# Patient Record
Sex: FEMALE | Race: WHITE | NOT HISPANIC OR LATINO | Employment: OTHER | URBAN - METROPOLITAN AREA
[De-identification: names, ages, dates, MRNs, and addresses within clinical notes are randomized per-mention and may not be internally consistent; named-entity substitution may affect disease eponyms.]

---

## 2017-01-31 RX ORDER — FLUTICASONE PROPIONATE 50 MCG
SPRAY, SUSPENSION (ML) NASAL
Qty: 1 BOTTLE | Refills: 2 | Status: SHIPPED | OUTPATIENT
Start: 2017-01-31 | End: 2017-08-28 | Stop reason: SDUPTHER

## 2017-02-23 RX ORDER — RANITIDINE 150 MG/1
TABLET ORAL
Qty: 180 TABLET | Refills: 1 | Status: SHIPPED | OUTPATIENT
Start: 2017-02-23 | End: 2017-07-10 | Stop reason: SDUPTHER

## 2017-02-23 RX ORDER — LOSARTAN POTASSIUM 50 MG/1
TABLET ORAL
Qty: 90 TABLET | Refills: 1 | Status: SHIPPED | OUTPATIENT
Start: 2017-02-23 | End: 2017-03-20 | Stop reason: HOSPADM

## 2017-02-23 RX ORDER — PRIMIDONE 50 MG/1
TABLET ORAL
Qty: 180 TABLET | Refills: 1 | Status: SHIPPED | OUTPATIENT
Start: 2017-02-23 | End: 2017-07-10 | Stop reason: SDUPTHER

## 2017-02-23 RX ORDER — SERTRALINE HYDROCHLORIDE 100 MG/1
TABLET, FILM COATED ORAL
Qty: 90 TABLET | Refills: 1 | Status: SHIPPED | OUTPATIENT
Start: 2017-02-23 | End: 2017-07-10 | Stop reason: SDUPTHER

## 2017-02-23 RX ORDER — TEMAZEPAM 15 MG/1
CAPSULE ORAL
Qty: 90 CAPSULE | Refills: 0 | Status: ON HOLD | OUTPATIENT
Start: 2017-02-23 | End: 2017-03-20

## 2017-03-14 ENCOUNTER — TELEPHONE (OUTPATIENT)
Dept: FAMILY MEDICINE CLINIC | Facility: CLINIC | Age: 82
End: 2017-03-14

## 2017-03-14 ENCOUNTER — HOSPITAL ENCOUNTER (INPATIENT)
Facility: HOSPITAL | Age: 82
LOS: 5 days | Discharge: NURSING, REHAB OR LONG-TERM CARE | End: 2017-03-20
Attending: EMERGENCY MEDICINE | Admitting: INTERNAL MEDICINE

## 2017-03-14 DIAGNOSIS — L03.116 LEFT LEG CELLULITIS: Primary | ICD-10-CM

## 2017-03-14 LAB
ALBUMIN SERPL-MCNC: 3.8 G/DL (ref 3.5–5.2)
ALBUMIN/GLOB SERPL: 1.1 G/DL
ALP SERPL-CCNC: 88 U/L (ref 39–117)
ALT SERPL W P-5'-P-CCNC: 11 U/L (ref 1–33)
ANION GAP SERPL CALCULATED.3IONS-SCNC: 8.7 MMOL/L
AST SERPL-CCNC: 19 U/L (ref 1–32)
BASOPHILS # BLD AUTO: 0.02 10*3/MM3 (ref 0–0.2)
BASOPHILS NFR BLD AUTO: 0.4 % (ref 0–1.5)
BILIRUB SERPL-MCNC: 0.4 MG/DL (ref 0.1–1.2)
BUN BLD-MCNC: 32 MG/DL (ref 8–23)
BUN/CREAT SERPL: 22.5 (ref 7–25)
CALCIUM SPEC-SCNC: 9.1 MG/DL (ref 8.2–9.6)
CHLORIDE SERPL-SCNC: 101 MMOL/L (ref 98–107)
CO2 SERPL-SCNC: 28.3 MMOL/L (ref 22–29)
CREAT BLD-MCNC: 1.42 MG/DL (ref 0.57–1)
DEPRECATED RDW RBC AUTO: 46.4 FL (ref 37–54)
EOSINOPHIL # BLD AUTO: 0.15 10*3/MM3 (ref 0–0.7)
EOSINOPHIL NFR BLD AUTO: 2.7 % (ref 0.3–6.2)
ERYTHROCYTE [DISTWIDTH] IN BLOOD BY AUTOMATED COUNT: 13.1 % (ref 11.7–13)
GFR SERPL CREATININE-BSD FRML MDRD: 35 ML/MIN/1.73
GLOBULIN UR ELPH-MCNC: 3.4 GM/DL
GLUCOSE BLD-MCNC: 135 MG/DL (ref 65–99)
HCT VFR BLD AUTO: 35.2 % (ref 35.6–45.5)
HGB BLD-MCNC: 11.1 G/DL (ref 11.9–15.5)
IMM GRANULOCYTES # BLD: 0 10*3/MM3 (ref 0–0.03)
IMM GRANULOCYTES NFR BLD: 0 % (ref 0–0.5)
LYMPHOCYTES # BLD AUTO: 1.42 10*3/MM3 (ref 0.9–4.8)
LYMPHOCYTES NFR BLD AUTO: 25.8 % (ref 19.6–45.3)
MCH RBC QN AUTO: 30.7 PG (ref 26.9–32)
MCHC RBC AUTO-ENTMCNC: 31.5 G/DL (ref 32.4–36.3)
MCV RBC AUTO: 97.2 FL (ref 80.5–98.2)
MONOCYTES # BLD AUTO: 0.43 10*3/MM3 (ref 0.2–1.2)
MONOCYTES NFR BLD AUTO: 7.8 % (ref 5–12)
NEUTROPHILS # BLD AUTO: 3.48 10*3/MM3 (ref 1.9–8.1)
NEUTROPHILS NFR BLD AUTO: 63.3 % (ref 42.7–76)
PLATELET # BLD AUTO: 201 10*3/MM3 (ref 140–500)
PMV BLD AUTO: 10 FL (ref 6–12)
POTASSIUM BLD-SCNC: 5 MMOL/L (ref 3.5–5.2)
PROT SERPL-MCNC: 7.2 G/DL (ref 6–8.5)
RBC # BLD AUTO: 3.62 10*6/MM3 (ref 3.9–5.2)
SODIUM BLD-SCNC: 138 MMOL/L (ref 136–145)
WBC NRBC COR # BLD: 5.5 10*3/MM3 (ref 4.5–10.7)

## 2017-03-14 PROCEDURE — 85025 COMPLETE CBC W/AUTO DIFF WBC: CPT | Performed by: EMERGENCY MEDICINE

## 2017-03-14 PROCEDURE — 80053 COMPREHEN METABOLIC PANEL: CPT | Performed by: EMERGENCY MEDICINE

## 2017-03-14 PROCEDURE — 36415 COLL VENOUS BLD VENIPUNCTURE: CPT | Performed by: EMERGENCY MEDICINE

## 2017-03-14 PROCEDURE — 99284 EMERGENCY DEPT VISIT MOD MDM: CPT

## 2017-03-14 RX ORDER — MULTIVITAMIN
1 TABLET ORAL
COMMUNITY
End: 2017-03-20 | Stop reason: HOSPADM

## 2017-03-14 NOTE — ED NOTES
"Pt reports she \"scratched\" her leg and has had swelling and redness in it since. LLE visibly swollen, red with dry drainage present. Pt reports she is diabetic and was seen by a podiatrist last Tues but leg has gotten worse. Resp even and unlabored. Pt in [place].     Alivia Pan RN  03/14/17 5292    "

## 2017-03-14 NOTE — TELEPHONE ENCOUNTER
Pt says that her leg is bleeding off and on red and swollen and I told her that she should go to er for evaluation. jm

## 2017-03-15 ENCOUNTER — APPOINTMENT (OUTPATIENT)
Dept: GENERAL RADIOLOGY | Facility: HOSPITAL | Age: 82
End: 2017-03-15
Attending: INTERNAL MEDICINE

## 2017-03-15 PROBLEM — L03.116 LEFT LEG CELLULITIS: Status: ACTIVE | Noted: 2017-03-15

## 2017-03-15 PROBLEM — N18.30 CKD (CHRONIC KIDNEY DISEASE) STAGE 3, GFR 30-59 ML/MIN (HCC): Status: ACTIVE | Noted: 2017-03-15

## 2017-03-15 LAB
GLUCOSE BLDC GLUCOMTR-MCNC: 119 MG/DL (ref 70–130)
GLUCOSE BLDC GLUCOMTR-MCNC: 132 MG/DL (ref 70–130)
GLUCOSE BLDC GLUCOMTR-MCNC: 175 MG/DL (ref 70–130)
GLUCOSE BLDC GLUCOMTR-MCNC: 98 MG/DL (ref 70–130)

## 2017-03-15 PROCEDURE — 25010000002 MEROPENEM: Performed by: EMERGENCY MEDICINE

## 2017-03-15 PROCEDURE — 25010000002 ENOXAPARIN PER 10 MG: Performed by: INTERNAL MEDICINE

## 2017-03-15 PROCEDURE — 87205 SMEAR GRAM STAIN: CPT | Performed by: EMERGENCY MEDICINE

## 2017-03-15 PROCEDURE — 73590 X-RAY EXAM OF LOWER LEG: CPT

## 2017-03-15 PROCEDURE — 87147 CULTURE TYPE IMMUNOLOGIC: CPT | Performed by: EMERGENCY MEDICINE

## 2017-03-15 PROCEDURE — 87070 CULTURE OTHR SPECIMN AEROBIC: CPT | Performed by: EMERGENCY MEDICINE

## 2017-03-15 PROCEDURE — 82962 GLUCOSE BLOOD TEST: CPT

## 2017-03-15 PROCEDURE — 25010000002 HYDRALAZINE PER 20 MG: Performed by: HOSPITALIST

## 2017-03-15 PROCEDURE — 25010000002 HYDRALAZINE PER 20 MG: Performed by: INTERNAL MEDICINE

## 2017-03-15 PROCEDURE — 25010000002 VANCOMYCIN: Performed by: EMERGENCY MEDICINE

## 2017-03-15 PROCEDURE — 87186 SC STD MICRODIL/AGAR DIL: CPT | Performed by: EMERGENCY MEDICINE

## 2017-03-15 PROCEDURE — 63710000001 INSULIN ASPART PER 5 UNITS: Performed by: INTERNAL MEDICINE

## 2017-03-15 PROCEDURE — 25010000002 MEROPENEM: Performed by: INTERNAL MEDICINE

## 2017-03-15 PROCEDURE — 87040 BLOOD CULTURE FOR BACTERIA: CPT | Performed by: EMERGENCY MEDICINE

## 2017-03-15 RX ORDER — DEXTROSE MONOHYDRATE 25 G/50ML
25 INJECTION, SOLUTION INTRAVENOUS
Status: DISCONTINUED | OUTPATIENT
Start: 2017-03-15 | End: 2017-03-20 | Stop reason: HOSPADM

## 2017-03-15 RX ORDER — HYDRALAZINE HYDROCHLORIDE 20 MG/ML
20 INJECTION INTRAMUSCULAR; INTRAVENOUS EVERY 6 HOURS PRN
Status: DISCONTINUED | OUTPATIENT
Start: 2017-03-15 | End: 2017-03-20 | Stop reason: HOSPADM

## 2017-03-15 RX ORDER — HYDROCODONE BITARTRATE AND ACETAMINOPHEN 5; 325 MG/1; MG/1
1 TABLET ORAL EVERY 4 HOURS PRN
Status: DISCONTINUED | OUTPATIENT
Start: 2017-03-15 | End: 2017-03-20 | Stop reason: HOSPADM

## 2017-03-15 RX ORDER — ACETAMINOPHEN 325 MG/1
650 TABLET ORAL EVERY 4 HOURS PRN
Status: DISCONTINUED | OUTPATIENT
Start: 2017-03-15 | End: 2017-03-20 | Stop reason: HOSPADM

## 2017-03-15 RX ORDER — SODIUM CHLORIDE 0.9 % (FLUSH) 0.9 %
1-10 SYRINGE (ML) INJECTION AS NEEDED
Status: DISCONTINUED | OUTPATIENT
Start: 2017-03-15 | End: 2017-03-20 | Stop reason: HOSPADM

## 2017-03-15 RX ORDER — SERTRALINE HYDROCHLORIDE 100 MG/1
100 TABLET, FILM COATED ORAL NIGHTLY
Status: DISCONTINUED | OUTPATIENT
Start: 2017-03-15 | End: 2017-03-20 | Stop reason: HOSPADM

## 2017-03-15 RX ORDER — FAMOTIDINE 20 MG/1
20 TABLET, FILM COATED ORAL 2 TIMES DAILY
Status: DISCONTINUED | OUTPATIENT
Start: 2017-03-15 | End: 2017-03-15 | Stop reason: DRUGHIGH

## 2017-03-15 RX ORDER — HYDRALAZINE HYDROCHLORIDE 20 MG/ML
10 INJECTION INTRAMUSCULAR; INTRAVENOUS ONCE
Status: COMPLETED | OUTPATIENT
Start: 2017-03-15 | End: 2017-03-15

## 2017-03-15 RX ORDER — AMLODIPINE BESYLATE 5 MG/1
5 TABLET ORAL
Status: DISCONTINUED | OUTPATIENT
Start: 2017-03-15 | End: 2017-03-20 | Stop reason: HOSPADM

## 2017-03-15 RX ORDER — PRIMIDONE 50 MG/1
50 TABLET ORAL EVERY 12 HOURS SCHEDULED
Status: DISCONTINUED | OUTPATIENT
Start: 2017-03-15 | End: 2017-03-20 | Stop reason: HOSPADM

## 2017-03-15 RX ORDER — FAMOTIDINE 20 MG/1
20 TABLET, FILM COATED ORAL DAILY
Status: DISCONTINUED | OUTPATIENT
Start: 2017-03-16 | End: 2017-03-20 | Stop reason: HOSPADM

## 2017-03-15 RX ORDER — TEMAZEPAM 15 MG/1
15 CAPSULE ORAL NIGHTLY
Status: DISCONTINUED | OUTPATIENT
Start: 2017-03-15 | End: 2017-03-20 | Stop reason: HOSPADM

## 2017-03-15 RX ORDER — NICOTINE POLACRILEX 4 MG
15 LOZENGE BUCCAL
Status: DISCONTINUED | OUTPATIENT
Start: 2017-03-15 | End: 2017-03-20 | Stop reason: HOSPADM

## 2017-03-15 RX ORDER — FLUTICASONE PROPIONATE 50 MCG
2 SPRAY, SUSPENSION (ML) NASAL DAILY
Status: DISCONTINUED | OUTPATIENT
Start: 2017-03-15 | End: 2017-03-20 | Stop reason: HOSPADM

## 2017-03-15 RX ADMIN — FAMOTIDINE 20 MG: 20 TABLET, FILM COATED ORAL at 11:06

## 2017-03-15 RX ADMIN — VANCOMYCIN HYDROCHLORIDE 1500 MG: 1 INJECTION, POWDER, LYOPHILIZED, FOR SOLUTION INTRAVENOUS at 01:49

## 2017-03-15 RX ADMIN — MEROPENEM 500 MG: 500 INJECTION, POWDER, FOR SOLUTION INTRAVENOUS at 21:01

## 2017-03-15 RX ADMIN — MEROPENEM 1 G: 1 INJECTION, POWDER, FOR SOLUTION INTRAVENOUS at 07:26

## 2017-03-15 RX ADMIN — PRIMIDONE 50 MG: 50 TABLET ORAL at 11:06

## 2017-03-15 RX ADMIN — SERTRALINE 100 MG: 100 TABLET, FILM COATED ORAL at 21:03

## 2017-03-15 RX ADMIN — AMLODIPINE BESYLATE 5 MG: 5 TABLET ORAL at 11:06

## 2017-03-15 RX ADMIN — PRIMIDONE 50 MG: 50 TABLET ORAL at 21:03

## 2017-03-15 RX ADMIN — ENOXAPARIN SODIUM 30 MG: 30 INJECTION SUBCUTANEOUS at 11:11

## 2017-03-15 RX ADMIN — INSULIN ASPART 2 UNITS: 100 INJECTION, SOLUTION INTRAVENOUS; SUBCUTANEOUS at 11:07

## 2017-03-15 RX ADMIN — HYDRALAZINE HYDROCHLORIDE 20 MG: 20 INJECTION INTRAMUSCULAR; INTRAVENOUS at 23:05

## 2017-03-15 RX ADMIN — HYDRALAZINE HYDROCHLORIDE 10 MG: 20 INJECTION INTRAMUSCULAR; INTRAVENOUS at 07:42

## 2017-03-15 RX ADMIN — TEMAZEPAM 15 MG: 15 CAPSULE ORAL at 21:04

## 2017-03-15 NOTE — ED PROVIDER NOTES
EMERGENCY DEPARTMENT ENCOUNTER    CHIEF COMPLAINT  Chief Complaint: lower extremity issue   History given by: pt  History limited by: none  Room Number: 17/17  PMD: Fly Sanchez DO      HPI:  Pt is a 89 y.o. female with hx of diabetes who presents complaining of redness and swelling to left lower extremity which has worsened over the past two days. She has been seeing a pediatrsit for for an area on her ankle, and reports it began itching and the redness worsened after scratching. She denies fever, CP, SOA and other complaints at this time. Hx of cellulitis on RLE.       Duration:  2 days   Onset: gradual   Timing: constant   Location: LLE  Radiation: none  Quality: redness and swelling   Intensity/Severity: moderate   Progression: worsening   Associated Symptoms: none  Aggravating Factors: none  Alleviating Factors: none  Previous Episodes: Hx of cellulitis on RLE.   Treatment before arrival: none    PAST MEDICAL HISTORY  Active Ambulatory Problems     Diagnosis Date Noted   • Adjustment disorder with mixed anxiety and depressed mood 03/25/2016   • Anemia 03/25/2016   • Benign essential hypertension 03/25/2016   • Hereditary essential tremor 03/25/2016   • Type 2 diabetes mellitus with diabetic polyneuropathy, without long-term current use of insulin 03/25/2016   • Dry skin dermatitis 03/25/2016   • Dyslipidemia 03/25/2016   • Gastroesophageal reflux disease with esophagitis 03/25/2016   • Pain of hand 03/25/2016   • Hearing loss 03/25/2016   • Arthralgia of hip 03/25/2016   • Impacted cerumen 03/25/2016   • Pruritus 03/25/2016   • Knee pain 03/25/2016   • Pain in extremity 03/25/2016   • Chronic low back pain 03/25/2016   • Weakness of lower extremity 03/25/2016   • Spinal stenosis of lumbar region 03/25/2016   • Senile osteoporosis 03/25/2016   • Piriformis syndrome 03/25/2016   • Primary insomnia 03/25/2016   • Tinea pedis 03/25/2016   • Vitamin D deficiency 03/25/2016     Resolved Ambulatory Problems      Diagnosis Date Noted   • Acute otitis externa 03/25/2016   • Acute sinusitis 03/25/2016   • Chronic sinusitis 03/25/2016     Past Medical History   Diagnosis Date   • Anxiety    • Atrophy of hand muscles    • Benign familial tremor    • Carpal tunnel syndrome    • Cataract    • Chronic rhinosinusitis    • Depression    • Diabetes mellitus    • Esophagitis, reflux    • Fracture of hip    • Frequent falls    • GERD (gastroesophageal reflux disease)    • Hand pain    • Hip pain    • Hyperlipidemia    • Hypertension    • Insomnia    • Limb pain    • Loss of hearing    • Low back pain    • Lower extremity weakness    • Lumbar canal stenosis    • Osteoporosis, senile    • Renal insufficiency 2006   • Sensorineural hearing loss    • Stroke 2004       PAST SURGICAL HISTORY  Past Surgical History   Procedure Laterality Date   • Colonoscopy     • Varicose vein surgery Right    • Fracture surgery Left 2006     HIP   • Cholecystectomy  2013   • Ercp w/ sphicterotomy  2012       FAMILY HISTORY  History reviewed. No pertinent family history.    SOCIAL HISTORY  Social History     Social History   • Marital status:      Spouse name: N/A   • Number of children: N/A   • Years of education: N/A     Occupational History   • Not on file.     Social History Main Topics   • Smoking status: Never Smoker   • Smokeless tobacco: Never Used   • Alcohol use No   • Drug use: No   • Sexual activity: Not on file     Other Topics Concern   • Not on file     Social History Narrative       ALLERGIES  Benzodiazepines; Codeine; Cortisone; Macrolides and ketolides; Melatonin; Neomycin; Penicillins; Sulfa antibiotics; and Tetracyclines & related    REVIEW OF SYSTEMS  Review of Systems   Constitutional: Negative for fever.   HENT: Negative for sore throat.    Eyes: Negative.    Respiratory: Negative for cough and shortness of breath.    Cardiovascular: Negative for chest pain.   Gastrointestinal: Negative for abdominal pain, diarrhea and vomiting.    Genitourinary: Negative for dysuria.   Musculoskeletal: Negative for neck pain.   Skin: Positive for wound. Negative for rash.   Allergic/Immunologic: Negative.    Neurological: Negative for weakness, numbness and headaches.   Hematological: Negative.    Psychiatric/Behavioral: Negative.    All other systems reviewed and are negative.      PHYSICAL EXAM  ED Triage Vitals   Temp Heart Rate Resp BP SpO2   03/14/17 1727 03/14/17 1727 03/14/17 1727 03/14/17 1751 03/14/17 1727   97.8 °F (36.6 °C) 63 16 157/67 99 %      Temp src Heart Rate Source Patient Position BP Location FiO2 (%)   03/14/17 1727 03/14/17 1727 03/14/17 1751 03/14/17 1751 --   Tympanic Monitor Sitting Right arm        Physical Exam   Constitutional: She is oriented to person, place, and time and well-developed, well-nourished, and in no distress. No distress.   Nontoxic appearing   HENT:   Head: Normocephalic and atraumatic.   Mouth/Throat: Oropharynx is clear and moist.   Eyes: EOM are normal. Pupils are equal, round, and reactive to light.   Neck: Normal range of motion. Neck supple.   Cardiovascular: Normal rate, regular rhythm, normal heart sounds and intact distal pulses.    No murmur heard.  Pulmonary/Chest: Effort normal and breath sounds normal. No respiratory distress. She has no rales.   Abdominal: Soft. There is no tenderness. There is no rebound and no guarding.   Musculoskeletal: Normal range of motion.   Left lower extremity: cellulitis from just below the knee to dorsum of the foot. Honey crusted discharge on anterior aspect. Palpable pulse.     Neurological: She is alert and oriented to person, place, and time. She has normal sensation and normal strength.   Skin: Skin is warm. No rash noted.   Psychiatric: Mood and affect normal.   Nursing note and vitals reviewed.      LAB RESULTS  Lab Results (last 24 hours)     Procedure Component Value Units Date/Time    CBC & Differential [[id number]] Collected:  03/14/17 1810    Specimen:  Blood  Updated:  03/14/17 1906    Narrative:       The following orders were created for panel order CBC & Differential.  Procedure                               Abnormality         Status                     ---------                               -----------         ------                     CBC Auto Differential[[id number]]         Abnormal            Final result                 Please view results for these tests on the individual orders.    Comprehensive Metabolic Panel [[id number]]  (Abnormal) Collected:  03/14/17 1810    Specimen:  Blood Updated:  03/14/17 1920     Glucose 135 (H) mg/dL      BUN 32 (H) mg/dL      Creatinine 1.42 (H) mg/dL      Sodium 138 mmol/L      Potassium 5.0 mmol/L      Chloride 101 mmol/L      CO2 28.3 mmol/L      Calcium 9.1 mg/dL      Total Protein 7.2 g/dL      Albumin 3.80 g/dL      ALT (SGPT) 11 U/L      AST (SGOT) 19 U/L      Alkaline Phosphatase 88 U/L      Total Bilirubin 0.4 mg/dL      eGFR Non African Amer 35 (L) mL/min/1.73      Globulin 3.4 gm/dL      A/G Ratio 1.1 g/dL      BUN/Creatinine Ratio 22.5      Anion Gap 8.7 mmol/L     Narrative:       The MDRD GFR formula is only valid for adults with stable renal function between ages 18 and 70.    CBC Auto Differential [[id number]]  (Abnormal) Collected:  03/14/17 1810    Specimen:  Blood Updated:  03/14/17 1906     WBC 5.50 10*3/mm3      RBC 3.62 (L) 10*6/mm3      Hemoglobin 11.1 (L) g/dL      Hematocrit 35.2 (L) %      MCV 97.2 fL      MCH 30.7 pg      MCHC 31.5 (L) g/dL      RDW 13.1 (H) %      RDW-SD 46.4 fl      MPV 10.0 fL      Platelets 201 10*3/mm3      Neutrophil % 63.3 %      Lymphocyte % 25.8 %      Monocyte % 7.8 %      Eosinophil % 2.7 %      Basophil % 0.4 %      Immature Grans % 0.0 %      Neutrophils, Absolute 3.48 10*3/mm3      Lymphocytes, Absolute 1.42 10*3/mm3      Monocytes, Absolute 0.43 10*3/mm3      Eosinophils, Absolute 0.15 10*3/mm3      Basophils, Absolute 0.02 10*3/mm3      Immature Grans, Absolute 0.00  10*3/mm3     Wound Culture [[id number]] Collected:  03/15/17 0147    Specimen:  Wound from Ankle, Left Updated:  03/15/17 0156    Blood Culture [[id number]] Collected:  03/15/17 0147    Specimen:  Blood from Blood, Venous Line Updated:  03/15/17 0157          I ordered the above labs and reviewed the results      PROCEDURES  Procedures      PROGRESS AND CONSULTS  ED Course       17:59 Ordered blood work for further evaluation.     01:35 Ordered Vancomycin and Merrem for cellulitis. Ordered wound and blood cultures for further evaluation. Placed call to [facility] for admission.     Discussed with pt and family plan to admit for further treatment of cellulitis. They agree with plan. Addressed all questions.     01:45 Discussed case with Dr. Lawler ([facility]) who will admit.     MEDICAL DECISION MAKING  Results were reviewed/discussed with the patient and they were also made aware of online access. Pt also made aware that some labs, such as cultures, will not be resulted during ER visit and follow up with PMD is necessary.     MDM  Number of Diagnoses or Management Options  Left leg cellulitis:      Amount and/or Complexity of Data Reviewed  Clinical lab tests: ordered and reviewed (WBC - 5.5)  Decide to obtain previous medical records or to obtain history from someone other than the patient: yes  Review and summarize past medical records: yes (No prior visits to [facility] ED)  Discuss the patient with other providers: yes (D/w Dr. Lawler ([facility]))           DIAGNOSIS  Final diagnoses:   Left leg cellulitis       DISPOSITION  ADMISSION    Discussed treatment plan and reason for admission with pt/family and admitting physician.  Pt/family voiced understanding of the plan for admission for further testing/treatment as needed.         Latest Documented Vital Signs:  As of 2:28 AM  BP- (!) 196/89 HR- 66 Temp- 97.8 °F (36.6 °C) (Tympanic) O2 sat- 97%    --  Documentation assistance provided by edi Germain for Dr. Chapa.  Information  recorded by the scribe was done at my direction and has been verified and validated by me.        Andra Germain  03/15/17 0228       Ridge Chapa MD  03/15/17 0354

## 2017-03-15 NOTE — ED NOTES
Pt states she has been seeing a pediatrist for months after bumping her leg. She states her left ankle has been red and swollen more than usual for 2 days.      Tamar Hernandes RN  03/15/17 0026

## 2017-03-15 NOTE — PLAN OF CARE
Problem: Patient Care Overview (Adult)  Goal: Plan of Care Review  Outcome: Ongoing (interventions implemented as appropriate)    03/15/17 0701   Coping/Psychosocial Response Interventions   Plan Of Care Reviewed With patient   Patient Care Overview   Progress no change   Outcome Evaluation   Outcome Summary/Follow up Plan patient arrived from ER with cellultis of LLE; has a scab on shin area; denies pain; hydralazine ordered for high BP; antiibiotics for cellulitis       Goal: Adult Individualization and Mutuality  Outcome: Ongoing (interventions implemented as appropriate)  Goal: Discharge Needs Assessment  Outcome: Ongoing (interventions implemented as appropriate)    Problem: Skin Integrity Impairment, Risk/Actual (Adult)  Goal: Identify Related Risk Factors and Signs and Symptoms  Outcome: Outcome(s) achieved Date Met:  03/15/17  Goal: Skin Integrity/Wound Healing  Outcome: Ongoing (interventions implemented as appropriate)    Problem: Fall Risk (Adult)  Goal: Identify Related Risk Factors and Signs and Symptoms  Outcome: Outcome(s) achieved Date Met:  03/15/17  Goal: Absence of Falls  Outcome: Ongoing (interventions implemented as appropriate)    Problem: Infection, Risk/Actual (Adult)  Goal: Identify Related Risk Factors and Signs and Symptoms  Outcome: Outcome(s) achieved Date Met:  03/15/17  Goal: Infection Prevention/Resolution  Outcome: Ongoing (interventions implemented as appropriate)

## 2017-03-15 NOTE — PROGRESS NOTES
"Pharmacokinetic Consult - Vancomycin Dosing (Initial Note)  Day #1   Magdalena NITHYA Rosalino has been consulted for pharmacy to dose vancomycin for left leg cellulitis   Pharmacy dosing vancomycin per Dr Castaneda's request.   Goal trough: 10-20  mg/L   Other antimicrobials: Merrem    Relevant clinical data and objective history reviewed:  89 y.o. female 67\" (170.2 cm) 167 lb 3.2 oz (75.8 kg)    Past Medical History   Diagnosis Date   • Adjustment disorder with mixed anxiety and depressed mood    • Anemia    • Anxiety    • Atrophy of hand muscles      LEFT HAND   • Benign familial tremor    • Carpal tunnel syndrome    • Cataract    • Chronic rhinosinusitis    • Depression    • Diabetes mellitus    • Dyslipidemia    • Esophagitis, reflux    • Fracture of hip    • Frequent falls    • GERD (gastroesophageal reflux disease)    • Hand pain    • Hearing loss    • Hip pain    • Hyperlipidemia    • Hypertension    • Impacted cerumen    • Insomnia    • Knee pain    • Limb pain    • Loss of hearing    • Low back pain    • Lower extremity weakness    • Lumbar canal stenosis    • Osteoporosis, senile    • Piriformis syndrome    • Renal insufficiency 2006   • Sensorineural hearing loss    • Stroke 2004   • Tinea pedis    • Vitamin D deficiency      CREATININE   Date Value Ref Range Status   03/14/2017 1.42 (H) 0.57 - 1.00 mg/dL Final     BUN   Date Value Ref Range Status   03/14/2017 32 (H) 8 - 23 mg/dL Final     Estimated Creatinine Clearance: 27.4 mL/min (by C-G formula based on Cr of 1.42).    Lab Results   Component Value Date    WBC 5.50 03/14/2017     Temp Readings from Last 3 Encounters:   03/15/17 98.2 °F (36.8 °C) (Oral)   12/12/16 98 °F (36.7 °C) (Oral)   06/10/16 98.3 °F (36.8 °C) (Oral)      Baseline culture/source/susceptibility: 3/15 WC  In process                                                                3/15 BC  In process    Dosing History  3/15  0149 Vancomycin 1500mg ( ~20mg/kg) load  3/16  0600 Vancomycin " random level to be obtained    Assessment/Plan  1. Scr= 1.42   CrCl= 27.4ml/min  2. Intermittent Dosing for now  3. Random level ordered with am labs  4. BMP with am labs  5. No signs of systemic infection  6. Pharmacy will continue to follow and adjust accordingly.  Sahara [facility]

## 2017-03-15 NOTE — H&P
Name: Magdalena Jerry ADMIT: 3/14/2017   : 1926  PCP: Fly Sanchez DO    MRN: [id number] LOS: 0 days   AGE/SEX: 89 y.o. female  ROOM: P878/1     Chief Complaint   Patient presents with   • Leg Swelling       Subjective   Ms Jerry is apleasant 89yoF with hx of DM, now diet controlled, and L ankle sore that has been healing well under supervision of podiatrist. She reports she had worse swelling and pain over her shin so she scratched open a wound on that leg. This followed with worse swelling redness and tenderness over her anterior shin with extension to just below the knee. Her ankle and foot have not changed and she has not had drainage from anywhere besides her shin. No recent falls. No fevers, chills, sick contacts. She has not been on antibiotics recently. No CP SOA NVD falls or dysuria reported.    History of Present Illness    Past Medical History   Diagnosis Date   • Adjustment disorder with mixed anxiety and depressed mood    • Anemia    • Anxiety    • Atrophy of hand muscles      LEFT HAND   • Benign familial tremor    • Carpal tunnel syndrome    • Cataract    • Chronic rhinosinusitis    • Depression    • Diabetes mellitus    • Dyslipidemia    • Esophagitis, reflux    • Fracture of hip    • Frequent falls    • GERD (gastroesophageal reflux disease)    • Hand pain    • Hearing loss    • Hip pain    • Hyperlipidemia    • Hypertension    • Impacted cerumen    • Insomnia    • Knee pain    • Limb pain    • Loss of hearing    • Low back pain    • Lower extremity weakness    • Lumbar canal stenosis    • Osteoporosis, senile    • Piriformis syndrome    • Renal insufficiency    • Sensorineural hearing loss    • Stroke    • Tinea pedis    • Vitamin D deficiency      Past Surgical History   Procedure Laterality Date   • Colonoscopy     • Varicose vein surgery Right    • Fracture surgery Left      HIP   • Cholecystectomy     • Ercp w/ sphicterotomy       History reviewed. No  pertinent family history.  Social History   Substance Use Topics   • Smoking status: Never Smoker   • Smokeless tobacco: Never Used   • Alcohol use No     Prescriptions Prior to Admission   Medication Sig Dispense Refill Last Dose   • Cholecalciferol (VITAMIN D) 1000 UNITS tablet Take 2,000 Units by mouth.   3/14/2017 at 0900   • fluticasone (FLONASE) 50 MCG/ACT nasal spray USE TWO SPRAY(S) IN EACH NOSTRIL ONCE DAILY 1 bottle 2 3/14/2017 at 0900   • glucose blood test strip TRUEtest test strip. Use as directed to test glucose once daily. Diagnosis E11.9 100 each 3 3/14/2017 at 0900   • Lancet Device misc Use as directed to test glucose once daily. Diagnosis E11.9 100 each 3 3/14/2017 at 0900   • losartan (COZAAR) 50 MG tablet TAKE 1 TABLET EVERY DAY FOR BLOOD PRESSURE 90 tablet 1 3/14/2017 at 0900   • Multiple Vitamin (MULTIVITAMIN) tablet Take 1 tablet by mouth.   3/14/2017 at 0900   • primidone (MYSOLINE) 50 MG tablet TAKE 1TABLET TWICE DAILY FOR TREMOR 180 tablet 1 Past Week at Unknown time   • raNITIdine (ZANTAC) 150 MG tablet TAKE 1 TABLET TWICE DAILY FOR REFLUX ESOPHAGITIS 180 tablet 1 3/14/2017 at 1600   • sertraline (ZOLOFT) 100 MG tablet TAKE 1 TABLET DAILY FOR MOOD AND WELL BEING 90 tablet 1 3/14/2017 at 0900   • temazepam (RESTORIL) 15 MG capsule TAKE 1 CAPSULE AT BEDTIME 90 capsule 0 Past Week at Unknown time     Allergies:  Benzodiazepines; Codeine; Cortisone; Macrolides and ketolides; Melatonin; Neomycin; Penicillins; Sulfa antibiotics; and Tetracyclines & related    Review of Systems   Constitutional: Negative for chills and fever.   HENT: Negative for sore throat, tinnitus and trouble swallowing.    Eyes: Negative for pain and visual disturbance.   Respiratory: Negative for cough and shortness of breath.    Cardiovascular: Negative for chest pain, palpitations and leg swelling.   Gastrointestinal: Negative for constipation, diarrhea, nausea and vomiting.   Endocrine: Negative for cold intolerance and  heat intolerance.   Genitourinary: Negative for difficulty urinating and dysuria.   Musculoskeletal: Negative for neck pain and neck stiffness.   Skin: Positive for rash and wound.   Allergic/Immunologic: Negative for environmental allergies and food allergies.   Neurological: Negative for seizures and syncope.   Hematological: Negative for adenopathy.   Psychiatric/Behavioral: Negative for agitation and confusion.        Objective    Vital Signs  Temp:  [97.1 °F (36.2 °C)-98.2 °F (36.8 °C)] 98.2 °F (36.8 °C)  Heart Rate:  [57-70] 70  Resp:  [16-18] 16  BP: (157-218)/(67-92) 187/67  SpO2:  [94 %-99 %] 98 %  on   ;   O2 Device: room air  Body mass index is 26.19 kg/(m^2).    Physical Exam   Constitutional: She appears well-developed and well-nourished. No distress.   HENT:   Head: Normocephalic and atraumatic.   Nose: Nose normal.   Eyes: Conjunctivae and EOM are normal. Pupils are equal, round, and reactive to light.   Neck: Normal range of motion. Neck supple.   Cardiovascular: Normal rate, regular rhythm and intact distal pulses.    Murmur heard.  systolic 3/5   Pulmonary/Chest: Effort normal and breath sounds normal. She has no wheezes.   Abdominal: Soft. Bowel sounds are normal. She exhibits no distension. There is no tenderness.   Musculoskeletal: Normal range of motion. She exhibits tenderness.   Neurological: She is alert. No cranial nerve deficit.   Skin: Skin is warm and dry. Rash noted. She is not diaphoretic.   L shin with anterior excoriation and wound with scab in place. No drainage. Erythema to mid shin. L lateral malleoulus with well healing wound and now has skin closure.   Psychiatric: She has a normal mood and affect. Her behavior is normal.   Nursing note and vitals reviewed.      Results Review:   I reviewed the patient's new clinical results. Reviewed labs. Reviewed prior clinic records.    Assessment/Plan     Principal Problem:    Left leg cellulitis  Active Problems:    Benign essential  hypertension    Type 2 diabetes mellitus with diabetic polyneuropathy, without long-term current use of insulin    CKD (chronic kidney disease) stage 3, GFR 30-59 ml/min      Assessment & Plan    -Will continue coverage for cellulitis with Vanc and Meropenem. No signs of systemic infection. Monitor for improvement with antibiotics. Check XR, ESR, CRP. Follow up Cx results.  -BP uncontrolled. Am going to hold losartan in the setting of CKD and baseline somewhere between 1.2-1.4 in case she requires MRI based on above results. Will check UA and give amlodipine + hydralazine prn.  -Check A1c, diet controlled. Will cover with SSI PRN.  -Lovenox  -Full code: discussed with patient    I discussed the patients findings and my recommendations with patient.    Gera Castaneda MD  [facility]  03/15/17  9:12 AM

## 2017-03-15 NOTE — PLAN OF CARE
Problem: Patient Care Overview (Adult)  Goal: Plan of Care Review  Outcome: Ongoing (interventions implemented as appropriate)    03/15/17 1446   Coping/Psychosocial Response Interventions   Plan Of Care Reviewed With patient;family   Patient Care Overview   Progress progress toward functional goals as expected   Outcome Evaluation   Outcome Summary/Follow up Plan antiobiotic thearpy as ordered B/P high MD aware adminstered med with improvement         Problem: Skin Integrity Impairment, Risk/Actual (Adult)  Goal: Skin Integrity/Wound Healing  Outcome: Ongoing (interventions implemented as appropriate)    03/15/17 1446   Skin Integrity Impairment, Risk/Actual (Adult)   Skin Integrity/Wound Healing achieves outcome         Problem: Fall Risk (Adult)  Goal: Absence of Falls  Outcome: Outcome(s) achieved Date Met:  03/15/17    03/15/17 1446   Fall Risk (Adult)   Absence of Falls achieves outcome         Problem: Infection, Risk/Actual (Adult)  Goal: Infection Prevention/Resolution  Outcome: Ongoing (interventions implemented as appropriate)    03/15/17 1446   Infection, Risk/Actual (Adult)   Infection Prevention/Resolution making progress toward outcome

## 2017-03-16 ENCOUNTER — APPOINTMENT (OUTPATIENT)
Dept: MRI IMAGING | Facility: HOSPITAL | Age: 82
End: 2017-03-16
Attending: INTERNAL MEDICINE

## 2017-03-16 PROBLEM — J30.9 ALLERGIC RHINITIS: Status: ACTIVE | Noted: 2017-03-16

## 2017-03-16 LAB
ANION GAP SERPL CALCULATED.3IONS-SCNC: 15.2 MMOL/L
BASOPHILS # BLD AUTO: 0.02 10*3/MM3 (ref 0–0.2)
BASOPHILS NFR BLD AUTO: 0.4 % (ref 0–1.5)
BUN BLD-MCNC: 23 MG/DL (ref 8–23)
BUN/CREAT SERPL: 21.7 (ref 7–25)
CALCIUM SPEC-SCNC: 8.7 MG/DL (ref 8.2–9.6)
CHLORIDE SERPL-SCNC: 102 MMOL/L (ref 98–107)
CO2 SERPL-SCNC: 22.8 MMOL/L (ref 22–29)
CREAT BLD-MCNC: 1.06 MG/DL (ref 0.57–1)
CRP SERPL-MCNC: 4.56 MG/DL (ref 0–0.5)
DEPRECATED RDW RBC AUTO: 44.6 FL (ref 37–54)
EOSINOPHIL # BLD AUTO: 0.19 10*3/MM3 (ref 0–0.7)
EOSINOPHIL NFR BLD AUTO: 3.9 % (ref 0.3–6.2)
ERYTHROCYTE [DISTWIDTH] IN BLOOD BY AUTOMATED COUNT: 13 % (ref 11.7–13)
ERYTHROCYTE [SEDIMENTATION RATE] IN BLOOD: 34 MM/HR (ref 0–30)
GFR SERPL CREATININE-BSD FRML MDRD: 49 ML/MIN/1.73
GLUCOSE BLD-MCNC: 104 MG/DL (ref 65–99)
GLUCOSE BLDC GLUCOMTR-MCNC: 127 MG/DL (ref 70–130)
GLUCOSE BLDC GLUCOMTR-MCNC: 130 MG/DL (ref 70–130)
GLUCOSE BLDC GLUCOMTR-MCNC: 165 MG/DL (ref 70–130)
GLUCOSE BLDC GLUCOMTR-MCNC: 97 MG/DL (ref 70–130)
HBA1C MFR BLD: 6.03 % (ref 4.8–5.6)
HCT VFR BLD AUTO: 33.6 % (ref 35.6–45.5)
HGB BLD-MCNC: 10.9 G/DL (ref 11.9–15.5)
IMM GRANULOCYTES # BLD: 0 10*3/MM3 (ref 0–0.03)
IMM GRANULOCYTES NFR BLD: 0 % (ref 0–0.5)
LYMPHOCYTES # BLD AUTO: 1.37 10*3/MM3 (ref 0.9–4.8)
LYMPHOCYTES NFR BLD AUTO: 28.2 % (ref 19.6–45.3)
MAGNESIUM SERPL-MCNC: 2 MG/DL (ref 1.7–2.3)
MCH RBC QN AUTO: 30.9 PG (ref 26.9–32)
MCHC RBC AUTO-ENTMCNC: 32.4 G/DL (ref 32.4–36.3)
MCV RBC AUTO: 95.2 FL (ref 80.5–98.2)
MONOCYTES # BLD AUTO: 0.55 10*3/MM3 (ref 0.2–1.2)
MONOCYTES NFR BLD AUTO: 11.3 % (ref 5–12)
NEUTROPHILS # BLD AUTO: 2.73 10*3/MM3 (ref 1.9–8.1)
NEUTROPHILS NFR BLD AUTO: 56.2 % (ref 42.7–76)
PLATELET # BLD AUTO: 202 10*3/MM3 (ref 140–500)
PMV BLD AUTO: 9.8 FL (ref 6–12)
POTASSIUM BLD-SCNC: 4.3 MMOL/L (ref 3.5–5.2)
RBC # BLD AUTO: 3.53 10*6/MM3 (ref 3.9–5.2)
SODIUM BLD-SCNC: 140 MMOL/L (ref 136–145)
VANCOMYCIN SERPL-MCNC: 8.2 MCG/ML (ref 5–40)
WBC NRBC COR # BLD: 4.86 10*3/MM3 (ref 4.5–10.7)

## 2017-03-16 PROCEDURE — 83735 ASSAY OF MAGNESIUM: CPT | Performed by: INTERNAL MEDICINE

## 2017-03-16 PROCEDURE — 85652 RBC SED RATE AUTOMATED: CPT | Performed by: INTERNAL MEDICINE

## 2017-03-16 PROCEDURE — 25010000002 ENOXAPARIN PER 10 MG: Performed by: INTERNAL MEDICINE

## 2017-03-16 PROCEDURE — 80048 BASIC METABOLIC PNL TOTAL CA: CPT | Performed by: INTERNAL MEDICINE

## 2017-03-16 PROCEDURE — A9577 INJ MULTIHANCE: HCPCS | Performed by: INTERNAL MEDICINE

## 2017-03-16 PROCEDURE — 85025 COMPLETE CBC W/AUTO DIFF WBC: CPT | Performed by: INTERNAL MEDICINE

## 2017-03-16 PROCEDURE — 25010000002 VANCOMYCIN: Performed by: INTERNAL MEDICINE

## 2017-03-16 PROCEDURE — 83036 HEMOGLOBIN GLYCOSYLATED A1C: CPT | Performed by: INTERNAL MEDICINE

## 2017-03-16 PROCEDURE — 73720 MRI LWR EXTREMITY W/O&W/DYE: CPT

## 2017-03-16 PROCEDURE — 0 GADOBENATE DIMEGLUMINE 529 MG/ML SOLUTION: Performed by: INTERNAL MEDICINE

## 2017-03-16 PROCEDURE — 86140 C-REACTIVE PROTEIN: CPT | Performed by: INTERNAL MEDICINE

## 2017-03-16 PROCEDURE — 25010000002 MEROPENEM: Performed by: INTERNAL MEDICINE

## 2017-03-16 PROCEDURE — 63710000001 INSULIN ASPART PER 5 UNITS: Performed by: INTERNAL MEDICINE

## 2017-03-16 PROCEDURE — 80202 ASSAY OF VANCOMYCIN: CPT | Performed by: INTERNAL MEDICINE

## 2017-03-16 PROCEDURE — 82962 GLUCOSE BLOOD TEST: CPT

## 2017-03-16 RX ORDER — GUAIFENESIN 600 MG/1
600 TABLET, EXTENDED RELEASE ORAL 2 TIMES DAILY
Status: DISCONTINUED | OUTPATIENT
Start: 2017-03-16 | End: 2017-03-20 | Stop reason: HOSPADM

## 2017-03-16 RX ADMIN — HYDROCODONE BITARTRATE AND ACETAMINOPHEN 1 TABLET: 5; 325 TABLET ORAL at 17:36

## 2017-03-16 RX ADMIN — PRIMIDONE 50 MG: 50 TABLET ORAL at 21:53

## 2017-03-16 RX ADMIN — PRIMIDONE 50 MG: 50 TABLET ORAL at 09:15

## 2017-03-16 RX ADMIN — GADOBENATE DIMEGLUMINE 15 ML: 529 INJECTION, SOLUTION INTRAVENOUS at 14:46

## 2017-03-16 RX ADMIN — FAMOTIDINE 20 MG: 20 TABLET, FILM COATED ORAL at 09:15

## 2017-03-16 RX ADMIN — ENOXAPARIN SODIUM 30 MG: 30 INJECTION SUBCUTANEOUS at 09:14

## 2017-03-16 RX ADMIN — MEROPENEM 500 MG: 500 INJECTION, POWDER, FOR SOLUTION INTRAVENOUS at 09:14

## 2017-03-16 RX ADMIN — VANCOMYCIN HYDROCHLORIDE 1250 MG: 1 INJECTION, POWDER, LYOPHILIZED, FOR SOLUTION INTRAVENOUS at 15:46

## 2017-03-16 RX ADMIN — TEMAZEPAM 15 MG: 15 CAPSULE ORAL at 21:53

## 2017-03-16 RX ADMIN — SERTRALINE 100 MG: 100 TABLET, FILM COATED ORAL at 21:53

## 2017-03-16 RX ADMIN — INSULIN ASPART 2 UNITS: 100 INJECTION, SOLUTION INTRAVENOUS; SUBCUTANEOUS at 17:36

## 2017-03-16 RX ADMIN — GUAIFENESIN 600 MG: 600 TABLET, EXTENDED RELEASE ORAL at 17:36

## 2017-03-16 RX ADMIN — AMLODIPINE BESYLATE 5 MG: 5 TABLET ORAL at 09:15

## 2017-03-16 NOTE — PROGRESS NOTES
Discharge Planning Assessment  [facility]     Patient Name: Magdalena Jerry  MRN: [id number]  Today's Date: 3/16/2017    Admit Date: 3/14/2017          Discharge Needs Assessment       03/16/17 1603    Living Environment    Lives With alone    Living Arrangements house    Discharge Needs Assessment    Concerns To Be Addressed basic needs concerns    Readmission Within The Last 30 Days no previous admission in last 30 days    Anticipated Changes Related to Illness none    Equipment Currently Used at Home walker, standard    Discharge Facility/Level Of Care Needs nursing facility, skilled    Transportation Available car;ambulance;family or friend will provide            Discharge Plan       03/16/17 1604    Case Management/Social Work Plan    Plan [facility]    Patient/Family In Agreement With Plan yes    Additional Comments Spoke with pt and family, verified correct information on facesheet and explained the role of CCP. Pt would like to d/c to [facility], referral given to [place] with [facility] who states they are able to accept, and will have bed available at d/c. Plan will be to d/c  to SNF skilled. No other needs identified at this time.        Discharge Placement     Facility/Agency Request Status Selected? Address Phone Number Fax Number    [facility] Accepted    [facility] [address] [facility] [id number] [phone]          Tracy Rodríguez RN

## 2017-03-16 NOTE — PROGRESS NOTES
" LOS: 1 day   Primary Care Physician: Fly Sanchez DO     Subjective  No fevers, CP, SOA. Reporting some post nasal drip with occasional cough. No sinus pressure. No NVD.       Vital Signs  Body mass index is 26.19 kg/(m^2).  Temp:  [97.9 °F (36.6 °C)-100.6 °F (38.1 °C)] 99.4 °F (37.4 °C)  Heart Rate:  [67-75] 74  Resp:  [16-18] 16  BP: (149-199)/(62-79) 149/68      Objective:  General Appearance:  Comfortable and in no acute distress.    Vital signs: (most recent): Blood pressure 149/68, pulse 74, temperature 99.4 °F (37.4 °C), temperature source Oral, resp. rate 16, height 67\" (170.2 cm), weight 167 lb 3.2 oz (75.8 kg), SpO2 93 %.  Fever.    HEENT: Normal HEENT exam.    Lungs:  Normal respiratory rate and normal effort.  Breath sounds clear to auscultation.    Heart: Normal rate.  Regular rhythm.    Abdomen: Abdomen is soft.  There is no abdominal tenderness.     Extremities: Normal range of motion.  There is local extremity swelling.    Pulses: Distal pulses are intact.    Neurological: Patient is alert.    Pupils:  Pupils are equal, round, and reactive to light.    Skin:  There is a rash.  (LLE with healing ulcer on shin, erythema is stable to mid shin, swelling much improved.)            Results Review:    I reviewed the patient's new clinical results.  I reviewed the patient's new imaging results and agree with the interpretation.      Results from last 7 days  Lab Units 03/16/17  0427 03/14/17  1810   WBC 10*3/mm3 4.86 5.50   HEMOGLOBIN g/dL 10.9* 11.1*   PLATELETS 10*3/mm3 202 201       Results from last 7 days  Lab Units 03/16/17  0427 03/14/17  1810   SODIUM mmol/L 140 138   POTASSIUM mmol/L 4.3 5.0   CHLORIDE mmol/L 102 101   TOTAL CO2 mmol/L 22.8 28.3   BUN mg/dL 23 32*   CREATININE mg/dL 1.06* 1.42*   CALCIUM mg/dL 8.7 9.1   GLUCOSE mg/dL 104* 135*         Hemoglobin A1C:  Lab Results   Component Value Date    HGBA1C 6.03 (H) 03/16/2017       Glucose Range:  GLUCOSE   Date/Time Value Ref Range " Status   03/16/2017 1126 130 70 - 130 mg/dL Final   03/16/2017 0612 97 70 - 130 mg/dL Final   03/15/2017 2255 119 70 - 130 mg/dL Final   03/15/2017 1605 132 (H) 70 - 130 mg/dL Final   03/15/2017 1107 175 (H) 70 - 130 mg/dL Final   03/15/2017 0553 98 70 - 130 mg/dL Final       Medication Review: Yes    Physical Therapy:    Assessment/Plan     Active Hospital Problems (** Indicates Principal Problem)    Diagnosis Date Noted   • **Left leg cellulitis [L03.116] 03/15/2017   • Allergic rhinitis [J30.9] 03/16/2017   • CKD (chronic kidney disease) stage 3, GFR 30-59 ml/min [N18.3] 03/15/2017   • Type 2 diabetes mellitus with diabetic polyneuropathy, without long-term current use of insulin [E11.42] 03/25/2016   • Benign essential hypertension [I10] 03/25/2016      Resolved Hospital Problems    Diagnosis Date Noted Date Resolved   No resolved problems to display.       Assessment & Plan  -MRSA on Wound Cx. XR negative and ESR/CRP are elevated. Will obtain MRI for possible osteo. She is having good improvement with antibiotics. Will follow up sensitivities and continue Vancomycin for now. Stop meropenem.  -Cr improving and now below previous baseline. Will monitor.  -A1c 6.0. Diet control at discharge  -Continue fluticasone. Will add guaifenesin.  Disposition: anticipate discharge in 1-2 days depending on results.    Gera Castaneda MD  03/16/17  12:21 PM

## 2017-03-16 NOTE — PLAN OF CARE
Problem: Patient Care Overview (Adult)  Goal: Plan of Care Review  Outcome: Ongoing (interventions implemented as appropriate)    03/16/17 0338   Coping/Psychosocial Response Interventions   Plan Of Care Reviewed With patient   Patient Care Overview   Progress progress toward functional goals is gradual   Outcome Evaluation   Outcome Summary/Follow up Plan ANTIBIOTIC THEARPY AS ORDERED. HYDRALAZINE GIVEN X1 FOR INCREASE BLOOD PRESSURE.        Goal: Adult Individualization and Mutuality  Outcome: Ongoing (interventions implemented as appropriate)  Goal: Discharge Needs Assessment  Outcome: Ongoing (interventions implemented as appropriate)    Problem: Skin Integrity Impairment, Risk/Actual (Adult)  Goal: Skin Integrity/Wound Healing  Outcome: Ongoing (interventions implemented as appropriate)    Problem: Fall Risk (Adult)  Goal: Absence of Falls  Outcome: Ongoing (interventions implemented as appropriate)    Problem: Infection, Risk/Actual (Adult)  Goal: Infection Prevention/Resolution  Outcome: Ongoing (interventions implemented as appropriate)

## 2017-03-16 NOTE — PROGRESS NOTES
"Pharmacokinetic Consult - Vancomycin Dosing (Follow-up Note)    Magdalena Jerry is on day 2 pharmacy to dose vancomycin for cellulitis.  Pharmacy dosing vancomycin per Dr Castaneda's request.   Goal trough: 10-20 mg/L     Relevant clinical data and objective history reviewed:  89 y.o. female 67\" (170.2 cm) 167 lb 3.2 oz (75.8 kg)    Past Medical History   Diagnosis Date   • Adjustment disorder with mixed anxiety and depressed mood    • Anemia    • Anxiety    • Atrophy of hand muscles      LEFT HAND   • Benign familial tremor    • Carpal tunnel syndrome    • Cataract    • Chronic rhinosinusitis    • Depression    • Diabetes mellitus    • Dyslipidemia    • Esophagitis, reflux    • Fracture of hip    • Frequent falls    • GERD (gastroesophageal reflux disease)    • Hand pain    • Hearing loss    • Hip pain    • Hyperlipidemia    • Hypertension    • Impacted cerumen    • Insomnia    • Knee pain    • Limb pain    • Loss of hearing    • Low back pain    • Lower extremity weakness    • Lumbar canal stenosis    • Osteoporosis, senile    • Piriformis syndrome    • Renal insufficiency 2006   • Sensorineural hearing loss    • Stroke 2004   • Tinea pedis    • Vitamin D deficiency      CREATININE   Date Value Ref Range Status   03/16/2017 1.06 (H) 0.57 - 1.00 mg/dL Final   03/14/2017 1.42 (H) 0.57 - 1.00 mg/dL Final   06/10/2016 1.16 (H) 0.57 - 1.00 mg/dL Final   01/05/2016 1.45 (H) 0.57 - 1.00 mg/dL Final   07/24/2015 1.23 (H) 0.57 - 1.00 mg/dL Final     BUN   Date Value Ref Range Status   03/16/2017 23 8 - 23 mg/dL Final   06/10/2016 24 10 - 36 mg/dL Final     Estimated Creatinine Clearance: 36.7 mL/min (by C-G formula based on Cr of 1.06).    Lab Results   Component Value Date    WBC 4.86 03/16/2017     Temp Readings from Last 3 Encounters:   03/16/17 (!) 100.6 °F (38.1 °C) (Oral)   12/12/16 98 °F (36.7 °C) (Oral)   06/10/16 98.3 °F (36.8 °C) (Oral)     Baseline culture/source/susceptibility: blood - NGx1d, wound - MRSA. "     IV Anti-Infectives     Ordered     Dose/Rate Route Frequency Start Stop    03/16/17 0936  vancomycin 1250 mg/250 mL 0.9% NS IVPB ([facility])     Ordering Provider:  Gera Castaneda MD    1,250 mg  over 125 Minutes Intravenous Every 24 Hours 03/16/17 1100      03/15/17 1041  Vancomycin Pharmacy Intermittent Dosing     Ordering Provider:  Gera Castaneda MD     Does not apply Daily 03/15/17 1115      03/15/17 0912  meropenem (MERREM) 500mg/100 mL 0.9% NS IVPB ([facility])     Ordering Provider:  Gera Castaneda MD    500 mg  over 30 Minutes Intravenous Every 12 Hours 03/15/17 0945      03/15/17 0912  Pharmacy to dose vancomycin     Ordering Provider:  Gera Castaneda MD     Does not apply Continuous PRN 03/15/17 0910      03/15/17 0136  vancomycin 1500 mg/500 mL 0.9% NS IVPB ([facility])     Ordering Provider:  Ridge Chapa MD    20 mg/kg × 80.3 kg Intravenous Once 03/15/17 0138 03/15/17 0149    03/15/17 0136  meropenem (MERREM) 1 g/100 mL 0.9% NS VTB ([facility])     Ordering Provider:  Ridge Chapa MD    1 g  over 30 Minutes Intravenous Once 03/15/17 0138 03/15/17 0756         Lab Results   Component Value Date    VANCORANDOM 8.20 03/16/2017       Assessment/Plan  SCr improved, will start vancomycin 1250mg iv q24h. Will monitor serum creatinine at least every 48 hours per dosing recommendations. Vancomycin level prior to 3rd dose. Pharmacy will continue to follow daily while on vancomycin and adjust as needed.     Gabriela Rawls [facility]

## 2017-03-17 ENCOUNTER — APPOINTMENT (OUTPATIENT)
Dept: GENERAL RADIOLOGY | Facility: HOSPITAL | Age: 82
End: 2017-03-17
Attending: INTERNAL MEDICINE

## 2017-03-17 PROBLEM — R05.9 COUGH: Status: ACTIVE | Noted: 2017-03-17

## 2017-03-17 LAB
ANION GAP SERPL CALCULATED.3IONS-SCNC: 11.2 MMOL/L
BACTERIA SPEC AEROBE CULT: ABNORMAL
BASOPHILS # BLD AUTO: 0.02 10*3/MM3 (ref 0–0.2)
BASOPHILS NFR BLD AUTO: 0.5 % (ref 0–1.5)
BUN BLD-MCNC: 19 MG/DL (ref 8–23)
BUN/CREAT SERPL: 18.3 (ref 7–25)
CALCIUM SPEC-SCNC: 8.6 MG/DL (ref 8.2–9.6)
CHLORIDE SERPL-SCNC: 101 MMOL/L (ref 98–107)
CO2 SERPL-SCNC: 24.8 MMOL/L (ref 22–29)
CREAT BLD-MCNC: 1.04 MG/DL (ref 0.57–1)
DEPRECATED RDW RBC AUTO: 46.4 FL (ref 37–54)
EOSINOPHIL # BLD AUTO: 0.12 10*3/MM3 (ref 0–0.7)
EOSINOPHIL NFR BLD AUTO: 3.1 % (ref 0.3–6.2)
ERYTHROCYTE [DISTWIDTH] IN BLOOD BY AUTOMATED COUNT: 13.2 % (ref 11.7–13)
GFR SERPL CREATININE-BSD FRML MDRD: 50 ML/MIN/1.73
GLUCOSE BLD-MCNC: 99 MG/DL (ref 65–99)
GLUCOSE BLDC GLUCOMTR-MCNC: 105 MG/DL (ref 70–130)
GLUCOSE BLDC GLUCOMTR-MCNC: 141 MG/DL (ref 70–130)
GLUCOSE BLDC GLUCOMTR-MCNC: 170 MG/DL (ref 70–130)
GRAM STN SPEC: ABNORMAL
GRAM STN SPEC: ABNORMAL
HCT VFR BLD AUTO: 33.4 % (ref 35.6–45.5)
HGB BLD-MCNC: 10.5 G/DL (ref 11.9–15.5)
IMM GRANULOCYTES # BLD: 0 10*3/MM3 (ref 0–0.03)
IMM GRANULOCYTES NFR BLD: 0 % (ref 0–0.5)
LYMPHOCYTES # BLD AUTO: 0.78 10*3/MM3 (ref 0.9–4.8)
LYMPHOCYTES NFR BLD AUTO: 20 % (ref 19.6–45.3)
MCH RBC QN AUTO: 30.3 PG (ref 26.9–32)
MCHC RBC AUTO-ENTMCNC: 31.4 G/DL (ref 32.4–36.3)
MCV RBC AUTO: 96.3 FL (ref 80.5–98.2)
MONOCYTES # BLD AUTO: 0.57 10*3/MM3 (ref 0.2–1.2)
MONOCYTES NFR BLD AUTO: 14.6 % (ref 5–12)
NEUTROPHILS # BLD AUTO: 2.41 10*3/MM3 (ref 1.9–8.1)
NEUTROPHILS NFR BLD AUTO: 61.8 % (ref 42.7–76)
PLATELET # BLD AUTO: 189 10*3/MM3 (ref 140–500)
PMV BLD AUTO: 9.1 FL (ref 6–12)
POTASSIUM BLD-SCNC: 4.4 MMOL/L (ref 3.5–5.2)
RBC # BLD AUTO: 3.47 10*6/MM3 (ref 3.9–5.2)
SODIUM BLD-SCNC: 137 MMOL/L (ref 136–145)
WBC NRBC COR # BLD: 3.9 10*3/MM3 (ref 4.5–10.7)

## 2017-03-17 PROCEDURE — 97110 THERAPEUTIC EXERCISES: CPT

## 2017-03-17 PROCEDURE — 82962 GLUCOSE BLOOD TEST: CPT

## 2017-03-17 PROCEDURE — 63710000001 INSULIN ASPART PER 5 UNITS: Performed by: INTERNAL MEDICINE

## 2017-03-17 PROCEDURE — 80048 BASIC METABOLIC PNL TOTAL CA: CPT | Performed by: INTERNAL MEDICINE

## 2017-03-17 PROCEDURE — 25010000002 VANCOMYCIN: Performed by: INTERNAL MEDICINE

## 2017-03-17 PROCEDURE — 25010000002 ENOXAPARIN PER 10 MG: Performed by: INTERNAL MEDICINE

## 2017-03-17 PROCEDURE — 97161 PT EVAL LOW COMPLEX 20 MIN: CPT

## 2017-03-17 PROCEDURE — 85025 COMPLETE CBC W/AUTO DIFF WBC: CPT | Performed by: INTERNAL MEDICINE

## 2017-03-17 PROCEDURE — 71020 HC CHEST PA AND LATERAL: CPT

## 2017-03-17 RX ADMIN — INSULIN ASPART 2 UNITS: 100 INJECTION, SOLUTION INTRAVENOUS; SUBCUTANEOUS at 21:52

## 2017-03-17 RX ADMIN — VANCOMYCIN HYDROCHLORIDE 1250 MG: 1 INJECTION, POWDER, LYOPHILIZED, FOR SOLUTION INTRAVENOUS at 13:15

## 2017-03-17 RX ADMIN — ENOXAPARIN SODIUM 30 MG: 30 INJECTION SUBCUTANEOUS at 08:15

## 2017-03-17 RX ADMIN — SERTRALINE 100 MG: 100 TABLET, FILM COATED ORAL at 21:52

## 2017-03-17 RX ADMIN — GUAIFENESIN 600 MG: 600 TABLET, EXTENDED RELEASE ORAL at 17:55

## 2017-03-17 RX ADMIN — FLUTICASONE PROPIONATE 2 SPRAY: 50 SPRAY, METERED NASAL at 09:20

## 2017-03-17 RX ADMIN — FAMOTIDINE 20 MG: 20 TABLET, FILM COATED ORAL at 08:16

## 2017-03-17 RX ADMIN — TEMAZEPAM 15 MG: 15 CAPSULE ORAL at 21:52

## 2017-03-17 RX ADMIN — PRIMIDONE 50 MG: 50 TABLET ORAL at 21:52

## 2017-03-17 RX ADMIN — PRIMIDONE 50 MG: 50 TABLET ORAL at 08:16

## 2017-03-17 RX ADMIN — HYDROCODONE BITARTRATE AND ACETAMINOPHEN 1 TABLET: 5; 325 TABLET ORAL at 08:29

## 2017-03-17 RX ADMIN — AMLODIPINE BESYLATE 5 MG: 5 TABLET ORAL at 08:16

## 2017-03-17 RX ADMIN — GUAIFENESIN 600 MG: 600 TABLET, EXTENDED RELEASE ORAL at 08:16

## 2017-03-17 NOTE — PROGRESS NOTES
Acute Care - Physical Therapy Initial Evaluation  [facility]     Patient Name: Magdalena Jerry  : 1926  MRN: [id number]  Today's Date: 3/17/2017   Onset of Illness/Injury or Date of Surgery Date: 17  Date of Referral to PT: 17  Referring Physician: Gera Dyson      Admit Date: 3/14/2017     Visit Dx:    ICD-10-CM ICD-9-CM   1. Left leg cellulitis L03.116 682.6     Patient Active Problem List   Diagnosis   • Adjustment disorder with mixed anxiety and depressed mood   • Anemia   • Benign essential hypertension   • Hereditary essential tremor   • Type 2 diabetes mellitus with diabetic polyneuropathy, without long-term current use of insulin   • Dry skin dermatitis   • Dyslipidemia   • Gastroesophageal reflux disease with esophagitis   • Pain of hand   • Hearing loss   • Arthralgia of hip   • Impacted cerumen   • Pruritus   • Knee pain   • Pain in extremity   • Chronic low back pain   • Weakness of lower extremity   • Spinal stenosis of lumbar region   • Senile osteoporosis   • Piriformis syndrome   • Primary insomnia   • Tinea pedis   • Vitamin D deficiency   • Left leg cellulitis   • CKD (chronic kidney disease) stage 3, GFR 30-59 ml/min   • Allergic rhinitis   • Cough     Past Medical History   Diagnosis Date   • Adjustment disorder with mixed anxiety and depressed mood    • Anemia    • Anxiety    • Atrophy of hand muscles      LEFT HAND   • Benign familial tremor    • Carpal tunnel syndrome    • Cataract    • Chronic rhinosinusitis    • Depression    • Diabetes mellitus    • Dyslipidemia    • Esophagitis, reflux    • Fracture of hip    • Frequent falls    • GERD (gastroesophageal reflux disease)    • Hand pain    • Hearing loss    • Hip pain    • Hyperlipidemia    • Hypertension    • Impacted cerumen    • Insomnia    • Knee pain    • Limb pain    • Loss of hearing    • Low back pain    • Lower extremity weakness    • Lumbar canal stenosis    • Osteoporosis, senile    • Piriformis  syndrome    • Renal insufficiency 2006   • Sensorineural hearing loss    • Stroke 2004   • Tinea pedis    • Vitamin D deficiency      Past Surgical History   Procedure Laterality Date   • Colonoscopy     • Varicose vein surgery Right    • Fracture surgery Left 2006     HIP   • Cholecystectomy  2013   • Ercp w/ sphicterotomy  2012          PT ASSESSMENT (last 72 hours)      PT Evaluation       03/17/17 1333 03/16/17 1603    Rehab Evaluation    Document Type evaluation  -MS     Subjective Information agree to therapy;complains of;fatigue;pain  -MS     Patient Effort, Rehab Treatment good  -MS     Symptoms Noted Comment Pt. reports minimal pain in her Left L.E. but eager to work with P.T.  -MS     General Information    Onset of Illness/Injury or Date of Surgery Date 03/14/17  -MS     Referring Physician Gera Dyson  -MS     Pertinent History Of Current Problem Pt. admitted with LLE cellulitis  -MS     Precautions/Limitations fall precautions   Exit alarm;  H.O.H.; Contact Isolation  -MS     Prior Level of Function independent:  -MS     Equipment Currently Used at Home  walker, standard  -AT    Plans/Goals Discussed With patient;agreed upon  -MS     Risks Reviewed patient:  -MS     Benefits Reviewed patient:  -MS     Barriers to Rehab hearing deficit   Pt. does wear hearing aids.  -MS     Living Environment    Lives With  alone  -AT    Living Arrangements  house  -AT    Transportation Available  car;ambulance;family or friend will provide  -AT    Clinical Impression    Date of Referral to PT 03/17/17  -MS     Criteria for Skilled Therapeutic Interventions Met treatment indicated  -MS     Rehab Potential good, to achieve stated therapy goals  -MS     Pain Assessment    Pain Assessment 0-10  -MS     Pain Score 3  -MS     Post Pain Score 3  -MS     Pain Type Acute pain  -MS     Pain Location Leg  -MS     Pain Orientation Left  -MS     Cognitive Assessment/Intervention    Current Cognitive/Communication Assessment  functional  -MS     Orientation Status oriented to;person;place  -MS     Follows Commands/Answers Questions 100% of the time;able to follow single-step instructions;needs cueing;needs increased time;needs repetition   Pt. is hard of hearing  -MS     Personal Safety Interventions fall prevention program maintained;gait belt;nonskid shoes/slippers when out of bed;supervised activity  -MS     ROM (Range of Motion)    General ROM --   BUE/BLE (WFL's)  -MS     MMT (Manual Muscle Testing)    General MMT Assessment --   BUE/BLE (3+/5)  -MS     Bed Mobility, Assessment/Treatment    Bed Mob, Supine to Sit, [place] minimum assist (75% patient effort);2 person assist required  -MS     Transfer Assessment/Treatment    Transfers, Sit-Stand [place] contact guard assist;2 person assist required  -MS     Transfers, Stand-Sit [place] contact guard assist;2 person assist required  -MS     Transfers, Sit-Stand-Sit, Assist Device rolling walker  -MS     Gait Assessment/Treatment    Gait, [place] Level contact guard assist;2 person assist required  -MS     Gait, Assistive Device rolling walker  -MS     Gait, Distance (Feet) 85  -MS     Gait, Gait Deviations left:;antalgic;nicky decreased;forward flexed posture  -MS     Gait, Comment Verbal/tactile cues for posture correction and RWX guidance.  -MS     Therapy Exercises    Bilateral Lower Extremities AROM:;10 reps;sitting;hip flexion;LAQ  -MS     Positioning and Restraints    Pre-Treatment Position in bed  -MS     Post Treatment Position chair  -MS     In Chair notified nsg;reclined;sitting;call light within reach;encouraged to call for assist;exit alarm on;with family/caregiver   All lines intact.  -MS       03/16/17 0338 03/15/17 0500    General Information    Equipment Currently Used at Home walker, standard  -QN walker, rolling  -CW    Living Environment    Lives With  alone  -CW    Living Arrangements  house  -CW    Home Accessibility  no concerns;stairs (1  railing present)  -CW    Stair Railings at Home  inside, present on right side  -CW    Type of Financial/Environmental Concern  none  -CW    Transportation Available car  -QN car  -CW      User Key  (r) = Recorded By, (t) = Taken By, (c) = Cosigned By    Initials Name Provider Type    HOLA Nash, RN Registered Nurse    MS Raghav Mathew, PT Physical Therapist    AT Tracy Rodríguez, RN Case Manager    SAEED Llamas, RN Registered Nurse          Physical Therapy Education     Title: PT OT SLP Therapies (Done)     Topic: Physical Therapy (Done)     Point: Mobility training (Done)    Learning Progress Summary    Learner Readiness Method Response Comment Documented by Status   Patient Acceptance E,D NR,VU  MS 03/17/17 1339 Done               Point: Home exercise program (Done)    Learning Progress Summary    Learner Readiness Method Response Comment Documented by Status   Patient Acceptance E,D NR,VU  MS 03/17/17 1339 Done               Point: Body mechanics (Done)    Learning Progress Summary    Learner Readiness Method Response Comment Documented by Status   Patient Acceptance E,D NR,VU  MS 03/17/17 1339 Done               Point: Precautions (Done)    Learning Progress Summary    Learner Readiness Method Response Comment Documented by Status   Patient Acceptance E,D NR,VU  MS 03/17/17 1339 Done                      User Key     Initials Effective Dates Name Provider Type Discipline    MS 12/01/15 -  Raghav Mathew, PT Physical Therapist PT                PT Recommendation and Plan  Anticipated Discharge Disposition: skilled nursing facility  Planned Therapy Interventions: balance training, bed mobility training, gait training, patient/family education, postural re-education, strengthening, transfer training  PT Frequency: daily  Plan of Care Review  Plan Of Care Reviewed With: patient  Outcome Summary/Follow up Plan: Pt. will benefit from skilled inpt. P.T. to address her functional deficits and to  assist pt. in regaining her maximum level of independence with functional mobility.          IP PT Goals       03/17/17 1339          Bed Mobility PT LTG    Bed Mobility PT LTG, Date Established 03/17/17  -MS      Bed Mobility PT LTG, Time to Achieve 3 days  -MS      Bed Mobility PT LTG, Activity Type all bed mobility  -MS      Bed Mobility PT LTG, [place] Level contact guard assist  -MS      Transfer Training PT LTG    Transfer Training PT LTG, Date Established 03/17/17  -MS      Transfer Training PT LTG, Time to Achieve 3 days  -MS      Transfer Training PT LTG, Activity Type all transfers  -MS      Transfer Training PT LTG, [place] Level supervision required  -MS      Transfer Training PT LTG, Assist Device walker, rolling  -MS      Gait Training PT LTG    Gait Training Goal PT LTG, Date Established 03/17/17  -MS      Gait Training Goal PT LTG, Time to Achieve 3 days  -MS      Gait Training Goal PT LTG, [place] Level supervision required  -MS      Gait Training Goal PT LTG, Assist Device walker, rolling  -MS      Gait Training Goal PT LTG, Distance to Achieve 100 feet  -MS        User Key  (r) = Recorded By, (t) = Taken By, (c) = Cosigned By    Initials Name Provider Type    MS Raghav KANG [place], PT Physical Therapist                Outcome Measures       03/17/17 1300          How much help from another person do you currently need...    Turning from your back to your side while in flat bed without using bedrails? 2  -MS      Moving from lying on back to sitting on the side of a flat bed without bedrails? 2  -MS      Moving to and from a bed to a chair (including a wheelchair)? 3  -MS      Standing up from a chair using your arms (e.g., wheelchair, bedside chair)? 3  -MS      Climbing 3-5 steps with a railing? 2  -MS      To walk in hospital room? 3  -MS      AM-PAC 6 Clicks Score 15  -MS      Functional Assessment    Outcome Measure Options AM-PAC 6 Clicks Basic Mobility (PT)  -MS        User Key   (r) = Recorded By, (t) = Taken By, (c) = Cosigned By    Initials Name Provider Type    MS Raghav Mathew PT Physical Therapist           Time Calculation:         PT Charges       03/17/17 1341          Time Calculation    Start Time 1310  -MS      Stop Time 1331  -MS      Time Calculation (min) 21 min  -MS      PT Received On 03/17/17  -MS      PT - Next Appointment 03/18/17  -MS      PT Goal Re-Cert Due Date 03/20/17  -MS        User Key  (r) = Recorded By, (t) = Taken By, (c) = Cosigned By    Initials Name Provider Type    MS Raghav Mathew PT Physical Therapist          Therapy Charges for Today     Code Description Service Date Service Provider Modifiers Qty    14919645050 HC PT EVAL LOW COMPLEXITY 1 3/17/2017 Raghav Mathew, PT GP 1    71607928649 HC PT THER PROC EA 15 MIN 3/17/2017 Raghav Mathew, PT GP 1    54505656570 HC PT THER SUPP EA 15 MIN 3/17/2017 Raghav Mathew, PT GP 1          PT G-Codes  Outcome Measure Options: AM-PAC 6 Clicks Basic Mobility (PT)      Raghav Mathew, PT  3/17/2017

## 2017-03-17 NOTE — PLAN OF CARE
Problem: Patient Care Overview (Adult)  Goal: Plan of Care Review  Outcome: Ongoing (interventions implemented as appropriate)    03/16/17 0338 03/16/17 1958   Coping/Psychosocial Response Interventions   Plan Of Care Reviewed With patient --    Patient Care Overview   Progress progress toward functional goals is gradual --    Outcome Evaluation   Outcome Summary/Follow up Plan --  Wound culture found MRSA, pt placed in contact isolation. IV antibiotics continued. MRI taken of LLE.       Goal: Adult Individualization and Mutuality  Outcome: Ongoing (interventions implemented as appropriate)  Goal: Discharge Needs Assessment  Outcome: Ongoing (interventions implemented as appropriate)    Problem: Skin Integrity Impairment, Risk/Actual (Adult)  Goal: Skin Integrity/Wound Healing  Outcome: Ongoing (interventions implemented as appropriate)    Problem: Fall Risk (Adult)  Goal: Absence of Falls  Outcome: Ongoing (interventions implemented as appropriate)    Problem: Infection, Risk/Actual (Adult)  Goal: Infection Prevention/Resolution  Outcome: Ongoing (interventions implemented as appropriate)

## 2017-03-17 NOTE — PLAN OF CARE
Problem: Patient Care Overview (Adult)  Goal: Plan of Care Review  Outcome: Ongoing (interventions implemented as appropriate)    03/17/17 1339 03/17/17 1716   Coping/Psychosocial Response Interventions   Plan Of Care Reviewed With --  patient   Patient Care Overview   Progress --  improving   Outcome Evaluation   Outcome Summary/Follow up Plan Pt. will benefit from skilled inpt. P.T. to address her functional deficits and to assist pt. in regaining her maximum level of independence with functional mobility. --        Goal: Adult Individualization and Mutuality  Outcome: Ongoing (interventions implemented as appropriate)    Problem: Skin Integrity Impairment, Risk/Actual (Adult)  Goal: Skin Integrity/Wound Healing  Outcome: Ongoing (interventions implemented as appropriate)    Problem: Fall Risk (Adult)  Goal: Absence of Falls  Outcome: Ongoing (interventions implemented as appropriate)    Problem: Infection, Risk/Actual (Adult)  Goal: Infection Prevention/Resolution  Outcome: Ongoing (interventions implemented as appropriate)

## 2017-03-17 NOTE — PLAN OF CARE
Problem: Patient Care Overview (Adult)  Goal: Plan of Care Review  Outcome: Ongoing (interventions implemented as appropriate)    03/17/17 0329   Coping/Psychosocial Response Interventions   Plan Of Care Reviewed With patient   Patient Care Overview   Progress improving   Outcome Evaluation   Outcome Summary/Follow up Plan Pt has done well through the night. Voiding per brief without difficulty. Extremity elevated and open to air. No c/o pain at this time.        Goal: Adult Individualization and Mutuality  Outcome: Ongoing (interventions implemented as appropriate)  Goal: Discharge Needs Assessment  Outcome: Ongoing (interventions implemented as appropriate)    Problem: Skin Integrity Impairment, Risk/Actual (Adult)  Goal: Skin Integrity/Wound Healing  Outcome: Ongoing (interventions implemented as appropriate)    03/17/17 0329   Skin Integrity Impairment, Risk/Actual (Adult)   Skin Integrity/Wound Healing achieves outcome         Problem: Fall Risk (Adult)  Goal: Absence of Falls  Outcome: Ongoing (interventions implemented as appropriate)    03/17/17 0329   Fall Risk (Adult)   Absence of Falls achieves outcome         Problem: Infection, Risk/Actual (Adult)  Goal: Infection Prevention/Resolution  Outcome: Ongoing (interventions implemented as appropriate)    03/17/17 0329   Infection, Risk/Actual (Adult)   Infection Prevention/Resolution achieves outcome

## 2017-03-17 NOTE — PROGRESS NOTES
LOS: 2 days   Primary Care Physician: Fly Sanchez DO       Subjective  Pt reports worsening cough today. No SOA. Occasionally productive. No CP or tightness. No sore throat.  No abd pain, nvd, leg pain improved.  History taken from: patient       Physical Exam   Constitutional: She appears well-developed and well-nourished. No distress.   HENT:   Head: Normocephalic and atraumatic.   Mouth/Throat: No oropharyngeal exudate.   Eyes: Conjunctivae and EOM are normal. Pupils are equal, round, and reactive to light.   Neck: Normal range of motion. Neck supple.   Cardiovascular: Normal rate, regular rhythm, normal heart sounds and intact distal pulses.    Pulmonary/Chest: Effort normal. No stridor. She has no decreased breath sounds. She has no wheezes. She has rhonchi. She has no rales.   Abdominal: Soft. Bowel sounds are normal. She exhibits no distension. There is no tenderness.   Musculoskeletal: Normal range of motion. She exhibits no edema.   Neurological: She is alert. No cranial nerve deficit.   Skin: Skin is warm. Rash noted. She is not diaphoretic. There is erythema.   LLE erythema improving now to mid shin and foot edema resolving   Psychiatric: She has a normal mood and affect. Her behavior is normal.   Nursing note and vitals reviewed.      Vital Signs  Body mass index is 26.19 kg/(m^2).  Temp:  [98.9 °F (37.2 °C)-101 °F (38.3 °C)] 101 °F (38.3 °C)  Heart Rate:  [72-75] 72  Resp:  [16-18] 18  BP: (132-172)/(61-70) 132/70      Results Review:     I reviewed the patient's new clinical results.  I reviewed the patient's new imaging results and agree with the interpretation.  I reviewed the patient's other test results and agree with the interpretation      Results from last 7 days  Lab Units 03/17/17  0303 03/16/17  0427   WBC 10*3/mm3 3.90* 4.86   HEMOGLOBIN g/dL 10.5* 10.9*   PLATELETS 10*3/mm3 189 202       Results from last 7 days  Lab Units 03/17/17  0303 03/16/17  0427   SODIUM mmol/L 137 140    POTASSIUM mmol/L 4.4 4.3   CHLORIDE mmol/L 101 102   TOTAL CO2 mmol/L 24.8 22.8   BUN mg/dL 19 23   CREATININE mg/dL 1.04* 1.06*   CALCIUM mg/dL 8.6 8.7   GLUCOSE mg/dL 99 104*         Hemoglobin A1C:  Lab Results   Component Value Date    HGBA1C 6.03 (H) 03/16/2017       Glucose Range:  GLUCOSE   Date/Time Value Ref Range Status   03/17/2017 0619 105 70 - 130 mg/dL Final   03/16/2017 2137 127 70 - 130 mg/dL Final   03/16/2017 1625 165 (H) 70 - 130 mg/dL Final   03/16/2017 1126 130 70 - 130 mg/dL Final   03/16/2017 0612 97 70 - 130 mg/dL Final   03/15/2017 2255 119 70 - 130 mg/dL Final       Medication Review: Yes    Physical Therapy:    Assessment/Plan     Active Hospital Problems (** Indicates Principal Problem)    Diagnosis Date Noted   • **Left leg cellulitis [L03.116] 03/15/2017   • Cough [R05] 03/17/2017   • Allergic rhinitis [J30.9] 03/16/2017   • CKD (chronic kidney disease) stage 3, GFR 30-59 ml/min [N18.3] 03/15/2017   • Type 2 diabetes mellitus with diabetic polyneuropathy, without long-term current use of insulin [E11.42] 03/25/2016   • Benign essential hypertension [I10] 03/25/2016      Resolved Hospital Problems    Diagnosis Date Noted Date Resolved   No resolved problems to display.       Assessment & Plan  -MRSA LLE Cellulitis: Elevated ESR and CRP so checked MRI. There is no abscess or osteomyelitis on that scan. Her skin abrasion/ulcer is healing well and limb is improved. Continue Vancomycin today with fever overnight. Once afebrile plan to change to clindamycin based on sensitivities and her allergies.  -Cough with rhonchi on exam. No hypoxia, sinus pressure, or exudate. Given her fever will check CXR to investigate possible PNA.  -Cr stable  -A1c 6.0, diet controlled at home.  -PT consult  -Dispo: Anticipate DC in 1-2 days depending on progression.    Gera Castaneda MD  03/17/17  9:28 AM    Time: 45min

## 2017-03-17 NOTE — PLAN OF CARE
Problem: Patient Care Overview (Adult)  Goal: Plan of Care Review    03/17/17 1339   Coping/Psychosocial Response Interventions   Plan Of Care Reviewed With patient   Outcome Evaluation   Outcome Summary/Follow up Plan Pt. will benefit from skilled inpt. P.T. to address her functional deficits and to assist pt. in regaining her maximum level of independence with functional mobility.         Problem: Inpatient Physical Therapy  Goal: Bed Mobility Goal LTG- PT    03/17/17 1339   Bed Mobility PT LTG   Bed Mobility PT LTG, Date Established 03/17/17   Bed Mobility PT LTG, Time to Achieve 3 days   Bed Mobility PT LTG, Activity Type all bed mobility   Bed Mobility PT LTG, [place] Level contact guard assist       Goal: Transfer Training Goal 1 LTG- PT    03/17/17 1339   Transfer Training PT LTG   Transfer Training PT LTG, Date Established 03/17/17   Transfer Training PT LTG, Time to Achieve 3 days   Transfer Training PT LTG, Activity Type all transfers   Transfer Training PT LTG, [place] Level supervision required   Transfer Training PT LTG, Assist Device walker, rolling       Goal: Gait Training Goal LTG- PT    03/17/17 1339   Gait Training PT LTG   Gait Training Goal PT LTG, Date Established 03/17/17   Gait Training Goal PT LTG, Time to Achieve 3 days   Gait Training Goal PT LTG, [place] Level supervision required   Gait Training Goal PT LTG, Assist Device walker, rolling   Gait Training Goal PT LTG, Distance to Achieve 100 feet

## 2017-03-18 PROBLEM — A49.02 MRSA (METHICILLIN RESISTANT STAPHYLOCOCCUS AUREUS) INFECTION: Status: ACTIVE | Noted: 2017-03-18

## 2017-03-18 PROBLEM — J18.9 PNEUMONIA DUE TO INFECTIOUS ORGANISM: Status: ACTIVE | Noted: 2017-03-17

## 2017-03-18 LAB
ANION GAP SERPL CALCULATED.3IONS-SCNC: 12 MMOL/L
B PERT DNA SPEC QL NAA+PROBE: NOT DETECTED
BACTERIA UR QL AUTO: ABNORMAL /HPF
BASOPHILS # BLD AUTO: 0.01 10*3/MM3 (ref 0–0.2)
BASOPHILS NFR BLD AUTO: 0.3 % (ref 0–1.5)
BILIRUB UR QL STRIP: NEGATIVE
BUN BLD-MCNC: 19 MG/DL (ref 8–23)
BUN/CREAT SERPL: 18.3 (ref 7–25)
C PNEUM DNA NPH QL NAA+NON-PROBE: NOT DETECTED
CALCIUM SPEC-SCNC: 8.1 MG/DL (ref 8.2–9.6)
CHLORIDE SERPL-SCNC: 101 MMOL/L (ref 98–107)
CLARITY UR: ABNORMAL
CO2 SERPL-SCNC: 24 MMOL/L (ref 22–29)
COLOR UR: YELLOW
CREAT BLD-MCNC: 1.04 MG/DL (ref 0.57–1)
DEPRECATED RDW RBC AUTO: 46.2 FL (ref 37–54)
EOSINOPHIL # BLD AUTO: 0.06 10*3/MM3 (ref 0–0.7)
EOSINOPHIL NFR BLD AUTO: 1.8 % (ref 0.3–6.2)
ERYTHROCYTE [DISTWIDTH] IN BLOOD BY AUTOMATED COUNT: 13.1 % (ref 11.7–13)
FLUAV H1 2009 PAND RNA NPH QL NAA+PROBE: NOT DETECTED
FLUAV H1 HA GENE NPH QL NAA+PROBE: NOT DETECTED
FLUAV H3 RNA NPH QL NAA+PROBE: DETECTED
FLUAV SUBTYP SPEC NAA+PROBE: NOT DETECTED
FLUBV RNA ISLT QL NAA+PROBE: NOT DETECTED
GFR SERPL CREATININE-BSD FRML MDRD: 50 ML/MIN/1.73
GLUCOSE BLD-MCNC: 88 MG/DL (ref 65–99)
GLUCOSE BLDC GLUCOMTR-MCNC: 141 MG/DL (ref 70–130)
GLUCOSE BLDC GLUCOMTR-MCNC: 145 MG/DL (ref 70–130)
GLUCOSE BLDC GLUCOMTR-MCNC: 91 MG/DL (ref 70–130)
GLUCOSE BLDC GLUCOMTR-MCNC: 93 MG/DL (ref 70–130)
GLUCOSE UR STRIP-MCNC: NEGATIVE MG/DL
HADV DNA SPEC NAA+PROBE: NOT DETECTED
HCOV 229E RNA SPEC QL NAA+PROBE: NOT DETECTED
HCOV HKU1 RNA SPEC QL NAA+PROBE: NOT DETECTED
HCOV NL63 RNA SPEC QL NAA+PROBE: NOT DETECTED
HCOV OC43 RNA SPEC QL NAA+PROBE: NOT DETECTED
HCT VFR BLD AUTO: 31.5 % (ref 35.6–45.5)
HGB BLD-MCNC: 10.1 G/DL (ref 11.9–15.5)
HGB UR QL STRIP.AUTO: NEGATIVE
HMPV RNA NPH QL NAA+NON-PROBE: NOT DETECTED
HPIV1 RNA SPEC QL NAA+PROBE: NOT DETECTED
HPIV2 RNA SPEC QL NAA+PROBE: NOT DETECTED
HPIV3 RNA NPH QL NAA+PROBE: NOT DETECTED
HPIV4 P GENE NPH QL NAA+PROBE: NOT DETECTED
HYALINE CASTS UR QL AUTO: ABNORMAL /LPF
IMM GRANULOCYTES # BLD: 0 10*3/MM3 (ref 0–0.03)
IMM GRANULOCYTES NFR BLD: 0 % (ref 0–0.5)
KETONES UR QL STRIP: NEGATIVE
LEUKOCYTE ESTERASE UR QL STRIP.AUTO: NEGATIVE
LYMPHOCYTES # BLD AUTO: 1.07 10*3/MM3 (ref 0.9–4.8)
LYMPHOCYTES NFR BLD AUTO: 32.2 % (ref 19.6–45.3)
M PNEUMO IGG SER IA-ACNC: NOT DETECTED
MCH RBC QN AUTO: 30.8 PG (ref 26.9–32)
MCHC RBC AUTO-ENTMCNC: 32.1 G/DL (ref 32.4–36.3)
MCV RBC AUTO: 96 FL (ref 80.5–98.2)
MONOCYTES # BLD AUTO: 0.55 10*3/MM3 (ref 0.2–1.2)
MONOCYTES NFR BLD AUTO: 16.6 % (ref 5–12)
NEUTROPHILS # BLD AUTO: 1.63 10*3/MM3 (ref 1.9–8.1)
NEUTROPHILS NFR BLD AUTO: 49.1 % (ref 42.7–76)
NITRITE UR QL STRIP: NEGATIVE
PH UR STRIP.AUTO: 5.5 [PH] (ref 5–8)
PLATELET # BLD AUTO: 175 10*3/MM3 (ref 140–500)
PMV BLD AUTO: 9.1 FL (ref 6–12)
POTASSIUM BLD-SCNC: 4.5 MMOL/L (ref 3.5–5.2)
PROCALCITONIN SERPL-MCNC: 0.09 NG/ML (ref 0.1–0.25)
PROT UR QL STRIP: ABNORMAL
RBC # BLD AUTO: 3.28 10*6/MM3 (ref 3.9–5.2)
RBC # UR: ABNORMAL /HPF
REF LAB TEST METHOD: ABNORMAL
RHINOVIRUS RNA SPEC NAA+PROBE: NOT DETECTED
RSV RNA NPH QL NAA+NON-PROBE: NOT DETECTED
S PNEUM AG SPEC QL LA: NEGATIVE
SODIUM BLD-SCNC: 137 MMOL/L (ref 136–145)
SP GR UR STRIP: 1.02 (ref 1–1.03)
SQUAMOUS #/AREA URNS HPF: ABNORMAL /HPF
UROBILINOGEN UR QL STRIP: ABNORMAL
VANCOMYCIN TROUGH SERPL-MCNC: 12.6 MCG/ML (ref 5–20)
WBC NRBC COR # BLD: 3.32 10*3/MM3 (ref 4.5–10.7)
WBC UR QL AUTO: ABNORMAL /HPF

## 2017-03-18 PROCEDURE — 87205 SMEAR GRAM STAIN: CPT | Performed by: INTERNAL MEDICINE

## 2017-03-18 PROCEDURE — 84145 PROCALCITONIN (PCT): CPT | Performed by: INTERNAL MEDICINE

## 2017-03-18 PROCEDURE — 25010000002 ENOXAPARIN PER 10 MG: Performed by: INTERNAL MEDICINE

## 2017-03-18 PROCEDURE — 81001 URINALYSIS AUTO W/SCOPE: CPT | Performed by: INTERNAL MEDICINE

## 2017-03-18 PROCEDURE — 87633 RESP VIRUS 12-25 TARGETS: CPT | Performed by: INTERNAL MEDICINE

## 2017-03-18 PROCEDURE — 82962 GLUCOSE BLOOD TEST: CPT

## 2017-03-18 PROCEDURE — 87798 DETECT AGENT NOS DNA AMP: CPT | Performed by: INTERNAL MEDICINE

## 2017-03-18 PROCEDURE — 85025 COMPLETE CBC W/AUTO DIFF WBC: CPT | Performed by: INTERNAL MEDICINE

## 2017-03-18 PROCEDURE — 87040 BLOOD CULTURE FOR BACTERIA: CPT | Performed by: INTERNAL MEDICINE

## 2017-03-18 PROCEDURE — 87086 URINE CULTURE/COLONY COUNT: CPT | Performed by: INTERNAL MEDICINE

## 2017-03-18 PROCEDURE — 87581 M.PNEUMON DNA AMP PROBE: CPT | Performed by: INTERNAL MEDICINE

## 2017-03-18 PROCEDURE — 97110 THERAPEUTIC EXERCISES: CPT

## 2017-03-18 PROCEDURE — 87899 AGENT NOS ASSAY W/OPTIC: CPT | Performed by: INTERNAL MEDICINE

## 2017-03-18 PROCEDURE — 87486 CHLMYD PNEUM DNA AMP PROBE: CPT | Performed by: INTERNAL MEDICINE

## 2017-03-18 PROCEDURE — 25010000002 LEVOFLOXACIN PER 250 MG: Performed by: INTERNAL MEDICINE

## 2017-03-18 PROCEDURE — 87070 CULTURE OTHR SPECIMN AEROBIC: CPT | Performed by: INTERNAL MEDICINE

## 2017-03-18 PROCEDURE — 80048 BASIC METABOLIC PNL TOTAL CA: CPT | Performed by: INTERNAL MEDICINE

## 2017-03-18 PROCEDURE — 80202 ASSAY OF VANCOMYCIN: CPT | Performed by: INTERNAL MEDICINE

## 2017-03-18 PROCEDURE — 25010000002 VANCOMYCIN: Performed by: INTERNAL MEDICINE

## 2017-03-18 RX ORDER — LEVOFLOXACIN 5 MG/ML
500 INJECTION, SOLUTION INTRAVENOUS EVERY 24 HOURS
Status: DISCONTINUED | OUTPATIENT
Start: 2017-03-18 | End: 2017-03-20 | Stop reason: HOSPADM

## 2017-03-18 RX ORDER — OSELTAMIVIR PHOSPHATE 30 MG/1
30 CAPSULE ORAL EVERY 12 HOURS SCHEDULED
Status: DISCONTINUED | OUTPATIENT
Start: 2017-03-18 | End: 2017-03-20 | Stop reason: HOSPADM

## 2017-03-18 RX ADMIN — ENOXAPARIN SODIUM 30 MG: 30 INJECTION SUBCUTANEOUS at 09:08

## 2017-03-18 RX ADMIN — LEVOFLOXACIN 500 MG: 5 INJECTION, SOLUTION INTRAVENOUS at 18:19

## 2017-03-18 RX ADMIN — GUAIFENESIN 600 MG: 600 TABLET, EXTENDED RELEASE ORAL at 09:09

## 2017-03-18 RX ADMIN — OSELTAMIVIR PHOSPHATE 30 MG: 30 CAPSULE ORAL at 21:55

## 2017-03-18 RX ADMIN — PRIMIDONE 50 MG: 50 TABLET ORAL at 09:09

## 2017-03-18 RX ADMIN — AMLODIPINE BESYLATE 5 MG: 5 TABLET ORAL at 09:09

## 2017-03-18 RX ADMIN — FAMOTIDINE 20 MG: 20 TABLET, FILM COATED ORAL at 09:09

## 2017-03-18 RX ADMIN — VANCOMYCIN HYDROCHLORIDE 1500 MG: 1 INJECTION, POWDER, LYOPHILIZED, FOR SOLUTION INTRAVENOUS at 12:38

## 2017-03-18 RX ADMIN — PRIMIDONE 50 MG: 50 TABLET ORAL at 21:55

## 2017-03-18 RX ADMIN — TEMAZEPAM 15 MG: 15 CAPSULE ORAL at 21:55

## 2017-03-18 RX ADMIN — SERTRALINE 100 MG: 100 TABLET, FILM COATED ORAL at 21:55

## 2017-03-18 RX ADMIN — GUAIFENESIN 600 MG: 600 TABLET, EXTENDED RELEASE ORAL at 18:19

## 2017-03-18 NOTE — PLAN OF CARE
Problem: Patient Care Overview (Adult)  Goal: Plan of Care Review  Outcome: Ongoing (interventions implemented as appropriate)    03/18/17 1745   Coping/Psychosocial Response Interventions   Plan Of Care Reviewed With patient   Patient Care Overview   Progress improving   Outcome Evaluation   Outcome Summary/Follow up Plan No c/o pain. Elevated temp this am, resp panel and culture to lab. IV levaquin added. Need UA to lab. Ambulating well with walker, assist x1. Swelling/redness improved.       Goal: Adult Individualization and Mutuality  Outcome: Ongoing (interventions implemented as appropriate)  Goal: Discharge Needs Assessment  Outcome: Ongoing (interventions implemented as appropriate)    Problem: Skin Integrity Impairment, Risk/Actual (Adult)  Goal: Skin Integrity/Wound Healing  Outcome: Ongoing (interventions implemented as appropriate)    Problem: Fall Risk (Adult)  Goal: Absence of Falls  Outcome: Ongoing (interventions implemented as appropriate)    03/18/17 1745   Fall Risk (Adult)   Absence of Falls achieves outcome         Problem: Infection, Risk/Actual (Adult)  Goal: Infection Prevention/Resolution  Outcome: Ongoing (interventions implemented as appropriate)

## 2017-03-18 NOTE — PROGRESS NOTES
"Acute Care - Physical Therapy Treatment Note  [facility]     Patient Name: Magdalena Jerry  : 1926  MRN: [id number]  Today's Date: 3/18/2017  Onset of Illness/Injury or Date of Surgery Date: 17  Date of Referral to PT: 17  Referring Physician: Gera Dyson    Admit Date: 3/14/2017    Visit Dx:    ICD-10-CM ICD-9-CM   1. Left leg cellulitis L03.116 682.6     Patient Active Problem List   Diagnosis   • Adjustment disorder with mixed anxiety and depressed mood   • Anemia   • Benign essential hypertension   • Hereditary essential tremor   • Type 2 diabetes mellitus with diabetic polyneuropathy, without long-term current use of insulin   • Dry skin dermatitis   • Dyslipidemia   • Gastroesophageal reflux disease with esophagitis   • Pain of hand   • Hearing loss   • Arthralgia of hip   • Impacted cerumen   • Pruritus   • Knee pain   • Pain in extremity   • Chronic low back pain   • Weakness of lower extremity   • Spinal stenosis of lumbar region   • Senile osteoporosis   • Piriformis syndrome   • Primary insomnia   • Tinea pedis   • Vitamin D deficiency   • Left leg cellulitis   • CKD (chronic kidney disease) stage 3, GFR 30-59 ml/min   • Allergic rhinitis   • Cough               Adult Rehabilitation Note       17 1000          Rehab Assessment/Intervention    Discipline physical therapist  -MS      Document Type therapy note (daily note)  -MS      Subjective Information agree to therapy;complains of;fatigue;weakness  -MS      Patient Effort, Rehab Treatment good  -MS      Symptoms Noted Comment Pt. reports feeling \"tired\" this AM but no c/o pain.  -MS      Precautions/Limitations fall precautions   Exit alarm; H.O.H.; Contact Isolation  -MS      Recorded by [MS] Raghav Mathew, PT      Pain Assessment    Pain Assessment No/denies pain  -MS      Recorded by [MS] Raghav Mathew, PT      Cognitive Assessment/Intervention    Current Cognitive/Communication Assessment functional  " -MS      Orientation Status oriented x 4  -MS      Follows Commands/Answers Questions 100% of the time;able to follow single-step instructions;needs cueing;needs increased time  -MS      Personal Safety Interventions fall prevention program maintained;gait belt;nonskid shoes/slippers when out of bed;supervised activity  -MS      Recorded by [MS] Raghav Mathew PT      Bed Mobility, Assessment/Treatment    Bed Mob, Supine to Sit, [place] minimum assist (75% patient effort)  -MS      Recorded by [MS] Raghav Mathew PT      Transfer Assessment/Treatment    Transfers, Sit-Stand [place] contact guard assist  -MS      Transfers, Stand-Sit [place] contact guard assist  -MS      Transfers, Sit-Stand-Sit, Assist Device rolling walker  -MS      Recorded by [MS] Raghav Mathew PT      Gait Assessment/Treatment    Gait, [place] Level contact guard assist  -MS      Gait, Assistive Device rolling walker  -MS      Gait, Distance (Feet) 100  -MS      Gait, Gait Deviations nicky decreased;forward flexed posture;step length decreased  -MS      Gait, Comment Pt. requires verbal/tactile cues for posture correction and RWX guidance.  -MS      Recorded by [MS] Raghav Mathew PT      Therapy Exercises    Bilateral Lower Extremities AROM:;10 reps;sitting;ankle pumps/circles;hip flexion;LAQ  -MS      Recorded by [MS] Raghav Mathew PT      Positioning and Restraints    Pre-Treatment Position in bed  -MS      Post Treatment Position chair  -MS      In Chair notified nsg;sitting;call light within reach;encouraged to call for assist;exit alarm on;with nsg   All lines intact.  -MS      Recorded by [MS] Raghav Mathew PT        User Key  (r) = Recorded By, (t) = Taken By, (c) = Cosigned By    Initials Name Effective Dates    MS Raghav Mathew PT 12/01/15 -                 IP PT Goals       03/17/17 1339          Bed Mobility PT LTG    Bed Mobility PT LTG, Date Established 03/17/17  -MS      Bed Mobility  PT LTG, Time to Achieve 3 days  -MS      Bed Mobility PT LTG, Activity Type all bed mobility  -MS      Bed Mobility PT LTG, [place] Level contact guard assist  -MS      Transfer Training PT LTG    Transfer Training PT LTG, Date Established 03/17/17  -MS      Transfer Training PT LTG, Time to Achieve 3 days  -MS      Transfer Training PT LTG, Activity Type all transfers  -MS      Transfer Training PT LTG, [place] Level supervision required  -MS      Transfer Training PT LTG, Assist Device walker, rolling  -MS      Gait Training PT LTG    Gait Training Goal PT LTG, Date Established 03/17/17  -MS      Gait Training Goal PT LTG, Time to Achieve 3 days  -MS      Gait Training Goal PT LTG, [place] Level supervision required  -MS      Gait Training Goal PT LTG, Assist Device walker, rolling  -MS      Gait Training Goal PT LTG, Distance to Achieve 100 feet  -MS        User Key  (r) = Recorded By, (t) = Taken By, (c) = Cosigned By    Initials Name Provider Type    MS Raghav Mathew, PT Physical Therapist          Physical Therapy Education     Title: PT OT SLP Therapies (Done)     Topic: Physical Therapy (Done)     Point: Mobility training (Done)    Learning Progress Summary    Learner Readiness Method Response Comment Documented by Status   Patient Acceptance E,D VU,NR  MS 03/18/17 1002 Done    Acceptance E,D NR,VU  MS 03/17/17 1339 Done               Point: Home exercise program (Done)    Learning Progress Summary    Learner Readiness Method Response Comment Documented by Status   Patient Acceptance E,D VU,NR  MS 03/18/17 1002 Done    Acceptance E,D NR,VU  MS 03/17/17 1339 Done               Point: Body mechanics (Done)    Learning Progress Summary    Learner Readiness Method Response Comment Documented by Status   Patient Acceptance E,D VU,NR  MS 03/18/17 1002 Done    Acceptance E,D NR,VU  MS 03/17/17 1339 Done               Point: Precautions (Done)    Learning Progress Summary    Learner Readiness  Method Response Comment Documented by Status   Patient Acceptance E,MARCIA PALACIO  MS 03/18/17 1002 Done    Acceptance E,D VIKTORIA KENNEDY  MS 03/17/17 1339 Done                      User Key     Initials Effective Dates Name Provider Type Discipline    MS 12/01/15 -  Raghav Mathew PT Physical Therapist PT                    PT Recommendation and Plan  Anticipated Discharge Disposition: skilled nursing facility  Planned Therapy Interventions: balance training, bed mobility training, gait training, patient/family education, postural re-education, strengthening, transfer training  PT Frequency: daily  Plan of Care Review  Plan Of Care Reviewed With: patient  Progress: improving  Outcome Summary/Follow up Plan: Improved tolerance to functional activity this day with an increase in gait distance, continued participation in ther. ex. program, and decreased assist required for overall functional mobility.          Outcome Measures       03/18/17 1000 03/17/17 1300       How much help from another person do you currently need...    Turning from your back to your side while in flat bed without using bedrails? 3  -MS 2  -MS     Moving from lying on back to sitting on the side of a flat bed without bedrails? 3  -MS 2  -MS     Moving to and from a bed to a chair (including a wheelchair)? 3  -MS 3  -MS     Standing up from a chair using your arms (e.g., wheelchair, bedside chair)? 3  -MS 3  -MS     Climbing 3-5 steps with a railing? 2  -MS 2  -MS     To walk in hospital room? 3  -MS 3  -MS     AM-PAC 6 Clicks Score 17  -MS 15  -MS     Functional Assessment    Outcome Measure Options AM-PAC 6 Clicks Basic Mobility (PT)  -MS AM-PAC 6 Clicks Basic Mobility (PT)  -MS       User Key  (r) = Recorded By, (t) = Taken By, (c) = Cosigned By    Initials Name Provider Type    MS Raghav Mathew, PT Physical Therapist           Time Calculation:         PT Charges       03/18/17 1003          Time Calculation    Start Time 0930  -MS      Stop Time  0945  -MS      Time Calculation (min) 15 min  -MS      PT Received On 03/18/17  -MS      PT - Next Appointment 03/19/17  -MS        User Key  (r) = Recorded By, (t) = Taken By, (c) = Cosigned By    Initials Name Provider Type    MS Raghav Mathew, PT Physical Therapist          Therapy Charges for Today     Code Description Service Date Service Provider Modifiers Qty    53712291697 HC PT EVAL LOW COMPLEXITY 1 3/17/2017 Raghav Mathew, PT GP 1    20404969090 HC PT THER PROC EA 15 MIN 3/17/2017 Raghav Mathew, PT GP 1    37692118089 HC PT THER SUPP EA 15 MIN 3/17/2017 Raghav Mathew, PT GP 1    12811826439 HC PT THER PROC EA 15 MIN 3/18/2017 Raghav Mathew, PT GP 1          PT G-Codes  Outcome Measure Options: AM-PAC 6 Clicks Basic Mobility (PT)    Raghav Mathew, PT  3/18/2017

## 2017-03-18 NOTE — PROGRESS NOTES
"Pharmacokinetic Consult - Vancomycin Dosing (Follow-up Note)    Magdalena Jerry is a 89 y.o. female who is on day 4 pharmacy to dose vancomycin for MRSA LLE cellulitis  Pharmacy dosing vancomycin per Dr. Castaneda's request.   Goal trough: 10-20 mg/L    Current Vancomycin dose: 1250 mg IV q24h (~16mg/kg)    Relevant clinical data and objective history reviewed:  67\" (170.2 cm)  167 lb 3.2 oz (75.8 kg)  Body mass index is 26.19 kg/(m^2).     She has a past medical history of Adjustment disorder with mixed anxiety and depressed mood; Anemia; Anxiety; Atrophy of hand muscles; Benign familial tremor; Carpal tunnel syndrome; Cataract; Chronic rhinosinusitis; Depression; Diabetes mellitus; Dyslipidemia; Esophagitis, reflux; Fracture of hip; Frequent falls; GERD (gastroesophageal reflux disease); Hand pain; Hearing loss; Hip pain; Hyperlipidemia; Hypertension; Impacted cerumen; Insomnia; Knee pain; Limb pain; Loss of hearing; Low back pain; Lower extremity weakness; Lumbar canal stenosis; Osteoporosis, senile; Piriformis syndrome; Renal insufficiency (2006); Sensorineural hearing loss; Stroke (2004); Tinea pedis; and Vitamin D deficiency.    Allergies as of 03/14/2017 - Doug as Reviewed 03/14/2017   Allergen Reaction Noted   • Benzodiazepines  06/03/2016   • Codeine  06/03/2016   • Cortisone  06/03/2016   • Macrolides and ketolides  06/03/2016   • Melatonin  06/03/2016   • Neomycin  06/03/2016   • Penicillins  06/03/2016   • Sulfa antibiotics  06/03/2016   • Tetracyclines & related  06/03/2016     Vital Signs (last 24 hours)       03/17 0700  -  03/18 0659 03/18 0700  -  03/18 1118   Most Recent    Temp (°F) 98.9 -  (!)101.6    98.3 -  (!)100.8     98.3 (36.8)    Heart Rate 72 -  87    68 -  74     68    Resp 16 -  18    16 -  18     16    /80 -  172/66    162/70 -  162/78     162/78    SpO2 (%) 94 -  97    93 -  96     96        Estimated Creatinine Clearance: 37.4 mL/min (by C-G formula based on Cr of " "1.04).    Results from last 7 days  Lab Units 03/18/17  0311 03/17/17  0303 03/16/17  0427   CREATININE mg/dL 1.04* 1.04* 1.06*       Results from last 7 days  Lab Units 03/18/17  0311 03/17/17  0303 03/16/17  0427   WBC 10*3/mm3 3.32* 3.90* 4.86     Baseline culture/source/susceptibility:     Microbiology Results (last 21 days)        Procedure Component Value - Date/Time       Wound Culture [[id number]] (Abnormal)  Collected: 03/15/17 0147       Lab Status: Final result Specimen: Wound from Ankle, Left Updated: 03/17/17 0758        Wound Culture           Heavy growth (4+) Staphylococcus aureus, MRSA (C)         D test is negative. Isolate does not exhibit \"inducible\" resistance to Clindamycin (isolate remains susceptible to Clindamycin).     Methicillin resistant Staphylococcus aureus, Patient may be an isolation risk.           Gram Stain Result           Many (4+) Gram positive cocci in pairs and clusters         Rare (1+) WBCs seen          Susceptibility         Staphylococcus aureus, MRSA         HARIS         Clindamycin <=0.25  Susceptible         Erythromycin >=8  Resistant         Oxacillin >=4  Resistant         Penicillin G >=0.5  Resistant         Rifampin <=0.5  Susceptible         Tetracycline <=1  Susceptible         Trimethoprim + Sulfamethoxazole <=10  Susceptible         Vancomycin 1  Susceptible                               Blood Culture [[id number]] (Normal) Collected: 03/15/17 0147       Lab Status: Preliminary result Specimen: Blood from Blood, Venous Line Updated: 03/18/17 0205        Blood Culture No growth at 3 days       Imaging:  3/17 CXR   Impression:         Minimal likely atelectasis or infiltrate at the lung bases.  Follow-up as clinical indications persist.     This report was finalized on 3/17/2017 12:41 PM by Dr. Jimmy Peace MD.     3/16 MRI L tibia fibula  Impression:         Nonspecific edema and enhancement in the left lower leg  distally. This is most consistent with " cellulitis. There is no abscess  or osteomyelitis.     This report was finalized on 3/17/2017 7:43 AM by Dr. Martin Clark MD.     Labs:  3/16 A1c 6.03  3/16 CRP 4.56  3/16 ESR 34  3/18 PCT 0.09     Temps of 101.6 and 100.8 this AM    Lab Results   Component Value Date    WADE 12.60 03/18/2017     Assessment/Plan  1) Vancomycin trough level drawn ~ 22h after preceding dose and prior to 4th overall = 12.6 mcg/mL.  Target 10-20 mcg/mL.  Anticipate Vanc trough level drawn ~24h post dose may be < 10 mcg/mL.  As such, will increase to 1500mg IV q24h (~20mg/kg) and plan for repeat trough before 3rd dose 3/20 afternoon.    2) Will monitor serum creatinine at least every 48 hours per dosing recommendations.    3) Encourage hydration as allowed by MD to help prevent toxic accumulation; monitor for s/sxn of toxicity including increase in SCr and decrease in UOP.    Pharmacy will continue to follow daily while on vancomycin and adjust as needed.     Thanks, Hung Mckeon, PharmD, BCPS

## 2017-03-18 NOTE — NURSING NOTE
Called positive influenza A result to Dr Castaneda. MD instructed RN to go ahead with administration of IV levaquin. MD to enter orders for tamiflu.

## 2017-03-18 NOTE — PROGRESS NOTES
" LOS: 3 days   Primary Care Physician: Fly Sanchez DO     Subjective  Cough worsening but no SOA. Febrile overnight. No CP NVD. Foot pain improved.    Vital Signs  Body mass index is 26.19 kg/(m^2).  Temp:  [98 °F (36.7 °C)-101.6 °F (38.7 °C)] 98 °F (36.7 °C)  Heart Rate:  [68-87] 70  Resp:  [16-18] 18  BP: (143-172)/(66-78) 160/70      Objective:  General Appearance:  Comfortable and in no acute distress.    Vital signs: (most recent): Blood pressure 160/70, pulse 70, temperature 98 °F (36.7 °C), temperature source Oral, resp. rate 18, height 67\" (170.2 cm), weight 167 lb 3.2 oz (75.8 kg), SpO2 93 %.  Fever.    HEENT: Normal HEENT exam.    Lungs:  Normal respiratory rate and normal effort.  She is not in respiratory distress.  There are rales and rhonchi.  No wheezes.    Heart: Normal rate.  Regular rhythm.  No murmur.   Abdomen: Abdomen is soft.  There is no abdominal tenderness.     Extremities: Normal range of motion.  There is local extremity swelling.    Pulses: Distal pulses are intact.    Neurological: Patient is alert.    Pupils:  Pupils are equal, round, and reactive to light.    Skin:  There is a rash.  (LLE with healing ulcer on shin, erythema is stable to mid shin, swelling resolved)              Results Review:    I reviewed the patient's new clinical results.  I reviewed the patient's new imaging results and agree with the interpretation.      Results from last 7 days  Lab Units 03/18/17  0311 03/17/17  0303   WBC 10*3/mm3 3.32* 3.90*   HEMOGLOBIN g/dL 10.1* 10.5*   PLATELETS 10*3/mm3 175 189       Results from last 7 days  Lab Units 03/18/17  0311 03/17/17  0303   SODIUM mmol/L 137 137   POTASSIUM mmol/L 4.5 4.4   CHLORIDE mmol/L 101 101   TOTAL CO2 mmol/L 24.0 24.8   BUN mg/dL 19 19   CREATININE mg/dL 1.04* 1.04*   CALCIUM mg/dL 8.1* 8.6   GLUCOSE mg/dL 88 99         Hemoglobin A1C:  Lab Results   Component Value Date    HGBA1C 6.03 (H) 03/16/2017       Glucose Range:  GLUCOSE   Date/Time " Value Ref Range Status   03/18/2017 1108 91 70 - 130 mg/dL Final   03/18/2017 0619 93 70 - 130 mg/dL Final   03/17/2017 2137 170 (H) 70 - 130 mg/dL Final   03/17/2017 1656 141 (H) 70 - 130 mg/dL Final   03/17/2017 0619 105 70 - 130 mg/dL Final   03/16/2017 2137 127 70 - 130 mg/dL Final       Medication Review: Yes    Physical Therapy:    Assessment/Plan     Active Hospital Problems (** Indicates Principal Problem)    Diagnosis Date Noted   • **Left leg cellulitis [L03.116] 03/15/2017   • MRSA (methicillin resistant Staphylococcus aureus) infection [A49.02] 03/18/2017   • Pneumonia due to infectious organism [J18.9] 03/17/2017   • Allergic rhinitis [J30.9] 03/16/2017   • CKD (chronic kidney disease) stage 3, GFR 30-59 ml/min [N18.3] 03/15/2017   • Type 2 diabetes mellitus with diabetic polyneuropathy, without long-term current use of insulin [E11.42] 03/25/2016   • Benign essential hypertension [I10] 03/25/2016      Resolved Hospital Problems    Diagnosis Date Noted Date Resolved   No resolved problems to display.       Assessment & Plan  -MRSA LLE Cellulitis: Elevated ESR and CRP so checked MRI. There is no abscess or osteomyelitis on that scan. Her skin abrasion/ulcer is healing well and limb is improving. Continue Vancomycin today with fever overnight. Once afebrile plan to change to clindamycin based on sensitivities and her allergies.  -CXR with atelectasis vs infiltrate. Rales today. Allergic to PCN so will add Levaquin and check Sputum Cx, RVP, procal. Rechecking Blood cultures today.  -Cr stable  -A1c 6.0, diet controlled at home.    Disposition: SNF, PT follwoing    Gera Castaneda MD  03/18/17  3:51 PM

## 2017-03-18 NOTE — PLAN OF CARE
Problem: Patient Care Overview (Adult)  Goal: Plan of Care Review    03/18/17 1002   Coping/Psychosocial Response Interventions   Plan Of Care Reviewed With patient   Patient Care Overview   Progress improving   Outcome Evaluation   Outcome Summary/Follow up Plan Improved tolerance to functional activity this day with an increase in gait distance, continued participation in ther. ex. program, and decreased assist required for overall functional mobility.

## 2017-03-18 NOTE — PLAN OF CARE
Problem: Patient Care Overview (Adult)  Goal: Plan of Care Review  Outcome: Ongoing (interventions implemented as appropriate)    03/18/17 0448   Coping/Psychosocial Response Interventions   Plan Of Care Reviewed With patient   Patient Care Overview   Progress improving   Outcome Evaluation   Outcome Summary/Follow up Plan Pt has done well through the night. Voiding per brief with incontinence. Mobility very limited and no complaints of pain tonight.        Goal: Adult Individualization and Mutuality  Outcome: Ongoing (interventions implemented as appropriate)  Goal: Discharge Needs Assessment  Outcome: Ongoing (interventions implemented as appropriate)    Problem: Skin Integrity Impairment, Risk/Actual (Adult)  Goal: Skin Integrity/Wound Healing  Outcome: Ongoing (interventions implemented as appropriate)    03/18/17 0448   Skin Integrity Impairment, Risk/Actual (Adult)   Skin Integrity/Wound Healing achieves outcome         Problem: Fall Risk (Adult)  Goal: Absence of Falls  Outcome: Ongoing (interventions implemented as appropriate)    03/18/17 0448   Fall Risk (Adult)   Absence of Falls achieves outcome         Problem: Infection, Risk/Actual (Adult)  Goal: Infection Prevention/Resolution  Outcome: Ongoing (interventions implemented as appropriate)    03/18/17 0448   Infection, Risk/Actual (Adult)   Infection Prevention/Resolution achieves outcome

## 2017-03-19 PROBLEM — R82.71 BACTERIA IN URINE: Status: ACTIVE | Noted: 2017-03-19

## 2017-03-19 PROBLEM — J10.00 PNEUMONIA DUE TO INFLUENZA A VIRUS: Status: ACTIVE | Noted: 2017-03-17

## 2017-03-19 LAB
ANION GAP SERPL CALCULATED.3IONS-SCNC: 11.8 MMOL/L
BASOPHILS # BLD AUTO: 0.01 10*3/MM3 (ref 0–0.2)
BASOPHILS NFR BLD AUTO: 0.3 % (ref 0–1.5)
BUN BLD-MCNC: 20 MG/DL (ref 8–23)
BUN/CREAT SERPL: 21.5 (ref 7–25)
CALCIUM SPEC-SCNC: 8.1 MG/DL (ref 8.2–9.6)
CHLORIDE SERPL-SCNC: 100 MMOL/L (ref 98–107)
CO2 SERPL-SCNC: 24.2 MMOL/L (ref 22–29)
CREAT BLD-MCNC: 0.93 MG/DL (ref 0.57–1)
DEPRECATED RDW RBC AUTO: 45.5 FL (ref 37–54)
EOSINOPHIL # BLD AUTO: 0.08 10*3/MM3 (ref 0–0.7)
EOSINOPHIL NFR BLD AUTO: 2.5 % (ref 0.3–6.2)
ERYTHROCYTE [DISTWIDTH] IN BLOOD BY AUTOMATED COUNT: 12.9 % (ref 11.7–13)
GFR SERPL CREATININE-BSD FRML MDRD: 57 ML/MIN/1.73
GLUCOSE BLD-MCNC: 90 MG/DL (ref 65–99)
GLUCOSE BLDC GLUCOMTR-MCNC: 112 MG/DL (ref 70–130)
GLUCOSE BLDC GLUCOMTR-MCNC: 133 MG/DL (ref 70–130)
GLUCOSE BLDC GLUCOMTR-MCNC: 209 MG/DL (ref 70–130)
GLUCOSE BLDC GLUCOMTR-MCNC: 93 MG/DL (ref 70–130)
HCT VFR BLD AUTO: 32.9 % (ref 35.6–45.5)
HGB BLD-MCNC: 10.5 G/DL (ref 11.9–15.5)
IMM GRANULOCYTES # BLD: 0 10*3/MM3 (ref 0–0.03)
IMM GRANULOCYTES NFR BLD: 0 % (ref 0–0.5)
LYMPHOCYTES # BLD AUTO: 1.03 10*3/MM3 (ref 0.9–4.8)
LYMPHOCYTES NFR BLD AUTO: 32.5 % (ref 19.6–45.3)
MCH RBC QN AUTO: 31 PG (ref 26.9–32)
MCHC RBC AUTO-ENTMCNC: 31.9 G/DL (ref 32.4–36.3)
MCV RBC AUTO: 97.1 FL (ref 80.5–98.2)
MONOCYTES # BLD AUTO: 0.63 10*3/MM3 (ref 0.2–1.2)
MONOCYTES NFR BLD AUTO: 19.9 % (ref 5–12)
NEUTROPHILS # BLD AUTO: 1.42 10*3/MM3 (ref 1.9–8.1)
NEUTROPHILS NFR BLD AUTO: 44.8 % (ref 42.7–76)
PLATELET # BLD AUTO: 145 10*3/MM3 (ref 140–500)
PMV BLD AUTO: 9.5 FL (ref 6–12)
POTASSIUM BLD-SCNC: 4.7 MMOL/L (ref 3.5–5.2)
PROCALCITONIN SERPL-MCNC: 0.07 NG/ML (ref 0.1–0.25)
RBC # BLD AUTO: 3.39 10*6/MM3 (ref 3.9–5.2)
SODIUM BLD-SCNC: 136 MMOL/L (ref 136–145)
WBC NRBC COR # BLD: 3.17 10*3/MM3 (ref 4.5–10.7)

## 2017-03-19 PROCEDURE — 25010000002 ENOXAPARIN PER 10 MG: Performed by: INTERNAL MEDICINE

## 2017-03-19 PROCEDURE — 97110 THERAPEUTIC EXERCISES: CPT

## 2017-03-19 PROCEDURE — 25010000002 VANCOMYCIN: Performed by: INTERNAL MEDICINE

## 2017-03-19 PROCEDURE — 63710000001 INSULIN ASPART PER 5 UNITS: Performed by: INTERNAL MEDICINE

## 2017-03-19 PROCEDURE — 82962 GLUCOSE BLOOD TEST: CPT

## 2017-03-19 PROCEDURE — 84145 PROCALCITONIN (PCT): CPT | Performed by: INTERNAL MEDICINE

## 2017-03-19 PROCEDURE — 25010000002 LEVOFLOXACIN PER 250 MG: Performed by: INTERNAL MEDICINE

## 2017-03-19 PROCEDURE — 85025 COMPLETE CBC W/AUTO DIFF WBC: CPT | Performed by: INTERNAL MEDICINE

## 2017-03-19 PROCEDURE — 80048 BASIC METABOLIC PNL TOTAL CA: CPT | Performed by: INTERNAL MEDICINE

## 2017-03-19 RX ADMIN — ENOXAPARIN SODIUM 30 MG: 30 INJECTION SUBCUTANEOUS at 08:59

## 2017-03-19 RX ADMIN — VANCOMYCIN HYDROCHLORIDE 1500 MG: 1 INJECTION, POWDER, LYOPHILIZED, FOR SOLUTION INTRAVENOUS at 13:55

## 2017-03-19 RX ADMIN — INSULIN ASPART 3 UNITS: 100 INJECTION, SOLUTION INTRAVENOUS; SUBCUTANEOUS at 12:09

## 2017-03-19 RX ADMIN — SERTRALINE 100 MG: 100 TABLET, FILM COATED ORAL at 22:52

## 2017-03-19 RX ADMIN — PRIMIDONE 50 MG: 50 TABLET ORAL at 08:59

## 2017-03-19 RX ADMIN — GUAIFENESIN 600 MG: 600 TABLET, EXTENDED RELEASE ORAL at 08:59

## 2017-03-19 RX ADMIN — AMLODIPINE BESYLATE 5 MG: 5 TABLET ORAL at 08:59

## 2017-03-19 RX ADMIN — OSELTAMIVIR PHOSPHATE 30 MG: 30 CAPSULE ORAL at 08:59

## 2017-03-19 RX ADMIN — LEVOFLOXACIN 500 MG: 5 INJECTION, SOLUTION INTRAVENOUS at 19:40

## 2017-03-19 RX ADMIN — FLUTICASONE PROPIONATE 2 SPRAY: 50 SPRAY, METERED NASAL at 08:59

## 2017-03-19 RX ADMIN — FAMOTIDINE 20 MG: 20 TABLET, FILM COATED ORAL at 08:59

## 2017-03-19 RX ADMIN — GUAIFENESIN 600 MG: 600 TABLET, EXTENDED RELEASE ORAL at 18:09

## 2017-03-19 RX ADMIN — TEMAZEPAM 15 MG: 15 CAPSULE ORAL at 22:52

## 2017-03-19 RX ADMIN — PRIMIDONE 50 MG: 50 TABLET ORAL at 22:52

## 2017-03-19 RX ADMIN — OSELTAMIVIR PHOSPHATE 30 MG: 30 CAPSULE ORAL at 22:52

## 2017-03-19 NOTE — PLAN OF CARE
Problem: Patient Care Overview (Adult)  Goal: Plan of Care Review  Outcome: Ongoing (interventions implemented as appropriate)    03/19/17 1657   Coping/Psychosocial Response Interventions   Plan Of Care Reviewed With patient   Patient Care Overview   Progress improving   Outcome Evaluation   Outcome Summary/Follow up Plan Pt stable. No c/o pain. Redness and swellling in BLE improved. Continue contact and droplet isolation. Continue IV antibiotics and PO tamiflu. Plan DC to [facility] tomorrow.       Goal: Adult Individualization and Mutuality  Outcome: Ongoing (interventions implemented as appropriate)  Goal: Discharge Needs Assessment  Outcome: Ongoing (interventions implemented as appropriate)    Problem: Skin Integrity Impairment, Risk/Actual (Adult)  Goal: Skin Integrity/Wound Healing  Outcome: Ongoing (interventions implemented as appropriate)    Problem: Fall Risk (Adult)  Goal: Absence of Falls  Outcome: Ongoing (interventions implemented as appropriate)    03/19/17 1657   Fall Risk (Adult)   Absence of Falls achieves outcome         Problem: Infection, Risk/Actual (Adult)  Goal: Infection Prevention/Resolution  Outcome: Ongoing (interventions implemented as appropriate)    03/19/17 1657   Infection, Risk/Actual (Adult)   Infection Prevention/Resolution achieves outcome

## 2017-03-19 NOTE — PROGRESS NOTES
"Acute Care - Physical Therapy Treatment Note  [facility]     Patient Name: Magdalena Jerry  : 1926  MRN: [id number]  Today's Date: 3/19/2017  Onset of Illness/Injury or Date of Surgery Date: 17  Date of Referral to PT: 17  Referring Physician: Gera Dyson    Admit Date: 3/14/2017    Visit Dx:    ICD-10-CM ICD-9-CM   1. Left leg cellulitis L03.116 682.6     Patient Active Problem List   Diagnosis   • Adjustment disorder with mixed anxiety and depressed mood   • Anemia   • Benign essential hypertension   • Hereditary essential tremor   • Type 2 diabetes mellitus with diabetic polyneuropathy, without long-term current use of insulin   • Dry skin dermatitis   • Dyslipidemia   • Gastroesophageal reflux disease with esophagitis   • Pain of hand   • Hearing loss   • Arthralgia of hip   • Impacted cerumen   • Pruritus   • Knee pain   • Pain in extremity   • Chronic low back pain   • Weakness of lower extremity   • Spinal stenosis of lumbar region   • Senile osteoporosis   • Piriformis syndrome   • Primary insomnia   • Tinea pedis   • Vitamin D deficiency   • Left leg cellulitis   • CKD (chronic kidney disease) stage 3, GFR 30-59 ml/min   • Allergic rhinitis   • Pneumonia due to influenza A virus   • MRSA (methicillin resistant Staphylococcus aureus) infection   • Bacteria in urine               Adult Rehabilitation Note       17 1018 17 1000       Rehab Assessment/Intervention    Discipline physical therapist  -MS physical therapist  -MS     Document Type therapy note (daily note)  -MS therapy note (daily note)  -MS     Subjective Information agree to therapy;complains of;weakness;fatigue  -MS agree to therapy;complains of;fatigue;weakness  -MS     Patient Effort, Rehab Treatment adequate  -MS good  -MS     Symptoms Noted Comment Pt. reports feeling \"worn out\" and agreeable to short ambulation distance only this day.  -MS Pt. reports feeling \"tired\" this AM but no c/o pain.  " -MS     Precautions/Limitations fall precautions   Exit alarm; H.O.H.; Contact/droplet isolation  -MS fall precautions   Exit alarm; H.O.H.; Contact Isolation  -MS     Specific Treatment Considerations Precautions taken to help minimize/eliminate all friction/shearing forces to pt.'s skin during mobility.  -MS      Recorded by [MS] Raghav Mathew PT [MS] Raghav Mathew PT     Pain Assessment    Pain Assessment No/denies pain   No verbal/visual signs of pain this AM.  -MS No/denies pain  -MS     Recorded by [MS] Raghav Mathew PT [MS] Raghav Mathew PT     Cognitive Assessment/Intervention    Current Cognitive/Communication Assessment functional  -MS functional  -MS     Orientation Status oriented x 4  -MS oriented x 4  -MS     Follows Commands/Answers Questions 100% of the time;able to follow single-step instructions;needs cueing;needs increased time  -% of the time;able to follow single-step instructions;needs cueing;needs increased time  -MS     Personal Safety Interventions fall prevention program maintained;gait belt;nonskid shoes/slippers when out of bed;supervised activity  -MS fall prevention program maintained;gait belt;nonskid shoes/slippers when out of bed;supervised activity  -MS     Recorded by [MS] Raghav Mathew PT [MS] Raghav Mathew PT     Bed Mobility, Assessment/Treatment    Bed Mob, Supine to Sit, [place]  minimum assist (75% patient effort)  -MS     Bed Mobility, Comment Pt. up in bathroom with nursing staff.  -MS      Recorded by [MS] Raghav Mathew PT [MS] Raghav Mathew PT     Transfer Assessment/Treatment    Transfers, Sit-Stand [place] minimum assist (75% patient effort)  -MS contact guard assist  -MS     Transfers, Stand-Sit [place] minimum assist (75% patient effort)  -MS contact guard assist  -MS     Transfers, Sit-Stand-Sit, Assist Device rolling walker  -MS rolling walker  -MS     Recorded by [MS] Raghav Mathew PT [MS] Raghav Mathew,  "PT     Gait Assessment/Treatment    Gait, [place] Level contact guard assist  -MS contact guard assist  -MS     Gait, Assistive Device rolling walker  -MS rolling walker  -MS     Gait, Distance (Feet) 30  -  -MS     Gait, Gait Deviations nicky decreased;forward flexed posture;narrow base;step length decreased  -MS nicky decreased;forward flexed posture;step length decreased  -MS     Gait, Safety Issues balance decreased during turns;step length decreased  -MS      Gait, Comment Pt. just in bathroom washing up and also using commode.  Pt. reports feeling \"worn out\" and only able to amb. 30 feet due to L.E. weakness/fatigue.  x 2 standing rest breaks.  Pt. requires min. verbal/tactile cues for posture correction and RWX guidance.  -MS Pt. requires verbal/tactile cues for posture correction and RWX guidance.  -MS     Recorded by [MS] Raghav Mathew PT [MS] Raghav Mathew PT     Therapy Exercises    Bilateral Lower Extremities AROM:;5 reps;sitting;ankle pumps/circles;hip flexion;LAQ  -MS AROM:;10 reps;sitting;ankle pumps/circles;hip flexion;LAQ  -MS     Recorded by [MS] Raghav Mathew PT [MS] Raghav Mathew PT     Positioning and Restraints    Pre-Treatment Position bathroom  -MS in bed  -MS     Post Treatment Position chair  -MS chair  -MS     In Chair notified nsg;reclined;sitting;call light within reach;encouraged to call for assist;exit alarm on;RLE elevated;LLE elevated;R heel elevated;L heel elevated   All lines intact.  -MS notified nsg;sitting;call light within reach;encouraged to call for assist;exit alarm on;with nsg   All lines intact.  -MS     Recorded by [MS] Raghav Mathew PT [MS] Raghav Mathew PT       User Key  (r) = Recorded By, (t) = Taken By, (c) = Cosigned By    Initials Name Effective Dates    MS Raghav Mathew PT 12/01/15 -                 IP PT Goals       03/17/17 1339          Bed Mobility PT LTG    Bed Mobility PT LTG, Date Established 03/17/17  -MS      Bed " Mobility PT LTG, Time to Achieve 3 days  -MS      Bed Mobility PT LTG, Activity Type all bed mobility  -MS      Bed Mobility PT LTG, [place] Level contact guard assist  -MS      Transfer Training PT LTG    Transfer Training PT LTG, Date Established 03/17/17  -MS      Transfer Training PT LTG, Time to Achieve 3 days  -MS      Transfer Training PT LTG, Activity Type all transfers  -MS      Transfer Training PT LTG, [place] Level supervision required  -MS      Transfer Training PT LTG, Assist Device walker, rolling  -MS      Gait Training PT LTG    Gait Training Goal PT LTG, Date Established 03/17/17  -MS      Gait Training Goal PT LTG, Time to Achieve 3 days  -MS      Gait Training Goal PT LTG, [place] Level supervision required  -MS      Gait Training Goal PT LTG, Assist Device walker, rolling  -MS      Gait Training Goal PT LTG, Distance to Achieve 100 feet  -MS        User Key  (r) = Recorded By, (t) = Taken By, (c) = Cosigned By    Initials Name Provider Type    MS Raghav Mathew, PT Physical Therapist          Physical Therapy Education     Title: PT OT SLP Therapies (Done)     Topic: Physical Therapy (Done)     Point: Mobility training (Done)    Learning Progress Summary    Learner Readiness Method Response Comment Documented by Status   Patient Acceptance E,D VU,NR  MS 03/19/17 1022 Done    Acceptance E,D VU,NR  MS 03/18/17 1002 Done    Acceptance ED NRVU  MS 03/17/17 1339 Done               Point: Home exercise program (Done)    Learning Progress Summary    Learner Readiness Method Response Comment Documented by Status   Patient Acceptance E,D VU,NR  MS 03/19/17 1022 Done    Acceptance ED VUNR  MS 03/18/17 1002 Done    Acceptance ED NR,VU  MS 03/17/17 1339 Done               Point: Body mechanics (Done)    Learning Progress Summary    Learner Readiness Method Response Comment Documented by Status   Patient Acceptance E,D VU,NR  MS 03/19/17 1022 Done    Acceptance ED VU,NR  MS 03/18/17  "1002 Done    Acceptance NAZIA DIAZ VU  MS 03/17/17 1339 Done               Point: Precautions (Done)    Learning Progress Summary    Learner Readiness Method Response Comment Documented by Status   Patient Acceptance NAZIA DIAZ NR  MS 03/19/17 1022 Done    Acceptance NAZIA DIAZ NR  MS 03/18/17 1002 Done    Acceptance NAZIA DIAZ VU  MS 03/17/17 1339 Done                      User Key     Initials Effective Dates Name Provider Type Discipline    MS 12/01/15 -  Raghav Mathew, PT Physical Therapist PT                    PT Recommendation and Plan  Anticipated Discharge Disposition: skilled nursing facility  Planned Therapy Interventions: balance training, bed mobility training, gait training, patient/family education, postural re-education, strengthening, transfer training  PT Frequency: daily  Plan of Care Review  Plan Of Care Reviewed With: patient  Progress: improving  Outcome Summary/Follow up Plan: Pt. very limited by overall fatigue and feeling \"worn out\" this AM.  Pt. only able to ambulate 30 feet this day but did participate in continued BLE ther. ex.  Pt. requires min. verbal/tactile cues for posture correction and RWX guidance.          Outcome Measures       03/19/17 1000 03/18/17 1000 03/17/17 1300    How much help from another person do you currently need...    Turning from your back to your side while in flat bed without using bedrails? 3  -MS 3  -MS 2  -MS    Moving from lying on back to sitting on the side of a flat bed without bedrails? 3  -MS 3  -MS 2  -MS    Moving to and from a bed to a chair (including a wheelchair)? 3  -MS 3  -MS 3  -MS    Standing up from a chair using your arms (e.g., wheelchair, bedside chair)? 3  -MS 3  -MS 3  -MS    Climbing 3-5 steps with a railing? 2  -MS 2  -MS 2  -MS    To walk in hospital room? 3  -MS 3  -MS 3  -MS    AM-PAC 6 Clicks Score 17  -MS 17  -MS 15  -MS    Functional Assessment    Outcome Measure Options AM-PAC 6 Clicks Basic Mobility (PT)  -MS AM-PAC 6 Clicks Basic Mobility " (PT)  -MS AM-PAC 6 Clicks Basic Mobility (PT)  -MS      User Key  (r) = Recorded By, (t) = Taken By, (c) = Cosigned By    Initials Name Provider Type    MS Raghav Mathew PT Physical Therapist           Time Calculation:         PT Charges       03/19/17 1023          Time Calculation    Start Time 0948  -MS      Stop Time 1005  -MS      Time Calculation (min) 17 min  -MS      PT Received On 03/19/17  -MS      PT - Next Appointment 03/20/17  -MS        User Key  (r) = Recorded By, (t) = Taken By, (c) = Cosigned By    Initials Name Provider Type    MS Raghav Mathew PT Physical Therapist          Therapy Charges for Today     Code Description Service Date Service Provider Modifiers Qty    15107744915 HC PT THER PROC EA 15 MIN 3/18/2017 Raghav Mathew, PT GP 1    43356735224 HC PT THER PROC EA 15 MIN 3/19/2017 Raghav Mathew, PT GP 1          PT G-Codes  Outcome Measure Options: AM-PAC 6 Clicks Basic Mobility (PT)    Raghav Mathew, PT  3/19/2017

## 2017-03-19 NOTE — PLAN OF CARE
"Problem: Patient Care Overview (Adult)  Goal: Plan of Care Review    03/19/17 1022   Coping/Psychosocial Response Interventions   Plan Of Care Reviewed With patient   Outcome Evaluation   Outcome Summary/Follow up Plan Pt. very limited by overall fatigue and feeling \"worn out\" this AM. Pt. only able to ambulate 30 feet this day but did participate in continued BLE ther. ex. Pt. requires min. verbal/tactile cues for posture correction and RWX guidance.           "

## 2017-03-19 NOTE — PLAN OF CARE
Problem: Patient Care Overview (Adult)  Goal: Plan of Care Review  Outcome: Ongoing (interventions implemented as appropriate)    03/19/17 0428   Coping/Psychosocial Response Interventions   Plan Of Care Reviewed With patient   Patient Care Overview   Progress improving   Outcome Evaluation   Outcome Summary/Follow up Plan Pt has done well through the night. Dose of tamiflu given and pt has not needed any additional pain medicine. Voiding per brief with incontinence.        Goal: Adult Individualization and Mutuality  Outcome: Ongoing (interventions implemented as appropriate)  Goal: Discharge Needs Assessment  Outcome: Ongoing (interventions implemented as appropriate)    Problem: Skin Integrity Impairment, Risk/Actual (Adult)  Goal: Skin Integrity/Wound Healing  Outcome: Ongoing (interventions implemented as appropriate)    03/19/17 0428   Skin Integrity Impairment, Risk/Actual (Adult)   Skin Integrity/Wound Healing achieves outcome         Problem: Fall Risk (Adult)  Goal: Absence of Falls  Outcome: Ongoing (interventions implemented as appropriate)    03/19/17 0428   Fall Risk (Adult)   Absence of Falls achieves outcome         Problem: Infection, Risk/Actual (Adult)  Goal: Infection Prevention/Resolution  Outcome: Ongoing (interventions implemented as appropriate)    03/19/17 0428   Infection, Risk/Actual (Adult)   Infection Prevention/Resolution achieves outcome

## 2017-03-19 NOTE — PROGRESS NOTES
LOS: 4 days   Primary Care Physician: Fly Sanchez DO       Subjective  She reports no dysuria or changes in urination. Cough improved today. Leg pain improved. No NVD.  History taken from: patient       Physical Exam   Constitutional: She appears well-developed and well-nourished. No distress.   HENT:   Head: Normocephalic and atraumatic.   Mouth/Throat: No oropharyngeal exudate.   Eyes: Conjunctivae and EOM are normal. Pupils are equal, round, and reactive to light.   Neck: Normal range of motion. Neck supple.   Cardiovascular: Normal rate, regular rhythm, normal heart sounds and intact distal pulses.    Pulmonary/Chest: Effort normal. No stridor. She has no decreased breath sounds. She has no wheezes. She has rhonchi. She has no rales.   Abdominal: Soft. Bowel sounds are normal. She exhibits no distension. There is no tenderness.   Musculoskeletal: Normal range of motion. She exhibits no edema.   Neurological: She is alert. No cranial nerve deficit.   Skin: Skin is warm. Rash noted. She is not diaphoretic. There is erythema.   LLE erythema improving now to mid shin and foot edema resolving   Psychiatric: She has a normal mood and affect. Her behavior is normal.   Nursing note and vitals reviewed.      Vital Signs  Body mass index is 26.19 kg/(m^2).  Temp:  [97.9 °F (36.6 °C)-100 °F (37.8 °C)] 100 °F (37.8 °C)  Heart Rate:  [62-75] 62  Resp:  [16-18] 16  BP: (123-162)/(60-78) 152/72      Results Review:     I reviewed the patient's new clinical results.  I reviewed the patient's other test results and agree with the interpretation      Results from last 7 days  Lab Units 03/19/17  0435 03/18/17  0311   WBC 10*3/mm3 3.17* 3.32*   HEMOGLOBIN g/dL 10.5* 10.1*   PLATELETS 10*3/mm3 145 175       Results from last 7 days  Lab Units 03/19/17  0435 03/18/17  0311   SODIUM mmol/L 136 137   POTASSIUM mmol/L 4.7 4.5   CHLORIDE mmol/L 100 101   TOTAL CO2 mmol/L 24.2 24.0   BUN mg/dL 20 19   CREATININE mg/dL 0.93 1.04*    CALCIUM mg/dL 8.1* 8.1*   GLUCOSE mg/dL 90 88         Hemoglobin A1C:  Lab Results   Component Value Date    HGBA1C 6.03 (H) 03/16/2017       Glucose Range:  GLUCOSE   Date/Time Value Ref Range Status   03/19/2017 0621 93 70 - 130 mg/dL Final   03/18/2017 2133 141 (H) 70 - 130 mg/dL Final   03/18/2017 1553 145 (H) 70 - 130 mg/dL Final   03/18/2017 1108 91 70 - 130 mg/dL Final   03/18/2017 0619 93 70 - 130 mg/dL Final   03/17/2017 2137 170 (H) 70 - 130 mg/dL Final       Medication Review: Yes    Physical Therapy:    Assessment/Plan     Active Hospital Problems (** Indicates Principal Problem)    Diagnosis Date Noted   • **Left leg cellulitis [L03.116] 03/15/2017   • Bacteria in urine [R82.71] 03/19/2017   • MRSA (methicillin resistant Staphylococcus aureus) infection [A49.02] 03/18/2017   • Pneumonia due to influenza A virus [J09.X1] 03/17/2017   • Allergic rhinitis [J30.9] 03/16/2017   • CKD (chronic kidney disease) stage 3, GFR 30-59 ml/min [N18.3] 03/15/2017   • Type 2 diabetes mellitus with diabetic polyneuropathy, without long-term current use of insulin [E11.42] 03/25/2016   • Benign essential hypertension [I10] 03/25/2016      Resolved Hospital Problems    Diagnosis Date Noted Date Resolved   No resolved problems to display.       Assessment & Plan  -MRSA LLE Cellulitis: Elevated ESR and CRP so checked MRI. There is no abscess or osteomyelitis on that scan. Her skin abrasion/ulcer is healing well and limb is improving. Continue Vancomycin today with fever overnight. Once afebrile plan to change to clindamycin based on sensitivities and her allergies.  -CXR with atelectasis vs infiltrate. Found to be infuenza a positive. Procalcitonin is negative. Tamiflu course. Could be source for continued fever despite antiotic treatmetn of her cellulitis.  -Continue levaquin for bacteriuria because of her continued fevers. Follow up Cx results and if negative can stop levaquin.  -A1c 6.0, diet controlled at  home.  -Dispo: SNF, [place], anticipate dc tomorrow    Gera Castaneda MD  03/19/17  8:08 AM    Time: 40min

## 2017-03-20 VITALS
BODY MASS INDEX: 26.24 KG/M2 | WEIGHT: 167.2 LBS | OXYGEN SATURATION: 95 % | SYSTOLIC BLOOD PRESSURE: 175 MMHG | HEART RATE: 58 BPM | RESPIRATION RATE: 16 BRPM | HEIGHT: 67 IN | TEMPERATURE: 99.9 F | DIASTOLIC BLOOD PRESSURE: 59 MMHG

## 2017-03-20 LAB
BACTERIA SPEC AEROBE CULT: NO GROWTH
BACTERIA SPEC AEROBE CULT: NORMAL
BACTERIA SPEC RESP CULT: NORMAL
CREAT BLD-MCNC: 1.18 MG/DL (ref 0.57–1)
GFR SERPL CREATININE-BSD FRML MDRD: 43 ML/MIN/1.73
GLUCOSE BLDC GLUCOMTR-MCNC: 146 MG/DL (ref 70–130)
GLUCOSE BLDC GLUCOMTR-MCNC: 88 MG/DL (ref 70–130)
GRAM STN SPEC: NORMAL
VANCOMYCIN TROUGH SERPL-MCNC: 18.5 MCG/ML (ref 5–20)

## 2017-03-20 PROCEDURE — 80202 ASSAY OF VANCOMYCIN: CPT | Performed by: INTERNAL MEDICINE

## 2017-03-20 PROCEDURE — 82962 GLUCOSE BLOOD TEST: CPT

## 2017-03-20 PROCEDURE — 25010000002 VANCOMYCIN: Performed by: INTERNAL MEDICINE

## 2017-03-20 PROCEDURE — 82565 ASSAY OF CREATININE: CPT | Performed by: INTERNAL MEDICINE

## 2017-03-20 PROCEDURE — 25010000002 ENOXAPARIN PER 10 MG: Performed by: INTERNAL MEDICINE

## 2017-03-20 RX ORDER — AMLODIPINE BESYLATE 5 MG/1
5 TABLET ORAL
Start: 2017-03-20 | End: 2017-06-12

## 2017-03-20 RX ORDER — TEMAZEPAM 15 MG/1
15 CAPSULE ORAL NIGHTLY PRN
Qty: 90 CAPSULE | Refills: 0 | Status: SHIPPED | OUTPATIENT
Start: 2017-03-20 | End: 2017-03-22

## 2017-03-20 RX ORDER — OSELTAMIVIR PHOSPHATE 30 MG/1
30 CAPSULE ORAL EVERY 12 HOURS SCHEDULED
Refills: 0
Start: 2017-03-20 | End: 2017-03-23

## 2017-03-20 RX ORDER — ACETAMINOPHEN 325 MG/1
650 TABLET ORAL EVERY 6 HOURS PRN
Refills: 0
Start: 2017-03-20

## 2017-03-20 RX ORDER — CLINDAMYCIN HYDROCHLORIDE 300 MG/1
300 CAPSULE ORAL 3 TIMES DAILY
Refills: 0
Start: 2017-03-20 | End: 2017-03-27

## 2017-03-20 RX ORDER — LEVOFLOXACIN 500 MG/1
500 TABLET, FILM COATED ORAL DAILY
Refills: 0
Start: 2017-03-21 | End: 2017-03-22

## 2017-03-20 RX ADMIN — AMLODIPINE BESYLATE 5 MG: 5 TABLET ORAL at 08:18

## 2017-03-20 RX ADMIN — GUAIFENESIN 600 MG: 600 TABLET, EXTENDED RELEASE ORAL at 08:18

## 2017-03-20 RX ADMIN — OSELTAMIVIR PHOSPHATE 30 MG: 30 CAPSULE ORAL at 08:18

## 2017-03-20 RX ADMIN — FAMOTIDINE 20 MG: 20 TABLET, FILM COATED ORAL at 08:18

## 2017-03-20 RX ADMIN — ENOXAPARIN SODIUM 30 MG: 30 INJECTION SUBCUTANEOUS at 08:18

## 2017-03-20 RX ADMIN — VANCOMYCIN HYDROCHLORIDE 1500 MG: 1 INJECTION, POWDER, LYOPHILIZED, FOR SOLUTION INTRAVENOUS at 13:17

## 2017-03-20 RX ADMIN — PRIMIDONE 50 MG: 50 TABLET ORAL at 08:18

## 2017-03-20 NOTE — DISCHARGE SUMMARY
DATE OF ADMISSION:  03/14/2017  DATE OF DISCHARGE:  03/20/2017.    DISCHARGE DIAGNOSIS:  Left leg cellulitis, methicillin-resistant Staphylococcus aureus.     SECONDARY DIAGNOSES:   1.  Influenza A.  2.  Weakness.   3.  Hypertension.   4.  Possible pneumonia.  5.  Chronic kidney disease stage 3.   6.  Diabetes mellitus type 2 with polyneuropathy.     CONSULTANT:  None.     DIAGNOSTIC DATA:    1.  MRI of the left tibia-fibula on 03/16/2017 that showed nonspecific edema and enhancement left lower leg. No evidence of abscess or osteomyelitis.  2.  Labs from yesterday: White count is 3.2, hemoglobin 10.5, platelets 145,000. Today creatinine is 1.18. Yesterday, sodium 136, potassium 4.7, chloride 100, CO2 of 24, BUN 20, creatinine 0.9, glucose is 90. Hemoglobin A1c is 6.0. Procalcitonin 0.07 yesterday. Her GFR is 43. Urine culture negative.     HOSPITAL COURSE:  Patient was admitted to our service for leg swelling and redness of the left leg. She was started empirically on antibiotics with broad-spectrum coverage. Wound culture eventually grew MRSA. Viral respiratory panel positive for influenza AH3. She was started on Tamiflu for that. Initially, Cozaar was held because of her renal function. MRI showed no evidence of abscess or osteomyelitis. Her leg has improved. She was noted to have a fever. Chest x-ray showed probable atelectasis, but pneumonia could not be ruled out. She was started on Levaquin 3 days ago. We will give her 1 more dose of Levaquin tomorrow, which will be 4 days, an adequate treatment course. She is okay for transfer to the [facility]. I had planned to switch her to doxycycline; however she is allergic, so we will need to use clindamycin. We will plan on 7 more days. She is also allergic to sulfa. Now reviewing the labs, I will have her stay off the Cozaar because of her renal function. She will need outpatient followup of her blood pressure, with further adjustment as needed.     CONDITION:   Stable.     PROGNOSIS:  Fair prognosis, given her advanced age.     MEDICATIONS:  1.  Tylenol 325 mg 2 p.o. q.6 h. p.r.n.   2.  Norvasc 5 mg daily.   3.  Clindamycin 300 mg t.i.d. for 7 days.  4.  Levaquin 500 mg tomorrow and then stop.   5.  Tamiflu 30 mg q.12 h. for 6 more doses.   6.  Restoril 15 mg at bedtime p.r.n.   7.  Flonase 2 sprays each nostril daily.   8.  Mysoline 50 mg daily.  9.  Zantac 150 mg b.i.d.   10.  Zoloft 100 mg daily.     DISCONTINUED MEDICATIONS:   1.  Losartan.  2.  Multivitamin.  3.  Vitamin D.     DISCHARGE INSTRUCTIONS:   1.  Activity as tolerated.   2.  Continue with physical therapy   3.  Diet is regular.   4.  Follow up with the nursing home physician in the next day or two.  5.  BMP and CBC 03/22/2017, with results called to the nursing home physician.  6.  Follow up with Dr. Sanchez in 3 weeks, 1-2 weeks after release from nursing home.   7.  I have discussed at length with the patient and family members at bedside. I have also discussed with CCP.     Total time 40 minutes.      Zita Pace M.D.  JH:ms  D:   03/20/2017 16:28:11  T:   03/20/2017 17:09:42  Job ID:   [id number]  Document ID:   [id number]  cc:

## 2017-03-20 NOTE — THERAPY DISCHARGE NOTE
Acute Care - Physical Therapy Discharge Summary  [facility]       Patient Name: Magdalena Jerry  : 1926  MRN: [id number]    Today's Date: 3/20/2017  Onset of Illness/Injury or Date of Surgery Date: 17    Date of Referral to PT: 17  Referring Physician: Gera Dyson      Admit Date: 3/14/2017      PT Recommendation and Plan    Visit Dx:    ICD-10-CM ICD-9-CM   1. Left leg cellulitis L03.116 682.6             Outcome Measures       17 1000 17 1000       How much help from another person do you currently need...    Turning from your back to your side while in flat bed without using bedrails? 3  -MS 3  -MS     Moving from lying on back to sitting on the side of a flat bed without bedrails? 3  -MS 3  -MS     Moving to and from a bed to a chair (including a wheelchair)? 3  -MS 3  -MS     Standing up from a chair using your arms (e.g., wheelchair, bedside chair)? 3  -MS 3  -MS     Climbing 3-5 steps with a railing? 2  -MS 2  -MS     To walk in hospital room? 3  -MS 3  -MS     AM-PAC 6 Clicks Score 17  -MS 17  -MS     Functional Assessment    Outcome Measure Options AM-PAC 6 Clicks Basic Mobility (PT)  -MS AM-PAC 6 Clicks Basic Mobility (PT)  -MS       User Key  (r) = Recorded By, (t) = Taken By, (c) = Cosigned By    Initials Name Provider Type    MS Raghva Mathew, PT Physical Therapist                      IP PT Goals       17 1620 17 1339       Bed Mobility PT LTG    Bed Mobility PT LTG, Date Established  17  -MS     Bed Mobility PT LTG, Time to Achieve  3 days  -MS     Bed Mobility PT LTG, Activity Type  all bed mobility  -MS     Bed Mobility PT LTG, [place] Level  contact guard assist  -MS     Bed Mobility PT LTG, Date Goal Reviewed 17  -EW      Bed Mobility PT LTG, Outcome goal partially met  -EW      Bed Mobility PT LTG, Reason Goal Not Met discharged from facility  -EW      Transfer Training PT LTG    Transfer Training PT LTG, Date  Established  03/17/17  -MS     Transfer Training PT LTG, Time to Achieve  3 days  -MS     Transfer Training PT LTG, Activity Type  all transfers  -MS     Transfer Training PT LTG, [place] Level  supervision required  -MS     Transfer Training PT LTG, Assist Device  walker, rolling  -MS     Transfer Training PT  LTG, Date Goal Reviewed 03/20/17  -EW      Transfer Training PT LTG, Outcome goal not met  -EW      Transfer Training PT LTG, Reason Goal Not Met discharged from facility  -EW      Gait Training PT LTG    Gait Training Goal PT LTG, Date Established  03/17/17  -MS     Gait Training Goal PT LTG, Time to Achieve  3 days  -MS     Gait Training Goal PT LTG, [place] Level  supervision required  -MS     Gait Training Goal PT LTG, Assist Device  walker, rolling  -MS     Gait Training Goal PT LTG, Distance to Achieve  100 feet  -MS     Gait Training Goal PT LTG, Date Goal Reviewed 03/20/17  -EW      Gait Training Goal PT LTG, Outcome goal not met  -EW      Gait Training Goal PT LTG, Reason Goal Not Met discharged from facility  -EW        User Key  (r) = Recorded By, (t) = Taken By, (c) = Cosigned By    Initials Name Provider Type    MS Raghav KANG [place], PT Physical Therapist    EW Martha Wetzel, PT Physical Therapist              PT Discharge Summary  Reason for Discharge: Discharge from facility  Outcomes Achieved: Refer to plan of care for updates on goals achieved  Discharge Destination: [facility]      Martha Wetzel, PT   3/20/2017

## 2017-03-20 NOTE — PLAN OF CARE
Problem: Patient Care Overview (Adult)  Goal: Plan of Care Review  Outcome: Unable to achieve outcome(s) by discharge Date Met:  03/20/17 03/20/17 1620   Coping/Psychosocial Response Interventions   Plan Of Care Reviewed With patient   Outcome Evaluation   Outcome Summary/Follow up Plan PT d/c to SNU today         Problem: Inpatient Physical Therapy  Goal: Bed Mobility Goal LTG- PT  Outcome: Unable to achieve outcome(s) by discharge Date Met:  03/20/17 03/20/17 1620   Bed Mobility PT LTG   Bed Mobility PT LTG, Date Goal Reviewed 03/20/17   Bed Mobility PT LTG, Outcome goal partially met   Bed Mobility PT LTG, Reason Goal Not Met discharged from facility       Goal: Transfer Training Goal 1 LTG- PT  Outcome: Unable to achieve outcome(s) by discharge Date Met:  03/20/17 03/20/17 1620   Transfer Training PT LTG   Transfer Training PT LTG, Date Goal Reviewed 03/20/17   Transfer Training PT LTG, Outcome goal not met   Transfer Training PT LTG, Reason Goal Not Met discharged from facility       Goal: Gait Training Goal LTG- PT  Outcome: Unable to achieve outcome(s) by discharge Date Met:  03/20/17 03/20/17 1620   Gait Training PT LTG   Gait Training Goal PT LTG, Date Goal Reviewed 03/20/17   Gait Training Goal PT LTG, Outcome goal not met   Gait Training Goal PT LTG, Reason Goal Not Met discharged from facility

## 2017-03-20 NOTE — PROGRESS NOTES
"     LOS: 5 days   Primary Care Physician: Fly Sanchez, DO     Subjective  Feels okay.  Having a little bit of a cough but nonproductive.  Son and daughter-in-law at bedside    Vital Signs  Body mass index is 26.19 kg/(m^2).  Temp:  [98.4 °F (36.9 °C)-99.9 °F (37.7 °C)] 99.9 °F (37.7 °C)  Heart Rate:  [56-60] 58  Resp:  [16-18] 16  BP: (138-175)/(55-76) 175/59      Objective:  General Appearance:  In no acute distress (looks younger than age).    Vital signs: (most recent): Blood pressure 175/59, pulse 58, temperature 99.9 °F (37.7 °C), temperature source Oral, resp. rate 16, height 67\" (170.2 cm), weight 167 lb 3.2 oz (75.8 kg), SpO2 95 %.    Lungs:  There are decreased breath sounds.  No wheezes, rales or rhonchi.    Heart: Normal rate.  Regular rhythm.  No murmur.   Abdomen: Abdomen is non-distended.  There is no abdominal tenderness.     Extremities: There is dependent edema.  (Trace left ankle.  Scaling skin.  Some eschars.  Improved per daughter-in-law who is at the bedside.)  Neurological: Patient is alert.          Results Review:    I reviewed the patient's new clinical results.      Results from last 7 days  Lab Units 03/19/17  0435 03/18/17  0311   WBC 10*3/mm3 3.17* 3.32*   HEMOGLOBIN g/dL 10.5* 10.1*   PLATELETS 10*3/mm3 145 175       Results from last 7 days  Lab Units 03/20/17  1220 03/19/17  0435 03/18/17  0311   SODIUM mmol/L  --  136 137   POTASSIUM mmol/L  --  4.7 4.5   CHLORIDE mmol/L  --  100 101   TOTAL CO2 mmol/L  --  24.2 24.0   BUN mg/dL  --  20 19   CREATININE mg/dL 1.18* 0.93 1.04*   CALCIUM mg/dL  --  8.1* 8.1*   GLUCOSE mg/dL  --  90 88         Hemoglobin A1C:  Lab Results   Component Value Date    HGBA1C 6.03 (H) 03/16/2017       Glucose Range:  GLUCOSE   Date/Time Value Ref Range Status   03/20/2017 1215 146 (H) 70 - 130 mg/dL Final   03/20/2017 0624 88 70 - 130 mg/dL Final   03/19/2017 2211 112 70 - 130 mg/dL Final   03/19/2017 1550 133 (H) 70 - 130 mg/dL Final   03/19/2017 1139 " 209 (H) 70 - 130 mg/dL Final   03/19/2017 0621 93 70 - 130 mg/dL Final       No results found for: [id number]    No results found for: TSH    Assessment & Plan      Medication Review: Yes    Active Hospital Problems (** Indicates Principal Problem)    Diagnosis Date Noted   • **Left leg cellulitis [L03.116] 03/15/2017   • Bacteria in urine [R82.71] 03/19/2017   • MRSA (methicillin resistant Staphylococcus aureus) infection [A49.02] 03/18/2017   • Pneumonia due to influenza A virus [J09.X1] 03/17/2017   • Allergic rhinitis [J30.9] 03/16/2017   • CKD (chronic kidney disease) stage 3, GFR 30-59 ml/min [N18.3] 03/15/2017   • Type 2 diabetes mellitus with diabetic polyneuropathy, without long-term current use of insulin [E11.42] 03/25/2016   • Benign essential hypertension [I10] 03/25/2016      Resolved Hospital Problems    Diagnosis Date Noted Date Resolved   No resolved problems to display.       Assessment/Plan  1. LLE cellulitis. Change vanc to clinda. outpt f/u. Allergic to doxy  2. Possible pneumonia.  levaquin day 3 today. Plan 4 day total  3.flu, on tamiflu thru 3/23 am.  4. Weakness- subacute rehab  5. htn- cont norvasc and restart cozaar    D/w pt, family, CCP  [id number]    Zita Pace MD  03/20/17  4:02 PM

## 2017-03-20 NOTE — PROGRESS NOTES
"Pharmacokinetic Consult - Vancomycin Dosing (Follow-up Note)    Magdalena Jerry is on day 6 pharmacy to dose vancomycin for MRSA LLE cellulitis.  Pharmacy dosing vancomycin per Tonya''s request.   Goal trough: 10-20 mg/L     Relevant clinical data and objective history reviewed:  89 y.o. female 67\" (170.2 cm) 167 lb 3.2 oz (75.8 kg)    Past Medical History   Diagnosis Date   • Adjustment disorder with mixed anxiety and depressed mood    • Anemia    • Anxiety    • Atrophy of hand muscles      LEFT HAND   • Benign familial tremor    • Carpal tunnel syndrome    • Cataract    • Chronic rhinosinusitis    • Depression    • Diabetes mellitus    • Dyslipidemia    • Esophagitis, reflux    • Fracture of hip    • Frequent falls    • GERD (gastroesophageal reflux disease)    • Hand pain    • Hearing loss    • Hip pain    • Hyperlipidemia    • Hypertension    • Impacted cerumen    • Insomnia    • Knee pain    • Limb pain    • Loss of hearing    • Low back pain    • Lower extremity weakness    • Lumbar canal stenosis    • Osteoporosis, senile    • Piriformis syndrome    • Renal insufficiency 2006   • Sensorineural hearing loss    • Stroke 2004   • Tinea pedis    • Vitamin D deficiency      CREATININE   Date Value Ref Range Status   03/20/2017 1.18 (H) 0.57 - 1.00 mg/dL Final   03/19/2017 0.93 0.57 - 1.00 mg/dL Final   03/18/2017 1.04 (H) 0.57 - 1.00 mg/dL Final   06/10/2016 1.16 (H) 0.57 - 1.00 mg/dL Final   01/05/2016 1.45 (H) 0.57 - 1.00 mg/dL Final   07/24/2015 1.23 (H) 0.57 - 1.00 mg/dL Final     BUN   Date Value Ref Range Status   03/19/2017 20 8 - 23 mg/dL Final   06/10/2016 24 10 - 36 mg/dL Final     Estimated Creatinine Clearance: 33 mL/min (by C-G formula based on Cr of 1.18).    Lab Results   Component Value Date    WBC 3.17 (L) 03/19/2017     Temp Readings from Last 3 Encounters:   03/20/17 99 °F (37.2 °C) (Oral)   12/12/16 98 °F (36.7 °C) (Oral)   06/10/16 98.3 °F (36.8 °C) (Oral)       IV Anti-Infectives     " Ordered     Dose/Rate Route Frequency Start Stop    03/18/17 1937  oseltamivir (TAMIFLU) capsule 30 mg     Ordering Provider:  Gera Castaneda MD    30 mg Oral Every 12 Hours Scheduled 03/18/17 2100 03/23/17 2059    03/18/17 1549  levoFLOXacin (LEVAQUIN) 500 mg/100 mL D5W (premix) (LEVAQUIN) 500 mg     Ordering Provider:  Gera Castaneda MD    500 mg Intravenous Every 24 Hours 03/18/17 1630      03/18/17 1117  vancomycin 1500 mg/500 mL 0.9% NS IVPB ([facility])     Ordering Provider:  Gera Castaneda MD    1,500 mg  over 150 Minutes Intravenous Every 24 Hours 03/18/17 1300      03/15/17 0912  Pharmacy to dose vancomycin     Ordering Provider:  Gera Castaneda MD     Does not apply Continuous PRN 03/15/17 0910      03/15/17 0136  vancomycin 1500 mg/500 mL 0.9% NS IVPB ([facility])     Ordering Provider:  Ridge Chapa MD    20 mg/kg × 80.3 kg Intravenous Once 03/15/17 0138 03/15/17 0149    03/15/17 0136  meropenem (MERREM) 1 g/100 mL 0.9% NS VTB ([facility])     Ordering Provider:  Ridge Chapa MD    1 g  over 30 Minutes Intravenous Once 03/15/17 0138 03/15/17 0756         Lab Results   Component Value Date    VANCOTROUGH 18.50 03/20/2017     Lab Results   Component Value Date    VANCORANDOM 8.20 03/16/2017       Assessment/Plan  Vancomycin trough level at 1220 today = 18.5 mcg/ml (therapeutic) therefore will continue with same regimen of 1500mg iv q24. Pharmacy will continue to follow daily while on the vancomycin and adjust as needed.     Hung Chandler [facility]

## 2017-03-23 LAB
BACTERIA SPEC AEROBE CULT: NORMAL
BACTERIA SPEC AEROBE CULT: NORMAL

## 2017-04-21 ENCOUNTER — OFFICE VISIT (OUTPATIENT)
Dept: FAMILY MEDICINE CLINIC | Facility: CLINIC | Age: 82
End: 2017-04-21

## 2017-04-21 VITALS
HEIGHT: 67 IN | BODY MASS INDEX: 26.53 KG/M2 | SYSTOLIC BLOOD PRESSURE: 145 MMHG | HEART RATE: 57 BPM | TEMPERATURE: 98.1 F | OXYGEN SATURATION: 98 % | DIASTOLIC BLOOD PRESSURE: 85 MMHG | WEIGHT: 169 LBS

## 2017-04-21 DIAGNOSIS — N18.30 CKD (CHRONIC KIDNEY DISEASE) STAGE 3, GFR 30-59 ML/MIN (HCC): ICD-10-CM

## 2017-04-21 DIAGNOSIS — L03.115 CELLULITIS OF RIGHT LOWER EXTREMITY: Primary | ICD-10-CM

## 2017-04-21 DIAGNOSIS — J11.1 INFLUENZA: ICD-10-CM

## 2017-04-21 DIAGNOSIS — M54.50 CHRONIC RIGHT-SIDED LOW BACK PAIN WITHOUT SCIATICA: ICD-10-CM

## 2017-04-21 DIAGNOSIS — G89.29 CHRONIC RIGHT-SIDED LOW BACK PAIN WITHOUT SCIATICA: ICD-10-CM

## 2017-04-21 DIAGNOSIS — I10 BENIGN ESSENTIAL HYPERTENSION: ICD-10-CM

## 2017-04-21 PROCEDURE — 99214 OFFICE O/P EST MOD 30 MIN: CPT | Performed by: FAMILY MEDICINE

## 2017-04-21 RX ORDER — LIDOCAINE 50 MG/G
1 PATCH TOPICAL EVERY 24 HOURS
Qty: 30 PATCH | Refills: 5 | Status: SHIPPED | OUTPATIENT
Start: 2017-04-21 | End: 2017-06-12

## 2017-04-21 NOTE — PROGRESS NOTES
Subjective   Magdalena Jerry is a 89 y.o. female. Presents today for   Chief Complaint   Patient presents with   • Cellulitis     pt here for hospital follow up for cellulitis.   • Back Pain     pt is having lower back pain. she said it is sore and aches, especially when she is walking     DISCHARGE DATA:  DATE OF ADMISSION: 03/14/2017  DATE OF DISCHARGE: 03/20/2017.     DISCHARGE DIAGNOSIS: Left leg cellulitis, methicillin-resistant Staphylococcus aureus.      SECONDARY DIAGNOSES:   1. Influenza A.  2. Weakness.   3. Hypertension.   4. Possible pneumonia.  5. Chronic kidney disease stage 3.   6. Diabetes mellitus type 2 with polyneuropathy.       HOSPITAL COURSE: Patient was admitted to our service for leg swelling and redness of the left leg. She was started empirically on antibiotics with broad-spectrum coverage. Wound culture eventually grew MRSA. Viral respiratory panel positive for influenza AH3. She was started on Tamiflu for that. Initially, Cozaar was held because of her renal function. MRI showed no evidence of abscess or osteomyelitis. Her leg has improved. She was noted to have a fever. Chest x-ray showed probable atelectasis, but pneumonia could not be ruled out. She was started on Levaquin 3 days ago. We will give her 1 more dose of Levaquin tomorrow, which will be 4 days, an adequate treatment course. She is okay for transfer to the [facility]. I had planned to switch her to doxycycline; however she is allergic, so we will need to use clindamycin. We will plan on 7 more days. She is also allergic to sulfa. Now reviewing the labs, I will have her stay off the Cozaar because of her renal function. She will need outpatient followup of her blood pressure, with further adjustment as needed.     HOSPITAL IMAGING:  MRI tib/fib 3/17/17  IMPRESSION:  Nonspecific edema and enhancement in the left lower leg  distally. This is most consistent with cellulitis. There is no abscess  or osteomyelitis.    CXR:   Atelectasis o/w clear    DISCHARGE LABS:  Creatinine, Serum   Order: [id number]   Collected:  3/20/2017 12:20 PM      Ref Range & Units 1mo ago     Creatinine 0.57 - 1.00 mg/dL 1.18 (H)   eGFR Non African Amer >60 mL/min/1.73 43 (L)   View Full Report  Narrative   The MDRD GFR formula is only valid for adults with stable renal function between ages 18 and 70.                  Basic Metabolic Panel   Order: [id number]   Collected:  3/19/2017  4:35 AM      Ref Range & Units 1mo ago     Glucose 65 - 99 mg/dL 90   BUN 8 - 23 mg/dL 20   Creatinine 0.57 - 1.00 mg/dL 0.93   Sodium 136 - 145 mmol/L 136   Potassium 3.5 - 5.2 mmol/L 4.7   Chloride 98 - 107 mmol/L 100   CO2 22.0 - 29.0 mmol/L 24.2   Calcium 8.2 - 9.6 mg/dL 8.1 (L)   eGFR Non African Amer >60 mL/min/1.73 57 (L)   BUN/Creatinine Ratio 7.0 - 25.0 21.5   Anion Gap mmol/L 11.8   View Full Report  Narrative   The MDRD GFR formula is only valid for adults with stable renal function between ages 18 and 70.         Related Result Highlights       Procalcitonin  Final result 3/19/2017                     CBC & Differential   Order: [id number]   Collected:  3/19/2017  4:35 AM   View Full Report                 CBC Auto Differential   Order: [id number] - Part of Panel Order [id number]   Collected:  3/19/2017  4:35 AM      Ref Range & Units 1mo ago     WBC 4.50 - 10.70 10*3/mm3 3.17 (L)   RBC 3.90 - 5.20 10*6/mm3 3.39 (L)   Hemoglobin 11.9 - 15.5 g/dL 10.5 (L)   Hematocrit 35.6 - 45.5 % 32.9 (L)   MCV 80.5 - 98.2 fL 97.1   MCH 26.9 - 32.0 pg 31.0   MCHC 32.4 - 36.3 g/dL 31.9 (L)   RDW 11.7 - 13.0 % 12.9   RDW-SD 37.0 - 54.0 fl 45.5   MPV 6.0 - 12.0 fL 9.5   Platelets 140 - 500 10*3/mm3 145   Neutrophil % 42.7 - 76.0 % 44.8   Lymphocyte % 19.6 - 45.3 % 32.5   Monocyte % 5.0 - 12.0 % 19.9 (H)   Eosinophil % 0.3 - 6.2 % 2.5   Basophil % 0.0 - 1.5 % 0.3   Immature Grans % 0.0 - 0.5 % 0.0   Neutrophils, Absolute 1.90 - 8.10 10*3/mm3 1.42 (L)   Lymphocytes, Absolute 0.90 - 4.80 10*3/mm3  1.03   Monocytes, Absolute 0.20 - 1.20 10*3/mm3 0.63   Eosinophils, Absolute 0.00 - 0.70 10*3/mm3 0.08   Basophils, Absolute 0.00 - 0.20 10*3/mm3 0.01   Immature Grans, Absolute 0.00 - 0.03 10*3/mm3 0.00   View Full Report        Hemoglobin A1c   Order: [id number]   Status:  Final result   Visible to patient:  No (Not Released)      Ref Range & Units 1mo ago     Hemoglobin A1C 4.80 - 5.60 % 6.03 (H)   Resulting Agency  [facility]   Narrative   Hemoglobin A1C Ranges:    Increased Risk for Diabetes  5.7% to 6.4%  Diabetes                     >= 6.5%  Diabetic Goal                < 7.0%      Specimen Collected: 03/16/17  4:27 AM Last Resulted: 03/16/17  5:07 AM              Blood culture negative      Rash   This is a new problem. The current episode started 1 to 4 weeks ago (post-d/c;  Sent to rehab and completed abx;  Was + for MRSA on cx; ). The affected locations include the right lowerleg. The rash is characterized by swelling, redness and pain. She was exposed to nothing. Pertinent negatives include no fever, shortness of breath or vomiting. (While inpatient developed flu, was exposed while in waiting room.) Treatments tried: IV abx, completed PO in rehab. Improvement on treatment: Resolved.   Back Pain   This is a recurrent problem. Episode onset: past few weeks. The problem occurs intermittently. The pain is present in the lumbar spine. The quality of the pain is described as aching. Associated symptoms include weakness (lower extremity weakness; ). Pertinent negatives include no abdominal pain, chest pain or fever. ([facility] to come for further PT/OT) Treatments tried: apap. The treatment provided mild relief.   Hypertension   This is a chronic problem. The current episode started more than 1 year ago. The problem has been gradually improving since onset. Condition status: Reports borderline while rehab, occly ok;  Inpatient was high. Pertinent negatives include no chest pain or shortness of breath. Past treatments  include calcium channel blockers. The current treatment provides moderate improvement.       Review of Systems   Constitutional: Negative for chills and fever.   Respiratory: Negative for shortness of breath.    Cardiovascular: Negative for chest pain.   Gastrointestinal: Negative for abdominal pain, nausea and vomiting.   Musculoskeletal: Positive for arthralgias, back pain and joint swelling.   Skin: Positive for rash. Negative for wound.   Neurological: Positive for weakness (lower extremity weakness; ). Negative for dizziness, syncope and light-headedness.       The following portions of the patient's history were reviewed and updated as appropriate: allergies, current medications, past medical history and problem list.    Patient Active Problem List   Diagnosis   • Adjustment disorder with mixed anxiety and depressed mood   • Anemia   • Benign essential hypertension   • Hereditary essential tremor   • Type 2 diabetes mellitus with diabetic polyneuropathy, without long-term current use of insulin   • Dry skin dermatitis   • Dyslipidemia   • Gastroesophageal reflux disease with esophagitis   • Pain of hand   • Hearing loss   • Arthralgia of hip   • Impacted cerumen   • Pruritus   • Knee pain   • Pain in extremity   • Chronic low back pain   • Weakness of lower extremity   • Spinal stenosis of lumbar region   • Senile osteoporosis   • Piriformis syndrome   • Primary insomnia   • Tinea pedis   • Vitamin D deficiency   • Left leg cellulitis   • CKD (chronic kidney disease) stage 3, GFR 30-59 ml/min   • Allergic rhinitis   • Pneumonia due to influenza A virus   • MRSA (methicillin resistant Staphylococcus aureus) infection   • Bacteria in urine       Allergies   Allergen Reactions   • Amoxicillin Other (See Comments)     unsure   • Benzodiazepines    • Codeine    • Cortisone    • Macrolides And Ketolides    • Melatonin    • Neomycin    • Penicillins    • Sulfa Antibiotics    • Tetracyclines & Related    •  "Erythromycin Rash       Current Outpatient Prescriptions on File Prior to Visit   Medication Sig Dispense Refill   • acetaminophen (TYLENOL) 325 MG tablet Take 2 tablets by mouth Every 6 (Six) Hours As Needed for Mild Pain (1-3).  0   • amLODIPine (NORVASC) 5 MG tablet Take 1 tablet by mouth Daily.     • fluticasone (FLONASE) 50 MCG/ACT nasal spray USE TWO SPRAY(S) IN EACH NOSTRIL ONCE DAILY 1 bottle 2   • Lancet Device misc Use as directed to test glucose once daily. Diagnosis E11.9 100 each 3   • primidone (MYSOLINE) 50 MG tablet TAKE 1TABLET TWICE DAILY FOR TREMOR 180 tablet 1   • raNITIdine (ZANTAC) 150 MG tablet TAKE 1 TABLET TWICE DAILY FOR REFLUX ESOPHAGITIS 180 tablet 1   • sertraline (ZOLOFT) 100 MG tablet TAKE 1 TABLET DAILY FOR MOOD AND WELL BEING 90 tablet 1     No current facility-administered medications on file prior to visit.        Objective   Vitals:    04/21/17 1456 04/21/17 1529   BP: 158/80 145/85   BP Location: Right arm    Patient Position: Sitting    Cuff Size: Adult    Pulse: 57    Temp: 98.1 °F (36.7 °C)    TempSrc: Oral    SpO2: 98%    Weight: 169 lb (76.7 kg)    Height: 67\" (170.2 cm)        Physical Exam   Constitutional: She appears well-developed and well-nourished.   HENT:   Head: Normocephalic and atraumatic.   Neck: Neck supple. No JVD present. No thyromegaly present.   Cardiovascular: Normal rate, regular rhythm and normal heart sounds.  Exam reveals no gallop and no friction rub.    No murmur heard.  Pulmonary/Chest: Effort normal and breath sounds normal. No respiratory distress. She has no wheezes. She has no rales.   Abdominal: Soft. Bowel sounds are normal. She exhibits no distension. There is no tenderness. There is no rebound and no guarding.   Musculoskeletal: She exhibits no edema.        Lumbar back: She exhibits decreased range of motion, tenderness and spasm. She exhibits no bony tenderness and no swelling.   Rt lower lumbar paravertevral muscle pain   Neurological: She " is alert.   Skin: Skin is warm and dry.   Cellulitis resolved; no warmth  Trace pitting edema b/l   Psychiatric: She has a normal mood and affect. Her behavior is normal.   Nursing note and vitals reviewed.      Assessment/Plan   Magdalena was seen today for cellulitis and back pain.    Diagnoses and all orders for this visit:    Chronic right-sided low back pain without sciatica  -     lidocaine (LIDODERM) 5 %; Place 1 patch on the skin Daily. Remove & Discard patch within 12 hours or as directed by MD    Cellulitis of right lower extremity    Influenza    CKD (chronic kidney disease) stage 3, GFR 30-59 ml/min    Benign essential hypertension    -recheck BP ok, given age will avoid overly treating;  Reports trended down after d/c  -recheck renal function;  Leukopenia and anemia on d/c labs  -cellulitis resolved;  Influenza while inpatient, resolved now  -back pain - ice prn, apap prn;  Consider tramadol but leary of this as high fall risks; Continue PT;  I hope lidoderm is covered as good candidate as lower risk and no nsaids as CKD       -Follow up: 3 months       Current Outpatient Prescriptions:   •  acetaminophen (TYLENOL) 325 MG tablet, Take 2 tablets by mouth Every 6 (Six) Hours As Needed for Mild Pain (1-3)., Disp: , Rfl: 0  •  amLODIPine (NORVASC) 5 MG tablet, Take 1 tablet by mouth Daily., Disp: , Rfl:   •  fluticasone (FLONASE) 50 MCG/ACT nasal spray, USE TWO SPRAY(S) IN EACH NOSTRIL ONCE DAILY, Disp: 1 bottle, Rfl: 2  •  Lancet Device [facility] Use as directed to test glucose once daily. Diagnosis E11.9, Disp: 100 each, Rfl: 3  •  primidone (MYSOLINE) 50 MG tablet, TAKE 1TABLET TWICE DAILY FOR TREMOR, Disp: 180 tablet, Rfl: 1  •  raNITIdine (ZANTAC) 150 MG tablet, TAKE 1 TABLET TWICE DAILY FOR REFLUX ESOPHAGITIS, Disp: 180 tablet, Rfl: 1  •  sertraline (ZOLOFT) 100 MG tablet, TAKE 1 TABLET DAILY FOR MOOD AND WELL BEING, Disp: 90 tablet, Rfl: 1  •  lidocaine (LIDODERM) 5 %, Place 1 patch on the skin Daily.  Remove & Discard patch within 12 hours or as directed by MD, Disp: 30 patch, Rfl: 5

## 2017-04-22 LAB
BASOPHILS # BLD AUTO: 0 X10E3/UL (ref 0–0.2)
BASOPHILS NFR BLD AUTO: 0 %
BUN SERPL-MCNC: 25 MG/DL (ref 10–36)
BUN/CREAT SERPL: 23 (ref 12–28)
CALCIUM SERPL-MCNC: 9 MG/DL (ref 8.7–10.3)
CHLORIDE SERPL-SCNC: 98 MMOL/L (ref 96–106)
CO2 SERPL-SCNC: 25 MMOL/L (ref 18–29)
CREAT SERPL-MCNC: 1.1 MG/DL (ref 0.57–1)
EOSINOPHIL # BLD AUTO: 0.2 X10E3/UL (ref 0–0.4)
EOSINOPHIL NFR BLD AUTO: 4 %
ERYTHROCYTE [DISTWIDTH] IN BLOOD BY AUTOMATED COUNT: 13.8 % (ref 12.3–15.4)
GLUCOSE SERPL-MCNC: 98 MG/DL (ref 65–99)
HCT VFR BLD AUTO: 35.2 % (ref 34–46.6)
HGB BLD-MCNC: 11.7 G/DL (ref 11.1–15.9)
IMM GRANULOCYTES # BLD: 0 X10E3/UL (ref 0–0.1)
IMM GRANULOCYTES NFR BLD: 0 %
LYMPHOCYTES # BLD AUTO: 1.5 X10E3/UL (ref 0.7–3.1)
LYMPHOCYTES NFR BLD AUTO: 33 %
MCH RBC QN AUTO: 30.4 PG (ref 26.6–33)
MCHC RBC AUTO-ENTMCNC: 33.2 G/DL (ref 31.5–35.7)
MCV RBC AUTO: 91 FL (ref 79–97)
MONOCYTES # BLD AUTO: 0.3 X10E3/UL (ref 0.1–0.9)
MONOCYTES NFR BLD AUTO: 7 %
NEUTROPHILS # BLD AUTO: 2.6 X10E3/UL (ref 1.4–7)
NEUTROPHILS NFR BLD AUTO: 56 %
PLATELET # BLD AUTO: 210 X10E3/UL (ref 150–379)
POTASSIUM SERPL-SCNC: 4.5 MMOL/L (ref 3.5–5.2)
RBC # BLD AUTO: 3.85 X10E6/UL (ref 3.77–5.28)
SODIUM SERPL-SCNC: 140 MMOL/L (ref 134–144)
WBC # BLD AUTO: 4.6 X10E3/UL (ref 3.4–10.8)

## 2017-04-22 NOTE — PROGRESS NOTES
Call and mail copy of results to patient.  Kidney function is stable  Anemia improved from when in hospital

## 2017-04-24 ENCOUNTER — TELEPHONE (OUTPATIENT)
Dept: FAMILY MEDICINE CLINIC | Facility: CLINIC | Age: 82
End: 2017-04-24

## 2017-04-24 NOTE — TELEPHONE ENCOUNTER
Medrol dose tacos 4mg use as directed disp 1 tacos no ref;  Apply eucerin dry skin therapy OTC daily to body.

## 2017-04-25 RX ORDER — METHYLPREDNISOLONE 4 MG/1
TABLET ORAL
Qty: 21 TABLET | Refills: 0 | Status: SHIPPED | OUTPATIENT
Start: 2017-04-25 | End: 2017-06-12

## 2017-04-25 NOTE — TELEPHONE ENCOUNTER
I was attempting to send medrol and says she is highly allergic to cortisone so I called and left message with dr. Sanchez to call and change or let me know. [email or web address]

## 2017-05-01 ENCOUNTER — TELEPHONE (OUTPATIENT)
Dept: FAMILY MEDICINE CLINIC | Facility: CLINIC | Age: 82
End: 2017-05-01

## 2017-05-01 RX ORDER — CETIRIZINE HYDROCHLORIDE 5 MG/1
5 TABLET ORAL
Qty: 30 TABLET | Refills: 3 | Status: SHIPPED | OUTPATIENT
Start: 2017-05-01 | End: 2017-10-12

## 2017-06-12 ENCOUNTER — OFFICE VISIT (OUTPATIENT)
Dept: FAMILY MEDICINE CLINIC | Facility: CLINIC | Age: 82
End: 2017-06-12

## 2017-06-12 VITALS
WEIGHT: 170 LBS | OXYGEN SATURATION: 96 % | HEART RATE: 62 BPM | BODY MASS INDEX: 26.68 KG/M2 | SYSTOLIC BLOOD PRESSURE: 140 MMHG | DIASTOLIC BLOOD PRESSURE: 65 MMHG | TEMPERATURE: 98.2 F | HEIGHT: 67 IN

## 2017-06-12 DIAGNOSIS — S91.002A ANKLE WOUND, LEFT, INITIAL ENCOUNTER: ICD-10-CM

## 2017-06-12 DIAGNOSIS — I10 BENIGN ESSENTIAL HYPERTENSION: ICD-10-CM

## 2017-06-12 DIAGNOSIS — L03.116 CELLULITIS OF LEFT LOWER EXTREMITY: Primary | ICD-10-CM

## 2017-06-12 DIAGNOSIS — M25.572 LEFT ANKLE PAIN, UNSPECIFIED CHRONICITY: ICD-10-CM

## 2017-06-12 PROCEDURE — 99214 OFFICE O/P EST MOD 30 MIN: CPT | Performed by: FAMILY MEDICINE

## 2017-06-12 RX ORDER — LOSARTAN POTASSIUM 50 MG/1
TABLET ORAL
COMMUNITY
Start: 2017-05-08 | End: 2017-07-10 | Stop reason: SDUPTHER

## 2017-06-12 RX ORDER — CLINDAMYCIN HYDROCHLORIDE 150 MG/1
150 CAPSULE ORAL 3 TIMES DAILY
Qty: 30 CAPSULE | Refills: 0 | Status: SHIPPED | OUTPATIENT
Start: 2017-06-12 | End: 2017-10-12

## 2017-06-12 RX ORDER — TEMAZEPAM 15 MG/1
15 CAPSULE ORAL
COMMUNITY
End: 2017-10-12 | Stop reason: SDUPTHER

## 2017-06-12 NOTE — PROGRESS NOTES
Subjective   Magdalena Jerry is a 89 y.o. female. Presents today for   Chief Complaint   Patient presents with   • Follow-up     6 month follow up hypertension   • Ankle Pain     left ankle pain x 2 weeks.        Rash   This is a recurrent (Has had cellulitis in past.) problem. The current episode started 1 to 4 weeks ago. The problem is unchanged. The affected locations include the left ankle. The rash is characterized by dryness, redness, swelling and scaling. She was exposed to nothing. Associated symptoms include joint pain. Pertinent negatives include no fever, shortness of breath or vomiting. Past treatments include nothing. The treatment provided no relief.   Hypertension   This is a chronic problem. The current episode started more than 1 year ago. The problem is unchanged. The problem is controlled. Associated symptoms include peripheral edema (trace ankle). Pertinent negatives include no blurred vision, chest pain, headaches, orthopnea, palpitations, PND or shortness of breath. There are no associated agents to hypertension. Risk factors for coronary artery disease include post-menopausal state. Past treatments include angiotensin blockers. The current treatment provides moderate improvement. There is no history of CAD/MI, CVA, heart failure or PVD.       Review of Systems   Constitutional: Negative for chills and fever.   Eyes: Negative for blurred vision.   Respiratory: Negative for shortness of breath and wheezing.    Cardiovascular: Negative for chest pain, palpitations, orthopnea and PND.   Gastrointestinal: Negative for abdominal pain, nausea and vomiting.   Musculoskeletal: Positive for arthralgias, gait problem, joint pain and joint swelling.   Skin: Positive for rash. Negative for wound.   Neurological: Negative for headaches.       The following portions of the patient's history were reviewed and updated as appropriate: allergies, current medications, past medical history and problem  list.    Patient Active Problem List   Diagnosis   • Adjustment disorder with mixed anxiety and depressed mood   • Anemia   • Benign essential hypertension   • Hereditary essential tremor   • Type 2 diabetes mellitus with diabetic polyneuropathy, without long-term current use of insulin   • Dry skin dermatitis   • Dyslipidemia   • Gastroesophageal reflux disease with esophagitis   • Pain of hand   • Hearing loss   • Arthralgia of hip   • Impacted cerumen   • Pruritus   • Knee pain   • Pain in extremity   • Chronic low back pain   • Weakness of lower extremity   • Spinal stenosis of lumbar region   • Senile osteoporosis   • Piriformis syndrome   • Primary insomnia   • Tinea pedis   • Vitamin D deficiency   • Left leg cellulitis   • CKD (chronic kidney disease) stage 3, GFR 30-59 ml/min   • Allergic rhinitis   • Pneumonia due to influenza A virus   • MRSA (methicillin resistant Staphylococcus aureus) infection   • Bacteria in urine       Allergies   Allergen Reactions   • Amoxicillin Other (See Comments)     unsure   • Benzodiazepines    • Codeine    • Cortisone    • Macrolides And Ketolides    • Melatonin    • Neomycin    • Penicillins    • Sulfa Antibiotics    • Tetracyclines & Related    • Erythromycin Rash       Current Outpatient Prescriptions on File Prior to Visit   Medication Sig Dispense Refill   • acetaminophen (TYLENOL) 325 MG tablet Take 2 tablets by mouth Every 6 (Six) Hours As Needed for Mild Pain (1-3).  0   • cetirizine (zyrTEC) 5 MG tablet Take 1 tablet by mouth every night at bedtime. 30 tablet 3   • fluticasone (FLONASE) 50 MCG/ACT nasal spray USE TWO SPRAY(S) IN EACH NOSTRIL ONCE DAILY 1 bottle 2   • Lancet Device misc Use as directed to test glucose once daily. Diagnosis E11.9 100 each 3   • primidone (MYSOLINE) 50 MG tablet TAKE 1TABLET TWICE DAILY FOR TREMOR 180 tablet 1   • raNITIdine (ZANTAC) 150 MG tablet TAKE 1 TABLET TWICE DAILY FOR REFLUX ESOPHAGITIS 180 tablet 1   • sertraline (ZOLOFT)  "100 MG tablet TAKE 1 TABLET DAILY FOR MOOD AND WELL BEING 90 tablet 1   • [DISCONTINUED] amLODIPine (NORVASC) 5 MG tablet Take 1 tablet by mouth Daily.     • [DISCONTINUED] lidocaine (LIDODERM) 5 % Place 1 patch on the skin Daily. Remove & Discard patch within 12 hours or as directed by MD 30 patch 5   • [DISCONTINUED] MethylPREDNISolone (MEDROL, HERNANDO,) 4 MG tablet Take as directed on package instructions. 21 tablet 0     No current facility-administered medications on file prior to visit.        Objective   Vitals:    06/12/17 1600   BP: 150/76   Pulse: 62   Temp: 98.2 °F (36.8 °C)   TempSrc: Oral   SpO2: 96%   Weight: 170 lb (77.1 kg)   Height: 67\" (170.2 cm)       Physical Exam   Constitutional: She appears well-developed and well-nourished.   HENT:   Head: Normocephalic and atraumatic.   Neck: Neck supple. No JVD present. No thyromegaly present.   Cardiovascular: Normal rate, regular rhythm and normal heart sounds.  Exam reveals no gallop and no friction rub.    No murmur heard.  Pulmonary/Chest: Effort normal and breath sounds normal. No respiratory distress. She has no wheezes. She has no rales.   Abdominal: Soft. Bowel sounds are normal. She exhibits no distension. There is no tenderness. There is no rebound and no guarding.   Musculoskeletal: She exhibits no edema.        Left ankle: She exhibits normal range of motion, no swelling and no deformity. No tenderness. No lateral malleolus, no medial malleolus, no AITFL, no CF ligament, no posterior TFL, no head of 5th metatarsal and no proximal fibula tenderness found. Achilles tendon normal.   Ankle/heel redness, warmth;  Skin dry, cracking with superficial wound.   Neurological: She is alert.   Skin: Skin is warm and dry.   Psychiatric: She has a normal mood and affect. Her behavior is normal.   Nursing note and vitals reviewed.      Assessment/Plan   Magdalena was seen today for follow-up and ankle pain.    Diagnoses and all orders for this " visit:    Cellulitis of left lower extremity  -     clindamycin (CLEOCIN) 150 MG capsule; Take 1 capsule by mouth 3 (Three) Times a Day.  -     mupirocin (BACTROBAN) 2 % ointment; Apply  topically 2 (Two) Times a Day. Left ankle    Left ankle pain, unspecified chronicity    Ankle wound, left, initial encounter  -     mupirocin (BACTROBAN) 2 % ointment; Apply  topically 2 (Two) Times a Day. Left ankle    Benign essential hypertension    -start abx, elevate as able; Prn - RTC if worse or no improvement.  -chronic ankle pain prior to cellulitis;  May need imaging if progresses.  -hypertension - controlled, continue medications           -Follow up: 4 months       Current Outpatient Prescriptions:   •  acetaminophen (TYLENOL) 325 MG tablet, Take 2 tablets by mouth Every 6 (Six) Hours As Needed for Mild Pain (1-3)., Disp: , Rfl: 0  •  cetirizine (zyrTEC) 5 MG tablet, Take 1 tablet by mouth every night at bedtime., Disp: 30 tablet, Rfl: 3  •  fluticasone (FLONASE) 50 MCG/ACT nasal spray, USE TWO SPRAY(S) IN EACH NOSTRIL ONCE DAILY, Disp: 1 bottle, Rfl: 2  •  Lancet Device misc, Use as directed to test glucose once daily. Diagnosis E11.9, Disp: 100 each, Rfl: 3  •  losartan (COZAAR) 50 MG tablet, , Disp: , Rfl:   •  primidone (MYSOLINE) 50 MG tablet, TAKE 1TABLET TWICE DAILY FOR TREMOR, Disp: 180 tablet, Rfl: 1  •  raNITIdine (ZANTAC) 150 MG tablet, TAKE 1 TABLET TWICE DAILY FOR REFLUX ESOPHAGITIS, Disp: 180 tablet, Rfl: 1  •  sertraline (ZOLOFT) 100 MG tablet, TAKE 1 TABLET DAILY FOR MOOD AND WELL BEING, Disp: 90 tablet, Rfl: 1  •  temazepam (RESTORIL) 15 MG capsule, Take 15 mg by mouth., Disp: , Rfl:

## 2017-07-10 RX ORDER — LOSARTAN POTASSIUM 50 MG/1
TABLET ORAL
Qty: 90 TABLET | Refills: 1 | Status: SHIPPED | OUTPATIENT
Start: 2017-07-10 | End: 2018-04-18 | Stop reason: SDUPTHER

## 2017-07-10 RX ORDER — RANITIDINE 150 MG/1
TABLET ORAL
Qty: 180 TABLET | Refills: 1 | Status: SHIPPED | OUTPATIENT
Start: 2017-07-10 | End: 2018-04-18 | Stop reason: SDUPTHER

## 2017-07-10 RX ORDER — PRIMIDONE 50 MG/1
TABLET ORAL
Qty: 180 TABLET | Refills: 1 | Status: SHIPPED | OUTPATIENT
Start: 2017-07-10 | End: 2018-04-18 | Stop reason: SDUPTHER

## 2017-07-10 RX ORDER — SERTRALINE HYDROCHLORIDE 100 MG/1
TABLET, FILM COATED ORAL
Qty: 90 TABLET | Refills: 1 | Status: SHIPPED | OUTPATIENT
Start: 2017-07-10 | End: 2018-04-18 | Stop reason: SDUPTHER

## 2017-07-24 ENCOUNTER — TELEPHONE (OUTPATIENT)
Dept: FAMILY MEDICINE CLINIC | Facility: CLINIC | Age: 82
End: 2017-07-24

## 2017-07-25 RX ORDER — FEXOFENADINE HCL 180 MG/1
180 TABLET ORAL DAILY
Qty: 30 TABLET | Refills: 2 | Status: SHIPPED | OUTPATIENT
Start: 2017-07-25 | End: 2019-08-07 | Stop reason: HOSPADM

## 2017-08-29 RX ORDER — FLUTICASONE PROPIONATE 50 MCG
SPRAY, SUSPENSION (ML) NASAL
Qty: 16 ML | Refills: 2 | Status: SHIPPED | OUTPATIENT
Start: 2017-08-29 | End: 2018-01-07 | Stop reason: SDUPTHER

## 2017-10-12 ENCOUNTER — OFFICE VISIT (OUTPATIENT)
Dept: FAMILY MEDICINE CLINIC | Facility: CLINIC | Age: 82
End: 2017-10-12

## 2017-10-12 VITALS
OXYGEN SATURATION: 98 % | TEMPERATURE: 97.4 F | DIASTOLIC BLOOD PRESSURE: 80 MMHG | HEART RATE: 56 BPM | BODY MASS INDEX: 26.06 KG/M2 | SYSTOLIC BLOOD PRESSURE: 144 MMHG | HEIGHT: 67 IN | WEIGHT: 166 LBS

## 2017-10-12 DIAGNOSIS — G47.09 OTHER INSOMNIA: ICD-10-CM

## 2017-10-12 DIAGNOSIS — G89.29 CHRONIC RIGHT-SIDED LOW BACK PAIN WITHOUT SCIATICA: ICD-10-CM

## 2017-10-12 DIAGNOSIS — E11.42 TYPE 2 DIABETES MELLITUS WITH DIABETIC POLYNEUROPATHY, WITHOUT LONG-TERM CURRENT USE OF INSULIN (HCC): ICD-10-CM

## 2017-10-12 DIAGNOSIS — M54.50 CHRONIC RIGHT-SIDED LOW BACK PAIN WITHOUT SCIATICA: ICD-10-CM

## 2017-10-12 DIAGNOSIS — I10 BENIGN ESSENTIAL HYPERTENSION: Primary | ICD-10-CM

## 2017-10-12 DIAGNOSIS — L30.8 OTHER ECZEMA: ICD-10-CM

## 2017-10-12 PROCEDURE — 99214 OFFICE O/P EST MOD 30 MIN: CPT | Performed by: FAMILY MEDICINE

## 2017-10-12 RX ORDER — TEMAZEPAM 7.5 MG/1
CAPSULE ORAL
Qty: 50 CAPSULE | Refills: 0 | Status: SHIPPED | OUTPATIENT
Start: 2017-10-12 | End: 2018-02-05

## 2017-10-12 RX ORDER — MIRTAZAPINE 15 MG/1
15 TABLET, FILM COATED ORAL NIGHTLY
Qty: 90 TABLET | Refills: 1 | Status: SHIPPED | OUTPATIENT
Start: 2017-10-12 | End: 2018-04-18 | Stop reason: SDUPTHER

## 2017-10-12 NOTE — PROGRESS NOTES
Subjective   Magdalena Jerry is a 89 y.o. female. Presents today for   Chief Complaint   Patient presents with   • Hypertension     PT HERE FOR 4 MONTH FOLLOW UP. SHE SAID AT LAST VISIT YOU TOLD HER SHE WOULD NEED LABS TODAY. PT DID NOT BRING MEDICATION LIST, SAID NO CHANGES.    • Med Refill     PT NEEDS REFILL OF TEMAZEPAM       Hypertension   This is a chronic problem. The problem is unchanged. The problem is controlled. Pertinent negatives include no chest pain, palpitations, peripheral edema or shortness of breath. (Hx of borderline bs's, not on any medications.  ) There are no associated agents to hypertension. Risk factors for coronary artery disease include diabetes mellitus. Past treatments include angiotensin blockers. The current treatment provides moderate improvement. There are no compliance problems.    Back Pain   This is a chronic problem. The current episode started more than 1 year ago. The problem occurs intermittently. The problem has been waxing and waning since onset. The pain is present in the lumbar spine. The quality of the pain is described as aching. The pain does not radiate. Pertinent negatives include no chest pain. She has tried analgesics for the symptoms. The treatment provided mild relief.     Hx of insomnia, on restoril, we discussed serious risks of this medication and would like to go off.    OBTW rash, papular, red scaly rash on right anterior knee and shin.    Review of Systems   Respiratory: Negative for shortness of breath.    Cardiovascular: Negative for chest pain and palpitations.   Musculoskeletal: Positive for back pain.       The following portions of the patient's history were reviewed and updated as appropriate: allergies, current medications, past medical history and problem list.    Patient Active Problem List   Diagnosis   • Adjustment disorder with mixed anxiety and depressed mood   • Anemia   • Benign essential hypertension   • Hereditary essential tremor   •  Type 2 diabetes mellitus with diabetic polyneuropathy, without long-term current use of insulin   • Dry skin dermatitis   • Dyslipidemia   • Gastroesophageal reflux disease with esophagitis   • Pain of hand   • Hearing loss   • Arthralgia of hip   • Impacted cerumen   • Pruritus   • Knee pain   • Pain in extremity   • Chronic low back pain   • Weakness of lower extremity   • Spinal stenosis of lumbar region   • Senile osteoporosis   • Piriformis syndrome   • Primary insomnia   • Tinea pedis   • Vitamin D deficiency   • Left leg cellulitis   • CKD (chronic kidney disease) stage 3, GFR 30-59 ml/min   • Allergic rhinitis   • Pneumonia due to influenza A virus   • MRSA (methicillin resistant Staphylococcus aureus) infection   • Bacteria in urine       Allergies   Allergen Reactions   • Amoxicillin Other (See Comments)     unsure   • Benzodiazepines    • Codeine    • Cortisone    • Macrolides And Ketolides    • Melatonin    • Neomycin    • Penicillins    • Sulfa Antibiotics    • Tetracyclines & Related    • Erythromycin Rash       Current Outpatient Prescriptions on File Prior to Visit   Medication Sig Dispense Refill   • acetaminophen (TYLENOL) 325 MG tablet Take 2 tablets by mouth Every 6 (Six) Hours As Needed for Mild Pain (1-3).  0   • cetirizine (zyrTEC) 5 MG tablet Take 1 tablet by mouth every night at bedtime. 30 tablet 3   • fexofenadine (ALLEGRA) 180 MG tablet Take 1 tablet by mouth Daily. 30 tablet 2   • fluticasone (FLONASE) 50 MCG/ACT nasal spray USE TWO SPRAYS IN EACH NOSTRIL ONCE DAILY 16 mL 2   • Lancet Device misc Use as directed to test glucose once daily. Diagnosis E11.9 100 each 3   • losartan (COZAAR) 50 MG tablet TAKE 1 TABLET EVERY DAY FOR BLOOD PRESSURE 90 tablet 1   • primidone (MYSOLINE) 50 MG tablet TAKE 1 TABLET TWICE DAILY  FOR  TREMOR 180 tablet 1   • raNITIdine (ZANTAC) 150 MG tablet TAKE 1 TABLET TWICE DAILY  FOR  REFLUX  ESOPHAGITIS 180 tablet 1   • sertraline (ZOLOFT) 100 MG tablet TAKE 1  "TABLET EVERY DAY  FOR  MOOD  AND  WELL  BEING 90 tablet 1   • temazepam (RESTORIL) 15 MG capsule Take 15 mg by mouth.     • triamcinolone (KENALOG) 0.1 % ointment Apply  topically 2 (Two) Times a Day. 453.6 g 0   • [DISCONTINUED] clindamycin (CLEOCIN) 150 MG capsule Take 1 capsule by mouth 3 (Three) Times a Day. 30 capsule 0   • [DISCONTINUED] mupirocin (BACTROBAN) 2 % ointment Apply  topically 2 (Two) Times a Day. Left ankle 22 g 0     No current facility-administered medications on file prior to visit.        Objective   Vitals:    10/12/17 1614   BP: 144/80   BP Location: Right arm   Patient Position: Sitting   Cuff Size: Adult   Pulse: 56   Temp: 97.4 °F (36.3 °C)   TempSrc: Oral   SpO2: 98%   Weight: 166 lb (75.3 kg)   Height: 67\" (170.2 cm)       Physical Exam   Constitutional: She appears well-developed and well-nourished.   HENT:   Head: Normocephalic and atraumatic.   Neck: Neck supple. No JVD present. No thyromegaly present.   Cardiovascular: Normal rate, regular rhythm and normal heart sounds.  Exam reveals no gallop and no friction rub.    No murmur heard.  Pulmonary/Chest: Effort normal and breath sounds normal. No respiratory distress. She has no wheezes. She has no rales.   Abdominal: Soft. Bowel sounds are normal. She exhibits no distension. There is no tenderness. There is no rebound and no guarding.   Musculoskeletal: She exhibits no edema.        Lumbar back: She exhibits decreased range of motion.   Ambulates with walker   Neurological: She is alert.   Skin: Skin is warm and dry.   Psychiatric: She has a normal mood and affect. Her behavior is normal.   Nursing note and vitals reviewed.      Assessment/Plan   Magdalena was seen today for hypertension and med refill.    Diagnoses and all orders for this visit:    Benign essential hypertension    Other insomnia  -     temazepam (RESTORIL) 7.5 MG capsule; 1 po nightly x 4 weeks, then every other night x 4 weeks, 1 every Monday, Friday x 4 weeks then " stop.  -     mirtazapine (REMERON) 15 MG tablet; Take 1 tablet by mouth Every Night.    Type 2 diabetes mellitus with diabetic polyneuropathy, without long-term current use of insulin    -had flu already  -hypertension - controlled, continue medications  -dm2 - will check a1c  -I discussed today bzd short and long-term risks.  I also discussed that it is highly recommended that the patient wean off this medication given serious risks.  Agreeable, gave weaning regimen over 12 weeks;  Will try remeron for sleep;           -Follow up: 6 months       Current Outpatient Prescriptions:   •  acetaminophen (TYLENOL) 325 MG tablet, Take 2 tablets by mouth Every 6 (Six) Hours As Needed for Mild Pain (1-3)., Disp: , Rfl: 0  •  cetirizine (zyrTEC) 5 MG tablet, Take 1 tablet by mouth every night at bedtime., Disp: 30 tablet, Rfl: 3  •  fexofenadine (ALLEGRA) 180 MG tablet, Take 1 tablet by mouth Daily., Disp: 30 tablet, Rfl: 2  •  fluticasone (FLONASE) 50 MCG/ACT nasal spray, USE TWO SPRAYS IN EACH NOSTRIL ONCE DAILY, Disp: 16 mL, Rfl: 2  •  Lancet Device misc, Use as directed to test glucose once daily. Diagnosis E11.9, Disp: 100 each, Rfl: 3  •  losartan (COZAAR) 50 MG tablet, TAKE 1 TABLET EVERY DAY FOR BLOOD PRESSURE, Disp: 90 tablet, Rfl: 1  •  primidone (MYSOLINE) 50 MG tablet, TAKE 1 TABLET TWICE DAILY  FOR  TREMOR, Disp: 180 tablet, Rfl: 1  •  raNITIdine (ZANTAC) 150 MG tablet, TAKE 1 TABLET TWICE DAILY  FOR  REFLUX  ESOPHAGITIS, Disp: 180 tablet, Rfl: 1  •  sertraline (ZOLOFT) 100 MG tablet, TAKE 1 TABLET EVERY DAY  FOR  MOOD  AND  WELL  BEING, Disp: 90 tablet, Rfl: 1  •  temazepam (RESTORIL) 15 MG capsule, Take 15 mg by mouth., Disp: , Rfl:   •  triamcinolone (KENALOG) 0.1 % ointment, Apply  topically 2 (Two) Times a Day., Disp: 453.6 g, Rfl: 0

## 2017-12-07 LAB
ALBUMIN SERPL-MCNC: 4 G/DL (ref 3.5–5.2)
ALBUMIN/GLOB SERPL: 1.3 G/DL
ALP SERPL-CCNC: 89 U/L (ref 39–117)
ALT SERPL-CCNC: 11 U/L (ref 1–33)
AST SERPL-CCNC: 17 U/L (ref 1–32)
BILIRUB SERPL-MCNC: 0.4 MG/DL (ref 0.1–1.2)
BUN SERPL-MCNC: 27 MG/DL (ref 8–23)
BUN/CREAT SERPL: 22.5 (ref 7–25)
CALCIUM SERPL-MCNC: 9 MG/DL (ref 8.2–9.6)
CHLORIDE SERPL-SCNC: 105 MMOL/L (ref 98–107)
CO2 SERPL-SCNC: 26.5 MMOL/L (ref 22–29)
CREAT SERPL-MCNC: 1.2 MG/DL (ref 0.57–1)
GFR SERPLBLD CREATININE-BSD FMLA CKD-EPI: 42 ML/MIN/1.73
GFR SERPLBLD CREATININE-BSD FMLA CKD-EPI: 51 ML/MIN/1.73
GLOBULIN SER CALC-MCNC: 3.1 GM/DL
GLUCOSE SERPL-MCNC: 114 MG/DL (ref 65–99)
HBA1C MFR BLD: 6.4 % (ref 4.8–5.6)
POTASSIUM SERPL-SCNC: 4.5 MMOL/L (ref 3.5–5.2)
PROT SERPL-MCNC: 7.1 G/DL (ref 6–8.5)
SODIUM SERPL-SCNC: 143 MMOL/L (ref 136–145)

## 2017-12-19 LAB — GLUCOSE BLDC GLUCOMTR-MCNC: 173 MG/DL (ref 70–130)

## 2018-01-08 RX ORDER — FLUTICASONE PROPIONATE 50 MCG
SPRAY, SUSPENSION (ML) NASAL
Qty: 16 ML | Refills: 2 | Status: SHIPPED | OUTPATIENT
Start: 2018-01-08

## 2018-02-05 ENCOUNTER — OFFICE VISIT (OUTPATIENT)
Dept: FAMILY MEDICINE CLINIC | Facility: CLINIC | Age: 83
End: 2018-02-05

## 2018-02-05 VITALS
BODY MASS INDEX: 26.68 KG/M2 | SYSTOLIC BLOOD PRESSURE: 130 MMHG | WEIGHT: 170 LBS | DIASTOLIC BLOOD PRESSURE: 65 MMHG | TEMPERATURE: 98.2 F | HEART RATE: 56 BPM | HEIGHT: 67 IN | OXYGEN SATURATION: 98 %

## 2018-02-05 DIAGNOSIS — G89.29 CHRONIC RIGHT-SIDED LOW BACK PAIN WITHOUT SCIATICA: ICD-10-CM

## 2018-02-05 DIAGNOSIS — N18.30 CKD (CHRONIC KIDNEY DISEASE) STAGE 3, GFR 30-59 ML/MIN (HCC): ICD-10-CM

## 2018-02-05 DIAGNOSIS — E11.42 TYPE 2 DIABETES MELLITUS WITH DIABETIC POLYNEUROPATHY, WITHOUT LONG-TERM CURRENT USE OF INSULIN (HCC): ICD-10-CM

## 2018-02-05 DIAGNOSIS — E11.9 TYPE 2 DIABETES MELLITUS WITHOUT COMPLICATION, WITHOUT LONG-TERM CURRENT USE OF INSULIN (HCC): Primary | ICD-10-CM

## 2018-02-05 DIAGNOSIS — I10 BENIGN ESSENTIAL HYPERTENSION: ICD-10-CM

## 2018-02-05 DIAGNOSIS — E78.5 DYSLIPIDEMIA: ICD-10-CM

## 2018-02-05 DIAGNOSIS — M54.50 CHRONIC RIGHT-SIDED LOW BACK PAIN WITHOUT SCIATICA: ICD-10-CM

## 2018-02-05 LAB
POC CREATININE URINE: 100
POC MICROALBUMIN URINE: 150

## 2018-02-05 PROCEDURE — 82044 UR ALBUMIN SEMIQUANTITATIVE: CPT | Performed by: FAMILY MEDICINE

## 2018-02-05 PROCEDURE — 99214 OFFICE O/P EST MOD 30 MIN: CPT | Performed by: FAMILY MEDICINE

## 2018-02-05 NOTE — PROGRESS NOTES
Subjective   Magdalena Jerry is a 89 y.o. female. Presents today for   Chief Complaint   Patient presents with   • Hypertension     pt here for follow up visit. he skin has still been itching all over.   • Back Pain     pt is having lower back pain       Hypertension   This is a chronic problem. The current episode started more than 1 year ago. The problem is unchanged. The problem is controlled. Pertinent negatives include no chest pain, orthopnea, palpitations, peripheral edema, PND or shortness of breath. There are no associated agents to hypertension. Risk factors for coronary artery disease include diabetes mellitus, dyslipidemia and post-menopausal state. Past treatments include angiotensin blockers. The current treatment provides moderate improvement. There are no compliance problems.  Hypertensive end-organ damage includes kidney disease. Identifiable causes of hypertension include chronic renal disease.   Diabetes   She presents for her follow-up diabetic visit. She has type 2 diabetes mellitus. Her disease course has been stable. Pertinent negatives for diabetes include no chest pain, no foot paresthesias and no foot ulcerations. Symptoms are stable. Risk factors for coronary artery disease include diabetes mellitus, dyslipidemia, hypertension and post-menopausal. Current diabetic treatment includes diet. She is compliant with treatment all of the time. Her weight is stable. She is following a diabetic diet. There is no change in her home blood glucose trend. An ACE inhibitor/angiotensin II receptor blocker is being taken.   Hyperlipidemia   This is a chronic problem. The current episode started more than 1 year ago. The problem is controlled. Recent lipid tests were reviewed and are normal. Exacerbating diseases include chronic renal disease. There are no known factors aggravating her hyperlipidemia. Pertinent negatives include no chest pain, myalgias or shortness of breath. She is currently on no  antihyperlipidemic treatment. Improvement on treatment: Stopped statin and checking given age. Risk factors for coronary artery disease include diabetes mellitus, dyslipidemia, hypertension and post-menopausal.     Reports icthing chronically, TAC helps but cannot get everywhere, trying eucerin cream.   D/w anti-ich pills and risks, declines.    Back pain - chornically, taking apap with some relief, d/w can increase dose to help with pain.   Directed try vics vapor rub for pain and itch.    WEaned off restoril last ov and started remeron, doing well now.    Review of Systems   Respiratory: Negative for shortness of breath.    Cardiovascular: Negative for chest pain, palpitations, orthopnea and PND.   Musculoskeletal: Negative for myalgias.       The following portions of the patient's history were reviewed and updated as appropriate: allergies, current medications, past medical history and problem list.    Patient Active Problem List   Diagnosis   • Adjustment disorder with mixed anxiety and depressed mood   • Anemia   • Benign essential hypertension   • Hereditary essential tremor   • Type 2 diabetes mellitus with diabetic polyneuropathy, without long-term current use of insulin   • Dry skin dermatitis   • Dyslipidemia   • Gastroesophageal reflux disease with esophagitis   • Pain of hand   • Hearing loss   • Arthralgia of hip   • Impacted cerumen   • Pruritus   • Knee pain   • Pain in extremity   • Chronic low back pain   • Weakness of lower extremity   • Spinal stenosis of lumbar region   • Senile osteoporosis   • Piriformis syndrome   • Primary insomnia   • Tinea pedis   • Vitamin D deficiency   • Left leg cellulitis   • CKD (chronic kidney disease) stage 3, GFR 30-59 ml/min   • Allergic rhinitis   • Pneumonia due to influenza A virus   • MRSA (methicillin resistant Staphylococcus aureus) infection   • Bacteria in urine       Allergies   Allergen Reactions   • Amoxicillin Other (See Comments)     unsure   •  "Benzodiazepines    • Codeine    • Cortisone    • Macrolides And Ketolides    • Melatonin    • Neomycin    • Penicillins    • Sulfa Antibiotics    • Tetracyclines & Related    • Erythromycin Rash       Current Outpatient Prescriptions on File Prior to Visit   Medication Sig Dispense Refill   • acetaminophen (TYLENOL) 325 MG tablet Take 2 tablets by mouth Every 6 (Six) Hours As Needed for Mild Pain (1-3).  0   • fexofenadine (ALLEGRA) 180 MG tablet Take 1 tablet by mouth Daily. 30 tablet 2   • fluticasone (FLONASE) 50 MCG/ACT nasal spray USE TWO SPRAYS IN EACH NOSTRIL ONCE DAILY 16 mL 2   • Lancet Device misc Use as directed to test glucose once daily. Diagnosis E11.9 100 each 3   • losartan (COZAAR) 50 MG tablet TAKE 1 TABLET EVERY DAY FOR BLOOD PRESSURE 90 tablet 1   • mirtazapine (REMERON) 15 MG tablet Take 1 tablet by mouth Every Night. 90 tablet 1   • primidone (MYSOLINE) 50 MG tablet TAKE 1 TABLET TWICE DAILY  FOR  TREMOR 180 tablet 1   • raNITIdine (ZANTAC) 150 MG tablet TAKE 1 TABLET TWICE DAILY  FOR  REFLUX  ESOPHAGITIS 180 tablet 1   • sertraline (ZOLOFT) 100 MG tablet TAKE 1 TABLET EVERY DAY  FOR  MOOD  AND  WELL  BEING 90 tablet 1   • triamcinolone (KENALOG) 0.1 % ointment Apply  topically 2 (Two) Times a Day. 453.6 g 0     No current facility-administered medications on file prior to visit.        Objective   Vitals:    02/05/18 1623 02/05/18 1649   BP: 166/80 130/65   BP Location: Right arm    Patient Position: Sitting    Cuff Size: Adult    Pulse: 56    Temp: 98.2 °F (36.8 °C)    TempSrc: Oral    SpO2: 98%    Weight: 77.1 kg (170 lb)    Height: 170.2 cm (67.01\")        Physical Exam   Constitutional: She appears well-developed and well-nourished.   HENT:   Head: Normocephalic and atraumatic.   Neck: Neck supple. No JVD present. No thyromegaly present.   Cardiovascular: Normal rate, regular rhythm and normal heart sounds.  Exam reveals no gallop and no friction rub.    No murmur heard.  Pulmonary/Chest: " Effort normal and breath sounds normal. No respiratory distress. She has no wheezes. She has no rales.   Abdominal: Soft. Bowel sounds are normal. She exhibits no distension. There is no tenderness. There is no rebound and no guarding.   Musculoskeletal: She exhibits no edema.    Magdalena had a diabetic foot exam performed (see scanned report) today.   During the foot exam she had a monofilament test performed.  Neurological: She is alert. Gait abnormal.   Skin: Skin is warm and dry.   Psychiatric: She has a normal mood and affect. Her behavior is normal.   Nursing note and vitals reviewed.    Glucose 65 - 99 mg/dL 114 (H)   BUN 8 - 23 mg/dL 27 (H)   Creatinine 0.57 - 1.00 mg/dL 1.20 (H)   eGFR Non African Am >60 mL/min/1.73 42 (L)   Comments: The MDRD GFR formula is only valid for adults with stable   renal function between ages 18 and 70.      eGFR African Am >60 mL/min/1.73 51 (L)   BUN/Creatinine Ratio 7.0 - 25.0 22.5   Sodium 136 - 145 mmol/L 143   Potassium 3.5 - 5.2 mmol/L 4.5   Chloride 98 - 107 mmol/L 105   Total CO2 22.0 - 29.0 mmol/L 26.5   Calcium 8.2 - 9.6 mg/dL 9.0   Total Protein 6.0 - 8.5 g/dL 7.1   Albumin 3.50 - 5.20 g/dL 4.00   Globulin gm/dL 3.1   A/G Ratio g/dL 1.3   Total Bilirubin 0.1 - 1.2 mg/dL 0.4   Alkaline Phosphatase 39 - 117 U/L 89   AST (SGOT) 1 - 32 U/L 17   ALT (SGPT) 1 - 33 U/L 11   View Full Report  Narrative   Performed at:  [address]  [id number]  : Darrion Vaughn MD, Phone:  [phone]  Patient Fasting:  Y                   Hemoglobin A1c   Order: [id number]   Collected:  12/6/2017  1:58 PM   Notes Recorded by Sharron Dias MA on 12/7/2017 at 10:16 AM  Result ltr mailed  ------    Notes Recorded by Fly Sanchez DO on 12/7/2017 at 9:57 AM  Call and mail copy of results to patient.  Blood sugar borderline and stable  Kidney function decline stable  Liver function and electrolytes are normal      Ref Range & Units  2mo ago     Hemoglobin A1C 4.80 - 5.60 % 6.40 (H)   Comments: Hemoglobin A1C Ranges:   Increased Risk for Diabetes  5.7% to 6.4%   Diabetes                     >= 6.5%   Diabetic Goal                < 7.0%      View Full Report  Narrative   Performed at:  [address] [facility]  [address], [place], [place]  [id number]  : Darrion Vaughn MD, Phone:  [phone]              Microalbumin, Urine 150   Creatinine, Urine 100         Assessment/Plan   Magdalena was seen today for hypertension and back pain.    Diagnoses and all orders for this visit:    Type 2 diabetes mellitus without complication, without long-term current use of insulin  -     POCT microalbumin    Type 2 diabetes mellitus with diabetic polyneuropathy, without long-term current use of insulin    Benign essential hypertension    Chronic right-sided low back pain without sciatica    CKD (chronic kidney disease) stage 3, GFR 30-59 ml/min    Dyslipidemia    -ckd - stable on preappt labs  -dm2 - controlled with just diet  -hld - stopped checking no statin now given age  -hypertension - controlled, continue medications  -back pain - apap, heat or ice  -itching - continue lotion, tac dry areas and may try vics vapor rub as needed for back pain and itching.       -Follow up: 6 months       Current Outpatient Prescriptions:   •  acetaminophen (TYLENOL) 325 MG tablet, Take 2 tablets by mouth Every 6 (Six) Hours As Needed for Mild Pain (1-3)., Disp: , Rfl: 0  •  fexofenadine (ALLEGRA) 180 MG tablet, Take 1 tablet by mouth Daily., Disp: 30 tablet, Rfl: 2  •  fluticasone (FLONASE) 50 MCG/ACT nasal spray, USE TWO SPRAYS IN EACH NOSTRIL ONCE DAILY, Disp: 16 mL, Rfl: 2  •  Lancet Device [facility], Use as directed to test glucose once daily. Diagnosis E11.9, Disp: 100 each, Rfl: 3  •  losartan (COZAAR) 50 MG tablet, TAKE 1 TABLET EVERY DAY FOR BLOOD PRESSURE, Disp: 90 tablet, Rfl: 1  •  mirtazapine (REMERON) 15 MG tablet, Take 1 tablet by mouth Every  Night., Disp: 90 tablet, Rfl: 1  •  primidone (MYSOLINE) 50 MG tablet, TAKE 1 TABLET TWICE DAILY  FOR  TREMOR, Disp: 180 tablet, Rfl: 1  •  raNITIdine (ZANTAC) 150 MG tablet, TAKE 1 TABLET TWICE DAILY  FOR  REFLUX  ESOPHAGITIS, Disp: 180 tablet, Rfl: 1  •  sertraline (ZOLOFT) 100 MG tablet, TAKE 1 TABLET EVERY DAY  FOR  MOOD  AND  WELL  BEING, Disp: 90 tablet, Rfl: 1  •  triamcinolone (KENALOG) 0.1 % ointment, Apply  topically 2 (Two) Times a Day., Disp: 453.6 g, Rfl: 0

## 2018-03-12 RX ORDER — CALCIUM CITRATE/VITAMIN D3 200MG-6.25
TABLET ORAL
Qty: 100 EACH | Refills: 0 | Status: SHIPPED | OUTPATIENT
Start: 2018-03-12 | End: 2018-05-28 | Stop reason: SDUPTHER

## 2018-04-18 DIAGNOSIS — G47.09 OTHER INSOMNIA: ICD-10-CM

## 2018-04-19 RX ORDER — PRIMIDONE 50 MG/1
TABLET ORAL
Qty: 180 TABLET | Refills: 1 | Status: SHIPPED | OUTPATIENT
Start: 2018-04-19 | End: 2018-10-01 | Stop reason: SDUPTHER

## 2018-04-19 RX ORDER — MIRTAZAPINE 15 MG/1
TABLET, FILM COATED ORAL
Qty: 90 TABLET | Refills: 1 | Status: SHIPPED | OUTPATIENT
Start: 2018-04-19 | End: 2018-10-01 | Stop reason: SDUPTHER

## 2018-04-19 RX ORDER — RANITIDINE 150 MG/1
TABLET ORAL
Qty: 180 TABLET | Refills: 1 | Status: SHIPPED | OUTPATIENT
Start: 2018-04-19 | End: 2018-10-01 | Stop reason: SDUPTHER

## 2018-04-19 RX ORDER — LOSARTAN POTASSIUM 50 MG/1
TABLET ORAL
Qty: 90 TABLET | Refills: 1 | Status: SHIPPED | OUTPATIENT
Start: 2018-04-19 | End: 2018-10-01 | Stop reason: SDUPTHER

## 2018-04-19 RX ORDER — SERTRALINE HYDROCHLORIDE 100 MG/1
TABLET, FILM COATED ORAL
Qty: 90 TABLET | Refills: 1 | Status: SHIPPED | OUTPATIENT
Start: 2018-04-19 | End: 2018-10-01 | Stop reason: SDUPTHER

## 2018-05-29 RX ORDER — CALCIUM CITRATE/VITAMIN D3 200MG-6.25
TABLET ORAL
Qty: 100 EACH | Refills: 0 | Status: SHIPPED | OUTPATIENT
Start: 2018-05-29 | End: 2018-09-10 | Stop reason: SDUPTHER

## 2018-08-06 ENCOUNTER — OFFICE VISIT (OUTPATIENT)
Dept: FAMILY MEDICINE CLINIC | Facility: CLINIC | Age: 83
End: 2018-08-06

## 2018-08-06 VITALS
WEIGHT: 174 LBS | BODY MASS INDEX: 27.31 KG/M2 | HEART RATE: 61 BPM | SYSTOLIC BLOOD PRESSURE: 122 MMHG | DIASTOLIC BLOOD PRESSURE: 60 MMHG | HEIGHT: 67 IN | OXYGEN SATURATION: 96 %

## 2018-08-06 DIAGNOSIS — N18.30 CKD (CHRONIC KIDNEY DISEASE) STAGE 3, GFR 30-59 ML/MIN (HCC): ICD-10-CM

## 2018-08-06 DIAGNOSIS — I10 BENIGN ESSENTIAL HYPERTENSION: ICD-10-CM

## 2018-08-06 DIAGNOSIS — E55.9 VITAMIN D DEFICIENCY: ICD-10-CM

## 2018-08-06 DIAGNOSIS — E11.42 TYPE 2 DIABETES MELLITUS WITH DIABETIC POLYNEUROPATHY, WITHOUT LONG-TERM CURRENT USE OF INSULIN (HCC): Primary | ICD-10-CM

## 2018-08-06 PROCEDURE — 99214 OFFICE O/P EST MOD 30 MIN: CPT | Performed by: FAMILY MEDICINE

## 2018-08-06 NOTE — PROGRESS NOTES
Subjective   Magdalena Jerry is a 89 y.o. female. Presents today for   Chief Complaint   Patient presents with   • Hypertension       Diabetes   She presents for her follow-up diabetic visit. She has type 2 diabetes mellitus. Her disease course has been stable. Associated symptoms include weakness. Pertinent negatives for diabetes include no chest pain. Symptoms are stable. Risk factors for coronary artery disease include diabetes mellitus, dyslipidemia, hypertension and post-menopausal. Current diabetic treatment includes diet. An ACE inhibitor/angiotensin II receptor blocker is being taken.   Hypertension   This is a chronic problem. The current episode started more than 1 year ago. The problem is unchanged. The problem is controlled. Pertinent negatives include no chest pain, orthopnea, palpitations, peripheral edema, PND or shortness of breath. There are no associated agents to hypertension. Risk factors for coronary artery disease include diabetes mellitus, dyslipidemia and post-menopausal state. Past treatments include angiotensin blockers. Current antihypertension treatment includes angiotensin blockers. The current treatment provides moderate improvement. There are no compliance problems.  Hypertensive end-organ damage includes kidney disease. Identifiable causes of hypertension include chronic renal disease (stage III).   Chronic Kidney Disease   Associated symptoms include weakness. Pertinent negatives include no chest pain.   Depression   Visit Type: follow-up  Patient is not experiencing: anhedonia, depressed mood, insomnia, palpitations and shortness of breath.  Frequency of symptoms: rarely   Sleep quality: good  Nighttime awakenings: occasional  Compliance with medications:  %  Treatment side effects: no side effects, doing well with medications.    Off/on back and knee pain, apap with some relief;  No falls.  Hx of low vitamin D, due for recheck  Review of Systems   Respiratory: Negative for  shortness of breath.    Cardiovascular: Negative for chest pain, palpitations, orthopnea and PND.   Neurological: Positive for weakness.   Psychiatric/Behavioral: The patient does not have insomnia.        Patient Active Problem List   Diagnosis   • Adjustment disorder with mixed anxiety and depressed mood   • Anemia   • Benign essential hypertension   • Hereditary essential tremor   • Type 2 diabetes mellitus with diabetic polyneuropathy, without long-term current use of insulin (CMS/[facility])   • Dry skin dermatitis   • Dyslipidemia   • Gastroesophageal reflux disease with esophagitis   • Pain of hand   • Hearing loss   • Arthralgia of hip   • Impacted cerumen   • Pruritus   • Knee pain   • Pain in extremity   • Chronic low back pain   • Weakness of lower extremity   • Spinal stenosis of lumbar region   • Senile osteoporosis   • Piriformis syndrome   • Primary insomnia   • Tinea pedis   • Vitamin D deficiency   • Left leg cellulitis   • CKD (chronic kidney disease) stage 3, GFR 30-59 ml/min   • Allergic rhinitis   • Pneumonia due to influenza A virus   • MRSA (methicillin resistant Staphylococcus aureus) infection   • Bacteria in urine       Social History     Social History   • Marital status:      Social History Main Topics   • Smoking status: Never Smoker   • Smokeless tobacco: Never Used   • Alcohol use No   • Drug use: No     Other Topics Concern   • Not on file       Allergies   Allergen Reactions   • Amoxicillin Other (See Comments)     unsure   • Benzodiazepines    • Codeine    • Cortisone    • Macrolides And Ketolides    • Melatonin    • Neomycin    • Penicillins    • Sulfa Antibiotics    • Tetracyclines & Related    • Erythromycin Rash       Current Outpatient Prescriptions on File Prior to Visit   Medication Sig Dispense Refill   • acetaminophen (TYLENOL) 325 MG tablet Take 2 tablets by mouth Every 6 (Six) Hours As Needed for Mild Pain (1-3).  0   • fexofenadine (ALLEGRA) 180 MG tablet Take 1 tablet  "by mouth Daily. 30 tablet 2   • fluticasone (FLONASE) 50 MCG/ACT nasal spray USE TWO SPRAYS IN EACH NOSTRIL ONCE DAILY 16 mL 2   • Lancet Device misc Use as directed to test glucose once daily. Diagnosis E11.9 100 each 3   • losartan (COZAAR) 50 MG tablet TAKE 1 TABLET EVERY DAY FOR BLOOD PRESSURE 90 tablet 1   • mirtazapine (REMERON) 15 MG tablet TAKE 1 TABLET EVERY NIGHT 90 tablet 1   • primidone (MYSOLINE) 50 MG tablet TAKE 1 TABLET TWICE DAILY  FOR  TREMOR 180 tablet 1   • raNITIdine (ZANTAC) 150 MG tablet TAKE 1 TABLET TWICE DAILY  FOR  REFLUX  ESOPHAGITIS 180 tablet 1   • sertraline (ZOLOFT) 100 MG tablet TAKE 1 TABLET EVERY DAY  FOR  MOOD  AND  WELL  BEING 90 tablet 1   • TRUE METRIX BLOOD GLUCOSE TEST test strip TEST BLOOD GLUCOSE ONCE DAILY AS DIRECTED. 100 each 0   • [DISCONTINUED] triamcinolone (KENALOG) 0.1 % ointment Apply  topically 2 (Two) Times a Day. 453.6 g 0     No current facility-administered medications on file prior to visit.        Objective   Vitals:    08/06/18 1708   BP: 122/60   BP Location: Right arm   Patient Position: Sitting   Cuff Size: Adult   Pulse: 61   SpO2: 96%   Weight: 78.9 kg (174 lb)   Height: 170.2 cm (67\")       Physical Exam   Constitutional: She appears well-developed and well-nourished.   HENT:   Head: Normocephalic and atraumatic.   Neck: Neck supple. No JVD present. No thyromegaly present.   Cardiovascular: Normal rate, regular rhythm and normal heart sounds.  Exam reveals no gallop and no friction rub.    No murmur heard.  Pulmonary/Chest: Effort normal and breath sounds normal. No respiratory distress. She has no wheezes. She has no rales.   Abdominal: Soft. Bowel sounds are normal. She exhibits no distension. There is no tenderness. There is no rebound and no guarding.   Musculoskeletal: She exhibits no edema.   Neurological: She is alert.   Skin: Skin is warm and dry.   Psychiatric: She has a normal mood and affect. Her behavior is normal.   Nursing note and " vitals reviewed.      Assessment/Plan   Problems Addressed this Visit        Cardiovascular and Mediastinum    Benign essential hypertension    Relevant Orders    Hemoglobin A1c    Comprehensive Metabolic Panel    Vitamin D 25 Hydroxy       Digestive    Vitamin D deficiency    Relevant Orders    Hemoglobin A1c    Comprehensive Metabolic Panel    Vitamin D 25 Hydroxy       Endocrine    Type 2 diabetes mellitus with diabetic polyneuropathy, without long-term current use of insulin (CMS/[facility]) - Primary    Relevant Orders    Hemoglobin A1c    Comprehensive Metabolic Panel    Vitamin D 25 Hydroxy       Genitourinary    CKD (chronic kidney disease) stage 3, GFR 30-59 ml/min    Relevant Orders    Hemoglobin A1c    Comprehensive Metabolic Panel    Vitamin D 25 Hydroxy        -dm2 - diet control, avoiding meds  -stopped checking lipids given advanced age  -hypertension - controlled, continue medications  -ckd - bp control  -due recheck vitamin D  -recommend ice knees prn and heat to back       -Follow up: 6 months and prn

## 2018-08-07 LAB
25(OH)D3+25(OH)D2 SERPL-MCNC: 36.8 NG/ML (ref 30–100)
ALBUMIN SERPL-MCNC: 3.9 G/DL (ref 3.2–4.6)
ALBUMIN/GLOB SERPL: 1.6 {RATIO} (ref 1.2–2.2)
ALP SERPL-CCNC: 90 IU/L (ref 39–117)
ALT SERPL-CCNC: 11 IU/L (ref 0–32)
AST SERPL-CCNC: 19 IU/L (ref 0–40)
BILIRUB SERPL-MCNC: 0.3 MG/DL (ref 0–1.2)
BUN SERPL-MCNC: 25 MG/DL (ref 10–36)
BUN/CREAT SERPL: 20 (ref 12–28)
CALCIUM SERPL-MCNC: 8.9 MG/DL (ref 8.7–10.3)
CHLORIDE SERPL-SCNC: 104 MMOL/L (ref 96–106)
CO2 SERPL-SCNC: 28 MMOL/L (ref 20–29)
CREAT SERPL-MCNC: 1.24 MG/DL (ref 0.57–1)
GLOBULIN SER CALC-MCNC: 2.5 G/DL (ref 1.5–4.5)
GLUCOSE SERPL-MCNC: 99 MG/DL (ref 65–99)
HBA1C MFR BLD: 6.2 % (ref 4.8–5.6)
Lab: NORMAL
POTASSIUM SERPL-SCNC: 5.3 MMOL/L (ref 3.5–5.2)
PROT SERPL-MCNC: 6.4 G/DL (ref 6–8.5)
SODIUM SERPL-SCNC: 142 MMOL/L (ref 134–144)

## 2018-08-08 NOTE — PROGRESS NOTES
Diabetes is adequately controlled.  Kidney function decline is stable.  Rest of labs normal or stable.

## 2018-09-11 RX ORDER — CALCIUM CITRATE/VITAMIN D3 200MG-6.25
TABLET ORAL
Qty: 100 EACH | Refills: 0 | Status: SHIPPED | OUTPATIENT
Start: 2018-09-11 | End: 2019-01-13 | Stop reason: SDUPTHER

## 2018-10-01 DIAGNOSIS — G47.09 OTHER INSOMNIA: ICD-10-CM

## 2018-10-02 RX ORDER — RANITIDINE 150 MG/1
TABLET ORAL
Qty: 180 TABLET | Refills: 1 | Status: SHIPPED | OUTPATIENT
Start: 2018-10-02 | End: 2019-05-22 | Stop reason: SDUPTHER

## 2018-10-02 RX ORDER — LOSARTAN POTASSIUM 50 MG/1
TABLET ORAL
Qty: 90 TABLET | Refills: 1 | Status: SHIPPED | OUTPATIENT
Start: 2018-10-02 | End: 2019-05-22 | Stop reason: SDUPTHER

## 2018-10-02 RX ORDER — SERTRALINE HYDROCHLORIDE 100 MG/1
TABLET, FILM COATED ORAL
Qty: 90 TABLET | Refills: 1 | Status: SHIPPED | OUTPATIENT
Start: 2018-10-02 | End: 2019-05-22 | Stop reason: SDUPTHER

## 2018-10-02 RX ORDER — PRIMIDONE 50 MG/1
TABLET ORAL
Qty: 180 TABLET | Refills: 1 | Status: SHIPPED | OUTPATIENT
Start: 2018-10-02 | End: 2019-05-22 | Stop reason: SDUPTHER

## 2018-10-02 RX ORDER — MIRTAZAPINE 15 MG/1
TABLET, FILM COATED ORAL
Qty: 90 TABLET | Refills: 1 | Status: SHIPPED | OUTPATIENT
Start: 2018-10-02 | End: 2019-05-22 | Stop reason: SDUPTHER

## 2018-12-03 ENCOUNTER — HOSPITAL ENCOUNTER (INPATIENT)
Facility: HOSPITAL | Age: 83
LOS: 3 days | Discharge: NURSING, REHAB OR LONG-TERM CARE | End: 2018-12-07
Attending: EMERGENCY MEDICINE | Admitting: HOSPITALIST

## 2018-12-03 ENCOUNTER — APPOINTMENT (OUTPATIENT)
Dept: CARDIOLOGY | Facility: HOSPITAL | Age: 83
End: 2018-12-03

## 2018-12-03 DIAGNOSIS — I10 POORLY-CONTROLLED HYPERTENSION: ICD-10-CM

## 2018-12-03 DIAGNOSIS — L03.119 CELLULITIS OF LOWER EXTREMITY, UNSPECIFIED LATERALITY: Primary | ICD-10-CM

## 2018-12-03 PROBLEM — I87.8 CHRONIC VENOUS STASIS: Status: ACTIVE | Noted: 2018-12-03

## 2018-12-03 LAB
ALBUMIN SERPL-MCNC: 3.8 G/DL (ref 3.5–5.2)
ALBUMIN/GLOB SERPL: 1.2 G/DL
ALP SERPL-CCNC: 96 U/L (ref 39–117)
ALT SERPL W P-5'-P-CCNC: 11 U/L (ref 1–33)
ANION GAP SERPL CALCULATED.3IONS-SCNC: 6.7 MMOL/L
AST SERPL-CCNC: 18 U/L (ref 1–32)
BASOPHILS # BLD AUTO: 0.04 10*3/MM3 (ref 0–0.2)
BASOPHILS NFR BLD AUTO: 1 % (ref 0–1.5)
BH CV LOWER VASCULAR LEFT COMMON FEMORAL AUGMENT: NORMAL
BH CV LOWER VASCULAR LEFT COMMON FEMORAL COMPETENT: NORMAL
BH CV LOWER VASCULAR LEFT COMMON FEMORAL COMPRESS: NORMAL
BH CV LOWER VASCULAR LEFT COMMON FEMORAL PHASIC: NORMAL
BH CV LOWER VASCULAR LEFT COMMON FEMORAL SPONT: NORMAL
BH CV LOWER VASCULAR LEFT DISTAL FEMORAL COMPRESS: NORMAL
BH CV LOWER VASCULAR LEFT GASTRONEMIUS COMPRESS: NORMAL
BH CV LOWER VASCULAR LEFT GREATER SAPH AK COMPRESS: NORMAL
BH CV LOWER VASCULAR LEFT GREATER SAPH BK COMPRESS: NORMAL
BH CV LOWER VASCULAR LEFT LESSER SAPH COMPRESS: NORMAL
BH CV LOWER VASCULAR LEFT MID FEMORAL AUGMENT: NORMAL
BH CV LOWER VASCULAR LEFT MID FEMORAL COMPETENT: NORMAL
BH CV LOWER VASCULAR LEFT MID FEMORAL COMPRESS: NORMAL
BH CV LOWER VASCULAR LEFT MID FEMORAL PHASIC: NORMAL
BH CV LOWER VASCULAR LEFT MID FEMORAL SPONT: NORMAL
BH CV LOWER VASCULAR LEFT PERONEAL COMPRESS: NORMAL
BH CV LOWER VASCULAR LEFT POPLITEAL AUGMENT: NORMAL
BH CV LOWER VASCULAR LEFT POPLITEAL COMPETENT: NORMAL
BH CV LOWER VASCULAR LEFT POPLITEAL COMPRESS: NORMAL
BH CV LOWER VASCULAR LEFT POPLITEAL PHASIC: NORMAL
BH CV LOWER VASCULAR LEFT POPLITEAL SPONT: NORMAL
BH CV LOWER VASCULAR LEFT POSTERIOR TIBIAL COMPRESS: NORMAL
BH CV LOWER VASCULAR LEFT PROXIMAL FEMORAL COMPRESS: NORMAL
BH CV LOWER VASCULAR LEFT SAPHENOFEMORAL JUNCTION AUGMENT: NORMAL
BH CV LOWER VASCULAR LEFT SAPHENOFEMORAL JUNCTION COMPETENT: NORMAL
BH CV LOWER VASCULAR LEFT SAPHENOFEMORAL JUNCTION COMPRESS: NORMAL
BH CV LOWER VASCULAR LEFT SAPHENOFEMORAL JUNCTION PHASIC: NORMAL
BH CV LOWER VASCULAR LEFT SAPHENOFEMORAL JUNCTION SPONT: NORMAL
BH CV LOWER VASCULAR RIGHT COMMON FEMORAL AUGMENT: NORMAL
BH CV LOWER VASCULAR RIGHT COMMON FEMORAL COMPETENT: NORMAL
BH CV LOWER VASCULAR RIGHT COMMON FEMORAL COMPRESS: NORMAL
BH CV LOWER VASCULAR RIGHT COMMON FEMORAL PHASIC: NORMAL
BH CV LOWER VASCULAR RIGHT COMMON FEMORAL SPONT: NORMAL
BH CV LOWER VASCULAR RIGHT DISTAL FEMORAL COMPRESS: NORMAL
BH CV LOWER VASCULAR RIGHT GASTRONEMIUS COMPRESS: NORMAL
BH CV LOWER VASCULAR RIGHT LESSER SAPH COMPRESS: NORMAL
BH CV LOWER VASCULAR RIGHT MID FEMORAL AUGMENT: NORMAL
BH CV LOWER VASCULAR RIGHT MID FEMORAL COMPETENT: NORMAL
BH CV LOWER VASCULAR RIGHT MID FEMORAL COMPRESS: NORMAL
BH CV LOWER VASCULAR RIGHT MID FEMORAL PHASIC: NORMAL
BH CV LOWER VASCULAR RIGHT MID FEMORAL SPONT: NORMAL
BH CV LOWER VASCULAR RIGHT PERONEAL COMPRESS: NORMAL
BH CV LOWER VASCULAR RIGHT POPLITEAL AUGMENT: NORMAL
BH CV LOWER VASCULAR RIGHT POPLITEAL COMPETENT: NORMAL
BH CV LOWER VASCULAR RIGHT POPLITEAL COMPRESS: NORMAL
BH CV LOWER VASCULAR RIGHT POPLITEAL PHASIC: NORMAL
BH CV LOWER VASCULAR RIGHT POPLITEAL SPONT: NORMAL
BH CV LOWER VASCULAR RIGHT POSTERIOR TIBIAL COMPRESS: NORMAL
BH CV LOWER VASCULAR RIGHT PROXIMAL FEMORAL COMPRESS: NORMAL
BILIRUB SERPL-MCNC: 0.2 MG/DL (ref 0.1–1.2)
BUN BLD-MCNC: 23 MG/DL (ref 8–23)
BUN/CREAT SERPL: 21.1 (ref 7–25)
CALCIUM SPEC-SCNC: 9.2 MG/DL (ref 8.2–9.6)
CHLORIDE SERPL-SCNC: 105 MMOL/L (ref 98–107)
CO2 SERPL-SCNC: 26.3 MMOL/L (ref 22–29)
CREAT BLD-MCNC: 1.09 MG/DL (ref 0.57–1)
D-LACTATE SERPL-SCNC: 0.6 MMOL/L (ref 0.5–2)
DEPRECATED RDW RBC AUTO: 46.9 FL (ref 37–54)
EOSINOPHIL # BLD AUTO: 0.08 10*3/MM3 (ref 0–0.7)
EOSINOPHIL NFR BLD AUTO: 1.9 % (ref 0.3–6.2)
ERYTHROCYTE [DISTWIDTH] IN BLOOD BY AUTOMATED COUNT: 13.4 % (ref 11.7–13)
GFR SERPL CREATININE-BSD FRML MDRD: 47 ML/MIN/1.73
GLOBULIN UR ELPH-MCNC: 3.1 GM/DL
GLUCOSE BLD-MCNC: 83 MG/DL (ref 65–99)
GLUCOSE BLDC GLUCOMTR-MCNC: 169 MG/DL (ref 70–130)
HCT VFR BLD AUTO: 34 % (ref 35.6–45.5)
HGB BLD-MCNC: 10.8 G/DL (ref 11.9–15.5)
IMM GRANULOCYTES # BLD: 0 10*3/MM3 (ref 0–0.03)
IMM GRANULOCYTES NFR BLD: 0 % (ref 0–0.5)
LYMPHOCYTES # BLD AUTO: 1.65 10*3/MM3 (ref 0.9–4.8)
LYMPHOCYTES NFR BLD AUTO: 40 % (ref 19.6–45.3)
MCH RBC QN AUTO: 30.7 PG (ref 26.9–32)
MCHC RBC AUTO-ENTMCNC: 31.8 G/DL (ref 32.4–36.3)
MCV RBC AUTO: 96.6 FL (ref 80.5–98.2)
MONOCYTES # BLD AUTO: 0.33 10*3/MM3 (ref 0.2–1.2)
MONOCYTES NFR BLD AUTO: 8 % (ref 5–12)
NEUTROPHILS # BLD AUTO: 2.02 10*3/MM3 (ref 1.9–8.1)
NEUTROPHILS NFR BLD AUTO: 49.1 % (ref 42.7–76)
PLATELET # BLD AUTO: 184 10*3/MM3 (ref 140–500)
PMV BLD AUTO: 9.4 FL (ref 6–12)
POTASSIUM BLD-SCNC: 5.1 MMOL/L (ref 3.5–5.2)
PROT SERPL-MCNC: 6.9 G/DL (ref 6–8.5)
RBC # BLD AUTO: 3.52 10*6/MM3 (ref 3.9–5.2)
SODIUM BLD-SCNC: 138 MMOL/L (ref 136–145)
WBC NRBC COR # BLD: 4.12 10*3/MM3 (ref 4.5–10.7)

## 2018-12-03 PROCEDURE — 85025 COMPLETE CBC W/AUTO DIFF WBC: CPT | Performed by: PHYSICIAN ASSISTANT

## 2018-12-03 PROCEDURE — 80053 COMPREHEN METABOLIC PANEL: CPT | Performed by: PHYSICIAN ASSISTANT

## 2018-12-03 PROCEDURE — 25010000002 VANCOMYCIN: Performed by: EMERGENCY MEDICINE

## 2018-12-03 PROCEDURE — 99284 EMERGENCY DEPT VISIT MOD MDM: CPT

## 2018-12-03 PROCEDURE — 82962 GLUCOSE BLOOD TEST: CPT

## 2018-12-03 PROCEDURE — 83605 ASSAY OF LACTIC ACID: CPT | Performed by: PHYSICIAN ASSISTANT

## 2018-12-03 PROCEDURE — 93970 EXTREMITY STUDY: CPT

## 2018-12-03 PROCEDURE — 87040 BLOOD CULTURE FOR BACTERIA: CPT | Performed by: PHYSICIAN ASSISTANT

## 2018-12-03 PROCEDURE — 25010000002 CEFTRIAXONE PER 250 MG: Performed by: EMERGENCY MEDICINE

## 2018-12-03 PROCEDURE — G0378 HOSPITAL OBSERVATION PER HR: HCPCS

## 2018-12-03 RX ORDER — SODIUM CHLORIDE 0.9 % (FLUSH) 0.9 %
3 SYRINGE (ML) INJECTION EVERY 12 HOURS SCHEDULED
Status: DISCONTINUED | OUTPATIENT
Start: 2018-12-03 | End: 2018-12-07 | Stop reason: HOSPADM

## 2018-12-03 RX ORDER — LABETALOL HYDROCHLORIDE 5 MG/ML
10 INJECTION, SOLUTION INTRAVENOUS ONCE
Status: COMPLETED | OUTPATIENT
Start: 2018-12-03 | End: 2018-12-03

## 2018-12-03 RX ORDER — CETIRIZINE HYDROCHLORIDE 10 MG/1
10 TABLET ORAL DAILY
Status: DISCONTINUED | OUTPATIENT
Start: 2018-12-04 | End: 2018-12-07 | Stop reason: HOSPADM

## 2018-12-03 RX ORDER — TRIAMCINOLONE ACETONIDE 0.25 MG/G
CREAM TOPICAL 2 TIMES DAILY
COMMUNITY
End: 2019-02-27

## 2018-12-03 RX ORDER — PRIMIDONE 50 MG/1
50 TABLET ORAL EVERY 12 HOURS SCHEDULED
Status: DISCONTINUED | OUTPATIENT
Start: 2018-12-04 | End: 2018-12-07 | Stop reason: HOSPADM

## 2018-12-03 RX ORDER — TRAMADOL HYDROCHLORIDE 50 MG/1
50 TABLET ORAL EVERY 6 HOURS PRN
Status: DISCONTINUED | OUTPATIENT
Start: 2018-12-03 | End: 2018-12-07 | Stop reason: HOSPADM

## 2018-12-03 RX ORDER — MIRTAZAPINE 15 MG/1
15 TABLET, FILM COATED ORAL NIGHTLY
Status: DISCONTINUED | OUTPATIENT
Start: 2018-12-04 | End: 2018-12-07 | Stop reason: HOSPADM

## 2018-12-03 RX ORDER — SODIUM CHLORIDE 0.9 % (FLUSH) 0.9 %
3-10 SYRINGE (ML) INJECTION AS NEEDED
Status: DISCONTINUED | OUTPATIENT
Start: 2018-12-03 | End: 2018-12-07 | Stop reason: HOSPADM

## 2018-12-03 RX ORDER — LOSARTAN POTASSIUM 50 MG/1
50 TABLET ORAL
Status: DISCONTINUED | OUTPATIENT
Start: 2018-12-04 | End: 2018-12-07 | Stop reason: HOSPADM

## 2018-12-03 RX ORDER — ACETAMINOPHEN 325 MG/1
650 TABLET ORAL EVERY 4 HOURS PRN
Status: DISCONTINUED | OUTPATIENT
Start: 2018-12-03 | End: 2018-12-07 | Stop reason: HOSPADM

## 2018-12-03 RX ORDER — CEFTRIAXONE SODIUM 1 G/50ML
1 INJECTION, SOLUTION INTRAVENOUS ONCE
Status: COMPLETED | OUTPATIENT
Start: 2018-12-03 | End: 2018-12-03

## 2018-12-03 RX ORDER — ONDANSETRON 2 MG/ML
4 INJECTION INTRAMUSCULAR; INTRAVENOUS EVERY 6 HOURS PRN
Status: DISCONTINUED | OUTPATIENT
Start: 2018-12-03 | End: 2018-12-07 | Stop reason: HOSPADM

## 2018-12-03 RX ORDER — TRIAMCINOLONE ACETONIDE 1 MG/G
1 CREAM TOPICAL EVERY 8 HOURS SCHEDULED
Status: DISCONTINUED | OUTPATIENT
Start: 2018-12-04 | End: 2018-12-07 | Stop reason: HOSPADM

## 2018-12-03 RX ORDER — NICOTINE POLACRILEX 4 MG
15 LOZENGE BUCCAL
Status: DISCONTINUED | OUTPATIENT
Start: 2018-12-03 | End: 2018-12-07 | Stop reason: HOSPADM

## 2018-12-03 RX ORDER — DEXTROSE MONOHYDRATE 25 G/50ML
25 INJECTION, SOLUTION INTRAVENOUS
Status: DISCONTINUED | OUTPATIENT
Start: 2018-12-03 | End: 2018-12-07 | Stop reason: HOSPADM

## 2018-12-03 RX ORDER — ONDANSETRON 4 MG/1
4 TABLET, FILM COATED ORAL EVERY 6 HOURS PRN
Status: DISCONTINUED | OUTPATIENT
Start: 2018-12-03 | End: 2018-12-07 | Stop reason: HOSPADM

## 2018-12-03 RX ORDER — SERTRALINE HYDROCHLORIDE 100 MG/1
100 TABLET, FILM COATED ORAL DAILY
Status: DISCONTINUED | OUTPATIENT
Start: 2018-12-04 | End: 2018-12-07 | Stop reason: HOSPADM

## 2018-12-03 RX ORDER — FLUTICASONE PROPIONATE 50 MCG
2 SPRAY, SUSPENSION (ML) NASAL DAILY
Status: DISCONTINUED | OUTPATIENT
Start: 2018-12-04 | End: 2018-12-07 | Stop reason: HOSPADM

## 2018-12-03 RX ORDER — SODIUM CHLORIDE 0.9 % (FLUSH) 0.9 %
10 SYRINGE (ML) INJECTION AS NEEDED
Status: DISCONTINUED | OUTPATIENT
Start: 2018-12-03 | End: 2018-12-07 | Stop reason: HOSPADM

## 2018-12-03 RX ORDER — ONDANSETRON 4 MG/1
4 TABLET, ORALLY DISINTEGRATING ORAL EVERY 6 HOURS PRN
Status: DISCONTINUED | OUTPATIENT
Start: 2018-12-03 | End: 2018-12-07 | Stop reason: HOSPADM

## 2018-12-03 RX ORDER — FAMOTIDINE 20 MG/1
20 TABLET, FILM COATED ORAL DAILY
Status: DISCONTINUED | OUTPATIENT
Start: 2018-12-04 | End: 2018-12-07 | Stop reason: HOSPADM

## 2018-12-03 RX ORDER — HYDRALAZINE HYDROCHLORIDE 20 MG/ML
10 INJECTION INTRAMUSCULAR; INTRAVENOUS EVERY 6 HOURS PRN
Status: DISCONTINUED | OUTPATIENT
Start: 2018-12-03 | End: 2018-12-07 | Stop reason: HOSPADM

## 2018-12-03 RX ADMIN — LABETALOL HYDROCHLORIDE 10 MG: 5 INJECTION, SOLUTION INTRAVENOUS at 21:41

## 2018-12-03 RX ADMIN — VANCOMYCIN HYDROCHLORIDE 1500 MG: 10 INJECTION, POWDER, LYOPHILIZED, FOR SOLUTION INTRAVENOUS at 22:33

## 2018-12-03 RX ADMIN — CEFTRIAXONE SODIUM 1 G: 1 INJECTION, SOLUTION INTRAVENOUS at 21:41

## 2018-12-03 RX ADMIN — LABETALOL HYDROCHLORIDE 10 MG: 5 INJECTION, SOLUTION INTRAVENOUS at 22:08

## 2018-12-03 NOTE — ED PROVIDER NOTES
EMERGENCY DEPARTMENT ENCOUNTER    Room Number:  44/44  Time seen: 6:30 PM  PCP: Fly Sanchez DO  Historian: patient, family  History Limited By: N/A      HPI:  Chief Complaint: lower extremity redness  Context: Magdalena Jerry is a 89 y.o. female who has diabetes and lives at home alone. She uses walker for ambulation. She presents to the ED c/o left lower leg redness that started yesterday and has worsened. It has been accompanied by left lower leg swelling, warmth, and pain. Family has also noticed some right lower leg swelling and redness on pt. Additionally, pt has had trouble walking secondary to the LLE pain. Pt denies recent known injury or trauma, sensory loss, motor loss, chest pain, dyspnea, N/V/D, abd pain, fever, chills, and hx of blood clots. According to family, pt had similar sx in 2017 when she was admitted at [facility] for LLE cellulitis. Pt has no other complaints at this time.    Location: left lower leg  Radiation: none  Quality: redness  Intensity/Severity: moderate  Duration: started yesterday  Onset quality: gradual  Timing: constant  Progression: worse  Aggravating Factors: none  Alleviating Factors: none  Previous Episodes: According to family, pt had similar sx in 2017 when she was admitted at [facility] for LLE cellulitis.   Treatment before arrival: none mentioned  Associated Symptoms: bilateral lower leg redness (left lower leg greater than right lower leg), bilateral lower leg swelling (left lower leg greater than right lower leg), left lower leg warmth, left lower leg pain, trouble walking secondary to LLE pain      PAST MEDICAL HISTORY  Active Ambulatory Problems     Diagnosis Date Noted   • Adjustment disorder with mixed anxiety and depressed mood 03/25/2016   • Anemia 03/25/2016   • Benign essential hypertension 03/25/2016   • Hereditary essential tremor 03/25/2016   • Type 2 diabetes mellitus with diabetic polyneuropathy, without long-term current use of insulin (CMS/[facility]) 03/25/2016    • Dry skin dermatitis 03/25/2016   • Dyslipidemia 03/25/2016   • Gastroesophageal reflux disease with esophagitis 03/25/2016   • Pain of hand 03/25/2016   • Hearing loss 03/25/2016   • Arthralgia of hip 03/25/2016   • Impacted cerumen 03/25/2016   • Pruritus 03/25/2016   • Knee pain 03/25/2016   • Pain in extremity 03/25/2016   • Chronic low back pain 03/25/2016   • Weakness of lower extremity 03/25/2016   • Spinal stenosis of lumbar region 03/25/2016   • Senile osteoporosis 03/25/2016   • Piriformis syndrome 03/25/2016   • Primary insomnia 03/25/2016   • Tinea pedis 03/25/2016   • Vitamin D deficiency 03/25/2016   • Left leg cellulitis 03/15/2017   • CKD (chronic kidney disease) stage 3, GFR 30-59 ml/min (CMS/HCC) 03/15/2017   • Allergic rhinitis 03/16/2017   • Pneumonia due to influenza A virus 03/17/2017   • MRSA (methicillin resistant Staphylococcus aureus) infection 03/18/2017   • Bacteria in urine 03/19/2017     Resolved Ambulatory Problems     Diagnosis Date Noted   • Acute otitis externa 03/25/2016   • Acute sinusitis 03/25/2016   • Chronic sinusitis 03/25/2016     Past Medical History:   Diagnosis Date   • Adjustment disorder with mixed anxiety and depressed mood    • Anemia    • Anxiety    • Atrophy of hand muscles    • Benign familial tremor    • Carpal tunnel syndrome    • Cataract    • Chronic rhinosinusitis    • Depression    • Diabetes mellitus (CMS/HCC)    • Dyslipidemia    • Esophagitis, reflux    • Fracture of hip (CMS/HCC)    • Frequent falls    • GERD (gastroesophageal reflux disease)    • Hand pain    • Hearing loss    • Hip pain    • Hyperlipidemia    • Hypertension    • Impacted cerumen    • Insomnia    • Knee pain    • Limb pain    • Loss of hearing    • Low back pain    • Lower extremity weakness    • Lumbar canal stenosis    • Osteoporosis, senile    • Piriformis syndrome    • Renal insufficiency 2006   • Sensorineural hearing loss    • Stroke (CMS/HCC) 2004   • Tinea pedis    • Vitamin D  deficiency          PAST SURGICAL HISTORY  Past Surgical History:   Procedure Laterality Date   • CHOLECYSTECTOMY  2013   • COLONOSCOPY     • ERCP WITH SPHINCTEROTOMY/PAPILLOTOMY  2012   • FRACTURE SURGERY Left 2006    HIP   • VARICOSE VEIN SURGERY Right          FAMILY HISTORY  History reviewed. No pertinent family history.      SOCIAL HISTORY  Social History     Socioeconomic History   • Marital status:      Spouse name: Not on file   • Number of children: Not on file   • Years of education: Not on file   • Highest education level: Not on file   Social Needs   • Financial resource strain: Not on file   • Food insecurity - worry: Not on file   • Food insecurity - inability: Not on file   • Transportation needs - medical: Not on file   • Transportation needs - non-medical: Not on file   Occupational History   • Not on file   Tobacco Use   • Smoking status: Never Smoker   • Smokeless tobacco: Never Used   Substance and Sexual Activity   • Alcohol use: No   • Drug use: No   • Sexual activity: Not on file   Other Topics Concern   • Not on file   Social History Narrative   • Not on file         ALLERGIES  Amoxicillin; Benzodiazepines; Codeine; Cortisone; Macrolides and ketolides; Melatonin; Neomycin; Penicillins; Sulfa antibiotics; Tetracyclines & related; and Erythromycin      REVIEW OF SYSTEMS  Review of Systems   Constitutional: Negative for chills and fever.   HENT: Negative for congestion, rhinorrhea and sore throat.    Eyes: Negative for pain.   Respiratory: Negative for cough and shortness of breath.    Cardiovascular: Negative for chest pain and palpitations.   Gastrointestinal: Negative for abdominal pain, diarrhea, nausea and vomiting.   Endocrine: Negative.    Genitourinary: Negative for difficulty urinating.   Musculoskeletal: Positive for gait problem (trouble walking secondary to LLE pain).        Bilateral lower leg swelling (left lower leg greater than right lower leg), left lower leg pain   Skin:  Positive for color change (bilateral lower leg redness (left lower leg greater than right lower leg)).        Left lower leg warmth   Neurological: Negative for speech difficulty, weakness, numbness and headaches.   Psychiatric/Behavioral: Negative.    All other systems reviewed and are negative.           PHYSICAL EXAM  ED Triage Vitals [12/03/18 1804]   Temp Heart Rate Resp BP SpO2   98.2 °F (36.8 °C) 68 16 (!) 213/83 97 % WNL      Temp src Heart Rate Source Patient Position BP Location FiO2 (%)   Tympanic Monitor -- -- --     Physical Exam   Constitutional: She is oriented to person, place, and time. No distress.   HENT:   Head: Normocephalic.   Mouth/Throat: Mucous membranes are normal.   Eyes: EOM are normal. Pupils are equal, round, and reactive to light.   Neck: Normal range of motion. Neck supple.   Cardiovascular: Normal rate and regular rhythm.   Murmur heard.   Systolic murmur is present with a grade of 2/6.  Pulses:       Dorsalis pedis pulses are 2+ on the right side, and 2+ on the left side.        Posterior tibial pulses are 2+ on the right side, and 2+ on the left side.   Pulmonary/Chest: Effort normal and breath sounds normal. No respiratory distress. She has no decreased breath sounds. She has no wheezes. She has no rhonchi. She has no rales.   Abdominal: Soft. There is no tenderness. There is no rebound and no guarding.   Musculoskeletal:   LLE- moderate erythema and swelling from the proximal 3rd portion of the LLE down to the left foot with mild tenderness, skin intact, NV intact distally to LLE    RLE- mild erythema and swelling from the proximal 3rd portion of the RLE down to the right foot, no tenderness, skin intact, NV intact distally to RLE   Neurological: She is alert and oriented to person, place, and time. She has normal sensation.   Skin: Skin is warm and dry.   Psychiatric: Mood and affect normal.   Nursing note and vitals reviewed.          LAB RESULTS  Recent Results (from the past  24 hour(s))   Comprehensive Metabolic Panel    Collection Time: 12/03/18  7:00 PM   Result Value Ref Range    Glucose 83 65 - 99 mg/dL    BUN 23 8 - 23 mg/dL    Creatinine 1.09 (H) 0.57 - 1.00 mg/dL    Sodium 138 136 - 145 mmol/L    Potassium 5.1 3.5 - 5.2 mmol/L    Chloride 105 98 - 107 mmol/L    CO2 26.3 22.0 - 29.0 mmol/L    Calcium 9.2 8.2 - 9.6 mg/dL    Total Protein 6.9 6.0 - 8.5 g/dL    Albumin 3.80 3.50 - 5.20 g/dL    ALT (SGPT) 11 1 - 33 U/L    AST (SGOT) 18 1 - 32 U/L    Alkaline Phosphatase 96 39 - 117 U/L    Total Bilirubin 0.2 0.1 - 1.2 mg/dL    eGFR Non African Amer 47 (L) >60 mL/min/1.73    Globulin 3.1 gm/dL    A/G Ratio 1.2 g/dL    BUN/Creatinine Ratio 21.1 7.0 - 25.0    Anion Gap 6.7 mmol/L   Lactic Acid, Plasma    Collection Time: 12/03/18  7:00 PM   Result Value Ref Range    Lactate 0.6 0.5 - 2.0 mmol/L   CBC Auto Differential    Collection Time: 12/03/18  7:00 PM   Result Value Ref Range    WBC 4.12 (L) 4.50 - 10.70 10*3/mm3    RBC 3.52 (L) 3.90 - 5.20 10*6/mm3    Hemoglobin 10.8 (L) 11.9 - 15.5 g/dL    Hematocrit 34.0 (L) 35.6 - 45.5 %    MCV 96.6 80.5 - 98.2 fL    MCH 30.7 26.9 - 32.0 pg    MCHC 31.8 (L) 32.4 - 36.3 g/dL    RDW 13.4 (H) 11.7 - 13.0 %    RDW-SD 46.9 37.0 - 54.0 fl    MPV 9.4 6.0 - 12.0 fL    Platelets 184 140 - 500 10*3/mm3    Neutrophil % 49.1 42.7 - 76.0 %    Lymphocyte % 40.0 19.6 - 45.3 %    Monocyte % 8.0 5.0 - 12.0 %    Eosinophil % 1.9 0.3 - 6.2 %    Basophil % 1.0 0.0 - 1.5 %    Immature Grans % 0.0 0.0 - 0.5 %    Neutrophils, Absolute 2.02 1.90 - 8.10 10*3/mm3    Lymphocytes, Absolute 1.65 0.90 - 4.80 10*3/mm3    Monocytes, Absolute 0.33 0.20 - 1.20 10*3/mm3    Eosinophils, Absolute 0.08 0.00 - 0.70 10*3/mm3    Basophils, Absolute 0.04 0.00 - 0.20 10*3/mm3    Immature Grans, Absolute 0.00 0.00 - 0.03 10*3/mm3     BLE venous duplex- pending    Ordered the above labs and reviewed the results.          PROCEDURES  Procedures          PROGRESS AND CONSULTS     6320-  Ordered blood cultures, lactic acid, blood work, and BLE venous duplex for further evaluation.     1855- Discussed case with Dr Barclay who agrees with the plan of care.     1955- Transferred care to Dr Barclay. Pending BLE venous duplex and final disposition.       MEDICAL DECISION MAKING      MDM  Number of Diagnoses or Management Options     Amount and/or Complexity of Data Reviewed  Clinical lab tests: ordered and reviewed (BUN= 23, creatinine= 1.09, lactic acid= 0.6, WBC= 4.12, hgb= 10.8    BLE venous duplex- pending)  Decide to obtain previous medical records or to obtain history from someone other than the patient: yes  Review and summarize past medical records: yes (Pt was admitted at [facility] in 3/2017 for LLE cellulitis. )    Patient Progress  Patient progress: stable             DIAGNOSIS  Final diagnoses:   None         DISPOSITION  Pending         Latest Documented Vital Signs:  As of 8:05 PM  BP- 213/83 HR- 68 Temp- 98.2 °F (36.8 °C) (Tympanic) O2 sat- 97%      --  Documentation assistance provided by edi Guillory for ROSANNA Caballero.  Information recorded by the scribe was done at my direction and has been verified and validated by me.     Steven Guillory  12/03/18 2009       Piter Caballero PA  12/04/18 4192

## 2018-12-04 LAB
ANION GAP SERPL CALCULATED.3IONS-SCNC: 8.8 MMOL/L
BASOPHILS # BLD AUTO: 0.02 10*3/MM3 (ref 0–0.2)
BASOPHILS NFR BLD AUTO: 0.5 % (ref 0–1.5)
BUN BLD-MCNC: 20 MG/DL (ref 8–23)
BUN/CREAT SERPL: 17.5 (ref 7–25)
CALCIUM SPEC-SCNC: 8.4 MG/DL (ref 8.2–9.6)
CHLORIDE SERPL-SCNC: 107 MMOL/L (ref 98–107)
CO2 SERPL-SCNC: 25.2 MMOL/L (ref 22–29)
CREAT BLD-MCNC: 1.14 MG/DL (ref 0.57–1)
CRP SERPL-MCNC: 0.85 MG/DL (ref 0–0.5)
DEPRECATED RDW RBC AUTO: 49.3 FL (ref 37–54)
EOSINOPHIL # BLD AUTO: 0.11 10*3/MM3 (ref 0–0.7)
EOSINOPHIL NFR BLD AUTO: 2.6 % (ref 0.3–6.2)
ERYTHROCYTE [DISTWIDTH] IN BLOOD BY AUTOMATED COUNT: 13.4 % (ref 11.7–13)
GFR SERPL CREATININE-BSD FRML MDRD: 45 ML/MIN/1.73
GLUCOSE BLD-MCNC: 114 MG/DL (ref 65–99)
GLUCOSE BLDC GLUCOMTR-MCNC: 118 MG/DL (ref 70–130)
GLUCOSE BLDC GLUCOMTR-MCNC: 120 MG/DL (ref 70–130)
GLUCOSE BLDC GLUCOMTR-MCNC: 135 MG/DL (ref 70–130)
GLUCOSE BLDC GLUCOMTR-MCNC: 148 MG/DL (ref 70–130)
HCT VFR BLD AUTO: 32.6 % (ref 35.6–45.5)
HGB BLD-MCNC: 9.8 G/DL (ref 11.9–15.5)
IMM GRANULOCYTES # BLD: 0 10*3/MM3 (ref 0–0.03)
IMM GRANULOCYTES NFR BLD: 0 % (ref 0–0.5)
LYMPHOCYTES # BLD AUTO: 1.73 10*3/MM3 (ref 0.9–4.8)
LYMPHOCYTES NFR BLD AUTO: 41.3 % (ref 19.6–45.3)
MCH RBC QN AUTO: 30.5 PG (ref 26.9–32)
MCHC RBC AUTO-ENTMCNC: 30.1 G/DL (ref 32.4–36.3)
MCV RBC AUTO: 101.6 FL (ref 80.5–98.2)
MONOCYTES # BLD AUTO: 0.46 10*3/MM3 (ref 0.2–1.2)
MONOCYTES NFR BLD AUTO: 11 % (ref 5–12)
NEUTROPHILS # BLD AUTO: 1.87 10*3/MM3 (ref 1.9–8.1)
NEUTROPHILS NFR BLD AUTO: 44.6 % (ref 42.7–76)
PLATELET # BLD AUTO: 158 10*3/MM3 (ref 140–500)
PMV BLD AUTO: 9.3 FL (ref 6–12)
POTASSIUM BLD-SCNC: 4.8 MMOL/L (ref 3.5–5.2)
RBC # BLD AUTO: 3.21 10*6/MM3 (ref 3.9–5.2)
SODIUM BLD-SCNC: 141 MMOL/L (ref 136–145)
WBC NRBC COR # BLD: 4.19 10*3/MM3 (ref 4.5–10.7)

## 2018-12-04 PROCEDURE — 85025 COMPLETE CBC W/AUTO DIFF WBC: CPT | Performed by: INTERNAL MEDICINE

## 2018-12-04 PROCEDURE — 25010000002 ENOXAPARIN PER 10 MG: Performed by: INTERNAL MEDICINE

## 2018-12-04 PROCEDURE — 25010000002 VANCOMYCIN 10 G RECONSTITUTED SOLUTION: Performed by: INTERNAL MEDICINE

## 2018-12-04 PROCEDURE — 36415 COLL VENOUS BLD VENIPUNCTURE: CPT | Performed by: INTERNAL MEDICINE

## 2018-12-04 PROCEDURE — 25010000002 HYDRALAZINE PER 20 MG: Performed by: INTERNAL MEDICINE

## 2018-12-04 PROCEDURE — 99223 1ST HOSP IP/OBS HIGH 75: CPT | Performed by: INTERNAL MEDICINE

## 2018-12-04 PROCEDURE — 97110 THERAPEUTIC EXERCISES: CPT | Performed by: PHYSICAL THERAPIST

## 2018-12-04 PROCEDURE — 82962 GLUCOSE BLOOD TEST: CPT

## 2018-12-04 PROCEDURE — 97162 PT EVAL MOD COMPLEX 30 MIN: CPT | Performed by: PHYSICAL THERAPIST

## 2018-12-04 PROCEDURE — 80048 BASIC METABOLIC PNL TOTAL CA: CPT | Performed by: INTERNAL MEDICINE

## 2018-12-04 PROCEDURE — 87081 CULTURE SCREEN ONLY: CPT | Performed by: HOSPITALIST

## 2018-12-04 PROCEDURE — 86140 C-REACTIVE PROTEIN: CPT | Performed by: INTERNAL MEDICINE

## 2018-12-04 RX ADMIN — VANCOMYCIN HYDROCHLORIDE 1250 MG: 10 INJECTION, POWDER, LYOPHILIZED, FOR SOLUTION INTRAVENOUS at 12:19

## 2018-12-04 RX ADMIN — MIRTAZAPINE 15 MG: 15 TABLET, FILM COATED ORAL at 01:02

## 2018-12-04 RX ADMIN — SODIUM CHLORIDE, PRESERVATIVE FREE 3 ML: 5 INJECTION INTRAVENOUS at 08:35

## 2018-12-04 RX ADMIN — CETIRIZINE HYDROCHLORIDE 10 MG: 10 TABLET, FILM COATED ORAL at 08:31

## 2018-12-04 RX ADMIN — MIRTAZAPINE 15 MG: 15 TABLET, FILM COATED ORAL at 20:27

## 2018-12-04 RX ADMIN — ENOXAPARIN SODIUM 40 MG: 40 INJECTION SUBCUTANEOUS at 01:01

## 2018-12-04 RX ADMIN — PRIMIDONE 50 MG: 50 TABLET ORAL at 01:02

## 2018-12-04 RX ADMIN — SODIUM CHLORIDE, PRESERVATIVE FREE 3 ML: 5 INJECTION INTRAVENOUS at 20:27

## 2018-12-04 RX ADMIN — PRIMIDONE 50 MG: 50 TABLET ORAL at 08:31

## 2018-12-04 RX ADMIN — TRIAMCINOLONE ACETONIDE 1 APPLICATION: 1 CREAM TOPICAL at 21:40

## 2018-12-04 RX ADMIN — FAMOTIDINE 20 MG: 20 TABLET, FILM COATED ORAL at 08:31

## 2018-12-04 RX ADMIN — HYDRALAZINE HYDROCHLORIDE 10 MG: 20 INJECTION INTRAMUSCULAR; INTRAVENOUS at 20:42

## 2018-12-04 RX ADMIN — PRIMIDONE 50 MG: 50 TABLET ORAL at 20:27

## 2018-12-04 RX ADMIN — LOSARTAN POTASSIUM 50 MG: 50 TABLET, FILM COATED ORAL at 08:31

## 2018-12-04 RX ADMIN — SERTRALINE 100 MG: 100 TABLET, FILM COATED ORAL at 08:31

## 2018-12-04 RX ADMIN — TRIAMCINOLONE ACETONIDE 1 APPLICATION: 1 CREAM TOPICAL at 06:07

## 2018-12-04 RX ADMIN — ENOXAPARIN SODIUM 40 MG: 40 INJECTION SUBCUTANEOUS at 20:27

## 2018-12-04 RX ADMIN — TRIAMCINOLONE ACETONIDE 1 APPLICATION: 1 CREAM TOPICAL at 14:06

## 2018-12-04 NOTE — ED PROVIDER NOTES
EMERGENCY DEPARTMENT ENCOUNTER    Room number:  44/44  Date Seen:  12/3/2018  Time of transfer:1955  PCP:  Fly Sanchez DO    LABORATORY RESULTS:  Recent Results (from the past 24 hour(s))   Comprehensive Metabolic Panel    Collection Time: 12/03/18  7:00 PM   Result Value Ref Range    Glucose 83 65 - 99 mg/dL    BUN 23 8 - 23 mg/dL    Creatinine 1.09 (H) 0.57 - 1.00 mg/dL    Sodium 138 136 - 145 mmol/L    Potassium 5.1 3.5 - 5.2 mmol/L    Chloride 105 98 - 107 mmol/L    CO2 26.3 22.0 - 29.0 mmol/L    Calcium 9.2 8.2 - 9.6 mg/dL    Total Protein 6.9 6.0 - 8.5 g/dL    Albumin 3.80 3.50 - 5.20 g/dL    ALT (SGPT) 11 1 - 33 U/L    AST (SGOT) 18 1 - 32 U/L    Alkaline Phosphatase 96 39 - 117 U/L    Total Bilirubin 0.2 0.1 - 1.2 mg/dL    eGFR Non African Amer 47 (L) >60 mL/min/1.73    Globulin 3.1 gm/dL    A/G Ratio 1.2 g/dL    BUN/Creatinine Ratio 21.1 7.0 - 25.0    Anion Gap 6.7 mmol/L   Lactic Acid, Plasma    Collection Time: 12/03/18  7:00 PM   Result Value Ref Range    Lactate 0.6 0.5 - 2.0 mmol/L   CBC Auto Differential    Collection Time: 12/03/18  7:00 PM   Result Value Ref Range    WBC 4.12 (L) 4.50 - 10.70 10*3/mm3    RBC 3.52 (L) 3.90 - 5.20 10*6/mm3    Hemoglobin 10.8 (L) 11.9 - 15.5 g/dL    Hematocrit 34.0 (L) 35.6 - 45.5 %    MCV 96.6 80.5 - 98.2 fL    MCH 30.7 26.9 - 32.0 pg    MCHC 31.8 (L) 32.4 - 36.3 g/dL    RDW 13.4 (H) 11.7 - 13.0 %    RDW-SD 46.9 37.0 - 54.0 fl    MPV 9.4 6.0 - 12.0 fL    Platelets 184 140 - 500 10*3/mm3    Neutrophil % 49.1 42.7 - 76.0 %    Lymphocyte % 40.0 19.6 - 45.3 %    Monocyte % 8.0 5.0 - 12.0 %    Eosinophil % 1.9 0.3 - 6.2 %    Basophil % 1.0 0.0 - 1.5 %    Immature Grans % 0.0 0.0 - 0.5 %    Neutrophils, Absolute 2.02 1.90 - 8.10 10*3/mm3    Lymphocytes, Absolute 1.65 0.90 - 4.80 10*3/mm3    Monocytes, Absolute 0.33 0.20 - 1.20 10*3/mm3    Eosinophils, Absolute 0.08 0.00 - 0.70 10*3/mm3    Basophils, Absolute 0.04 0.00 - 0.20 10*3/mm3    Immature Grans, Absolute 0.00  0.00 - 0.03 10*3/mm3   Duplex Venous Lower Extremity - Bilateral CAR    Collection Time: 12/03/18  9:30 PM   Result Value Ref Range    Right Common Femoral Spont Y     Right Common Femoral Phasic Y     Right Common Femoral Augment Y     Right Common Femoral Competent Y     Right Common Femoral Compress C     Right Proximal Femoral Compress C     Right Mid Femoral Spont Y     Right Mid Femoral Phasic Y     Right Mid Femoral Augment Y     Right Mid Femoral Competent Y     Right Mid Femoral Compress C     Right Distal Femoral Compress C     Right Popliteal Spont Y     Right Popliteal Phasic Y     Right Popliteal Augment Y     Right Popliteal Competent Y     Right Popliteal Compress C     Right Posterior Tibial Compress C     Right Peroneal Compress C     Right GastronemiusSoleal Compress C     Right Lesser Saph Compress C     Left Common Femoral Spont Y     Left Common Femoral Phasic Y     Left Common Femoral Augment Y     Left Common Femoral Competent Y     Left Common Femoral Compress C     Left Saphenofemoral Junction Spont Y     Left Saphenofemoral Junction Phasic Y     Left Saphenofemoral Junction Augment Y     Left Saphenofemoral Junction Competent Y     Left Saphenofemoral Junction Compress C     Left Proximal Femoral Compress C     Left Mid Femoral Spont Y     Left Mid Femoral Phasic Y     Left Mid Femoral Augment Y     Left Mid Femoral Competent Y     Left Mid Femoral Compress C     Left Distal Femoral Compress C     Left Popliteal Spont Y     Left Popliteal Phasic Y     Left Popliteal Augment Y     Left Popliteal Competent Y     Left Popliteal Compress C     Left Posterior Tibial Compress C     Left Peroneal Compress C     Left GastronemiusSoleal Compress C     Left Greater Saph AK Compress C     Left Greater Saph BK Compress C     Left Lesser Saph Compress C      I reviewed the above results.     MEDICATIONS ORDERED IN THE ED:  Medications   sodium chloride 0.9 % flush 10 mL (not administered)   cefTRIAXone  (ROCEPHIN) IVPB 1 g (1 g Intravenous New Bag 12/3/18 2141)   vancomycin 1500 mg/500 mL 0.9% NS IVPB ([facility]) (not administered)   labetalol (NORMODYNE,TRANDATE) injection 10 mg (10 mg Intravenous Given 12/3/18 2141)       PROGRESS AND CONSULT NOTES:  1955:  Patient care transferred from Piter Caballero PA-C, pending BLE venous doppler and disposition.    2111: Reviewed pt's BLE venous dopplers, which are negative for DVT. Independently viewed by me. Interpreted by radiologist.    2113: Pt is still in vascular at this time.    2117: Ordered Rocephin for cellulitis. Placed call to [facility] for admission.    2118: Rechecked pt. Pt continues to complain of BLE discomfort and itching. Notified pt and family of the pt's lab and imaging results, including the pt's negative doppler. On exam, the pt's BLE (L>R) are erythematous, edematous and tender. Discussed the plan to admit the pt for IV abx and further evaulation. Pt and family agree with the plan and all questions were addressed.    2135: Ordered labetalol for HTN.    2147: Discussed the pt's case with Dr. Leon ([facility]), who agrees to admit the pt to a Med/Surg bed. He requests that  I order vancomycin because of the pt's MRSA history, which I will do.    2147: Ordered vancomycin, per Dr. Leon.    DIAGNOSIS:  Final diagnoses:   Cellulitis of lower extremity, unspecified laterality   Poorly-controlled hypertension       DISPOSITION:  ADMISSION    Discussed treatment plan and reason for admission with pt/family and admitting physician.  Pt/family voiced understanding of the plan for admission for further testing/treatment as needed.         Provider attestation:  I personally reviewed the past medical history, past surgical history, social history, family history, current medications, and allergies as they appear in the chart.    Documentation assistance provided by edi Boykin for Dr. Barclay.  Information recorded by the edi was done at my direction and has  been verified and validated by me.       Izabella Boykin  12/03/18 2154       Yosvany Barclay MD  12/03/18 2228

## 2018-12-04 NOTE — PROGRESS NOTES
Discharge Planning Assessment  [facility]     Patient Name: Magdalena Jerry  MRN: [id number]  Today's Date: 12/4/2018    Admit Date: 12/3/2018    Discharge Needs Assessment     Row Name 12/04/18 1423       Living Environment    Lives With  alone    Current Living Arrangements  home/apartment/condo    Primary Care Provided by  self    Provides Primary Care For  no one, unable/limited ability to care for self    Family Caregiver if Needed  child(ernestina), adult    Quality of Family Relationships  supportive    Able to Return to Prior Arrangements  other (see comments) uncertain at this time       Resource/Environmental Concerns    Resource/Environmental Concerns  none    Transportation Concerns  -- family provides transportation       Transition Planning    Patient/Family Anticipates Transition to  -- Home with Home Health v/s short term rehab at [facility]    Transportation Anticipated  family or friend will provide       Discharge Needs Assessment    Readmission Within the Last 30 Days  no previous admission in last 30 days    Concerns to be Addressed  adjustment to diagnosis/illness;home safety    Equipment Currently Used at Home  walker, rolling;glucometer    Anticipated Changes Related to Illness  inability to care for self    Equipment Needed After Discharge  none    Outpatient/Agency/Support Group Needs  other (see comments) Home with Home Health v/s short term rehab at [facility]    Discharge Facility/Level of Care Needs  other (see comments) Home with Home Health v/s short term rehab at [facility]    Offered/Gave Vendor List  yes        Discharge Plan     Row Name 12/04/18 1425       Plan    Plan  Home with [facility] if HH needed v/s short term rehab at [facility] (pending acceptance)    Plan Comments  I initially met with pt then later with pt and her son Anoop (and his wife Sharon) Rosalino. Pt said her son Nathan is her POA but he lives in [place] and she depends on Anoop is she needs anything. Pt said she  lives alone and does not drive and Anoop provides her with transportatin. Pt said the only DME she uses is a glucometer and wallker. Pt said she primarily uses [facility] but gets her glocomenter strips at [facility]. Pt said she has been to [facility] and had [facility], pt said if HH needed she would want [facility] (Zakia notified) and if she needs rehab she would want [facility] (Alicia notified by voice mail) Linda Fernandez RN    Row Name 12/04/18 1422       Plan    Patient/Family in Agreement with Plan  yes        Destination      Service Provider Request Status Selected Services Address Phone Number Fax Number    [facility] Pending - Request Sent N/A [address] [id number] [phone]      Durable Medical Equipment      No service coordination in this encounter.      Dialysis/Infusion      No service coordination in this encounter.      Home Medical Care      Service Provider Request Status Selected Services Address Phone Number Fax Number    [facility] Pending - Request Sent N/A [address] [address] [phone]      Community Resources      No service coordination in this encounter.          Demographic Summary     Row Name 12/04/18 1422       General Information    Admission Type  inpatient    Arrived From  home    Required Notices Provided  Important Message from Medicare At pt's request IMM Letter signed by her son Anoop Jerry, copy of letter given to pt.    Referral Source  admission list    Reason for Consult  discharge planning    Preferred Language  English     Used During This Interaction  no        Functional Status     Row Name 12/04/18 1422       Functional Status, IADL    Medications  independent    Meal Preparation  independent    Housekeeping  independent    Laundry  independent    Shopping  assistive person no longer drives     Patient Forms     Row  Name 12/04/18 1421       Patient Forms    Provider Choice List  Delivered    Delivered to  Patient    Method of delivery  In person    Important Message from Medicare (IMM)  Delivered At pt's request IMM Letter signed by her son Anoop Jerry, copy of letter given to pt.    Delivered to  Patient    Method of delivery  In person            Linda Fernandez RN

## 2018-12-04 NOTE — PROGRESS NOTES
"   LOS: 0 days   Patient Care Team:  Fly Sanchez DO as PCP - General  Fly Sanchez DO as PCP - Claims Attributed    Chief Complaint: pain in BLEs    Subjective     Feeling okay. C/o pain in BLEs--L>R. Otherwise feeling okay. She does report pain with weight-bearing on LLE        Subjective:  Symptoms:  Stable.  No shortness of breath, malaise, cough, chest pain, weakness, headache, chest pressure, anorexia, diarrhea or anxiety.    Diet:  Adequate intake.  No nausea or vomiting.    Activity level: Impaired due to pain.    Pain:  She complains of pain that is mild.  She reports pain is unchanged.  Pain is well controlled.        History taken from: patient chart RN    Objective     Vital Signs  Temp:  [97.4 °F (36.3 °C)-98.3 °F (36.8 °C)] 98.3 °F (36.8 °C)  Heart Rate:  [52-68] 52  Resp:  [16-18] 18  BP: (154-224)/(63-95) 161/70    Objective:  General Appearance:  Comfortable and in no acute distress.    Vital signs: (most recent): Blood pressure 161/70, pulse 52, temperature 98.3 °F (36.8 °C), temperature source Oral, resp. rate 18, height 170.2 cm (67\"), weight 77.6 kg (171 lb), SpO2 95 %.  Vital signs are normal.  No fever.    Output: Producing urine.    HEENT: Normal HEENT exam.    Lungs:  Normal effort and normal respiratory rate.  Breath sounds clear to auscultation.    Heart: Normal rate.  Regular rhythm.  Positive for murmur.    Abdomen: Abdomen is soft.  Bowel sounds are normal.   There is no abdominal tenderness.     Extremities: There is dependent edema (L>R).  (Erythema of BLEs, L>R, warm and TTP, no wounds)  Pulses: Distal pulses are intact.    Neurological: Patient is alert.  (Very [place]).    Pupils:  Pupils are equal, round, and reactive to light.    Skin:  Warm and dry.              Results Review:     I reviewed the patient's new clinical results.  I reviewed the patient's new imaging results and agree with the interpretation.  I reviewed the patient's other test results and agree with the " interpretation  Discussed with patient and RN    Medication Review: reviewed    Assessment/Plan       Left leg cellulitis    Anemia    Benign essential hypertension    Type 2 diabetes mellitus with diabetic polyneuropathy, without long-term current use of insulin (CMS/[facility])    CKD (chronic kidney disease) stage 3, GFR 30-59 ml/min (CMS/[facility])    Chronic venous stasis          Plan:   (Continue IV Vanc at present as she has h/o MRSA cellulitis  U/S of LLE did not reveal DVT and did not mention fluid collection  Consult ID  MRSA screen ordered today  Watch Cr closely while on IV Vanc  Check anemia labs  BPs improved since admission  Continue SSI while here, check HgbA1c, not on meds for DM at home  PT to see  Lovenox for DVT ppx).       Martin Watters MD  12/04/18  8:59 AM    Time: 30min

## 2018-12-04 NOTE — PROGRESS NOTES
"Pharmacokinetic Consult - Vancomycin Dosing (Initial Note)    Magdalena Jerry has been consulted for pharmacy to dose vancomycin for LLE cellulitis per Dr. Leon's request. Goal trough: 10-20 mcg/mL.    Duration of Therapy: 7 days    Other Antimicrobials: Ceftriaxone 1g IV q24h    Relevant clinical data and objective history reviewed:  89 y.o. female 170.2 cm (67\") 77.6 kg (171 lb)    Vitals:    12/03/18 2133 12/03/18 2203 12/03/18 2232 12/03/18 2255   BP: (!) 224/91 (!) 212/95 (!) 181/81 156/66   BP Location: Right arm  Right arm Right arm   Pulse: 62  56 60   Resp: 18 18 18   Temp:    97.4 °F (36.3 °C)   TempSrc:    Oral   SpO2: 98% 97% 95% 94%     Patient presents to the hospital with left leg swelling, erythema, and pain. She was admitted to the hospital in 2017 for LLE cellulitis due to MRSA. Upon presentation to the ED, she was quite hypertensive. Empiric antibiotics have been started by the hospitalist.    Creatinine   Date Value Ref Range Status   12/03/2018 1.09 (H) 0.57 - 1.00 mg/dL Final     BUN   Date Value Ref Range Status   12/03/2018 23 8 - 23 mg/dL Final     Estimated Creatinine Clearance: 35.4 mL/min (A) (by C-G formula based on SCr of 1.09 mg/dL (H)).    Lab Results   Component Value Date    WBC 4.12 (L) 12/03/2018     Temp Readings from Last 3 Encounters:   12/03/18 97.4 °F (36.3 °C) (Oral)      Baseline culture/source/susceptibility:   12/3: Blood cx x 2-in process    Assessment/Plan    1. Patient was given a vancomycin loading dose of 1500 mg (20 mg/kg) IV once in the ED around 2230. Will start a maintenance dose of 1250 mg (~16 mg/kg) IV q24h approximately 12 hours after loading dose tomorrow morning based on patient parameters.     2. Will schedule a vancomycin trough on Thursday morning 12/6 before the 1100 dose (before the 4th total dose) to determine if the dosing is appropriate.    3. Will monitor serum creatinine every 24 hours for the first 3 days then at least every 48 hours per " recommendations. SCr was 1.09 yesterday evening before antibiotics were started.     4. Pharmacy will continue to follow daily while on vancomycin and adjust as needed.     Thank you for allowing me to participate in your patient's care,  Alesia Llamas, Pharm.D., BCPS

## 2018-12-04 NOTE — H&P
Patient Name:  Magdalena Jerry  YOB: 1926  MRN:  [id number]  Admit Date:  12/3/2018  Patient Care Team:  Fly Sanchez DO as PCP - General  Fly Sanchez DO as PCP - Claims Attributed      Chief Complaint   Patient presents with   • Leg Swelling     left.  red, warm.  last year she had a ellulitis which looked the same       Subjective   Ms. Jerry is a very pleasant 89-year-old female with a history of hypertension, chronic kidney disease stage III, type 2 diabetes with polyneuropathy, history of chronic venous stasis who was admitted to our service in 2017 for left leg cellulitis due to MRSA.  Presented back to the emergency room tonight for soreness, swelling, erythema and warmth of her left leg.  Symptoms started last Tuesday and have progressively worsened.  It first started as itching.  She saw her diet wrist who had prescribed compression stockings.  She did not appear to have any infection at that time.  She presented to the emergency room due to worsening discomfort and edema that made it very difficult for her to walk.  Lower extremity Dopplers were negative for DVTs.  Given the patient's history of MRSA infection she was started on vancomycin as well as ceftriaxone.  The patient's white blood cell count is 4 and her renal function appears to be stable.  She's been admitted to our service for further care.      History of Present Illness    Past Medical History:   Diagnosis Date   • Adjustment disorder with mixed anxiety and depressed mood    • Anemia    • Anxiety    • Atrophy of hand muscles     LEFT HAND   • Benign familial tremor    • Carpal tunnel syndrome    • Cataract    • Chronic rhinosinusitis    • Depression    • Diabetes mellitus (CMS/HCC)    • Dyslipidemia    • Esophagitis, reflux    • Fracture of hip (CMS/HCC)    • Frequent falls    • GERD (gastroesophageal reflux disease)    • Hand pain    • Hearing loss    • Hip pain    • Hyperlipidemia    • Hypertension    •  Impacted cerumen    • Insomnia    • Knee pain    • Limb pain    • Loss of hearing    • Low back pain    • Lower extremity weakness    • Lumbar canal stenosis    • Osteoporosis, senile    • Piriformis syndrome    • Renal insufficiency 2006   • Sensorineural hearing loss    • Stroke (CMS/HCC) 2004   • Tinea pedis    • Vitamin D deficiency      Past Surgical History:   Procedure Laterality Date   • CHOLECYSTECTOMY  2013   • COLONOSCOPY     • ERCP WITH SPHINCTEROTOMY/PAPILLOTOMY  2012   • FRACTURE SURGERY Left 2006    HIP   • VARICOSE VEIN SURGERY Right      History reviewed. No pertinent family history.  Social History     Tobacco Use   • Smoking status: Never Smoker   • Smokeless tobacco: Never Used   Substance Use Topics   • Alcohol use: No   • Drug use: No       (Not in a hospital admission)  Allergies:    Allergies   Allergen Reactions   • Amoxicillin Other (See Comments)     unsure   • Benzodiazepines    • Codeine    • Cortisone    • Macrolides And Ketolides    • Melatonin    • Neomycin    • Penicillins    • Sulfa Antibiotics    • Tetracyclines & Related    • Erythromycin Rash       Review of Systems   Constitutional: Negative for activity change, appetite change, fatigue and fever.   HENT: Negative for sore throat and trouble swallowing.    Eyes: Negative for pain and visual disturbance.   Respiratory: Negative for cough, chest tightness and shortness of breath.    Cardiovascular: Negative for chest pain, palpitations and leg swelling.   Gastrointestinal: Negative for abdominal pain, constipation, diarrhea, nausea and vomiting.   Endocrine:        + for Diabetes but neg for thyroid disease   Genitourinary: Negative for difficulty urinating and hematuria.   Musculoskeletal: Positive for gait problem. Negative for back pain, neck pain and neck stiffness.   Skin: Positive for color change and wound. Negative for rash.   Neurological: Negative for dizziness, syncope, weakness, light-headedness and headaches.    Hematological: Negative for adenopathy. Does not bruise/bleed easily.   Psychiatric/Behavioral: Negative for agitation, behavioral problems and confusion.        Objective    Vital Signs  Temp:  [98.2 °F (36.8 °C)] 98.2 °F (36.8 °C)  Heart Rate:  [56-68] 56  Resp:  [16-18] 18  BP: (181-224)/(81-95) 181/81  SpO2:  [95 %-98 %] 95 %  on   ;   Device (Oxygen Therapy): room air  Body mass index is 26.78 kg/m².    Physical Exam   Constitutional: She is oriented to person, place, and time. She appears well-developed. No distress.   Elderly and frail-appearing but no acute distress.  Very hard of hearing   HENT:   Head: Normocephalic and atraumatic.   Mouth/Throat: No oropharyngeal exudate.   Eyes: Pupils are equal, round, and reactive to light. No scleral icterus.   Neck: Normal range of motion. Neck supple. No thyromegaly present.   Cardiovascular: Normal rate and regular rhythm. Exam reveals no friction rub.   No murmur heard.  Pulmonary/Chest: Effort normal and breath sounds normal. No respiratory distress. She has no wheezes. She has no rales.   Abdominal: Soft. Bowel sounds are normal. She exhibits no distension and no mass. There is no tenderness. There is no guarding.   Lymphadenopathy:     She has no cervical adenopathy.   Neurological: She is alert and oriented to person, place, and time.   Skin: She is not diaphoretic. There is erythema.    Erythema, warmth and 1-2+ edema LLE c/w cellulitis. RLE has trace edema and is not as erythematous but is c/w venous stasis changes     Psychiatric: She has a normal mood and affect. Her behavior is normal.   Vitals reviewed.      Results Review:  I reviewed the patient's new clinical results.  I reviewed the patient's new imaging results and agree with the interpretation.  I reviewed the patient's other test results and agree with the interpretation  I personally viewed and interpreted the patient's EKG/Telemetry data  Discussed with ED provider.      Lab Results (last 24  hours)     Procedure Component Value Units Date/Time    CBC & Differential [[id number]] Collected:  12/03/18 1900    Specimen:  Blood Updated:  12/03/18 1926    Narrative:       The following orders were created for panel order CBC & Differential.  Procedure                               Abnormality         Status                     ---------                               -----------         ------                     CBC Auto Differential[[id number]]        Abnormal            Final result                 Please view results for these tests on the individual orders.    Comprehensive Metabolic Panel [[id number]]  (Abnormal) Collected:  12/03/18 1900    Specimen:  Blood Updated:  12/03/18 1957     Glucose 83 mg/dL      BUN 23 mg/dL      Creatinine 1.09 mg/dL      Sodium 138 mmol/L      Potassium 5.1 mmol/L      Chloride 105 mmol/L      CO2 26.3 mmol/L      Calcium 9.2 mg/dL      Total Protein 6.9 g/dL      Albumin 3.80 g/dL      ALT (SGPT) 11 U/L      AST (SGOT) 18 U/L      Alkaline Phosphatase 96 U/L      Total Bilirubin 0.2 mg/dL      eGFR Non African Amer 47 mL/min/1.73      Globulin 3.1 gm/dL      A/G Ratio 1.2 g/dL      BUN/Creatinine Ratio 21.1     Anion Gap 6.7 mmol/L     Narrative:       The MDRD GFR formula is only valid for adults with stable renal function between ages 18 and 70.    Blood Culture - Blood, Arm, Left [[id number]] Collected:  12/03/18 1900    Specimen:  Blood from Arm, Left Updated:  12/03/18 1911    Lactic Acid, Plasma [[id number]]  (Normal) Collected:  12/03/18 1900    Specimen:  Blood Updated:  12/03/18 1941     Lactate 0.6 mmol/L     CBC Auto Differential [[id number]]  (Abnormal) Collected:  12/03/18 1900    Specimen:  Blood Updated:  12/03/18 1926     WBC 4.12 10*3/mm3      RBC 3.52 10*6/mm3      Hemoglobin 10.8 g/dL      Hematocrit 34.0 %      MCV 96.6 fL      MCH 30.7 pg      MCHC 31.8 g/dL      RDW 13.4 %      RDW-SD 46.9 fl      MPV 9.4 fL      Platelets 184 10*3/mm3      Neutrophil %  49.1 %      Lymphocyte % 40.0 %      Monocyte % 8.0 %      Eosinophil % 1.9 %      Basophil % 1.0 %      Immature Grans % 0.0 %      Neutrophils, Absolute 2.02 10*3/mm3      Lymphocytes, Absolute 1.65 10*3/mm3      Monocytes, Absolute 0.33 10*3/mm3      Eosinophils, Absolute 0.08 10*3/mm3      Basophils, Absolute 0.04 10*3/mm3      Immature Grans, Absolute 0.00 10*3/mm3     Blood Culture - Blood, Arm, Left [[id number]] Collected:  12/03/18 2132    Specimen:  Blood from Arm, Left Updated:  12/03/18 2137          Imaging Results (last 24 hours)     ** No results found for the last 24 hours. **          No orders to display     Assessment/Plan   Active Hospital Problems    Diagnosis Date Noted   • **Left leg cellulitis [L03.116] 03/15/2017   • Chronic venous stasis [I87.8] 12/03/2018   • CKD (chronic kidney disease) stage 3, GFR 30-59 ml/min (CMS/[facility]) [N18.3] 03/15/2017   • Type 2 diabetes mellitus with diabetic polyneuropathy, without long-term current use of insulin (CMS/[facility]) [E11.42] 03/25/2016   • Benign essential hypertension [I10] 03/25/2016      Resolved Hospital Problems   No resolved problems to display.       · Ms. Jerry is a 89 y.o. who has been admitted to our service for left lower extremity cellulitis.  Doppler was negative at the bilateral lower extremities.  I'll continue vancomycin given her history of MRSA infection.  She does not appear to be septic.  I'll ask physical therapy to see her she's having difficulty time walking on the leg.  Provide pain control.  Continue her home medications and provide as needed IV antihypertensives given her significant hyper tension currently.  The patient's son and daughter are her healthcare surrogate's and she wishes to be a full code    I discussed the patients findings and my recommendations with patient, family and nursing staff.      Jose Rafael Leon MD  [facility]  12/03/18  10:54 PM

## 2018-12-04 NOTE — PROGRESS NOTES
Discharge Planning Assessment  [facility]     Patient Name: Magdalena Jerry  MRN: [id number]  Today's Date: 12/4/2018    Admit Date: 12/3/2018    Discharge Needs Assessment     Row Name 12/04/18 142       Living Environment    Lives With  alone    Current Living Arrangements  home/apartment/condo    Primary Care Provided by  self    Provides Primary Care For  no one, unable/limited ability to care for self    Family Caregiver if Needed  child(ernestina), adult    Quality of Family Relationships  supportive    Able to Return to Prior Arrangements  other (see comments) uncertain at this time       Resource/Environmental Concerns    Resource/Environmental Concerns  none    Transportation Concerns  -- family provides transportation       Transition Planning    Patient/Family Anticipates Transition to  -- Home with Home Health v/s short term rehab at [facility]    Transportation Anticipated  family or friend will provide       Discharge Needs Assessment    Readmission Within the Last 30 Days  no previous admission in last 30 days    Concerns to be Addressed  adjustment to diagnosis/illness;home safety    Equipment Currently Used at Home  walker, rolling;glucometer    Anticipated Changes Related to Illness  inability to care for self    Equipment Needed After Discharge  none    Outpatient/Agency/Support Group Needs  other (see comments) Home with Home Health v/s short term rehab at [facility]    Discharge Facility/Level of Care Needs  other (see comments) Home with Home Health v/s short term rehab at [facility]    Offered/Gave Vendor List  yes        Discharge Plan     Row Name 12/04/18 0611       Plan    Plan  Home with [facility] v/s [facility] (pending acceptance)     Plan Comments  Alicia from [place] called to advise they have beds available and she will evaluate pt tomorrow. Linda Fernandez RN    Row Name 12/04/18 1426             Plan    Patient/Family in Agreement with Plan  yes        Destination       Service Provider Request Status Selected Services Address Phone Number Fax Number    [facility] Pending - Request Sent N/A [address] [facility] [address] [phone]      Durable Medical Equipment      No service coordination in this encounter.      Dialysis/Infusion      No service coordination in this encounter.      Home Medical Care      Service Provider Request Status Selected Services Address Phone Number Fax Number    [facility] Accepted N/A [address] [address] [phone]          Linda Fernandez RN

## 2018-12-04 NOTE — PROGRESS NOTES
Bilateral leg venous Doppler study preliminary report: negative for new dvt in both legs.  Called report to Dr. Barclay.

## 2018-12-04 NOTE — PLAN OF CARE
Problem: Patient Care Overview  Goal: Plan of Care Review  Outcome: Ongoing (interventions implemented as appropriate)   12/04/18 0406   Coping/Psychosocial   Plan of Care Reviewed With patient   Plan of Care Review   Progress no change   OTHER   Outcome Summary Pt admitted from ED for cellulitis of BLE. Has not c/o pain of legs since admission, resting well. IV abx continued. BLE are swollen and red with areas of ecchymosis on L shin. SSI started to manage bgl. Repeat labs in the morning. Dr. Nielson has seen. Will continue to monitor     Goal: Individualization and Mutuality  Outcome: Ongoing (interventions implemented as appropriate)    Goal: Discharge Needs Assessment  Outcome: Ongoing (interventions implemented as appropriate)    Goal: Interprofessional Rounds/Family Conf  Outcome: Ongoing (interventions implemented as appropriate)      Problem: Skin Injury Risk (Adult)  Goal: Identify Related Risk Factors and Signs and Symptoms  Outcome: Outcome(s) achieved Date Met: 12/04/18    Goal: Skin Health and Integrity  Outcome: Ongoing (interventions implemented as appropriate)

## 2018-12-04 NOTE — DISCHARGE PLACEMENT REQUEST
"Magdalena Morales (89 y.o. Female)     Date of Birth Social Security Number Address Home Phone MRN    02/20/1926  [id number] [facility] [address] [phone] [id number]    Congregation Marital Status          None        Admission Date Admission Type Admitting Provider Attending Provider Department, Room/Bed    [facility] Duong Rodriguez John B, MD [address], P683/1    Discharge Date Discharge Disposition Discharge Destination                       Attending Provider:  Martin Watters MD    Allergies:  Amoxicillin, Benzodiazepines, Codeine, Cortisone, Macrolides And Ketolides, Melatonin, Neomycin, Penicillins, Sulfa Antibiotics, Tetracyclines & Related, Erythromycin    Isolation:  Contact   Infection:  MRSA (03/16/17)   Code Status:  CPR    Ht:  170.2 cm (67\")   Wt:  77.6 kg (171 lb)    Admission Cmt:  None   Principal Problem:  Left leg cellulitis [L03.116]                 Active Insurance as of 12/3/2018     Primary Coverage     Payor Plan Insurance Group Employer/Plan Group    MEDICARE MEDICARE A & B      Payor Plan Address Payor Plan Phone Number Payor Plan Fax Number Effective Dates    [address] [phone]  2/1/1991 - None Entered    [facility] [id number]       Subscriber Name Subscriber Birth Date Member ID       MAGDALENA MORALES 2/20/1926 [id number]           Secondary Coverage     Payor Plan Insurance Group Employer/Plan Group    [facility] SUPP [id number]     Payor Plan Address Payor Plan Phone Number Payor Plan Fax Number Effective Dates    [address]   12/1/2016 - None Entered    [facility] [id number]       Subscriber Name Subscriber Birth Date Member ID       MAGDALENA MORALES 2/20/1926 [id number]                 Emergency Contacts      (Rel.) Home Phone Work Phone Mobile Phone    Anoop Morales (Son) [phone] -- [phone]              "

## 2018-12-04 NOTE — PLAN OF CARE
Problem: Patient Care Overview  Goal: Plan of Care Review   12/04/18 1306   OTHER   Outcome Summary Pt admitted from home with cellulitis. Pt c/o low back pain, but no c/o leg pain. Pt reports she lives alone and walks with a Rwx. She hopes to return home. Pt presents with generalized weakness. decreased endurance and unsteady gait.      12/04/18 1306   OTHER   Outcome Summary Pt admitted from home with cellulitis. Pt c/o low back pain, but no c/o leg pain. Pt reports she lives alone and walks with a Rwx. She hopes to return home. Pt presents with generalized weakness. decreased endurance and unsteady gait. PT would benefit from PT to address these impairments.

## 2018-12-04 NOTE — CONSULTS
Referring Provider: ADDISON Watters MD  Reason for Consultation: cellulitis    History of present illness:  Mrs Jerry is a 89 YO who I am asked to evaluate and give opinion for cellulitis of lower extremity. History is obtained from the patient, family, and review of the old medical records which I summarize/synthesize as follows: She presented to the ER on 12/3/18 with sharp pain, swelling, and associated erythema of the left lower extremity. She was not having associated fevers. There were no alleviating factors. In the ER she had Dopplers negative for DVT. She was started on vancomycin and ceftriaxone.     Back in March 2017 she was admitted for LLE cellulitis and a wound culture grew MRSA. She says this is similar though at that time the leg was draining. That admission an MRI was done and was negative for abscess and osteomyelitis. She was treated with clindamycin to complete a 7-day course and had cure. No recurrences until this time. No known inciting factors.    Past Medical History:   Diagnosis Date   • Adjustment disorder with mixed anxiety and depressed mood    • Anemia    • Anxiety    • Atrophy of hand muscles     LEFT HAND   • Benign familial tremor    • Carpal tunnel syndrome    • Cataract    • Chronic rhinosinusitis    • Depression    • Diabetes mellitus (CMS/HCC)    • Dyslipidemia    • Esophagitis, reflux    • Fracture of hip (CMS/HCC)    • Frequent falls    • GERD (gastroesophageal reflux disease)    • Hand pain    • Hearing loss    • Hip pain    • Hyperlipidemia    • Hypertension    • Impacted cerumen    • Insomnia    • Knee pain    • Limb pain    • Loss of hearing    • Low back pain    • Lower extremity weakness    • Lumbar canal stenosis    • Osteoporosis, senile    • Piriformis syndrome    • Renal insufficiency 2006   • Sensorineural hearing loss    • Stroke (CMS/HCC) 2004   • Tinea pedis    • Vitamin D deficiency        Past Surgical History:   Procedure Laterality Date   • CHOLECYSTECTOMY  2013   •  COLONOSCOPY     • ERCP WITH SPHINCTEROTOMY/PAPILLOTOMY  2012   • FRACTURE SURGERY Left 2006    HIP   • VARICOSE VEIN SURGERY Right        Social History:    Lives in [place]  No illicits    Family History:  No 1st degree relatives w/ MRSA    Allergies:    1. Amoxicillin - unknown  2. Neomycin  3. Penicillin - unknown  4. Tetracycline - unknown  5. Erythromycin - rash  6. Sulfa - unknown    Medications:    Current Facility-Administered Medications:   •  acetaminophen (TYLENOL) tablet 650 mg, 650 mg, Oral, Q4H PRN, Jose Rafael Leon MD  •  cetirizine (zyrTEC) tablet 10 mg, 10 mg, Oral, Daily, Jose Rafael Leon MD, 10 mg at 12/04/18 0831  •  dextrose (D50W) 25 g/ 50mL Intravenous Solution 25 g, 25 g, Intravenous, Q15 Min PRN, Jose Rafael Leon MD  •  dextrose (GLUTOSE) oral gel 15 g, 15 g, Oral, Q15 Min PRN, Jose Rafael Leon MD  •  enoxaparin (LOVENOX) syringe 40 mg, 40 mg, Subcutaneous, Q24H, Jose Rafael Leon MD, 40 mg at 12/04/18 0101  •  famotidine (PEPCID) tablet 20 mg, 20 mg, Oral, Daily, Jose Rafael Leon MD, 20 mg at 12/04/18 0831  •  fluticasone (FLONASE) 50 MCG/ACT nasal spray 2 spray, 2 spray, Each Nare, Daily, Jose Rafael Leon MD  •  glucagon (human recombinant) (GLUCAGEN DIAGNOSTIC) injection 1 mg, 1 mg, Subcutaneous, PRN, Jose Rafael Leon MD  •  hydrALAZINE (APRESOLINE) injection 10 mg, 10 mg, Intravenous, Q6H PRN, Jose Rafael Leon MD  •  insulin lispro (humaLOG) injection 0-7 Units, 0-7 Units, Subcutaneous, 4x Daily With Meals & Nightly, Jose Rafael Leon MD  •  losartan (COZAAR) tablet 50 mg, 50 mg, Oral, Q24H, Jose Rafael Leon MD, 50 mg at 12/04/18 0831  •  mirtazapine (REMERON) tablet 15 mg, 15 mg, Oral, Nightly, Jose Rafael Leon MD, 15 mg at 12/04/18 0102  •  ondansetron (ZOFRAN) tablet 4 mg, 4 mg, Oral, Q6H PRN **OR** ondansetron ODT (ZOFRAN-ODT) disintegrating tablet 4 mg, 4 mg, Oral, Q6H PRN **OR** ondansetron (ZOFRAN) injection 4  mg, 4 mg, Intravenous, Q6H PRN, Jose Rafael Leon MD  •  Pharmacy to dose vancomycin, , Does not apply, Continuous PRN, Jose Rafael Leon MD  •  primidone (MYSOLINE) tablet 50 mg, 50 mg, Oral, Q12H, Jose Rafael Leon MD, 50 mg at 12/04/18 0831  •  sertraline (ZOLOFT) tablet 100 mg, 100 mg, Oral, Daily, Jose Rafael Leon MD, 100 mg at 12/04/18 0831  •  [COMPLETED] Insert peripheral IV, , , Once **AND** sodium chloride 0.9 % flush 10 mL, 10 mL, Intravenous, PRN, Piter Caballero PA  •  sodium chloride 0.9 % flush 3 mL, 3 mL, Intravenous, Q12H, Jose Rafael Leon MD, 3 mL at 12/04/18 0835  •  sodium chloride 0.9 % flush 3-10 mL, 3-10 mL, Intravenous, PRN, Jose Rafael Leon MD  •  traMADol (ULTRAM) tablet 50 mg, 50 mg, Oral, Q6H PRN, Jose Rafael Leon MD  •  triamcinolone (KENALOG) 0.1 % cream 1 application, 1 application, Topical, Q8H, Jose Rafael Leon MD, 1 application at 12/04/18 0607  •  vancomycin 1250 mg/250 mL 0.9% NS IVPB ([facility]), 1,250 mg, Intravenous, Q24H, Jose Rafael Leon MD    Review of Systems  All systems were reviewed and are negative unless otherwise stated above in the HPI    Objective   Vital Signs   Temp:  [97.4 °F (36.3 °C)-98.3 °F (36.8 °C)] 98.3 °F (36.8 °C)  Heart Rate:  [52-68] 52  Resp:  [16-18] 18  BP: (154-224)/(63-95) 161/70    Physical Exam:   General: awake, alert, poor historian and hard of hearing  Head: Normocephalic, atraumatic  Eyes:  EOMI, no scleral icterus   ENT: MMM, OP clear, no thrush.   Neck: Supple, no visible thyromegaly  Cardiovascular: NR, RR, 1+ LE edema  Respiratory: Lungs are clear to auscultation bilaterally, no rales or wheezing; normal work of breathing on ambient air  GI: Abdomen is soft, non-tender, non-distended  : no Fiore catheter present  Musculoskeletal: no joint abnormalities, normal musculature  Skin: venous stasis changes on both lower extremities but more heat, redness, and induration on the RLE  Neurological:  Alert and oriented x 3,  motor strength 5/5 in all four extremities  Psychiatric: Normal mood and affect   Lymph: no pre-auricular, post-auricular, submandibular, cervical, supraclavicular  LAD  Vasc: no cyanosis; PIV w/o erythema    Labs:     Lab Results   Component Value Date    WBC 4.19 (L) 12/04/2018    HGB 9.8 (L) 12/04/2018    HCT 32.6 (L) 12/04/2018    .6 (H) 12/04/2018     12/04/2018       Lab Results   Component Value Date    GLUCOSE 114 (H) 12/04/2018    BUN 20 12/04/2018    CREATININE 1.14 (H) 12/04/2018    EGFRIFNONA 45 (L) 12/04/2018    EGFRIFAFRI 44 (L) 08/06/2018    BCR 17.5 12/04/2018    CO2 25.2 12/04/2018    CALCIUM 8.4 12/04/2018    PROTENTOTREF 6.4 08/06/2018    ALBUMIN 3.80 12/03/2018    LABIL2 1.6 08/06/2018    AST 18 12/03/2018    ALT 11 12/03/2018     Lab Results   Component Value Date    CRP 4.56 (H) 03/16/2017     Lab Results   Component Value Date    HGBA1C 6.2 (H) 08/06/2018     Lab Results   Component Value Date    VANCOTROUGH 18.50 03/20/2017    VANCORANDOM 8.20 03/16/2017       Microbiology:  -Historical wound culture with MRSA in 2017  12/3 BCx: NGTD    Radiology (personally reviewed images/report):  BLE Doppler negative for DVT    Assessment/Plan   1. Left lower extremity cellulitis  2. History of MRSA infection  3. CKD 3  4. Controlled DM2  5. Venous stasis    She has underlying venous stasis changes but does appear to have more intense erythema and heat of the LLE. There is no purulent drainage. However, given history of MRSA I think we should continue the vancomycin with goal trough 15-20. Stop ceftriaxone as I doubt a gram-negative process. Check CRP. Following clinical course. If worsening or no significant improvement will obtain imaging. Follow-up blood cultures though with no fevers suspect they will be negative.    Thank you for this consult. ID will follow.

## 2018-12-04 NOTE — THERAPY EVALUATION
Acute Care - Physical Therapy Initial Evaluation  [facility]     Patient Name: Magdalena Jerry  : 1926  MRN: [id number]  Today's Date: 2018                Admit Date: 12/3/2018    Visit Dx:     ICD-10-CM ICD-9-CM   1. Cellulitis of lower extremity, unspecified laterality L03.119 682.6   2. Poorly-controlled hypertension I10 401.9     Patient Active Problem List   Diagnosis   • Adjustment disorder with mixed anxiety and depressed mood   • Anemia   • Benign essential hypertension   • Hereditary essential tremor   • Type 2 diabetes mellitus with diabetic polyneuropathy, without long-term current use of insulin (CMS/[facility])   • Dry skin dermatitis   • Dyslipidemia   • Gastroesophageal reflux disease with esophagitis   • Pain of hand   • Hearing loss   • Arthralgia of hip   • Impacted cerumen   • Pruritus   • Knee pain   • Pain in extremity   • Chronic low back pain   • Weakness of lower extremity   • Spinal stenosis of lumbar region   • Senile osteoporosis   • Piriformis syndrome   • Primary insomnia   • Tinea pedis   • Vitamin D deficiency   • Left leg cellulitis   • CKD (chronic kidney disease) stage 3, GFR 30-59 ml/min (CMS/[facility])   • Allergic rhinitis   • Pneumonia due to influenza A virus   • MRSA (methicillin resistant Staphylococcus aureus) infection   • Bacteria in urine   • Cellulitis of lower extremity   • Chronic venous stasis     Past Medical History:   Diagnosis Date   • Adjustment disorder with mixed anxiety and depressed mood    • Anemia    • Anxiety    • Atrophy of hand muscles     LEFT HAND   • Benign familial tremor    • Carpal tunnel syndrome    • Cataract    • Chronic rhinosinusitis    • Depression    • Diabetes mellitus (CMS/[facility])    • Dyslipidemia    • Esophagitis, reflux    • Fracture of hip (CMS/[facility])    • Frequent falls    • GERD (gastroesophageal reflux disease)    • Hand pain    • Hearing loss    • Hip pain    • Hyperlipidemia    • Hypertension    • Impacted cerumen    • Insomnia     • Knee pain    • Limb pain    • Loss of hearing    • Low back pain    • Lower extremity weakness    • Lumbar canal stenosis    • Osteoporosis, senile    • Piriformis syndrome    • Renal insufficiency 2006   • Sensorineural hearing loss    • Stroke (CMS/HCC) 2004   • Tinea pedis    • Vitamin D deficiency      Past Surgical History:   Procedure Laterality Date   • CHOLECYSTECTOMY  2013   • COLONOSCOPY     • ERCP WITH SPHINCTEROTOMY/PAPILLOTOMY  2012   • FRACTURE SURGERY Left 2006    HIP   • VARICOSE VEIN SURGERY Right         PT ASSESSMENT (last 12 hours)      Physical Therapy Evaluation     Row Name 12/04/18 1157          PT Evaluation Time/Intention    Subjective Information  complains of;pain  -     Document Type  evaluation  -     Mode of Treatment  physical therapy  -     Patient Effort  good  -     Symptoms Noted During/After Treatment  none  -     Row Name 12/04/18 1157          General Information    Patient Observations  alert;cooperative;agree to therapy  -     General Observations of Patient  in chair, BLE elevated  -     Prior Level of Function  independent:  -KH     Equipment Currently Used at Home  walker, rolling  -     Pertinent History of Current Functional Problem  admitted from home with LLE cellulitis  -     Existing Precautions/Restrictions  fall  -     Row Name 12/04/18 1157          Relationship/Environment    Lives With  alone pt reports son and daughter bring her meals  -     Row Name 12/04/18 1157          Resource/Environmental Concerns    Current Living Arrangements  home/apartment/condo  -     Row Name 12/04/18 1157          Cognitive Assessment/Interventions    Additional Documentation  Cognitive Assessment/Intervention (Group)  -     Row Name 12/04/18 1157          Cognitive Assessment/Intervention- PT/OT    Orientation Status (Cognition)  oriented x 3  -     Follows Commands (Cognition)  WNL  -     Personal Safety Interventions  fall prevention program  maintained;gait belt;nonskid shoes/slippers when out of bed  -[facility] Name 12/04/18 1157          Bed Mobility Assessment/Treatment    Comment (Bed Mobility)  up in chair  -KH     Row Name 12/04/18 1157          Transfer Assessment/Treatment    Transfer Assessment/Treatment  sit-stand transfer;stand-sit transfer  -     Sit-Stand [place] (Transfers)  minimum assist (75% patient effort);moderate assist (50% patient effort)  -     Stand-Sit [place] (Transfers)  minimum assist (75% patient effort)  -KH     Row Name 12/04/18 1157          Sit-Stand Transfer    Assistive Device (Sit-Stand Transfers)  walker, front-wheeled  -[facility] Name 12/04/18 1157          Stand-Sit Transfer    Assistive Device (Stand-Sit Transfers)  walker, front-wheeled  -KH     Row Name 12/04/18 1157          Gait/Stairs Assessment/Training    [place] Level (Gait)  minimum assist (75% patient effort)  -     Assistive Device (Gait)  walker, front-wheeled  -     Distance in Feet (Gait)  60  -     Pattern (Gait)  step-through  -     Deviations/Abnormal Patterns (Gait)  antalgic;gait speed decreased;stride length decreased  -     Bilateral Gait Deviations  forward flexed posture;heel strike decreased  -KH     Row Name 12/04/18 1157          General ROM    GENERAL ROM COMMENTS  L  -[facility] 12/04/18 1157          MMT (Manual Muscle Testing)    General MMT Comments  [facility]  -KH     Row Name 12/04/18 1157          Motor Assessment/Intervention    Additional Documentation  Therapeutic Exercise Interventions (Group)  -KH     Row Name 12/04/18 1157          Therapeutic Exercise    Comment (Therapeutic Exercise)  BLE AP, LAQ, seated marching x 10 reps  -KH     Row Name 12/04/18 1157          Pain Assessment    Additional Documentation  Pain Scale: Word Pre/Post-Treatment (Group)  -[facility] Name 12/04/18 1157          Pain Scale: Word Pre/Post-Treatment    Pain: Word Scale, Pretreatment  2 - mild pain  -      Pain: Word Scale, Post-Treatment  2 - mild pain  -     Row Name 12/04/18 1157          Plan of Care Review    Plan of Care Reviewed With  patient  -     Row Name 12/04/18 1157          Physical Therapy Clinical Impression    Patient/Family Goals Statement (PT Clinical Impression)  return to PLOF  -     Criteria for Skilled Interventions Met (PT Clinical Impression)  treatment indicated  -     Impairments Found (describe specific impairments)  gait, locomotion, and balance  -     Row Name 12/04/18 1157          Physical Therapy Goals    Bed Mobility Goal Selection (PT)  bed mobility, PT goal 1  -     Transfer Goal Selection (PT)  transfer, PT goal 1  -     Gait Training Goal Selection (PT)  gait training, PT goal 1  -     Row Name 12/04/18 1157          Bed Mobility Goal 1 (PT)    Activity/Assistive Device (Bed Mobility Goal 1, PT)  bed mobility activities, all  -KH     [place] Level/Cues Needed (Bed Mobility Goal 1, PT)  supervision required  -KH     Time Frame (Bed Mobility Goal 1, PT)  1 week  -     Row Name 12/04/18 1157          Transfer Goal 1 (PT)    Activity/Assistive Device (Transfer Goal 1, PT)  transfers, all  -KH     [place] Level/Cues Needed (Transfer Goal 1, PT)  supervision required  -KH     Time Frame (Transfer Goal 1, PT)  1 week  -     Row Name 12/04/18 1157          Gait Training Goal 1 (PT)    Activity/Assistive Device (Gait Training Goal 1, PT)  gait (walking locomotion)  -KH     [place] Level (Gait Training Goal 1, PT)  supervision required  -KH     Distance (Gait Goal 1, PT)  150  -KH     Time Frame (Gait Training Goal 1, PT)  1 week  -     Row Name 12/04/18 1157          Patient Education Goal (PT)    Activity (Patient Education Goal, PT)  HEP  -KH     [place]/Cues/Accuracy (Memory Goal 2, PT)  demonstrates adequately  -KH     Time Frame (Patient Education Goal, PT)  1 week  -     Row Name 12/04/18 1157          Positioning and Restraints     Pre-Treatment Position  sitting in chair/recliner  -     Post Treatment Position  chair  -KH     In Chair  reclined;call light within reach;encouraged to call for assist;exit alarm on;notified mack [facility]     Row Name 12/04/18 1157          Living Environment    Home Accessibility  stairs to enter home  -       User Key  (r) = Recorded By, (t) = Taken By, (c) = Cosigned By    Initials Name Provider Type    Marlen Mcgrath, PT Physical Therapist        Physical Therapy Education     Title: PT OT SLP Therapies (Done)     Topic: Physical Therapy (Done)     Point: Mobility training (Done)     Learning Progress Summary           Patient Acceptance, E,D, VU,NR by YVONNE at 12/4/2018  1:05 PM                   Point: Home exercise program (Done)     Learning Progress Summary           Patient Acceptance, E,D, VU,NR by YVONNE at 12/4/2018  1:05 PM                   Point: Body mechanics (Done)     Learning Progress Summary           Patient Acceptance, E,D, VU,NR by YVONNE at 12/4/2018  1:05 PM                   Point: Precautions (Done)     Learning Progress Summary           Patient Acceptance, E,D, VU,NR by  at 12/4/2018  1:05 PM                               User Key     Initials Effective Dates Name Provider Type [facility] 06/08/18 -  Marlen Thomas, PT Physical Therapist PT              PT Recommendation and Plan  Anticipated Discharge Disposition (PT): home with home health, skilled nursing facility, home with assist  Planned Therapy Interventions (PT Eval): balance training, bed mobility training, gait training, home exercise program, patient/family education, strengthening, transfer training  Therapy Frequency (PT Clinical Impression): daily  Outcome Summary/Treatment Plan (PT)  Anticipated Discharge Disposition (PT): home with home health, skilled nursing facility, home with assist  Plan of Care Reviewed With: patient  Outcome Summary: Pt admitted from home with cellulitis. Pt c/o low back pain,  but no c/o leg pain. Pt reports she lives alone and walks with a Rwx. She hopes to return home. Pt presents with generalized weakness. decreased endurance and unsteady gait. PT would benefit from PT to address these impairments.  Outcome Measures     Row Name 12/04/18 1300             How much help from another person do you currently need...    Turning from your back to your side while in flat bed without using bedrails?  3  -KH      Moving from lying on back to sitting on the side of a flat bed without bedrails?  3  -KH      Moving to and from a bed to a chair (including a wheelchair)?  3  -KH      Standing up from a chair using your arms (e.g., wheelchair, bedside chair)?  3  -KH      Climbing 3-5 steps with a railing?  2  -KH      To walk in hospital room?  3  -KH      AM-PAC 6 Clicks Score  17  -KH         Functional Assessment    Outcome Measure Options  AM-PAC 6 Clicks Basic Mobility (PT)  -        User Key  (r) = Recorded By, (t) = Taken By, (c) = Cosigned By    Initials Name Provider Type    Marlen Mcgrath PT Physical Therapist         Time Calculation:   PT Charges     Row Name 12/04/18 1148             Time Calculation    Start Time  1120  -      Stop Time  1146  -      Time Calculation (min)  26 min  -      PT Received On  12/04/18  -      PT - Next Appointment  12/05/18  -      PT Goal Re-Cert Due Date  12/11/18  -         Time Calculation- PT    Total Timed Code Minutes- PT  10 minute(s)  -        User Key  (r) = Recorded By, (t) = Taken By, (c) = Cosigned By    Initials Name Provider Type    Marlen Mcgrath PT Physical Therapist        Therapy Suggested Charges     Code   Minutes Charges    None           Therapy Charges for Today     Code Description Service Date Service Provider Modifiers Qty    63853683271 HC PT EVAL MOD COMPLEXITY 2 12/4/2018 Marlen Thomas, PT GP 1    04342287116 HC PT THER PROC EA 15 MIN 12/4/2018 Marlen Thomas, PT GP 1           PT G-Codes  Outcome Measure Options: AM-PAC 6 Clicks Basic Mobility (PT)  AM-PAC 6 Clicks Score: 17      Marlen Thomas, PT  12/4/2018

## 2018-12-05 LAB
ALBUMIN SERPL-MCNC: 3.4 G/DL (ref 3.5–5.2)
ALBUMIN/GLOB SERPL: 1.5 G/DL
ALP SERPL-CCNC: 77 U/L (ref 39–117)
ALT SERPL W P-5'-P-CCNC: 9 U/L (ref 1–33)
ANION GAP SERPL CALCULATED.3IONS-SCNC: 10.3 MMOL/L
AST SERPL-CCNC: 17 U/L (ref 1–32)
BILIRUB SERPL-MCNC: 0.3 MG/DL (ref 0.1–1.2)
BUN BLD-MCNC: 21 MG/DL (ref 8–23)
BUN/CREAT SERPL: 17.6 (ref 7–25)
CALCIUM SPEC-SCNC: 8.5 MG/DL (ref 8.2–9.6)
CHLORIDE SERPL-SCNC: 107 MMOL/L (ref 98–107)
CO2 SERPL-SCNC: 24.7 MMOL/L (ref 22–29)
CREAT BLD-MCNC: 1.19 MG/DL (ref 0.57–1)
CREAT BLD-MCNC: 1.23 MG/DL (ref 0.57–1)
DEPRECATED RDW RBC AUTO: 50.5 FL (ref 37–54)
ERYTHROCYTE [DISTWIDTH] IN BLOOD BY AUTOMATED COUNT: 13.5 % (ref 11.7–13)
FERRITIN SERPL-MCNC: 107.3 NG/ML (ref 13–150)
FOLATE SERPL-MCNC: 16.97 NG/ML (ref 4.78–24.2)
GFR SERPL CREATININE-BSD FRML MDRD: 41 ML/MIN/1.73
GFR SERPL CREATININE-BSD FRML MDRD: 42 ML/MIN/1.73
GLOBULIN UR ELPH-MCNC: 2.3 GM/DL
GLUCOSE BLD-MCNC: 103 MG/DL (ref 65–99)
GLUCOSE BLDC GLUCOMTR-MCNC: 113 MG/DL (ref 70–130)
GLUCOSE BLDC GLUCOMTR-MCNC: 145 MG/DL (ref 70–130)
GLUCOSE BLDC GLUCOMTR-MCNC: 179 MG/DL (ref 70–130)
HCT VFR BLD AUTO: 34.3 % (ref 35.6–45.5)
HGB BLD-MCNC: 10.3 G/DL (ref 11.9–15.5)
IRON 24H UR-MRATE: 62 MCG/DL (ref 37–145)
IRON SATN MFR SERPL: 24 % (ref 20–50)
MCH RBC QN AUTO: 30.4 PG (ref 26.9–32)
MCHC RBC AUTO-ENTMCNC: 30 G/DL (ref 32.4–36.3)
MCV RBC AUTO: 101.2 FL (ref 80.5–98.2)
PLATELET # BLD AUTO: 178 10*3/MM3 (ref 140–500)
PMV BLD AUTO: 9.3 FL (ref 6–12)
POTASSIUM BLD-SCNC: 4.7 MMOL/L (ref 3.5–5.2)
PROT SERPL-MCNC: 5.7 G/DL (ref 6–8.5)
RBC # BLD AUTO: 3.39 10*6/MM3 (ref 3.9–5.2)
SODIUM BLD-SCNC: 142 MMOL/L (ref 136–145)
TIBC SERPL-MCNC: 261 MCG/DL
TRANSFERRIN SERPL-MCNC: 175 MG/DL (ref 200–360)
VANCOMYCIN TROUGH SERPL-MCNC: 15.3 MCG/ML (ref 5–20)
VIT B12 BLD-MCNC: 495 PG/ML (ref 211–946)
WBC NRBC COR # BLD: 4.57 10*3/MM3 (ref 4.5–10.7)

## 2018-12-05 PROCEDURE — 63710000001 INSULIN LISPRO (HUMAN) PER 5 UNITS: Performed by: INTERNAL MEDICINE

## 2018-12-05 PROCEDURE — 25010000002 VANCOMYCIN 10 G RECONSTITUTED SOLUTION: Performed by: INTERNAL MEDICINE

## 2018-12-05 PROCEDURE — 83540 ASSAY OF IRON: CPT | Performed by: HOSPITALIST

## 2018-12-05 PROCEDURE — 82746 ASSAY OF FOLIC ACID SERUM: CPT | Performed by: HOSPITALIST

## 2018-12-05 PROCEDURE — 84466 ASSAY OF TRANSFERRIN: CPT | Performed by: HOSPITALIST

## 2018-12-05 PROCEDURE — 85027 COMPLETE CBC AUTOMATED: CPT | Performed by: INTERNAL MEDICINE

## 2018-12-05 PROCEDURE — 97110 THERAPEUTIC EXERCISES: CPT

## 2018-12-05 PROCEDURE — 80202 ASSAY OF VANCOMYCIN: CPT | Performed by: HOSPITALIST

## 2018-12-05 PROCEDURE — 80053 COMPREHEN METABOLIC PANEL: CPT | Performed by: HOSPITALIST

## 2018-12-05 PROCEDURE — 82565 ASSAY OF CREATININE: CPT | Performed by: INTERNAL MEDICINE

## 2018-12-05 PROCEDURE — 99232 SBSQ HOSP IP/OBS MODERATE 35: CPT | Performed by: INTERNAL MEDICINE

## 2018-12-05 PROCEDURE — 25010000002 ENOXAPARIN PER 10 MG: Performed by: INTERNAL MEDICINE

## 2018-12-05 PROCEDURE — 82728 ASSAY OF FERRITIN: CPT | Performed by: HOSPITALIST

## 2018-12-05 PROCEDURE — 82962 GLUCOSE BLOOD TEST: CPT

## 2018-12-05 PROCEDURE — 25010000002 HYDRALAZINE PER 20 MG: Performed by: INTERNAL MEDICINE

## 2018-12-05 PROCEDURE — 82607 VITAMIN B-12: CPT | Performed by: HOSPITALIST

## 2018-12-05 RX ORDER — AMLODIPINE BESYLATE 2.5 MG/1
2.5 TABLET ORAL
Status: DISCONTINUED | OUTPATIENT
Start: 2018-12-05 | End: 2018-12-07

## 2018-12-05 RX ORDER — SACCHAROMYCES BOULARDII 250 MG
500 CAPSULE ORAL 2 TIMES DAILY
Status: DISCONTINUED | OUTPATIENT
Start: 2018-12-05 | End: 2018-12-07 | Stop reason: HOSPADM

## 2018-12-05 RX ADMIN — PRIMIDONE 50 MG: 50 TABLET ORAL at 08:20

## 2018-12-05 RX ADMIN — TRIAMCINOLONE ACETONIDE 1 APPLICATION: 1 CREAM TOPICAL at 21:42

## 2018-12-05 RX ADMIN — Medication 500 MG: at 12:34

## 2018-12-05 RX ADMIN — TRIAMCINOLONE ACETONIDE 1 APPLICATION: 1 CREAM TOPICAL at 13:40

## 2018-12-05 RX ADMIN — SERTRALINE 100 MG: 100 TABLET, FILM COATED ORAL at 08:20

## 2018-12-05 RX ADMIN — SODIUM CHLORIDE, PRESERVATIVE FREE 3 ML: 5 INJECTION INTRAVENOUS at 22:40

## 2018-12-05 RX ADMIN — VANCOMYCIN HYDROCHLORIDE 1250 MG: 10 INJECTION, POWDER, LYOPHILIZED, FOR SOLUTION INTRAVENOUS at 12:35

## 2018-12-05 RX ADMIN — TRIAMCINOLONE ACETONIDE 1 APPLICATION: 1 CREAM TOPICAL at 06:46

## 2018-12-05 RX ADMIN — FAMOTIDINE 20 MG: 20 TABLET, FILM COATED ORAL at 08:20

## 2018-12-05 RX ADMIN — LOSARTAN POTASSIUM 50 MG: 50 TABLET, FILM COATED ORAL at 08:20

## 2018-12-05 RX ADMIN — AMLODIPINE BESYLATE 2.5 MG: 2.5 TABLET ORAL at 12:34

## 2018-12-05 RX ADMIN — MIRTAZAPINE 15 MG: 15 TABLET, FILM COATED ORAL at 21:02

## 2018-12-05 RX ADMIN — INSULIN LISPRO 2 UNITS: 100 INJECTION, SOLUTION INTRAVENOUS; SUBCUTANEOUS at 21:42

## 2018-12-05 RX ADMIN — HYDRALAZINE HYDROCHLORIDE 10 MG: 20 INJECTION INTRAMUSCULAR; INTRAVENOUS at 03:26

## 2018-12-05 RX ADMIN — ENOXAPARIN SODIUM 40 MG: 40 INJECTION SUBCUTANEOUS at 21:02

## 2018-12-05 RX ADMIN — SODIUM CHLORIDE, PRESERVATIVE FREE 3 ML: 5 INJECTION INTRAVENOUS at 08:21

## 2018-12-05 RX ADMIN — Medication 500 MG: at 21:02

## 2018-12-05 RX ADMIN — FLUTICASONE PROPIONATE 2 SPRAY: 50 SPRAY, METERED NASAL at 08:20

## 2018-12-05 RX ADMIN — PRIMIDONE 50 MG: 50 TABLET ORAL at 21:02

## 2018-12-05 RX ADMIN — CETIRIZINE HYDROCHLORIDE 10 MG: 10 TABLET, FILM COATED ORAL at 08:20

## 2018-12-05 NOTE — THERAPY TREATMENT NOTE
Acute Care - Physical Therapy Treatment Note  [facility]     Patient Name: Magdalena Jerry  : 1926  MRN: [id number]  Today's Date: 2018             Admit Date: 12/3/2018    Visit Dx:    ICD-10-CM ICD-9-CM   1. Cellulitis of lower extremity, unspecified laterality L03.119 682.6   2. Poorly-controlled hypertension I10 401.9     Patient Active Problem List   Diagnosis   • Adjustment disorder with mixed anxiety and depressed mood   • Anemia   • Benign essential hypertension   • Hereditary essential tremor   • Type 2 diabetes mellitus with diabetic polyneuropathy, without long-term current use of insulin (CMS/[facility])   • Dry skin dermatitis   • Dyslipidemia   • Gastroesophageal reflux disease with esophagitis   • Pain of hand   • Hearing loss   • Arthralgia of hip   • Impacted cerumen   • Pruritus   • Knee pain   • Pain in extremity   • Chronic low back pain   • Weakness of lower extremity   • Spinal stenosis of lumbar region   • Senile osteoporosis   • Piriformis syndrome   • Primary insomnia   • Tinea pedis   • Vitamin D deficiency   • Left leg cellulitis   • CKD (chronic kidney disease) stage 3, GFR 30-59 ml/min (CMS/[facility])   • Allergic rhinitis   • Pneumonia due to influenza A virus   • MRSA (methicillin resistant Staphylococcus aureus) infection   • Bacteria in urine   • Cellulitis of lower extremity   • Chronic venous stasis       Therapy Treatment    Rehabilitation Treatment Summary     Row Name 18 104             Treatment Time/Intention    Discipline  physical therapist  -EJ      Document Type  therapy note (daily note)  -EJ      Subjective Information  complains of;weakness  -EJ      Mode of Treatment  physical therapy  -EJ      Patient/Family Observations  reclined in chair, no acute distress  -EJ      Patient Effort  good  -EJ      Comment  confused  -EJ      Existing Precautions/Restrictions  fall  -EJ      Recorded by [EJ] Martha Vega, PT 18 1103      Row Name 18 1046              Cognitive Assessment/Intervention- PT/OT    Personal Safety Interventions  fall prevention program maintained;gait belt;nonskid shoes/slippers when out of bed;supervised activity  -EJ      Recorded by [EJ] Martha Vega, PT 12/05/18 1103      Row Name 12/05/18 1040             Bed Mobility Assessment/Treatment    Comment (Bed Mobility)  up in chair  -EJ      Recorded by [EJ] Martha Vega, PT 12/05/18 1103      Row Name 12/05/18 1040             Sit-Stand Transfer    Sit-Stand [place] (Transfers)  verbal cues;minimum assist (75% patient effort);moderate assist (50% patient effort)  -EJ      Assistive Device (Sit-Stand Transfers)  walker, front-wheeled  -EJ      Recorded by [EJ] Martha Vega, PT 12/05/18 1103      Row Name 12/05/18 1040             Stand-Sit Transfer    Stand-Sit [place] (Transfers)  verbal cues;minimum assist (75% patient effort);moderate assist (50% patient effort)  -EJ      Assistive Device (Stand-Sit Transfers)  walker, front-wheeled  -EJ      Recorded by [EJ] Martha Vega, PT 12/05/18 1103      Row Name 12/05/18 1040             Gait/Stairs Assessment/Training    [place] Level (Gait)  verbal cues;contact guard;minimum assist (75% patient effort)  -EJ      Assistive Device (Gait)  walker, front-wheeled  -EJ      Distance in Feet (Gait)  120  -EJ      Deviations/Abnormal Patterns (Gait)  nicky decreased;stride length decreased  -EJ      Bilateral Gait Deviations  forward flexed posture;heel strike decreased  -EJ      Recorded by [EJ] Martha Vega, PT 12/05/18 1103      Row Name 12/05/18 1040             Positioning and Restraints    Pre-Treatment Position  sitting in chair/recliner  -EJ      Post Treatment Position  chair  -EJ      In Chair  notified nsg;reclined;call light within reach;encouraged to call for assist;exit alarm on  -EJ      Recorded by [EJ] Martha Vega, PT 12/05/18 1103      Row Name 12/05/18 1040             Pain  Scale: Word Pre/Post-Treatment    Pain: Word Scale, Pretreatment  0 - no pain  -EJ      Pain: Word Scale, Post-Treatment  0 - no pain  -EJ      Recorded by [RENNY] Martha Vega, PT 12/05/18 1103        User Key  (r) = Recorded By, (t) = Taken By, (c) = Cosigned By    Initials Name Effective Dates Discipline    EJ Martha Vega, PT 04/03/18 -  PT                   Physical Therapy Education     Title: PT OT SLP Therapies (Done)     Topic: Physical Therapy (Done)     Point: Mobility training (Done)     Learning Progress Summary           Patient Acceptance, E,TB,D, VU,NR by  at 12/5/2018 11:04 AM    Acceptance, E,TB, VU by MT at 12/5/2018  4:49 AM    Acceptance, E,D, VU,NR by  at 12/4/2018  1:05 PM                   Point: Home exercise program (Done)     Learning Progress Summary           Patient Acceptance, E,TB, VU by MT at 12/5/2018  4:49 AM    Acceptance, E,D, VU,NR by  at 12/4/2018  1:05 PM                   Point: Body mechanics (Done)     Learning Progress Summary           Patient Acceptance, E,TB, VU by MT at 12/5/2018  4:49 AM    Acceptance, E,D, VU,NR by  at 12/4/2018  1:05 PM                   Point: Precautions (Done)     Learning Progress Summary           Patient Acceptance, E,TB, VU by MT at 12/5/2018  4:49 AM    Acceptance, E,D, VU,NR by  at 12/4/2018  1:05 PM                               User Key     Initials Effective Dates Name Provider Type Discipline     06/08/18 -  Marlen Thomas, PT Physical Therapist PT    MT 06/16/16 -  Titi Steward, RN Registered Nurse Nurse     04/03/18 -  Martha Vega, PT Physical Therapist PT                PT Recommendation and Plan     Plan of Care Reviewed With: patient  Progress: improving  Outcome Summary: Pt doing well, agreeable to PT this am. She has no c/o pain at this time. She requires mod A to stand up from chair, but was able to increase ambulation distance today, using R wx and CGA/min A. Will continue to progress  as tolerated.  Outcome Measures     Row Name 12/05/18 1100 12/04/18 1300          How much help from another person do you currently need...    Turning from your back to your side while in flat bed without using bedrails?  3  -EJ  3  -KH     Moving from lying on back to sitting on the side of a flat bed without bedrails?  3  -EJ  3  -KH     Moving to and from a bed to a chair (including a wheelchair)?  2  -EJ  3  -KH     Standing up from a chair using your arms (e.g., wheelchair, bedside chair)?  2  -EJ  3  -KH     Climbing 3-5 steps with a railing?  2  -EJ  2  -KH     To walk in hospital room?  3  -EJ  3  -KH     AM-PAC 6 Clicks Score  15  -EJ  17  -KH        Functional Assessment    Outcome Measure Options  AM-PAC 6 Clicks Basic Mobility (PT)  -EJ  AM-PAC 6 Clicks Basic Mobility (PT)  -       User Key  (r) = Recorded By, (t) = Taken By, (c) = Cosigned By    Initials Name Provider Type    Marlen Mcgrath, PT Physical Therapist    EJ Martha Vega, PT Physical Therapist         Time Calculation:   PT Charges     Row Name 12/05/18 1105             Time Calculation    Start Time  1040  -EJ      Stop Time  1058  -EJ      Time Calculation (min)  18 min  -EJ      PT Received On  12/05/18  -EJ      PT - Next Appointment  12/06/18  -        User Key  (r) = Recorded By, (t) = Taken By, (c) = Cosigned By    Initials Name Provider Type    Martha Elliott, PT Physical Therapist        Therapy Suggested Charges     Code   Minutes Charges    None           Therapy Charges for Today     Code Description Service Date Service Provider Modifiers Qty    [id number]  PT THER PROC EA 15 MIN 12/5/2018 Martha Vega, PT GP 1          PT G-Codes  Outcome Measure Options: AM-PAC 6 Clicks Basic Mobility (PT)  AM-PAC 6 Clicks Score: 15    Martha Vega PT  12/5/2018

## 2018-12-05 NOTE — PROGRESS NOTES
"   LOS: 1 day   Patient Care Team:  Fly Sanchez DO as PCP - General  Fly Sanchez DO as PCP - Claims Attributed    Chief Complaint: LLE pain    Subjective     Feeling okay. C/o pain in BLEs--L>R. Otherwise feeling okay. She does report pain with weight-bearing on LLE. She is okay with dc to [facility] eventually \"if that's what we think is best\"        Subjective:  Symptoms:  Stable.  No shortness of breath, malaise, cough, chest pain, weakness, headache, chest pressure, anorexia, diarrhea or anxiety.    Diet:  Adequate intake.  No nausea or vomiting.    Activity level: Impaired due to pain.    Pain:  She complains of pain that is mild.  She reports pain is unchanged.  Pain is well controlled.        History taken from: patient chart    Objective     Vital Signs  Temp:  [97.3 °F (36.3 °C)-99.8 °F (37.7 °C)] 97.3 °F (36.3 °C)  Heart Rate:  [58-65] 58  Resp:  [18] 18  BP: (157-205)/(59-80) 161/64    Objective:  General Appearance:  Comfortable and in no acute distress.    Vital signs: (most recent): Blood pressure 161/64, pulse 58, temperature 97.3 °F (36.3 °C), temperature source Oral, resp. rate 18, height 170.2 cm (67\"), weight 77.6 kg (171 lb), SpO2 93 %.  Vital signs are normal.  No fever.    Output: Producing urine.    HEENT: Normal HEENT exam.    Lungs:  Normal effort and normal respiratory rate.  Breath sounds clear to auscultation.    Heart: Normal rate.  Regular rhythm.  Positive for murmur.    Abdomen: Abdomen is soft.  Bowel sounds are normal.   There is no abdominal tenderness.     Extremities: There is venous stasis (chronic BLEs) and dependent edema (L>R).  (Erythema of BLEs, L>R, warm and TTP, no wounds  Seems improved today)  Pulses: Distal pulses are intact.    Neurological: Patient is alert.  (Very [place]).    Pupils:  Pupils are equal, round, and reactive to light.    Skin:  Warm and dry.              Results Review:     I reviewed the patient's new clinical results.  I reviewed the " patient's other test results and agree with the interpretation  Discussed with patient and CCP    Medication Review: reviewed    Assessment/Plan       Left leg cellulitis    Anemia    Benign essential hypertension    Type 2 diabetes mellitus with diabetic polyneuropathy, without long-term current use of insulin (CMS/[facility])    CKD (chronic kidney disease) stage 3, GFR 30-59 ml/min (CMS/[facility])    Chronic venous stasis          Plan:   (Appreciate ID attention to pt  Continue Vanc until dc then change to po Clinda to complete 7d total abx course (last day of tx 12/10)  Probiotic started  MRSA screen pending  Watch Cr closely while on IV Vanc, continue Losartan for now  BPs still quite high, add low dose Amlodipine  Anemia labs unremarkable and Hgb stable, suspect anemia of chronic disease  Continue SSI while here, check HgbA1c, not on meds for DM at home  PT araceli noted, [place] likely need SNF at [place] ([place])  Lovenox for DVT ppx).       Martin Watters MD  12/05/18  9:34 AM    Time: 20min

## 2018-12-05 NOTE — PLAN OF CARE
Problem: Patient Care Overview  Goal: Plan of Care Review  Outcome: Ongoing (interventions implemented as appropriate)   12/05/18 0450   Coping/Psychosocial   Plan of Care Reviewed With patient   Plan of Care Review   Progress improving   OTHER   Outcome Summary Bp elevated mediated with hydralazine iv, voiding freely, up with assist and using walker, BLE redness noted.      12/05/18 0450   Coping/Psychosocial   Plan of Care Reviewed With patient   Plan of Care Review   Progress improving   OTHER   Outcome Summary Bp elevated mediated with hydralazine iv, voiding freely, up with assist and using walker, BLE redness noted.     Goal: Individualization and Mutuality  Outcome: Ongoing (interventions implemented as appropriate)    Goal: Discharge Needs Assessment  Outcome: Ongoing (interventions implemented as appropriate)    Goal: Interprofessional Rounds/Family Conf  Outcome: Ongoing (interventions implemented as appropriate)      Problem: Skin Injury Risk (Adult)  Goal: Skin Health and Integrity  Outcome: Ongoing (interventions implemented as appropriate)      Problem: Fall Risk (Adult)  Goal: Identify Related Risk Factors and Signs and Symptoms  Outcome: Ongoing (interventions implemented as appropriate)    Goal: Absence of Fall  Outcome: Ongoing (interventions implemented as appropriate)

## 2018-12-05 NOTE — PROGRESS NOTES
"Pharmacokinetic Consult - Vancomycin Dosing (Follow-up Note)    Magdalena Jerry is a 89 y.o. female who is on day 2 pharmacy to dose vancomycin for left leg cellulitis.  Pharmacy dosing vancomycin per Dr. Leon's request.   Other antimicrobials: none  Goal trough: 10-20 mg/L    Current Vancomycin dose: 1250 mg IV q24h    Relevant clinical data and objective history reviewed:  170.2 cm (67\")  77.6 kg (171 lb)  Body mass index is 26.78 kg/m².     She has a past medical history of Adjustment disorder with mixed anxiety and depressed mood, Anemia, Anxiety, Atrophy of hand muscles, Benign familial tremor, Carpal tunnel syndrome, Cataract, Chronic rhinosinusitis, Depression, Diabetes mellitus (CMS/[facility]), Dyslipidemia, Esophagitis, reflux, Fracture of hip (CMS/[facility]), Frequent falls, GERD (gastroesophageal reflux disease), Hand pain, Hearing loss, Hip pain, Hyperlipidemia, Hypertension, Impacted cerumen, Insomnia, Knee pain, Limb pain, Loss of hearing, Low back pain, Lower extremity weakness, Lumbar canal stenosis, Osteoporosis, senile, Piriformis syndrome, Renal insufficiency (2006), Sensorineural hearing loss, Stroke (CMS/[facility]) (2004), Tinea pedis, and Vitamin D deficiency.    Allergies as of 12/03/2018 - Reviewed 12/03/2018   Allergen Reaction Noted   • Amoxicillin Other (See Comments) 12/13/2013   • Benzodiazepines  06/03/2016   • Codeine  06/03/2016   • Cortisone  06/03/2016   • Macrolides and ketolides  06/03/2016   • Melatonin  06/03/2016   • Neomycin  06/03/2016   • Penicillins  06/03/2016   • Sulfa antibiotics  06/03/2016   • Tetracyclines & related  06/03/2016   • Erythromycin Rash 12/13/2013     Vital Signs (last 24 hours)       12/04 0700  -  12/05 0659 12/05 0700  -  12/05 0838   Most Recent    Temp (°F) 97.5 -  99.8      97.3     97.3 (36.3)    Heart Rate 52 -  65      58     58    Resp   18      18     18    /69 -  (!) 205/80      161/64     161/64    SpO2 (%) 93 -  97      93     93    "     Estimated Creatinine Clearance: 32.4 mL/min (A) (by C-G formula based on SCr of 1.19 mg/dL (H)).  Results from last 7 days   Lab Units  12/05/18   0502  12/04/18   0640  12/03/18   1900   CREATININE mg/dL  1.19*  1.14*  1.09*     Results from last 7 days   Lab Units  12/05/18   0502  12/04/18   0640  12/03/18   1900   WBC 10*3/mm3  4.57  4.19*  4.12*     Baseline culture/source/susceptibility:   BCx x2 sets (12/3): NGTD  MRSA screen (12/4): in process     Labs:  Lactic acid (12/3): 0.6  CRP (12/4): 0.85 (nl 0 - 0.5)     Anti-Infectives (From admission, onward)    Ordered     Dose/Rate Route Frequency Start Stop    12/04/18 0012  vancomycin 1250 mg/250 mL 0.9% NS IVPB ([facility])     Ordering Provider:  Jose Rafael Leon MD    1,250 mg  over 120 Minutes Intravenous Every 24 Hours 12/04/18 1100 12/11/18 1159    12/03/18 2246  Pharmacy to dose vancomycin     Ordering Provider:  Jose Rafael Leon MD     Does not apply Continuous PRN 12/03/18 2244 12/10/18 2243    12/03/18 2147  vancomycin 1500 mg/500 mL 0.9% NS IVPB ([facility])     Ordering Provider:  Yosvany Barclay MD    20 mg/kg × 77.6 kg Intravenous Once 12/03/18 2149 12/04/18 0108    12/03/18 2117  cefTRIAXone (ROCEPHIN) IVPB 1 g     Ordering Provider:  Yosvany Barclay MD    1 g  100 mL/hr over 30 Minutes Intravenous Once 12/03/18 2119 12/03/18 2232         Vancomycin Dosing History  1500 mg (~20 mg/kg) IV x1 dose    12/03/18 2233  1250 mg (~15 mg/kg) IV q24h   12/04/18 1219    Trough 12/05/18 1131: 15.3 mg/L (~11h after last dose)     Assessment/Plan  1) Continue vancomycin 1250 mg IV q24h. Ordered repeat trough for 30 min prior to dose on 12/7 at 1200 to check at steady state.  2) Will monitor serum creatinine tomorrow with AM labs.    3) Encourage hydration as allowed by MD to help prevent toxic accumulation; monitor for s/sxn of toxicity including increase in SCr and decrease in UOP.    Pharmacy will continue to follow daily while on vancomycin and adjust  as needed.     Thank you for this consult,    Alonso Yeh, PharmD, SARA, BCPS

## 2018-12-05 NOTE — PROGRESS NOTES
Discharge Planning Assessment  [facility]     Patient Name: Magdalena Jerry  MRN: [id number]  Today's Date: 12/5/2018    Admit Date: 12/3/2018    Discharge Needs Assessment    No documentation.       Discharge Plan     Row Name 12/05/18 0934       Plan    Plan  Home with [facility] v/s [facility] (pending bed availability)    Plan Comments Email received from Alicia with [facility] that they can accept pending bed availability, reviewed with attending on rounds.  Linda Fernandez RN        Destination      Service Provider Request Status Selected Services Address Phone Number Fax Number    [facility] Accepted N/A [address] [facility] [facility] [address] [phone]      Durable Medical Equipment      No service coordination in this encounter.      Dialysis/Infusion      No service coordination in this encounter.      Home Medical Care      Service Provider Request Status Selected Services Address Phone Number Fax Number    [facility] Accepted N/A [address] [address] [phone]        Linda Fernandez RN

## 2018-12-05 NOTE — PLAN OF CARE
Problem: Patient Care Overview  Goal: Plan of Care Review  Outcome: Ongoing (interventions implemented as appropriate)   12/05/18 1104   Coping/Psychosocial   Plan of Care Reviewed With patient   Plan of Care Review   Progress improving   OTHER   Outcome Summary Pt doing well, agreeable to PT this am. She has no c/o pain at this time. She requires mod A to stand up from chair, but was able to increase ambulation distance today, using R wx and CGA/min A. Will continue to progress as tolerated.

## 2018-12-05 NOTE — PROGRESS NOTES
LOS: 1 day     Chief Complaint:  Follow-up LLE cellulitis    Interval History:  No fevers. Sharp pain a bit better today. Edema slightly improved. Erythema and heat slightly improved though I don't know her baseline and she clearly has some venous stasis changes.  Tolerating vancomycin.    ROS: no n/v/d    Vital Signs  Temp:  [97.3 °F (36.3 °C)-99.8 °F (37.7 °C)] 97.3 °F (36.3 °C)  Heart Rate:  [58-65] 58  Resp:  [18] 18  BP: (157-205)/(59-80) 161/64    Physical Exam:  General: awake, alert, sitting in chair  Head: Normocephalic  Eyes: no scleral icterus   ENT: MMM  Neck: Supple  Cardiovascular: mild bradycardia, RR, 1+ LE edema  Respiratory:  normal work of breathing on ambient air  GI: Abdomen is soft, non-tender, non-distended  : no Fiore catheter present  Skin: venous stasis changes on both lower extremities; erythema and warmth on the LLE are improved today  Neurological: Alert and oriented x 3  Psychiatric: Normal mood and affect     Antibiotics:  •  vancomycin 1250 mg/250 mL 0.9% NS IVPB ([facility]), 1,250 mg, Intravenous, Q24H, Jose Rafael Leon MD, 1,250 mg at 12/04/18 1219    LABS:  CBC, BMP, CRP, micro reviewed today  Lab Results   Component Value Date    WBC 4.57 12/05/2018    HGB 10.3 (L) 12/05/2018    HCT 34.3 (L) 12/05/2018    .2 (H) 12/05/2018     12/05/2018     Lab Results   Component Value Date    GLUCOSE 103 (H) 12/05/2018    CALCIUM 8.5 12/05/2018     12/05/2018    K 4.7 12/05/2018    CO2 24.7 12/05/2018     12/05/2018    BUN 21 12/05/2018    CREATININE 1.19 (H) 12/05/2018    EGFRIFAFRI 44 (L) 08/06/2018    EGFRIFNONA 42 (L) 12/05/2018    BCR 17.6 12/05/2018    ANIONGAP 10.3 12/05/2018     Lab Results   Component Value Date    CRP 0.85 (H) 12/04/2018     Lab Results   Component Value Date    WADE 18.50 03/20/2017    VANCHERIBERTOOM 8.20 03/16/2017     Microbiology:  -Historical wound culture with MRSA in 2017  12/3 BCx: NGTD    Radiology (personally reviewed):   No  new imaging today    Assessment/Plan   1. Left lower extremity cellulitis  2. History of MRSA infection  3. CKD 3  4. Controlled DM2  5. Venous stasis     She has underlying venous stasis changes but at admission did appear to have more intense erythema and heat of the LLE. There was no purulent drainage but given history of MRSA I think best to cover both Staph (MRSA/MSSA) and Strep species. I recommending continuing vancomycin with goal trough 15-20 (level due today) while in the hospital then transition to clindamycin 450 mg PO q8h at time of discharge with stop date 12/10/18 to complete a 7-day total course of antibiotics. I have elevated her legs and encouraged her to do the same at home. I'll also go ahead and start her on a probiotic.    Thank you for allowing me to be involved in the care of this patient. Infectious diseases will sign off at this time with antibiotics plan in place but please call me at [phone] if any further questions.

## 2018-12-06 PROBLEM — D63.8 ANEMIA OF CHRONIC DISEASE: Chronic | Status: ACTIVE | Noted: 2018-12-06

## 2018-12-06 LAB
ANION GAP SERPL CALCULATED.3IONS-SCNC: 10.2 MMOL/L
BUN BLD-MCNC: 26 MG/DL (ref 8–23)
BUN/CREAT SERPL: 21.8 (ref 7–25)
CALCIUM SPEC-SCNC: 8.2 MG/DL (ref 8.2–9.6)
CHLORIDE SERPL-SCNC: 111 MMOL/L (ref 98–107)
CO2 SERPL-SCNC: 22.8 MMOL/L (ref 22–29)
CREAT BLD-MCNC: 1.19 MG/DL (ref 0.57–1)
DEPRECATED RDW RBC AUTO: 51.4 FL (ref 37–54)
ERYTHROCYTE [DISTWIDTH] IN BLOOD BY AUTOMATED COUNT: 13.8 % (ref 11.7–13)
GFR SERPL CREATININE-BSD FRML MDRD: 42 ML/MIN/1.73
GLUCOSE BLD-MCNC: 87 MG/DL (ref 65–99)
GLUCOSE BLDC GLUCOMTR-MCNC: 147 MG/DL (ref 70–130)
GLUCOSE BLDC GLUCOMTR-MCNC: 160 MG/DL (ref 70–130)
GLUCOSE BLDC GLUCOMTR-MCNC: 167 MG/DL (ref 70–130)
GLUCOSE BLDC GLUCOMTR-MCNC: 86 MG/DL (ref 70–130)
HBA1C MFR BLD: 6.03 % (ref 4.8–5.6)
HCT VFR BLD AUTO: 33.6 % (ref 35.6–45.5)
HGB BLD-MCNC: 10 G/DL (ref 11.9–15.5)
MCH RBC QN AUTO: 30.1 PG (ref 26.9–32)
MCHC RBC AUTO-ENTMCNC: 29.8 G/DL (ref 32.4–36.3)
MCV RBC AUTO: 101.2 FL (ref 80.5–98.2)
MRSA SPEC QL CULT: NORMAL
PLATELET # BLD AUTO: 167 10*3/MM3 (ref 140–500)
PMV BLD AUTO: 9.5 FL (ref 6–12)
POTASSIUM BLD-SCNC: 4.6 MMOL/L (ref 3.5–5.2)
RBC # BLD AUTO: 3.32 10*6/MM3 (ref 3.9–5.2)
SODIUM BLD-SCNC: 144 MMOL/L (ref 136–145)
WBC NRBC COR # BLD: 4.54 10*3/MM3 (ref 4.5–10.7)

## 2018-12-06 PROCEDURE — 80048 BASIC METABOLIC PNL TOTAL CA: CPT | Performed by: HOSPITALIST

## 2018-12-06 PROCEDURE — 83036 HEMOGLOBIN GLYCOSYLATED A1C: CPT | Performed by: HOSPITALIST

## 2018-12-06 PROCEDURE — 63710000001 INSULIN LISPRO (HUMAN) PER 5 UNITS: Performed by: INTERNAL MEDICINE

## 2018-12-06 PROCEDURE — 25010000002 VANCOMYCIN 10 G RECONSTITUTED SOLUTION: Performed by: INTERNAL MEDICINE

## 2018-12-06 PROCEDURE — 25010000002 ENOXAPARIN PER 10 MG: Performed by: INTERNAL MEDICINE

## 2018-12-06 PROCEDURE — 85027 COMPLETE CBC AUTOMATED: CPT | Performed by: HOSPITALIST

## 2018-12-06 PROCEDURE — 97110 THERAPEUTIC EXERCISES: CPT | Performed by: PHYSICAL THERAPIST

## 2018-12-06 PROCEDURE — 82962 GLUCOSE BLOOD TEST: CPT

## 2018-12-06 PROCEDURE — 25010000002 HYDRALAZINE PER 20 MG: Performed by: INTERNAL MEDICINE

## 2018-12-06 RX ADMIN — SODIUM CHLORIDE, PRESERVATIVE FREE 3 ML: 5 INJECTION INTRAVENOUS at 09:30

## 2018-12-06 RX ADMIN — LOSARTAN POTASSIUM 50 MG: 50 TABLET, FILM COATED ORAL at 09:30

## 2018-12-06 RX ADMIN — ENOXAPARIN SODIUM 40 MG: 40 INJECTION SUBCUTANEOUS at 20:04

## 2018-12-06 RX ADMIN — HYDRALAZINE HYDROCHLORIDE 10 MG: 20 INJECTION INTRAMUSCULAR; INTRAVENOUS at 16:08

## 2018-12-06 RX ADMIN — AMLODIPINE BESYLATE 2.5 MG: 2.5 TABLET ORAL at 09:30

## 2018-12-06 RX ADMIN — FLUTICASONE PROPIONATE 2 SPRAY: 50 SPRAY, METERED NASAL at 09:31

## 2018-12-06 RX ADMIN — PRIMIDONE 50 MG: 50 TABLET ORAL at 20:05

## 2018-12-06 RX ADMIN — FAMOTIDINE 20 MG: 20 TABLET, FILM COATED ORAL at 09:30

## 2018-12-06 RX ADMIN — VANCOMYCIN HYDROCHLORIDE 1250 MG: 10 INJECTION, POWDER, LYOPHILIZED, FOR SOLUTION INTRAVENOUS at 12:13

## 2018-12-06 RX ADMIN — TRIAMCINOLONE ACETONIDE 1 APPLICATION: 1 CREAM TOPICAL at 14:30

## 2018-12-06 RX ADMIN — PRIMIDONE 50 MG: 50 TABLET ORAL at 09:30

## 2018-12-06 RX ADMIN — INSULIN LISPRO 2 UNITS: 100 INJECTION, SOLUTION INTRAVENOUS; SUBCUTANEOUS at 22:50

## 2018-12-06 RX ADMIN — SERTRALINE 100 MG: 100 TABLET, FILM COATED ORAL at 09:30

## 2018-12-06 RX ADMIN — TRIAMCINOLONE ACETONIDE 1 APPLICATION: 1 CREAM TOPICAL at 05:40

## 2018-12-06 RX ADMIN — CETIRIZINE HYDROCHLORIDE 10 MG: 10 TABLET, FILM COATED ORAL at 09:30

## 2018-12-06 RX ADMIN — Medication 500 MG: at 09:30

## 2018-12-06 RX ADMIN — TRIAMCINOLONE ACETONIDE 1 APPLICATION: 1 CREAM TOPICAL at 21:11

## 2018-12-06 RX ADMIN — Medication 500 MG: at 20:04

## 2018-12-06 RX ADMIN — SODIUM CHLORIDE, PRESERVATIVE FREE 3 ML: 5 INJECTION INTRAVENOUS at 21:11

## 2018-12-06 RX ADMIN — MIRTAZAPINE 15 MG: 15 TABLET, FILM COATED ORAL at 20:05

## 2018-12-06 NOTE — PLAN OF CARE
Problem: Patient Care Overview  Goal: Plan of Care Review   12/06/18 5819   OTHER   Outcome Summary Pt continues to require Min A for bed mobility and transfers. She ambulates fairly well with Rwx, but fatigues quickly and gait quality declines with decreased step lenth and heel strike. Pt lives alone and has family nearby that can assist, but she would benefit from subacute rehab to address strength, endurance, balance and gait training prior to returning home.

## 2018-12-06 NOTE — PLAN OF CARE
Problem: Patient Care Overview  Goal: Plan of Care Review  Outcome: Ongoing (interventions implemented as appropriate)   12/06/18 0451   Coping/Psychosocial   Plan of Care Reviewed With patient   Plan of Care Review   Progress no change   OTHER   Outcome Summary VSS. BLE red and warm, kenalog cream applied. Up with asstx2 and walker, having trouble bearing weight on legs. Incontinent of urine, brief on. Waiting for stool sample to be sent for hemoccult blood. Saline locked. Contact isolation maintained. Resting well.      Goal: Individualization and Mutuality  Outcome: Ongoing (interventions implemented as appropriate)    Goal: Discharge Needs Assessment  Outcome: Ongoing (interventions implemented as appropriate)    Goal: Interprofessional Rounds/Family Conf  Outcome: Ongoing (interventions implemented as appropriate)      Problem: Skin Injury Risk (Adult)  Goal: Skin Health and Integrity  Outcome: Ongoing (interventions implemented as appropriate)      Problem: Fall Risk (Adult)  Goal: Identify Related Risk Factors and Signs and Symptoms  Outcome: Outcome(s) achieved Date Met: 12/06/18    Goal: Absence of Fall  Outcome: Ongoing (interventions implemented as appropriate)

## 2018-12-06 NOTE — PROGRESS NOTES
"   LOS: 2 days   Patient Care Team:  Fly Sanchez DO as PCP - General  Fly Sanchez DO as PCP - Claims Attributed    Chief Complaint: tired    Subjective     Legs feeling better today        Subjective:  Symptoms:  Improved.  No shortness of breath, malaise, cough, chest pain, weakness, headache, chest pressure, anorexia, diarrhea or anxiety.    Diet:  Adequate intake.  No nausea or vomiting.    Activity level: Impaired due to pain.    Pain:  She complains of pain that is mild.  She reports pain is unchanged.  Pain is well controlled.        History taken from: patient chart    Objective     Vital Signs  Temp:  [97.4 °F (36.3 °C)-98.6 °F (37 °C)] 97.4 °F (36.3 °C)  Heart Rate:  [63-78] 63  Resp:  [18] 18  BP: (120-178)/(68-72) 178/72    Objective:  General Appearance:  Comfortable and in no acute distress.    Vital signs: (most recent): Blood pressure 178/72, pulse 63, temperature 97.4 °F (36.3 °C), temperature source Oral, resp. rate 18, height 170.2 cm (67\"), weight 77.6 kg (171 lb), SpO2 91 %.  Vital signs are normal.  No fever.    Output: Producing urine.    HEENT: Normal HEENT exam.    Lungs:  Normal effort and normal respiratory rate.  Breath sounds clear to auscultation.    Heart: Normal rate.  Regular rhythm.  Positive for murmur.    Abdomen: Abdomen is soft.  Bowel sounds are normal.   There is no abdominal tenderness.     Extremities: There is venous stasis (chronic BLEs) and dependent edema (L>R).  (Erythema of BLEs, L>R, warm and TTP, no wounds  Seems improved today)  Pulses: Distal pulses are intact.    Neurological: Patient is alert.  (Very [place]).    Pupils:  Pupils are equal, round, and reactive to light.    Skin:  Warm and dry.              Results Review:     I reviewed the patient's new clinical results.  I reviewed the patient's other test results and agree with the interpretation  Discussed with patient and CCP    Medication Review: reviewed    Assessment/Plan       Left leg " cellulitis    Benign essential hypertension    Type 2 diabetes mellitus with diabetic polyneuropathy, without long-term current use of insulin (CMS/[facility])    CKD (chronic kidney disease) stage 3, GFR 30-59 ml/min (CMS/[facility])    Chronic venous stasis    Anemia of chronic disease          Plan:   (Appreciate ID attention to pt  Continue Vanc until dc then change to po Clinda to complete 7d total abx course (last day of tx 12/10)  Probiotic started  MRSA screen pending  Watch Cr closely while on IV Vanc, continue Losartan for now  BPs still slightly high, added low dose Amlodipine 12/5, continue to monitor  Anemia labs unremarkable and Hgb stable, c/w anemia of chronic disease  Continue SSI while here, HgbA1c at goal, not on meds for DM at home  PT eval noted, will likely need SNF at dc ([place]), d/w CCP  Lovenox for DVT ppx).       Martin Watters MD  12/06/18  9:08 AM    Time: 20min

## 2018-12-06 NOTE — PLAN OF CARE
Problem: Patient Care Overview  Goal: Plan of Care Review  Outcome: Ongoing (interventions implemented as appropriate)   12/06/18 1601   Coping/Psychosocial   Plan of Care Reviewed With patient   Plan of Care Review   Progress improving   OTHER   Outcome Summary legs discolored with mild edema, [place] but alert/cooperative/pleasant, vss and afebrile, monitoring blood sugars, tolerating NCS diet without nausea, up in chair most of the day, no BM to obtain stool for OB, iv abx given as ordered, no distress     Goal: Individualization and Mutuality  Outcome: Ongoing (interventions implemented as appropriate)    Goal: Discharge Needs Assessment  Outcome: Ongoing (interventions implemented as appropriate)    Goal: Interprofessional Rounds/Family Conf  Outcome: Ongoing (interventions implemented as appropriate)      Problem: Skin Injury Risk (Adult)  Goal: Skin Health and Integrity  Outcome: Ongoing (interventions implemented as appropriate)      Problem: Fall Risk (Adult)  Goal: Absence of Fall  Outcome: Ongoing (interventions implemented as appropriate)      Problem: Skin and Soft Tissue Infection (Adult)  Goal: Signs and Symptoms of Listed Potential Problems Will be Absent, Minimized or Managed (Skin and Soft Tissue Infection)  Outcome: Ongoing (interventions implemented as appropriate)

## 2018-12-06 NOTE — PROGRESS NOTES
Discharge Planning Assessment  [facility]     Patient Name: Magdalena Jerry  MRN: [id number]  Today's Date: 12/6/2018    Admit Date: 12/3/2018    Discharge Needs Assessment    No documentation.       Discharge Plan     Row Name 12/06/18 1124       Plan    [facility]    Plan Comments  Alicia with [facility] [phone] said short term rehab bed will be available for pt tomorrow.  Linda Fernandez RN        Destination - Selection Complete      Service Provider Request Status Selected Services Address Phone Number Fax Number    [facility] Selected Skilled Nursing [address] [address] [phone]      Durable Medical Equipment      No service coordination in this encounter.      Dialysis/Infusion      No service coordination in this encounter.      Home Medical Care      Service Provider Request Status Selected Services Address Phone Number Fax Number    [facility] Accepted N/A [address] [address] [phone]      [facility]      No service coordination in this encounter.        Expected Discharge Date and Time     Expected Discharge Date Expected Discharge Time    Dec 7, 2018         Linda Fernandez RN

## 2018-12-06 NOTE — THERAPY TREATMENT NOTE
Acute Care - Physical Therapy Treatment Note  [facility]     Patient Name: Magdalena Jerry  : 1926  MRN: [id number]  Today's Date: 2018             Admit Date: 12/3/2018    Visit Dx:    ICD-10-CM ICD-9-CM   1. Cellulitis of lower extremity, unspecified laterality L03.119 682.6   2. Poorly-controlled hypertension I10 401.9     Patient Active Problem List   Diagnosis   • Adjustment disorder with mixed anxiety and depressed mood   • Anemia   • Benign essential hypertension   • Hereditary essential tremor   • Type 2 diabetes mellitus with diabetic polyneuropathy, without long-term current use of insulin (CMS/[facility])   • Dry skin dermatitis   • Dyslipidemia   • Gastroesophageal reflux disease with esophagitis   • Pain of hand   • Hearing loss   • Arthralgia of hip   • Impacted cerumen   • Pruritus   • Knee pain   • Pain in extremity   • Chronic low back pain   • Weakness of lower extremity   • Spinal stenosis of lumbar region   • Senile osteoporosis   • Piriformis syndrome   • Primary insomnia   • Tinea pedis   • Vitamin D deficiency   • Left leg cellulitis   • CKD (chronic kidney disease) stage 3, GFR 30-59 ml/min (CMS/[facility])   • Allergic rhinitis   • Pneumonia due to influenza A virus   • MRSA (methicillin resistant Staphylococcus aureus) infection   • Bacteria in urine   • Cellulitis of lower extremity   • Chronic venous stasis   • Anemia of chronic disease       Therapy Treatment    Rehabilitation Treatment Summary     Row Name 18 6190             Treatment Time/Intention    Discipline  physical therapist  -      Document Type  therapy note (daily note)  -      Subjective Information  complains of;pain  -KH      Mode of Treatment  physical therapy  -KH      Patient/Family Observations  in bed  -KH      Therapy Frequency (PT Clinical Impression)  daily  -KH      Patient Effort  good  -      Comment  [place]  -      Existing Precautions/Restrictions  fall  -KH      Recorded by [YVONNE] Martha  Marlen Mckeon, PT 12/06/18 1455      Row Name 12/06/18 1450             Bed Mobility Assessment/Treatment    Bed Mobility Assessment/Treatment  supine-sit;sit-supine  -YVONNE      Supine-Sit [place] (Bed Mobility)  minimum assist (75% patient effort)  -YVONNE      Sit-Supine [place] (Bed Mobility)  minimum assist (75% patient effort);2 person assist  -KH      Recorded by [KH] Marlen Thomas, PT 12/06/18 1455      Row Name 12/06/18 1450             Transfer Assessment/Treatment    Transfer Assessment/Treatment  sit-stand transfer;stand-sit transfer  -KH      Recorded by [] Marlen Thomas, PT 12/06/18 1455      Row Name 12/06/18 1450             Sit-Stand Transfer    Sit-Stand [place] (Transfers)  minimum assist (75% patient effort)  -YVONNE      Assistive Device (Sit-Stand Transfers)  walker, front-wheeled  -YVONNE      Recorded by [] Marlen Thomas, PT 12/06/18 1455      Row Name 12/06/18 1450             Stand-Sit Transfer    Stand-Sit [place] (Transfers)  contact guard  -YVONNE      Assistive Device (Stand-Sit Transfers)  walker, front-wheeled  -KH      Recorded by [] Marlen Thomas, PT 12/06/18 1455      Row Name 12/06/18 1450             Gait/Stairs Assessment/Training    [place] Level (Gait)  contact guard;minimum assist (75% patient effort)  -YVONNE      Assistive Device (Gait)  walker, front-wheeled  -YVONNE      Distance in Feet (Gait)  90 1 standing rest break  -YVONNE      Pattern (Gait)  step-through  -      Deviations/Abnormal Patterns (Gait)  stride length decreased;gait speed decreased  -      Bilateral Gait Deviations  forward flexed posture  -YVONNE      Comment (Gait/Stairs)  decreased step length as pt fatigued  -KH      Recorded by [] Marlen Thomas, PT 12/06/18 1455      Row Name 12/06/18 1450             Positioning and Restraints    Pre-Treatment Position  in bed  -KH      Post Treatment Position  bed  -KH      In Bed  fowlers;call light within  reach;encouraged to call for assist;exit alarm on;notified nsg  -KH      Recorded by [YVONNE] Marlen Thomas, PT 12/06/18 1455      Row Name 12/06/18 1450             Pain Assessment    Additional Documentation  Pain Scale: Word Pre/Post-Treatment (Group)  -KH      Recorded by [YVONNE] Marlen Thomas, PT 12/06/18 1455      Row Name 12/06/18 1450             Pain Scale: Numbers Pre/Post-Treatment    Pain Location  back  -KH      Pain Intervention(s)  Repositioned  -KH      Recorded by [YVONNE] Marlen Thomas, PT 12/06/18 1455      Row Name 12/06/18 1450             Pain Scale: Word Pre/Post-Treatment    Pain: Word Scale, Pretreatment  2 - mild pain  -KH      Pain: Word Scale, Post-Treatment  2 - mild pain  -KH      Recorded by [YVONNE] Marlen Thomas, PT 12/06/18 1455      Row Name 12/06/18 1450             Plan of Care Review    Plan of Care Reviewed With  patient  -KH      Recorded by [YVONNE] Marlen Thomas, PT 12/06/18 1455      Row Name 12/06/18 1450             Outcome Summary/Treatment Plan (PT)    Anticipated Discharge Disposition (PT)  skilled nursing facility  -KH      Recorded by [YVONNE] Marlen Thomas, PT 12/06/18 1455        User Key  (r) = Recorded By, (t) = Taken By, (c) = Cosigned By    Initials Name Effective Dates Discipline    Marlen Mcgrath, PT 06/08/18 -  PT                   Physical Therapy Education     Title: PT OT SLP Therapies (Done)     Topic: Physical Therapy (Done)     Point: Mobility training (Done)     Learning Progress Summary           Patient Acceptance, E,D, VU,NR by YVONNE at 12/6/2018  2:55 PM    Acceptance, E,TB, VU by MARIELY at 12/6/2018  3:37 AM    Acceptance, E,TB,D, VU,NR by RENNY at 12/5/2018 11:04 AM    Acceptance, E,TB, VU by MT at 12/5/2018  4:49 AM    Acceptance, E,D, VU,NR by YVONNE at 12/4/2018  1:05 PM                   Point: Home exercise program (Done)     Learning Progress Summary           Patient Acceptance, E,D, VU,NR by YVONNE at  12/6/2018  2:55 PM    Acceptance, E,TB, VU by LL at 12/6/2018  3:37 AM    Acceptance, E,TB, VU by MT at 12/5/2018  4:49 AM    Acceptance, E,D, VU,NR by  at 12/4/2018  1:05 PM                   Point: Body mechanics (Done)     Learning Progress Summary           Patient Acceptance, E,D, VU,NR by  at 12/6/2018  2:55 PM    Acceptance, E,TB, VU by LL at 12/6/2018  3:37 AM    Acceptance, E,TB, VU by MT at 12/5/2018  4:49 AM    Acceptance, E,D, VU,NR by  at 12/4/2018  1:05 PM                   Point: Precautions (Done)     Learning Progress Summary           Patient Acceptance, E,D, VU,NR by  at 12/6/2018  2:55 PM    Acceptance, E,TB, VU by LL at 12/6/2018  3:37 AM    Acceptance, E,TB, VU by MT at 12/5/2018  4:49 AM    Acceptance, E,D, VU,NR by  at 12/4/2018  1:05 PM                               User Key     Initials Effective Dates Name Provider Type Discipline     06/08/18 -  Marlen Thomas, PT Physical Therapist PT    MT 06/16/16 -  Titi Steward, RN Registered Nurse Nurse     04/03/18 -  Martha Vega, PT Physical Therapist PT     02/05/18 -  Marie Gonzalez, FINA Registered Nurse Nurse                PT Recommendation and Plan  Anticipated Discharge Disposition (PT): skilled nursing facility  Planned Therapy Interventions (PT Eval): balance training, bed mobility training, gait training, home exercise program, patient/family education, strengthening, transfer training  Therapy Frequency (PT Clinical Impression): daily  Outcome Summary/Treatment Plan (PT)  Anticipated Discharge Disposition (PT): skilled nursing facility  Plan of Care Reviewed With: patient  Outcome Summary: Pt continues to require Min A for bed mobility and transfers. She ambulates fairly well with Rwx, but fatigues quickly and gait quality declines with decreased step lenth and heel strike. Pt lives alone and has family nearby that can assist, but she would benefit from subacute rehab to address strength, endurance,  balance and gait training prior to returning home.   Outcome Measures     Row Name 12/06/18 1500 12/05/18 1100 12/04/18 1300       How much help from another person do you currently need...    Turning from your back to your side while in flat bed without using bedrails?  3  -KH  3  -EJ  3  -KH    Moving from lying on back to sitting on the side of a flat bed without bedrails?  3  -KH  3  -EJ  3  -KH    Moving to and from a bed to a chair (including a wheelchair)?  3  -KH  2  -EJ  3  -KH    Standing up from a chair using your arms (e.g., wheelchair, bedside chair)?  3  -KH  2  -EJ  3  -KH    Climbing 3-5 steps with a railing?  2  -KH  2  -EJ  2  -KH    To walk in hospital room?  3  -KH  3  -EJ  3  -KH    AM-PAC 6 Clicks Score  17  -KH  15  -EJ  17  -KH       Functional Assessment    Outcome Measure Options  AM-PAC 6 Clicks Basic Mobility (PT)  -KH  AM-PAC 6 Clicks Basic Mobility (PT)  -EJ  AM-PAC 6 Clicks Basic Mobility (PT)  -KH      User Key  (r) = Recorded By, (t) = Taken By, (c) = Cosigned By    Initials Name Provider Type    Marlen Mcgrath, PT Physical Therapist    Martha Elliott, PT Physical Therapist         Time Calculation:   PT Charges     Row Name 12/06/18 1448             Time Calculation    Start Time  1430  -      Stop Time  1443  -KH      Time Calculation (min)  13 min  -      PT Received On  12/06/18  -      PT - Next Appointment  12/07/18  -         Time Calculation- PT    Total Timed Code Minutes- PT  13 minute(s)  -        User Key  (r) = Recorded By, (t) = Taken By, (c) = Cosigned By    Initials Name Provider Type    Marlen Mcgrath, PT Physical Therapist        Therapy Suggested Charges     Code   Minutes Charges    None           Therapy Charges for Today     Code Description Service Date Service Provider Modifiers Qty    67553216541 HC PT THER PROC EA 15 MIN 12/6/2018 Marlen Thomas, PT GP 1          PT G-Codes  Outcome Measure Options: AM-PAC 6  Clicks Basic Mobility (PT)  -[facility] 6 Clicks Score: 17    Marlen Thomas, PT  12/6/2018

## 2018-12-06 NOTE — PROGRESS NOTES
Discharge Planning Assessment  [facility]     Patient Name: Magdalena Jerry  MRN: [id number]  Today's Date: 12/6/2018    Admit Date: 12/3/2018    Discharge Needs Assessment    No documentation.       Discharge Plan     Row Name 12/06/18 1535       Plan    Plan  [facility] then [facility]    Plan Comments  I met with pt and her son Anoop and SHREYA, I advised attending updated me today that discharge is planned for tomorrow & [facility] said they anticipate having a bed for pt.  Anoop said they will transport pt to [facility] when didcharged.           Linda Fernandez RN        Destination - Selection Complete      Service Provider Request Status Selected Services Address Phone Number Fax Number    [facility] Selected Skilled Nursing [address] [facility] [address] [phone]      Durable Medical Equipment      No service coordination in this encounter.      Dialysis/Infusion      No service coordination in this encounter.      Home Medical Care      Service Provider Request Status Selected Services Address Phone Number Fax Number    [facility] Accept[facility] N/A [address] [id number] [phone]      [facility]      No service coordination in this encounter.        Expected Discharge Date and Time     Expected Discharge Date Expected Discharge Time    Dec 7, 2018        Linda Fernandez RN

## 2018-12-07 VITALS
HEART RATE: 62 BPM | BODY MASS INDEX: 26.84 KG/M2 | HEIGHT: 67 IN | OXYGEN SATURATION: 95 % | DIASTOLIC BLOOD PRESSURE: 80 MMHG | RESPIRATION RATE: 18 BRPM | TEMPERATURE: 98.5 F | SYSTOLIC BLOOD PRESSURE: 189 MMHG | WEIGHT: 171 LBS

## 2018-12-07 LAB
ALBUMIN SERPL-MCNC: 3.3 G/DL (ref 3.5–5.2)
ALBUMIN/GLOB SERPL: 1.4 G/DL
ALP SERPL-CCNC: 72 U/L (ref 39–117)
ALT SERPL W P-5'-P-CCNC: 9 U/L (ref 1–33)
ANION GAP SERPL CALCULATED.3IONS-SCNC: 9.6 MMOL/L
AST SERPL-CCNC: 17 U/L (ref 1–32)
BASOPHILS # BLD AUTO: 0.04 10*3/MM3 (ref 0–0.2)
BASOPHILS NFR BLD AUTO: 0.9 % (ref 0–1.5)
BILIRUB SERPL-MCNC: 0.2 MG/DL (ref 0.1–1.2)
BUN BLD-MCNC: 21 MG/DL (ref 8–23)
BUN/CREAT SERPL: 20 (ref 7–25)
CALCIUM SPEC-SCNC: 8.4 MG/DL (ref 8.2–9.6)
CHLORIDE SERPL-SCNC: 111 MMOL/L (ref 98–107)
CO2 SERPL-SCNC: 23.4 MMOL/L (ref 22–29)
CREAT BLD-MCNC: 1.05 MG/DL (ref 0.57–1)
DEPRECATED RDW RBC AUTO: 50.3 FL (ref 37–54)
EOSINOPHIL # BLD AUTO: 0.16 10*3/MM3 (ref 0–0.7)
EOSINOPHIL NFR BLD AUTO: 3.6 % (ref 0.3–6.2)
ERYTHROCYTE [DISTWIDTH] IN BLOOD BY AUTOMATED COUNT: 13.6 % (ref 11.7–13)
GFR SERPL CREATININE-BSD FRML MDRD: 49 ML/MIN/1.73
GLOBULIN UR ELPH-MCNC: 2.4 GM/DL
GLUCOSE BLD-MCNC: 90 MG/DL (ref 65–99)
GLUCOSE BLDC GLUCOMTR-MCNC: 118 MG/DL (ref 70–130)
GLUCOSE BLDC GLUCOMTR-MCNC: 84 MG/DL (ref 70–130)
HCT VFR BLD AUTO: 32.9 % (ref 35.6–45.5)
HGB BLD-MCNC: 10 G/DL (ref 11.9–15.5)
IMM GRANULOCYTES # BLD: 0 10*3/MM3 (ref 0–0.03)
IMM GRANULOCYTES NFR BLD: 0 % (ref 0–0.5)
LYMPHOCYTES # BLD AUTO: 1.75 10*3/MM3 (ref 0.9–4.8)
LYMPHOCYTES NFR BLD AUTO: 39.7 % (ref 19.6–45.3)
MCH RBC QN AUTO: 30.7 PG (ref 26.9–32)
MCHC RBC AUTO-ENTMCNC: 30.4 G/DL (ref 32.4–36.3)
MCV RBC AUTO: 100.9 FL (ref 80.5–98.2)
MONOCYTES # BLD AUTO: 0.44 10*3/MM3 (ref 0.2–1.2)
MONOCYTES NFR BLD AUTO: 10 % (ref 5–12)
NEUTROPHILS # BLD AUTO: 2.02 10*3/MM3 (ref 1.9–8.1)
NEUTROPHILS NFR BLD AUTO: 45.8 % (ref 42.7–76)
PLATELET # BLD AUTO: 165 10*3/MM3 (ref 140–500)
PMV BLD AUTO: 9 FL (ref 6–12)
POTASSIUM BLD-SCNC: 4.5 MMOL/L (ref 3.5–5.2)
PROT SERPL-MCNC: 5.7 G/DL (ref 6–8.5)
RBC # BLD AUTO: 3.26 10*6/MM3 (ref 3.9–5.2)
SODIUM BLD-SCNC: 144 MMOL/L (ref 136–145)
WBC NRBC COR # BLD: 4.41 10*3/MM3 (ref 4.5–10.7)

## 2018-12-07 PROCEDURE — 82962 GLUCOSE BLOOD TEST: CPT

## 2018-12-07 PROCEDURE — 80053 COMPREHEN METABOLIC PANEL: CPT | Performed by: HOSPITALIST

## 2018-12-07 PROCEDURE — 85025 COMPLETE CBC W/AUTO DIFF WBC: CPT | Performed by: HOSPITALIST

## 2018-12-07 RX ORDER — AMLODIPINE BESYLATE 5 MG/1
5 TABLET ORAL
Start: 2018-12-07 | End: 2019-01-14 | Stop reason: SDUPTHER

## 2018-12-07 RX ORDER — AMLODIPINE BESYLATE 5 MG/1
5 TABLET ORAL
Status: DISCONTINUED | OUTPATIENT
Start: 2018-12-07 | End: 2018-12-07 | Stop reason: HOSPADM

## 2018-12-07 RX ORDER — CLINDAMYCIN HYDROCHLORIDE 150 MG/1
450 CAPSULE ORAL EVERY 8 HOURS SCHEDULED
Qty: 36 CAPSULE | Refills: 0 | Status: SHIPPED | OUTPATIENT
Start: 2018-12-07 | End: 2018-12-11

## 2018-12-07 RX ADMIN — FLUTICASONE PROPIONATE 2 SPRAY: 50 SPRAY, METERED NASAL at 08:47

## 2018-12-07 RX ADMIN — TRIAMCINOLONE ACETONIDE 1 APPLICATION: 1 CREAM TOPICAL at 05:22

## 2018-12-07 RX ADMIN — Medication 500 MG: at 08:45

## 2018-12-07 RX ADMIN — CETIRIZINE HYDROCHLORIDE 10 MG: 10 TABLET, FILM COATED ORAL at 08:45

## 2018-12-07 RX ADMIN — PRIMIDONE 50 MG: 50 TABLET ORAL at 08:46

## 2018-12-07 RX ADMIN — CLINDAMYCIN HYDROCHLORIDE 450 MG: 150 CAPSULE ORAL at 12:24

## 2018-12-07 RX ADMIN — SERTRALINE 100 MG: 100 TABLET, FILM COATED ORAL at 08:45

## 2018-12-07 RX ADMIN — LOSARTAN POTASSIUM 50 MG: 50 TABLET, FILM COATED ORAL at 08:46

## 2018-12-07 RX ADMIN — AMLODIPINE BESYLATE 5 MG: 5 TABLET ORAL at 10:55

## 2018-12-07 RX ADMIN — FAMOTIDINE 20 MG: 20 TABLET, FILM COATED ORAL at 08:46

## 2018-12-07 RX ADMIN — SODIUM CHLORIDE, PRESERVATIVE FREE 3 ML: 5 INJECTION INTRAVENOUS at 08:47

## 2018-12-07 NOTE — PLAN OF CARE
Problem: Patient Care Overview  Goal: Plan of Care Review  Outcome: Ongoing (interventions implemented as appropriate)   12/07/18 8663   Coping/Psychosocial   Plan of Care Reviewed With patient   Plan of Care Review   Progress improving   OTHER   Outcome Summary VSS. BLE red with scars from previous hospitalizations, mild edema. Kenalog cream and vanc continued. Very [place] but A&Ox4. Up with asst and walker, incontinent at times vs continent. No complaints of pain or nausea. Small BM today. Resting well     Goal: Individualization and Mutuality  Outcome: Ongoing (interventions implemented as appropriate)    Goal: Discharge Needs Assessment  Outcome: Ongoing (interventions implemented as appropriate)    Goal: Interprofessional Rounds/Family Conf  Outcome: Ongoing (interventions implemented as appropriate)      Problem: Skin Injury Risk (Adult)  Goal: Skin Health and Integrity  Outcome: Ongoing (interventions implemented as appropriate)      Problem: Fall Risk (Adult)  Goal: Absence of Fall  Outcome: Ongoing (interventions implemented as appropriate)      Problem: Skin and Soft Tissue Infection (Adult)  Goal: Signs and Symptoms of Listed Potential Problems Will be Absent, Minimized or Managed (Skin and Soft Tissue Infection)  Outcome: Ongoing (interventions implemented as appropriate)

## 2018-12-07 NOTE — DISCHARGE SUMMARY
Name: Magdalena Jerry  Age: 89 y.o.  Sex: female  :  1926  MRN: [id number]         Primary Care Physician: Fly Sanchez DO      Date of Admission:  12/3/2018  Date of Discharge:  2018      Chief Complaint:  Leg Swelling (left.  red, warm.  last year she had a ellulitis which looked the same  )      Presenting Problem/History of Present Illness:  Poorly-controlled hypertension [I10]  Cellulitis of lower extremity, unspecified laterality [L03.119]  Cellulitis of lower extremity, unspecified laterality [L03.119]     Discharge Diagnosis:  Active Hospital Problems    Diagnosis Date Noted   • **Left leg cellulitis [L03.116] 03/15/2017   • Anemia of chronic disease [D63.8] 2018   • Chronic venous stasis [I87.8] 2018   • CKD (chronic kidney disease) stage 3, GFR 30-59 ml/min (CMS/[facility]) [N18.3] 03/15/2017   • Type 2 diabetes mellitus with diabetic polyneuropathy, without long-term current use of insulin (CMS/[facility]) [E11.42] 2016   • Benign essential hypertension [I10] 2016      Resolved Hospital Problems   No resolved problems to display.       Secondary Diagnoses:  Past Medical History:   Diagnosis Date   • Adjustment disorder with mixed anxiety and depressed mood    • Anemia    • Anxiety    • Atrophy of hand muscles     LEFT HAND   • Benign familial tremor    • Carpal tunnel syndrome    • Cataract    • Chronic rhinosinusitis    • Depression    • Diabetes mellitus (CMS/[facility])    • Dyslipidemia    • Esophagitis, reflux    • Fracture of hip (CMS/[facility])    • Frequent falls    • GERD (gastroesophageal reflux disease)    • Hand pain    • Hearing loss    • Hip pain    • Hyperlipidemia    • Hypertension    • Impacted cerumen    • Insomnia    • Knee pain    • Limb pain    • Loss of hearing    • Low back pain    • Lower extremity weakness    • Lumbar canal stenosis    • Osteoporosis, senile    • Piriformis syndrome    • Renal insufficiency    • Sensorineural hearing loss    • Stroke  (CMS/[facility]) 2004   • Tinea pedis    • Vitamin D deficiency          Consults:  Consult Orders (all) (From admission, onward)    Start     Ordered    12/04/18 0851  Inpatient Infectious Diseases Consult  Once     Specialty:  Infectious Diseases  Provider:  Jaylene Rodriguez MD    12/04/18 0851    12/04/18 0029  Inpatient Case Management  Consult  Once     Provider:  (Not yet assigned)    12/04/18 0029    12/03/18 2119  [facility] (on-call MD unless specified)  Once     Specialty:  Internal Medicine  Provider:  (Not yet assigned)    12/03/18 2118          Procedures Performed:  None        Hospital Course:  Very pleasant 89yo woman with a history of hypertension, chronic kidney disease stage III, type 2 diabetes with polyneuropathy, history of chronic venous stasis who was admitted to our service in 2017 for left leg cellulitis due to MRSA.  She presented to the emergency room for soreness, swelling, erythema and warmth of her left leg.  Symptoms started last Tuesday and had progressively worsened. She was started on Rocephin and Vanc and admitted to our service. Cultures were sent and ID was consulted. Cultures are all negative. ID recommended continuing IV Vanc with transition to oral Clindamycin at discharge. She has progressively improved, slowly but surely, over the last several days. Amlodipine was added to her antihypertensive regimen and BPs are better now. She is still having some elevated BPs here and there and will need further monitoring/adjustment after discharge. Arrangements made for discharge to [facility] today.          Physical Exam:  Temp:  [97.6 °F (36.4 °C)-99.6 °F (37.6 °C)] 98.5 °F (36.9 °C)  Heart Rate:  [62-96] 62  Resp:  [18] 18  BP: (138-191)/(52-75) 180/63  Body mass index is 26.78 kg/m².  Physical Exam  General Appearance:  Comfortable and in no acute distress.      Output: Producing urine.    HEENT: Normal HEENT exam.    Lungs:  Normal effort and normal respiratory rate.  Breath  sounds clear to auscultation.    Heart: Normal rate.  Regular rhythm.  Positive for murmur.    Abdomen: Abdomen is soft.  Bowel sounds are normal.   There is no abdominal tenderness.     Extremities: There is venous stasis (chronic BLEs) and dependent edema (L>R).  (Erythema of BLEs, L>R, warm and TTP, no wounds  Seems improved today)  Pulses: Distal pulses are intact.    Neurological: Patient is alert.  (Very [place]).    Pupils:  Pupils are equal, round, and reactive to light.    Skin:  Warm and dry.          Condition on Discharge:  Stable.    Discharge Disposition:   [place]    Allergies:   Allergies   Allergen Reactions   • Amoxicillin Other (See Comments)     unsure   • Benzodiazepines    • Codeine    • Cortisone    • Macrolides And Ketolides    • Melatonin    • Neomycin    • Penicillins    • Sulfa Antibiotics    • Tetracyclines & Related    • Erythromycin Rash       Discharge Medications:      Discharge Medications      New Medications      Instructions Start Date   amLODIPine 5 MG tablet  Commonly known as:  NORVASC   5 mg, Oral, Every 24 Hours Scheduled      clindamycin 150 MG capsule  Commonly known as:  CLEOCIN   450 mg, Oral, Every 8 Hours Scheduled         Continue These Medications      Instructions Start Date   acetaminophen 325 MG tablet  Commonly known as:  TYLENOL   650 mg, Oral, Every 6 Hours PRN      fexofenadine 180 MG tablet  Commonly known as:  ALLEGRA   180 mg, Oral, Daily      fluticasone 50 MCG/ACT nasal spray  Commonly known as:  FLONASE   USE TWO SPRAYS IN EACH NOSTRIL ONCE DAILY      Lancet Device misc   Use as directed to test glucose once daily. Diagnosis E11.9      losartan 50 MG tablet  Commonly known as:  COZAAR   TAKE 1 TABLET EVERY DAY FOR BLOOD PRESSURE      mirtazapine 15 MG tablet  Commonly known as:  REMERON   TAKE 1 TABLET EVERY NIGHT      primidone 50 MG tablet  Commonly known as:  MYSOLINE   TAKE 1 TABLET TWICE DAILY  FOR  TREMOR      raNITIdine 150 MG tablet  Commonly  known as:  ZANTAC   TAKE 1 TABLET TWICE DAILY  FOR  REFLUX  ESOPHAGITIS      sertraline 100 MG tablet  Commonly known as:  ZOLOFT   TAKE 1 TABLET EVERY DAY  FOR  MOOD  AND  WELL  BEING      triamcinolone 0.025 % cream  Commonly known as:  KENALOG   Topical, 2 Times Daily      TRUE METRIX BLOOD GLUCOSE TEST test strip  Generic drug:  glucose blood   USE TO TEST BLOOD SUGAR ONCE DAILY AS DIRECTED             Discharge Diet:     Activity at Discharge:     Follow-up Appointments:  Future Appointments   Date Time Provider Department Center   2/11/2019  5:30 PM Fly Sanchez DO [facility] None     Additional Instructions for the Follow-ups that You Need to Schedule     Discharge Follow-up with PCP   As directed       Currently Documented PCP:    Fly Sanchez DO    PCP Phone Number:    [phone]     Follow Up Details:  Dr. Sanchez (PCP) after dc from facility            Contact information for follow-up providers     Fly Sanchez DO .    Specialties:  Family Medicine, Emergency Medicine  Why:  Dr. Sanchez (PCP) after dc from facility  Contact information:  [address] [place] [id number]  [phone]                   Contact information for after-discharge care     Destination     [facility] .    Service:  Skilled Nursing  Contact information:  [address] [facility] [address] [phone]                           Additional Instructions for the Follow-ups that You Need to Schedule     Discharge Follow-up with PCP   As directed       Currently Documented PCP:    Fly Sanchez DO    PCP Phone Number:    [phone]     Follow Up Details:  Dr. Sanchez (PCP) after dc from facility               Test Results Pending at Discharge:  None   Order Current Status    Blood Culture - Blood, Arm, Left Preliminary result    Blood Culture - Blood, Arm, Left Preliminary result           Code status:   Code Status and Medical Interventions:   Ordered at: 12/03/18  2246     Level Of Support Discussed With:    Patient     Code Status:    CPR     Medical Interventions (Level of Support Prior to Arrest):    Full         Martin Watters MD  [facility]  12/07/18  8:53 AM      Time: greater than 30 minutes.

## 2018-12-07 NOTE — PROGRESS NOTES
Discharge Planning Assessment  [facility]     Patient Name: Magdalena Jerry  MRN: [id number]  Today's Date: 12/7/2018    Admit Date: 12/3/2018    Discharge Needs Assessment    No documentation.       Discharge Plan     Row Name 12/07/18 1112       Plan    Plan  [facility] then home with [facility]    Plan Comments  Alicia olmedo [facility] notified me that bed will be ready after 1PM.  Discharge clinical faxed to [place]. Zakia with [facility] notified of discharge to [place], she said they will follow pt there.  Pt's son Anoop told me yesterday he will transport his mother to [place].  I called pt's son Anoop ([phone]), a recording said I had reached [place], I left a HIPPA compliant voice mail advising of discharge and bed will be ready after 1PM.  I advised also that I gave pt her IMM Letter with her Medicare Rights, she was reading the previous one when I met with her today, she said she would prefer her son Anoop sign the letter.  I left the letters in the room and ask that it be signed when he arrives to the hospital.   Linda Fernandez RN    Final Discharge Disposition Code  03 - skilled nursing facility (SNF)        Destination - Selection Complete      Service Provider Request Status Selected Services Address Phone Number Fax Number    [facility] Selected Skilled Nursing [address] [address] [phone]      Durable Medical Equipment      No service coordination in this encounter.      Dialysis/Infusion      No service coordination in this encounter.      Home Medical Care      Service Provider Request Status Selected Services Address Phone Number Fax Number    [facility] Accepted N/A [address] [address] [phone]      [facility]      No service coordination in this encounter.        Expected Discharge Date and Time     Expected  Discharge Date Expected Discharge Time    Dec 7, 2018             Linda Fernandez RN

## 2018-12-08 LAB
BACTERIA SPEC AEROBE CULT: NORMAL
BACTERIA SPEC AEROBE CULT: NORMAL

## 2018-12-10 ENCOUNTER — EPISODE CHANGES (OUTPATIENT)
Dept: CASE MANAGEMENT | Facility: OTHER | Age: 83
End: 2018-12-10

## 2019-01-03 ENCOUNTER — PATIENT OUTREACH (OUTPATIENT)
Dept: CASE MANAGEMENT | Facility: OTHER | Age: 84
End: 2019-01-03

## 2019-01-03 ENCOUNTER — EPISODE CHANGES (OUTPATIENT)
Dept: CASE MANAGEMENT | Facility: OTHER | Age: 84
End: 2019-01-03

## 2019-01-07 ENCOUNTER — PATIENT OUTREACH (OUTPATIENT)
Dept: CASE MANAGEMENT | Facility: OTHER | Age: 84
End: 2019-01-07

## 2019-01-07 NOTE — OUTREACH NOTE
Care Management Plan 1/7/2019   Lifestyle Goals Decrease falls risk;Eat a healthy diet;Medication management;Routine eye exam;Routine follow-up with doctor(s);Routine foot care;Self monitor blood sugar   Self Management Get flu/pneumonia shot;Home Glucose Monitoring;Medication Adherence   Suggested Appointments Other (See Comment)   Suggested Appointments Move up appt with PCP for follow-up IP and Rehab stay   Annual Wellness Visit:  Patient Will Schedule   Specific Disease Process Teaching Diabetes   Does patient have depression diagnosis? No   Advanced Directives: Patient Has   Discharged From: [place]   Discharged to: Home   Admit Date: 12/7/18   Discharge Date: 1/4/19   Discharge destination: Home Health   Agency: [facility]   Medication Adherence Medications understood   Health Literacy Moderate     The main concerns and/or symptoms the patient would like to address are: Swelling in feet.    Education/instruction provided by Care Coordinator: Discussed elevating lower extremities above heart level while sitting and reports elevated while sitting in recliner.   A nurse visited today with plans to visit twice weekly on Mondays and Thursdays.  Blood sugar this morning 125 and 88 yesterday.  Adherent to diet and medications.No questions or concerns related to medications.    Discussed low salt intake and denies any fluid restrictions. Recommended moving PCP appt up for follow-up from inpatient hospital stay and rehab.  She will discuss with her sons who assist with transportation needs. Son, Nathan, is POA.  Does not utilize my chart.    Follow Up Outreach Due: As needed.    Alfreda Corrales RN

## 2019-01-14 ENCOUNTER — TELEPHONE (OUTPATIENT)
Dept: FAMILY MEDICINE CLINIC | Facility: CLINIC | Age: 84
End: 2019-01-14

## 2019-01-14 RX ORDER — AMLODIPINE BESYLATE 5 MG/1
5 TABLET ORAL
Qty: 30 TABLET | Refills: 5 | Status: SHIPPED | OUTPATIENT
Start: 2019-01-14 | End: 2019-02-11 | Stop reason: SDUPTHER

## 2019-01-14 RX ORDER — CALCIUM CITRATE/VITAMIN D3 200MG-6.25
TABLET ORAL
Qty: 100 EACH | Refills: 0 | Status: SHIPPED | OUTPATIENT
Start: 2019-01-14

## 2019-01-16 RX ORDER — LANCETS 33 GAUGE
EACH MISCELLANEOUS
Qty: 100 EACH | Refills: 5 | Status: SHIPPED | OUTPATIENT
Start: 2019-01-16

## 2019-01-28 ENCOUNTER — PATIENT OUTREACH (OUTPATIENT)
Dept: CASE MANAGEMENT | Facility: OTHER | Age: 84
End: 2019-01-28

## 2019-02-11 ENCOUNTER — OFFICE VISIT (OUTPATIENT)
Dept: FAMILY MEDICINE CLINIC | Facility: CLINIC | Age: 84
End: 2019-02-11

## 2019-02-11 VITALS
SYSTOLIC BLOOD PRESSURE: 120 MMHG | BODY MASS INDEX: 28.72 KG/M2 | WEIGHT: 183 LBS | DIASTOLIC BLOOD PRESSURE: 76 MMHG | HEART RATE: 60 BPM | HEIGHT: 67 IN | OXYGEN SATURATION: 97 %

## 2019-02-11 DIAGNOSIS — N18.30 CKD (CHRONIC KIDNEY DISEASE) STAGE 3, GFR 30-59 ML/MIN (HCC): ICD-10-CM

## 2019-02-11 DIAGNOSIS — I87.8 CHRONIC VENOUS STASIS: ICD-10-CM

## 2019-02-11 DIAGNOSIS — Z00.00 MEDICARE ANNUAL WELLNESS VISIT, SUBSEQUENT: Primary | ICD-10-CM

## 2019-02-11 DIAGNOSIS — E11.42 TYPE 2 DIABETES MELLITUS WITH DIABETIC POLYNEUROPATHY, WITHOUT LONG-TERM CURRENT USE OF INSULIN (HCC): ICD-10-CM

## 2019-02-11 DIAGNOSIS — I10 BENIGN ESSENTIAL HYPERTENSION: ICD-10-CM

## 2019-02-11 PROCEDURE — 99214 OFFICE O/P EST MOD 30 MIN: CPT | Performed by: FAMILY MEDICINE

## 2019-02-11 PROCEDURE — G0439 PPPS, SUBSEQ VISIT: HCPCS | Performed by: FAMILY MEDICINE

## 2019-02-11 RX ORDER — AMLODIPINE BESYLATE 5 MG/1
5 TABLET ORAL
Qty: 90 TABLET | Refills: 3 | Status: SHIPPED | OUTPATIENT
Start: 2019-02-11 | End: 2019-08-07 | Stop reason: HOSPADM

## 2019-02-11 NOTE — PROGRESS NOTES
QUICK REFERENCE INFORMATION:  The ABCs of the Annual Wellness Visit    Subsequent Medicare Wellness Visit    HEALTH RISK ASSESSMENT    2/20/1926    Recent Hospitalizations:  Recently treated at the following:  [facility].    Hospital Course:  Very pleasant 89yo woman with a history of hypertension, chronic kidney disease stage III, type 2 diabetes with polyneuropathy, history of chronic venous stasis who was admitted to our service in 2017 for left leg cellulitis due to MRSA.  She presented to the emergency room for soreness, swelling, erythema and warmth of her left leg.  Symptoms started last Tuesday and had progressively worsened. She was started on Rocephin and Vanc and admitted to our service. Cultures were sent and ID was consulted. Cultures are all negative. ID recommended continuing IV Vanc with transition to oral Clindamycin at discharge. She has progressively improved, slowly but surely, over the last several days. Amlodipine was added to her antihypertensive regimen and BPs are better now. She is still having some elevated BPs here and there and will need further monitoring/adjustment after discharge. Arrangements made for discharge to [place] today.    Current Medical Providers:  Patient Care Team:  Fly Sanchez DO as PCP - General  Fly Sanchez DO as PCP - [facility] Alfreda Pope, RN as Care Coordinator (Population Health)        Smoking Status:  Social History     Tobacco Use   Smoking Status Never Smoker   Smokeless Tobacco Never Used       Alcohol Consumption:  Social History     Substance and Sexual Activity   Alcohol Use No       Depression Screen:   PHQ-2/PHQ-9 Depression Screening 2/11/2019   Little interest or pleasure in doing things 0   Feeling down, depressed, or hopeless 0   Trouble falling or staying asleep, or sleeping too much 0   Feeling tired or having little energy 3   Poor appetite or overeating 0   Feeling bad about yourself - or that you  are a failure or have let yourself or your family down 0   Trouble concentrating on things, such as reading the newspaper or watching television 0   Moving or speaking so slowly that other people could have noticed. Or the opposite - being so fidgety or restless that you have been moving around a lot more than usual 0   Thoughts that you would be better off dead, or of hurting yourself in some way 0   Total Score 3   If you checked off any problems, how difficult have these problems made it for you to do your work, take care of things at home, or get along with other people? Not difficult at all       Health Habits and Functional and Cognitive Screening:  Functional & Cognitive Status 2/11/2019   Do you have difficulty preparing food and eating? No   Do you have difficulty bathing yourself, getting dressed or grooming yourself? No   Do you have difficulty using the toilet? No   Do you have difficulty moving around from place to place? No   Do you have trouble with steps or getting out of a bed or a chair? No   In the past year have you fallen or experienced a near fall? No   Current Diet Well Balanced Diet   Dental Exam Up to date   Eye Exam Up to date   Exercise (times per week) 0 times per week   Current Exercise Activities Include None   Do you need help using the phone?  No   Are you deaf or do you have serious difficulty hearing?  Yes   Do you need help with transportation? Yes   Do you need help shopping? Yes   Do you need help preparing meals?  No   Do you need help with housework?  No   Do you need help with laundry? Yes   Do you need help taking your medications? No   Do you need help managing money? No   Do you ever drive or ride in a car without wearing a seat belt? No   Have you felt unusual stress, anger or loneliness in the last month? No   Who do you live with? Alone   If you need help, do you have trouble finding someone available to you? No   Have you been bothered in the last four weeks by sexual  problems? No   Do you have difficulty concentrating, remembering or making decisions? No           Does the patient have evidence of cognitive impairment? 1 of 3 on rcall, clock normal.      Aspirin use counseling: Does not need ASA (and currently is not on it)      Recent Lab Results:  CMP:  Lab Results   Component Value Date    GLU 99 08/06/2018    BUN 21 12/07/2018    CREATININE 1.05 (H) 12/07/2018    EGFRIFNONA 49 (L) 12/07/2018    EGFRIFAFRI 44 (L) 08/06/2018    BCR 20.0 12/07/2018     12/07/2018    K 4.5 12/07/2018    CO2 23.4 12/07/2018    CALCIUM 8.4 12/07/2018    PROTENTOTREF 6.4 08/06/2018    ALBUMIN 3.30 (L) 12/07/2018    LABGLOBREF 2.5 08/06/2018    LABIL2 1.6 08/06/2018    BILITOT 0.2 12/07/2018    ALKPHOS 72 12/07/2018    AST 17 12/07/2018    ALT 9 12/07/2018     Lipid Panel:  Lab Results   Component Value Date    TRIG 144 06/10/2016    HDL 67 06/10/2016    VLDL 29 06/10/2016     HbA1c:  Lab Results   Component Value Date    HGBA1C 6.03 (H) 12/06/2018       Visual Acuity:  No exam data present    Age-appropriate Screening Schedule:  Refer to the list below for future screening recommendations based on patient's age, sex and/or medical conditions. Orders for these recommended tests are listed in the plan section. The patient has been provided with a written plan.    Health Maintenance   Topic Date Due   • ZOSTER VACCINE (2 of 2) 02/06/2017   • LIPID PANEL  06/10/2017   • DXA SCAN  06/27/2018   • MAMMOGRAM  12/12/2018   • URINE MICROALBUMIN  02/05/2019   • HEMOGLOBIN A1C  06/06/2019   • TDAP/TD VACCINES (2 - Td) 12/12/2026   • INFLUENZA VACCINE  Completed   • PNEUMOCOCCAL VACCINES (65+ LOW/MEDIUM RISK)  Completed        Subjective   History of Present Illness    Magdalena Jerry is a 89 y.o. female who presents for an Subsequent Wellness Visit.    The following portions of the patient's history were reviewed and updated as appropriate: allergies, current medications, past family history, past  medical history, past social history, past surgical history and problem list.    Outpatient Medications Prior to Visit   Medication Sig Dispense Refill   • acetaminophen (TYLENOL) 325 MG tablet Take 2 tablets by mouth Every 6 (Six) Hours As Needed for Mild Pain (1-3).  0   • fexofenadine (ALLEGRA) 180 MG tablet Take 1 tablet by mouth Daily. 30 tablet 2   • fluticasone (FLONASE) 50 MCG/ACT nasal spray USE TWO SPRAYS IN EACH NOSTRIL ONCE DAILY 16 mL 2   • LANCETS MICRO THIN 33G misc USE AS DIRECTED TO TEST BLOOD GLUCOSE DAILY 100 each 5   • losartan (COZAAR) 50 MG tablet TAKE 1 TABLET EVERY DAY FOR BLOOD PRESSURE 90 tablet 1   • mirtazapine (REMERON) 15 MG tablet TAKE 1 TABLET EVERY NIGHT 90 tablet 1   • primidone (MYSOLINE) 50 MG tablet TAKE 1 TABLET TWICE DAILY  FOR  TREMOR 180 tablet 1   • raNITIdine (ZANTAC) 150 MG tablet TAKE 1 TABLET TWICE DAILY  FOR  REFLUX  ESOPHAGITIS 180 tablet 1   • sertraline (ZOLOFT) 100 MG tablet TAKE 1 TABLET EVERY DAY  FOR  MOOD  AND  WELL  BEING 90 tablet 1   • triamcinolone (KENALOG) 0.025 % cream Apply  topically to the appropriate area as directed 2 (Two) Times a Day.     • TRUE METRIX BLOOD GLUCOSE TEST test strip USE TO TEST BLOOD SUGAR ONCE DAILY AS DIRECTED 100 each 0   • amLODIPine (NORVASC) 5 MG tablet Take 1 tablet by mouth Daily. 30 tablet 5     No facility-administered medications prior to visit.        Patient Active Problem List   Diagnosis   • Adjustment disorder with mixed anxiety and depressed mood   • Anemia   • Benign essential hypertension   • Hereditary essential tremor   • Type 2 diabetes mellitus with diabetic polyneuropathy, without long-term current use of insulin (CMS/[facility])   • Dry skin dermatitis   • Dyslipidemia   • Gastroesophageal reflux disease with esophagitis   • Pain of hand   • Hearing loss   • Arthralgia of hip   • Impacted cerumen   • Pruritus   • Knee pain   • Pain in extremity   • Chronic low back pain   • Weakness of lower extremity   • Spinal  stenosis of lumbar region   • Senile osteoporosis   • Piriformis syndrome   • Primary insomnia   • Tinea pedis   • Vitamin D deficiency   • Left leg cellulitis   • CKD (chronic kidney disease) stage 3, GFR 30-59 ml/min (CMS/[facility])   • Allergic rhinitis   • Pneumonia due to influenza A virus   • MRSA (methicillin resistant Staphylococcus aureus) infection   • Bacteria in urine   • Cellulitis of lower extremity   • Chronic venous stasis   • Anemia of chronic disease       Advance Care Planning:  has NO advance directive - information provided to the patient today    Identification of Risk Factors:  Risk factors include: advanced age.    Review of Systems   Respiratory: Negative for shortness of breath.    Cardiovascular: Negative for chest pain, palpitations, orthopnea and PND.   Skin: Positive for rash.       Compared to one year ago, the patient feels her physical health is the same.  Compared to one year ago, the patient feels her mental health is the same.    Objective     Physical Exam   Constitutional: She appears well-developed and well-nourished.   HENT:   Head: Normocephalic and atraumatic.   Neck: Neck supple. No JVD present. No thyromegaly present.   Cardiovascular: Normal rate, regular rhythm and normal heart sounds. Exam reveals no gallop and no friction rub.   No murmur heard.  Pulmonary/Chest: Effort normal and breath sounds normal. No respiratory distress. She has no wheezes. She has no rales.   Abdominal: Soft. Bowel sounds are normal. She exhibits no distension. There is no tenderness. There is no rebound and no guarding.   Musculoskeletal: She exhibits edema (venous stasis, no warmth to touch).   Neurological: She is alert.   Skin: Skin is warm and dry.   Psychiatric: She has a normal mood and affect. Her behavior is normal.   Nursing note and vitals reviewed.      Vitals:    02/11/19 1751   BP: 120/76   BP Location: Right arm   Patient Position: Sitting   Cuff Size: Adult   Pulse: 60   SpO2: 97%  "  Weight: 83 kg (183 lb)   Height: 170.2 cm (67\")   PainSc: 0-No pain       Patient's Body mass index is 28.66 kg/m². BMI is within normal parameters. No follow-up required..      Assessment/Plan   Patient Self-Management and Personalized Health Advice  The patient has been provided with information about: diet and preventive services including:   · Advance directive.    Visit Diagnoses:    ICD-10-CM ICD-9-CM   1. Medicare annual wellness visit, subsequent Z00.00 V70.0   2. Benign essential hypertension I10 401.1   3. Chronic venous stasis I87.8 459.81   4. Type 2 diabetes mellitus with diabetic polyneuropathy, without long-term current use of insulin (CMS/[facility]) E11.42 250.60     357.2   5. CKD (chronic kidney disease) stage 3, GFR 30-59 ml/min (CMS/[facility]) N18.3 585.3       No orders of the defined types were placed in this encounter.      Outpatient Encounter Medications as of 2/11/2019   Medication Sig Dispense Refill   • acetaminophen (TYLENOL) 325 MG tablet Take 2 tablets by mouth Every 6 (Six) Hours As Needed for Mild Pain (1-3).  0   • amLODIPine (NORVASC) 5 MG tablet Take 1 tablet by mouth Daily. 90 tablet 3   • fexofenadine (ALLEGRA) 180 MG tablet Take 1 tablet by mouth Daily. 30 tablet 2   • fluticasone (FLONASE) 50 MCG/ACT nasal spray USE TWO SPRAYS IN EACH NOSTRIL ONCE DAILY 16 mL 2   • LANCETS MICRO THIN 33G misc USE AS DIRECTED TO TEST BLOOD GLUCOSE DAILY 100 each 5   • losartan (COZAAR) 50 MG tablet TAKE 1 TABLET EVERY DAY FOR BLOOD PRESSURE 90 tablet 1   • mirtazapine (REMERON) 15 MG tablet TAKE 1 TABLET EVERY NIGHT 90 tablet 1   • primidone (MYSOLINE) 50 MG tablet TAKE 1 TABLET TWICE DAILY  FOR  TREMOR 180 tablet 1   • raNITIdine (ZANTAC) 150 MG tablet TAKE 1 TABLET TWICE DAILY  FOR  REFLUX  ESOPHAGITIS 180 tablet 1   • sertraline (ZOLOFT) 100 MG tablet TAKE 1 TABLET EVERY DAY  FOR  MOOD  AND  WELL  BEING 90 tablet 1   • triamcinolone (KENALOG) 0.025 % cream Apply  topically to the appropriate area as " directed 2 (Two) Times a Day.     • TRUE METRIX BLOOD GLUCOSE TEST test strip USE TO TEST BLOOD SUGAR ONCE DAILY AS DIRECTED 100 each 0   • [DISCONTINUED] amLODIPine (NORVASC) 5 MG tablet Take 1 tablet by mouth Daily. 30 tablet 5     No facility-administered encounter medications on file as of 2/11/2019.        Reviewed use of high risk medication in the elderly: yes  Reviewed for potential of harmful drug interactions in the elderly: yes    Follow Up:  Return in about 6 months (around 8/11/2019), or if symptoms worsen or fail to improve.     An After Visit Summary and PPPS with all of these plans were given to the patient.           ++++++++++++++++++++++++++++++++++++++++++++++++++++++++++++++++++     CC:dm2, htn, f/u cellulitis/hospitalization  Diabetes   She presents for her follow-up diabetic visit. She has type 2 diabetes mellitus. Her disease course has been stable. Pertinent negatives for diabetes include no chest pain, no foot paresthesias and no foot ulcerations. Symptoms are stable. Current diabetic treatment includes diet (doing fine off oral hypoglyemics). She is following a generally healthy diet. An ACE inhibitor/angiotensin II receptor blocker is being taken.   Hypertension   This is a chronic problem. The problem has been waxing and waning since onset. Condition status: Doing better now, but had to go back on ccb, had off due ot swelling. Associated symptoms include peripheral edema (warmth gone, ut has some redness when swelling). Pertinent negatives include no chest pain, orthopnea, palpitations, PND or shortness of breath. There are no associated agents to hypertension. Risk factors for coronary artery disease include dyslipidemia, diabetes mellitus and post-menopausal state. Past treatments include angiotensin blockers and calcium channel blockers. Current antihypertension treatment includes calcium channel blockers and angiotensin blockers. The current treatment provides moderate improvement.  "There are no compliance problems.  Hypertensive end-organ damage includes kidney disease. Identifiable causes of hypertension include chronic renal disease.   Rash   This is a recurrent problem. The current episode started more than 1 month ago. The problem has been resolved since onset. Location: b/l lower legs. The rash is characterized by redness and swelling. Pertinent negatives include no shortness of breath. Past treatments include antibiotics. The treatment provided moderate relief.       Review of Systems   Cardiovascular: Negative for chest pain, orthopnea, palpitations and paroxysmal nocturnal dyspnea.   Respiratory: Negative for shortness of breath.    Skin: Positive for rash.       Social History     Tobacco Use   • Smoking status: Never Smoker   • Smokeless tobacco: Never Used   Substance Use Topics   • Alcohol use: No     O:   Vitals:    02/11/19 1751   BP: 120/76   BP Location: Right arm   Patient Position: Sitting   Cuff Size: Adult   Pulse: 60   SpO2: 97%   Weight: 83 kg (183 lb)   Height: 170.2 cm (67\")   PainSc: 0-No pain       Physical Exam   Constitutional: She appears well-developed and well-nourished.   HENT:   Head: Normocephalic and atraumatic.   Neck: Neck supple. No JVD present. No thyromegaly present.   Cardiovascular: Normal rate, regular rhythm and normal heart sounds. Exam reveals no gallop and no friction rub.   No murmur heard.  Pulmonary/Chest: Effort normal and breath sounds normal. No respiratory distress. She has no wheezes. She has no rales.   Abdominal: Soft. Bowel sounds are normal. She exhibits no distension. There is no tenderness. There is no rebound and no guarding.   Musculoskeletal: She exhibits edema (venous stasis, no warmth to touch).   Neurological: She is alert.   Skin: Skin is warm and dry.   Psychiatric: She has a normal mood and affect. Her behavior is normal.   Nursing note and vitals reviewed.    Component      Latest Ref Rng & Units 12/6/2018 12/7/2018   WBC   "    4.50 - 10.70 10*3/mm3  4.41 (L)   RBC      3.90 - 5.20 10*6/mm3  3.26 (L)   Hemoglobin      11.9 - 15.5 g/dL  10.0 (L)   Hematocrit      35.6 - 45.5 %  32.9 (L)   MCV      80.5 - 98.2 fL  100.9 (H)   MCH      26.9 - 32.0 pg  30.7   MCHC      32.4 - 36.3 g/dL  30.4 (L)   RDW      11.7 - 13.0 %  13.6 (H)   RDW-SD      37.0 - 54.0 fl  50.3   MPV      6.0 - 12.0 fL  9.0   Platelets      140 - 500 10*3/mm3  165   Neutrophil Rel %      42.7 - 76.0 %  45.8   Lymphocyte Rel %      19.6 - 45.3 %  39.7   Monocyte Rel %      5.0 - 12.0 %  10.0   Eosinophil Rel %      0.3 - 6.2 %  3.6   Basophil Rel %      0.0 - 1.5 %  0.9   Immature Granulocyte Rel %      0.0 - 0.5 %  0.0   Neutrophils Absolute      1.90 - 8.10 10*3/mm3  2.02   Lymphocytes Absolute      0.90 - 4.80 10*3/mm3  1.75   Monocytes Absolute      0.20 - 1.20 10*3/mm3  0.44   Eosinophils Absolute      0.00 - 0.70 10*3/mm3  0.16   Basophils Absolute      0.00 - 0.20 10*3/mm3  0.04   Immature Grans, Absolute      0.00 - 0.03 10*3/mm3  0.00   Glucose      65 - 99 mg/dL  90   BUN      8 - 23 mg/dL  21   Creatinine      0.57 - 1.00 mg/dL  1.05 (H)   Sodium      136 - 145 mmol/L  144   Potassium      3.5 - 5.2 mmol/L  4.5   Chloride      98 - 107 mmol/L  111 (H)   CO2      22.0 - 29.0 mmol/L  23.4   Calcium      8.2 - 9.6 mg/dL  8.4   Total Protein      6.0 - 8.5 g/dL  5.7 (L)   Albumin      3.50 - 5.20 g/dL  3.30 (L)   ALT (SGPT)      1 - 33 U/L  9   AST (SGOT)      1 - 32 U/L  17   Alkaline Phosphatase      39 - 117 U/L  72   Total Bilirubin      0.1 - 1.2 mg/dL  0.2   eGFR Non African Am      >60 mL/min/1.73  49 (L)   Globulin      gm/dL  2.4   A/G Ratio      g/dL  1.4   BUN/Creatinine Ratio      7.0 - 25.0  20.0   Anion Gap      mmol/L  9.6   Hemoglobin A1C      4.80 - 5.60 % 6.03 (H)      Magdalena was seen today for annual exam, hypertension and diabetes.    Diagnoses and all orders for this visit:    Medicare annual wellness visit, subsequent    Benign essential  hypertension    Chronic venous stasis    Type 2 diabetes mellitus with diabetic polyneuropathy, without long-term current use of insulin (CMS/[facility])    CKD (chronic kidney disease) stage 3, GFR 30-59 ml/min (CMS/[facility])    Other orders  -     amLODIPine (NORVASC) 5 MG tablet; Take 1 tablet by mouth Daily.    -recommend elevate legs;  Apply lotion to prevent cracking a nidus for infection  -dm2 - well controlled;    -CKD - tight blood pressure, blood sugar control and avoid nsaids.  Renal function at d/c good  -hypertension - controlled, continue medications      Return in about 6 months (around 8/11/2019), or if symptoms worsen or fail to improve.

## 2019-02-12 ENCOUNTER — PATIENT OUTREACH (OUTPATIENT)
Dept: CASE MANAGEMENT | Facility: OTHER | Age: 84
End: 2019-02-12

## 2019-02-27 ENCOUNTER — OFFICE VISIT (OUTPATIENT)
Dept: FAMILY MEDICINE CLINIC | Facility: CLINIC | Age: 84
End: 2019-02-27

## 2019-02-27 VITALS
HEART RATE: 70 BPM | HEIGHT: 67 IN | WEIGHT: 185 LBS | OXYGEN SATURATION: 95 % | DIASTOLIC BLOOD PRESSURE: 74 MMHG | SYSTOLIC BLOOD PRESSURE: 128 MMHG | TEMPERATURE: 98.1 F | BODY MASS INDEX: 29.03 KG/M2

## 2019-02-27 DIAGNOSIS — L03.115 CELLULITIS OF RIGHT LOWER EXTREMITY: Primary | ICD-10-CM

## 2019-02-27 PROCEDURE — 99213 OFFICE O/P EST LOW 20 MIN: CPT | Performed by: NURSE PRACTITIONER

## 2019-02-27 RX ORDER — CLINDAMYCIN HYDROCHLORIDE 150 MG/1
450 CAPSULE ORAL 3 TIMES DAILY
Qty: 63 CAPSULE | Refills: 0 | Status: SHIPPED | OUTPATIENT
Start: 2019-02-27 | End: 2019-03-11

## 2019-02-27 NOTE — PROGRESS NOTES
"Subjective   Magdalena Jerry is a 89 y.o. female who presents c/o pain, swelling, redness and heat in right lower leg x 2 days.     History of Present Illness   Years ago had cellulitis in RLE, so has chronic discoloration, but in the last 2 days has had increase in RLE swelling and discoloration with some pain. Is MRSA prone. Seen today with daughter in law whom is caregiver.   Blood sugar slightly elevated today, 117.   The following portions of the patient's history were reviewed and updated as appropriate: allergies, current medications, past family history, past medical history, past social history, past surgical history and problem list.    Review of Systems   Constitutional: Negative for chills and fever.   HENT: Positive for hearing loss.    Respiratory: Negative for shortness of breath.    Cardiovascular: Positive for leg swelling.   Musculoskeletal: Positive for myalgias.   Skin: Positive for color change. Negative for rash and skin lesions.   Psychiatric/Behavioral: Negative.      /74   Pulse 70   Temp 98.1 °F (36.7 °C) (Oral)   Ht 170.2 cm (67\")   Wt 83.9 kg (185 lb)   LMP  (LMP Unknown)   SpO2 95%   BMI 28.98 kg/m²     Objective   Physical Exam   Constitutional: She appears well-developed and well-nourished. No distress.   Cardiovascular: Normal rate.   Pulmonary/Chest: Effort normal.   Musculoskeletal: She exhibits edema (1+ to distal calf only RLE, supple).   Skin: Skin is warm and dry. Capillary refill takes less than 2 seconds. There is erythema (redness extends to the knee).   Psychiatric: She has a normal mood and affect. Her behavior is normal. Judgment and thought content normal.   Nursing note and vitals reviewed.    Assessment/Plan   Problems Addressed this Visit        Other    Cellulitis of lower extremity - Primary    Relevant Medications    clindamycin (CLEOCIN) 150 MG capsule        Cellulitis--if not improving in 2 days, would recheck in the office. (Pain and swelling and " redness). Will cover with clinda as hx MRSA, last GFR normal.

## 2019-03-01 ENCOUNTER — TELEPHONE (OUTPATIENT)
Dept: FAMILY MEDICINE CLINIC | Facility: CLINIC | Age: 84
End: 2019-03-01

## 2019-03-01 NOTE — TELEPHONE ENCOUNTER
Daughter-in-law reports leg is no worse, but no better either.  Still swollen, red and warm but none of this has worsened. Patient has taken 5 doses of medication thus far. Please advise.     [phone]

## 2019-03-04 ENCOUNTER — OFFICE VISIT (OUTPATIENT)
Dept: FAMILY MEDICINE CLINIC | Facility: CLINIC | Age: 84
End: 2019-03-04

## 2019-03-04 VITALS
HEART RATE: 66 BPM | DIASTOLIC BLOOD PRESSURE: 82 MMHG | OXYGEN SATURATION: 94 % | TEMPERATURE: 98.2 F | SYSTOLIC BLOOD PRESSURE: 148 MMHG

## 2019-03-04 DIAGNOSIS — L03.115 CELLULITIS OF RIGHT LOWER EXTREMITY: Primary | ICD-10-CM

## 2019-03-04 DIAGNOSIS — M79.642 PAIN OF LEFT HAND: ICD-10-CM

## 2019-03-04 PROCEDURE — 99213 OFFICE O/P EST LOW 20 MIN: CPT | Performed by: NURSE PRACTITIONER

## 2019-03-04 RX ORDER — CIPROFLOXACIN 500 MG/1
500 TABLET, FILM COATED ORAL 2 TIMES DAILY
Qty: 14 TABLET | Refills: 0 | Status: SHIPPED | OUTPATIENT
Start: 2019-03-04 | End: 2019-03-11 | Stop reason: ALTCHOICE

## 2019-03-04 NOTE — PROGRESS NOTES
Subjective   Magdalena Jerry is a 89 y.o. female who presents for a follow up on RLE cellulitis.     History of Present Illness   LLE has typical color and swelling. RLE color is improving slightly, but swelling is not improving at all. Highest blood sugar 137  The following portions of the patient's history were reviewed and updated as appropriate: allergies, current medications, past family history, past medical history, past social history, past surgical history and problem list.    Review of Systems   Constitutional: Negative for chills and fever.   Cardiovascular: Positive for leg swelling.   Musculoskeletal: Positive for arthralgias. Negative for myalgias.   Skin: Positive for color change and dry skin.   Hematological: Negative for adenopathy.     /82   Pulse 66   Temp 98.2 °F (36.8 °C) (Oral)   LMP  (LMP Unknown)   SpO2 94%     Objective   Physical Exam   Constitutional: She appears well-developed and well-nourished. No distress.   Cardiovascular: Normal rate.   Pulmonary/Chest: Effort normal.   Musculoskeletal: She exhibits edema (loose at the calf).        Left shoulder: She exhibits decreased range of motion and tenderness.        Left hand: She exhibits tenderness (pulley tenderness).   Skin: Skin is warm and dry. There is erythema.   Psychiatric: She has a normal mood and affect. Her behavior is normal. Judgment and thought content normal.   Nursing note and vitals reviewed.    Assessment/Plan   Problems Addressed this Visit        Nervous and Auditory    Pain of hand       Other    Cellulitis of lower extremity - Primary    Relevant Medications    ciprofloxacin (CIPRO) 500 MG tablet        Cellulitis--only mild improvement, will rotate antibiotics, secondary to allergies will rotate to FQ, FU 3-4 days if swelling not resolving.

## 2019-03-07 ENCOUNTER — TELEPHONE (OUTPATIENT)
Dept: FAMILY MEDICINE CLINIC | Facility: CLINIC | Age: 84
End: 2019-03-07

## 2019-03-07 NOTE — TELEPHONE ENCOUNTER
Family called with an update on the cellulitis. Leg is still swollen and the swelling has spread some. It is not as warm to touch as earlier this week. The redness has lightened up a bit. Please advise.     Sharon [phone]

## 2019-03-08 ENCOUNTER — HOSPITAL ENCOUNTER (OUTPATIENT)
Dept: CARDIOLOGY | Facility: HOSPITAL | Age: 84
Discharge: HOME | End: 2019-03-08
Admitting: NURSE PRACTITIONER

## 2019-03-08 DIAGNOSIS — R60.9 SWELLING: Primary | ICD-10-CM

## 2019-03-08 DIAGNOSIS — M79.89 PAIN AND SWELLING OF RIGHT LOWER LEG: ICD-10-CM

## 2019-03-08 DIAGNOSIS — M79.89 PAIN AND SWELLING OF RIGHT LOWER LEG: Primary | ICD-10-CM

## 2019-03-08 DIAGNOSIS — M79.661 PAIN AND SWELLING OF RIGHT LOWER LEG: ICD-10-CM

## 2019-03-08 DIAGNOSIS — M79.661 PAIN AND SWELLING OF RIGHT LOWER LEG: Primary | ICD-10-CM

## 2019-03-08 LAB
BH CV LOWER VASCULAR LEFT COMMON FEMORAL AUGMENT: NORMAL
BH CV LOWER VASCULAR LEFT COMMON FEMORAL COMPETENT: NORMAL
BH CV LOWER VASCULAR LEFT COMMON FEMORAL COMPRESS: NORMAL
BH CV LOWER VASCULAR LEFT COMMON FEMORAL PHASIC: NORMAL
BH CV LOWER VASCULAR LEFT COMMON FEMORAL SPONT: NORMAL
BH CV LOWER VASCULAR RIGHT COMMON FEMORAL AUGMENT: NORMAL
BH CV LOWER VASCULAR RIGHT COMMON FEMORAL COMPETENT: NORMAL
BH CV LOWER VASCULAR RIGHT COMMON FEMORAL COMPRESS: NORMAL
BH CV LOWER VASCULAR RIGHT COMMON FEMORAL PHASIC: NORMAL
BH CV LOWER VASCULAR RIGHT COMMON FEMORAL SPONT: NORMAL
BH CV LOWER VASCULAR RIGHT DISTAL FEMORAL COMPRESS: NORMAL
BH CV LOWER VASCULAR RIGHT GASTRONEMIUS COMPRESS: NORMAL
BH CV LOWER VASCULAR RIGHT GREATER SAPH AK COMPRESS: NORMAL
BH CV LOWER VASCULAR RIGHT GREATER SAPH BK COMPRESS: NORMAL
BH CV LOWER VASCULAR RIGHT MID FEMORAL AUGMENT: NORMAL
BH CV LOWER VASCULAR RIGHT MID FEMORAL COMPETENT: NORMAL
BH CV LOWER VASCULAR RIGHT MID FEMORAL COMPRESS: NORMAL
BH CV LOWER VASCULAR RIGHT MID FEMORAL PHASIC: NORMAL
BH CV LOWER VASCULAR RIGHT MID FEMORAL SPONT: NORMAL
BH CV LOWER VASCULAR RIGHT PERONEAL COMPRESS: NORMAL
BH CV LOWER VASCULAR RIGHT POPLITEAL AUGMENT: NORMAL
BH CV LOWER VASCULAR RIGHT POPLITEAL COMPETENT: NORMAL
BH CV LOWER VASCULAR RIGHT POPLITEAL COMPRESS: NORMAL
BH CV LOWER VASCULAR RIGHT POPLITEAL PHASIC: NORMAL
BH CV LOWER VASCULAR RIGHT POPLITEAL SPONT: NORMAL
BH CV LOWER VASCULAR RIGHT POSTERIOR TIBIAL COMPRESS: NORMAL
BH CV LOWER VASCULAR RIGHT PROXIMAL FEMORAL COMPRESS: NORMAL

## 2019-03-08 PROCEDURE — 93971 EXTREMITY STUDY: CPT

## 2019-03-08 NOTE — TELEPHONE ENCOUNTER
Will you please get this scheduled today. Patient wants to have done Monday sometime around noon-1PM. Thank you.

## 2019-03-08 NOTE — TELEPHONE ENCOUNTER
If the swelling is spreading I feel we should set up an outpt doppler study. Continue same antibiotics

## 2019-03-11 ENCOUNTER — OFFICE VISIT (OUTPATIENT)
Dept: FAMILY MEDICINE CLINIC | Facility: CLINIC | Age: 84
End: 2019-03-11

## 2019-03-11 VITALS
HEIGHT: 67 IN | HEART RATE: 67 BPM | WEIGHT: 187 LBS | SYSTOLIC BLOOD PRESSURE: 132 MMHG | OXYGEN SATURATION: 95 % | BODY MASS INDEX: 29.35 KG/M2 | TEMPERATURE: 98 F | DIASTOLIC BLOOD PRESSURE: 78 MMHG

## 2019-03-11 DIAGNOSIS — L03.115 CELLULITIS OF RIGHT LOWER EXTREMITY: Primary | ICD-10-CM

## 2019-03-11 PROCEDURE — 99213 OFFICE O/P EST LOW 20 MIN: CPT | Performed by: NURSE PRACTITIONER

## 2019-03-11 NOTE — PROGRESS NOTES
"Subjective   Magdalena Jerry is a 89 y.o. female who presents for a follow up on cellulitis of RLE.    History of Present Illness   Swelling is not improving and redness is spreading. Has history of MRSA cellulitis. Leg is  to touch.  The following portions of the patient's history were reviewed and updated as appropriate: allergies, current medications, past family history, past medical history, past social history, past surgical history and problem list.    Review of Systems   Constitutional: Negative for chills and fever.   HENT: Positive for hearing loss.    Respiratory: Negative.    Cardiovascular: Positive for leg swelling.   Skin: Positive for color change and dry skin. Negative for skin lesions.   Neurological: Negative.      /78   Pulse 67   Temp 98 °F (36.7 °C) (Oral)   Ht 170.2 cm (67\")   Wt 84.8 kg (187 lb)   LMP  (LMP Unknown)   SpO2 95%   BMI 29.29 kg/m²     Objective   Physical Exam   Constitutional: She appears well-developed and well-nourished. No distress.   Cardiovascular: Normal rate.   Pulmonary/Chest: Effort normal.   Skin: Skin is warm and dry.   Mottled redness to knee from distal shin with associated swelling. Moderate tenderness   Psychiatric: She has a normal mood and affect. Her behavior is normal. Judgment and thought content normal.   Nursing note and vitals reviewed.    Assessment/Plan   Problems Addressed this Visit        Other    Cellulitis of lower extremity - Primary        Cellulitis--nontoxic--failed clindamycin and cipro, multiple medication allergies. Trial of baxdela to cover MRSA. FU 1 week  Dopplers negative on 3/8 for DVT         "

## 2019-03-18 ENCOUNTER — OFFICE VISIT (OUTPATIENT)
Dept: FAMILY MEDICINE CLINIC | Facility: CLINIC | Age: 84
End: 2019-03-18

## 2019-03-18 VITALS
OXYGEN SATURATION: 93 % | HEIGHT: 67 IN | BODY MASS INDEX: 29.66 KG/M2 | WEIGHT: 189 LBS | SYSTOLIC BLOOD PRESSURE: 124 MMHG | TEMPERATURE: 98.1 F | DIASTOLIC BLOOD PRESSURE: 60 MMHG | HEART RATE: 74 BPM

## 2019-03-18 DIAGNOSIS — L03.115 CELLULITIS OF RIGHT LOWER EXTREMITY: Primary | ICD-10-CM

## 2019-03-18 PROCEDURE — 99213 OFFICE O/P EST LOW 20 MIN: CPT | Performed by: NURSE PRACTITIONER

## 2019-03-18 NOTE — PROGRESS NOTES
"Subjective   Magdalena Jerry is a 89 y.o. female who presents to us today for a follow up on her cellulitis on her lower extremities.     History of Present Illness   Was started on Baxdela on 3/11/19 for RLE cellulitis, reports still some pain to the RLE, but improving.   The following portions of the patient's history were reviewed and updated as appropriate: allergies, current medications, past family history, past medical history, past social history, past surgical history and problem list.    Review of Systems   Constitutional: Negative for chills and fever.   Cardiovascular: Positive for leg swelling.   Gastrointestinal: Negative for abdominal pain and diarrhea.   Musculoskeletal: Positive for arthralgias.     /60 (BP Location: Right arm, Patient Position: Sitting, Cuff Size: Adult)   Pulse 74   Temp 98.1 °F (36.7 °C) (Oral)   Ht 170.2 cm (67\")   Wt 85.7 kg (189 lb)   LMP  (LMP Unknown)   SpO2 93%   BMI 29.60 kg/m²     Objective   Physical Exam   Constitutional: She appears well-developed and well-nourished. No distress.   Cardiovascular: Normal rate.   Pulmonary/Chest: Effort normal.   Musculoskeletal: She exhibits edema (RLE with decreased redness, decreased swelling scant redness over lateral malleollus.) and tenderness (RLE).   Skin: Skin is warm and dry. Capillary refill takes less than 2 seconds. There is erythema (trace only today to RLE, near resolved).   Psychiatric: She has a normal mood and affect. Her behavior is normal. Judgment and thought content normal.   Nursing note and vitals reviewed.    Assessment/Plan   Problems Addressed this Visit        Other    Cellulitis of lower extremity - Primary        RLE cellulitis--will continue baxdela 3 more days for a total of 10 days treatment. FU 1 week. Continue yogurt for probiotic.          "

## 2019-03-24 ENCOUNTER — PATIENT OUTREACH (OUTPATIENT)
Dept: CASE MANAGEMENT | Facility: OTHER | Age: 84
End: 2019-03-24

## 2019-03-24 ENCOUNTER — EPISODE CHANGES (OUTPATIENT)
Dept: CASE MANAGEMENT | Facility: OTHER | Age: 84
End: 2019-03-24

## 2019-03-25 ENCOUNTER — OFFICE VISIT (OUTPATIENT)
Dept: FAMILY MEDICINE CLINIC | Facility: CLINIC | Age: 84
End: 2019-03-25

## 2019-03-25 ENCOUNTER — EPISODE CHANGES (OUTPATIENT)
Dept: CASE MANAGEMENT | Facility: OTHER | Age: 84
End: 2019-03-25

## 2019-03-25 VITALS
TEMPERATURE: 98.1 F | SYSTOLIC BLOOD PRESSURE: 120 MMHG | HEIGHT: 67 IN | HEART RATE: 64 BPM | OXYGEN SATURATION: 93 % | DIASTOLIC BLOOD PRESSURE: 64 MMHG | WEIGHT: 188 LBS | BODY MASS INDEX: 29.51 KG/M2

## 2019-03-25 DIAGNOSIS — M79.604 PAIN OF RIGHT LOWER EXTREMITY: Primary | ICD-10-CM

## 2019-03-25 PROCEDURE — 99213 OFFICE O/P EST LOW 20 MIN: CPT | Performed by: NURSE PRACTITIONER

## 2019-03-25 NOTE — OUTREACH NOTE
Patient has met care plan goals, all care gaps have been addressed, and patient has a strong sense of health self-management. The patient's annual wellness visit has been completed.  She is not active with my chart.  . Patient's daughter, Nalini, is designated healthcare surrogate.

## 2019-03-25 NOTE — PROGRESS NOTES
"Subjective   Magdalena Jerry is a 89 y.o. female who presents for a follow up on cellulitis of RLE.    History of Present Illness   RLE is still a bit sore and swollen, though swelling is significantly improved. Lateral malleolus is still a bit red. Last antibiotic was Thursday morning. Swelling has stayed resolved. Shoulder biggest complaint today. L shoulder is painful where didn't heal right.   The following portions of the patient's history were reviewed and updated as appropriate: allergies, current medications, past family history, past medical history, past social history, past surgical history and problem list.    Review of Systems   Constitutional: Negative for chills and fever.   Musculoskeletal: Positive for arthralgias, joint swelling and myalgias.   Skin: Negative for color change and skin lesions.     /64   Pulse 64   Temp 98.1 °F (36.7 °C) (Oral)   Ht 170.2 cm (67\")   Wt 85.3 kg (188 lb)   LMP  (LMP Unknown)   SpO2 93%   BMI 29.44 kg/m²     Objective   Physical Exam   Constitutional: She appears well-developed and well-nourished.   Musculoskeletal: She exhibits no edema or tenderness.        Left shoulder: She exhibits decreased range of motion.        Hands:  Neurological: She is alert.   Skin: Skin is warm and dry. Capillary refill takes less than 2 seconds. No rash noted. No erythema.   Psychiatric: She has a normal mood and affect. Her behavior is normal. Judgment and thought content normal.   Nursing note and vitals reviewed.    Assessment/Plan   Problems Addressed this Visit        Nervous and Auditory    Pain in extremity - Primary        Pain in RLE--cellulitis--resolved. FU if swelling and redness returns. Baxdela was successful at resolving the cellulitis with 10 day course.          "

## 2019-04-19 PROCEDURE — 20610 DRAIN/INJ JOINT/BURSA W/O US: CPT | Performed by: FAMILY MEDICINE

## 2019-04-22 ENCOUNTER — OFFICE VISIT (OUTPATIENT)
Dept: FAMILY MEDICINE CLINIC | Facility: CLINIC | Age: 84
End: 2019-04-22

## 2019-04-22 VITALS
WEIGHT: 180 LBS | DIASTOLIC BLOOD PRESSURE: 60 MMHG | SYSTOLIC BLOOD PRESSURE: 142 MMHG | TEMPERATURE: 98.1 F | HEART RATE: 64 BPM | OXYGEN SATURATION: 96 % | BODY MASS INDEX: 28.19 KG/M2

## 2019-04-22 DIAGNOSIS — M65.332 TRIGGER MIDDLE FINGER OF LEFT HAND: ICD-10-CM

## 2019-04-22 DIAGNOSIS — M19.012 PRIMARY OSTEOARTHRITIS OF LEFT SHOULDER: ICD-10-CM

## 2019-04-22 DIAGNOSIS — E11.42 TYPE 2 DIABETES MELLITUS WITH DIABETIC POLYNEUROPATHY, WITHOUT LONG-TERM CURRENT USE OF INSULIN (HCC): Primary | ICD-10-CM

## 2019-04-22 PROCEDURE — 20550 NJX 1 TENDON SHEATH/LIGAMENT: CPT | Performed by: FAMILY MEDICINE

## 2019-04-22 RX ORDER — LIDOCAINE HYDROCHLORIDE 20 MG/ML
2 INJECTION, SOLUTION INFILTRATION; PERINEURAL ONCE
Status: COMPLETED | OUTPATIENT
Start: 2019-04-22 | End: 2019-04-22

## 2019-04-22 RX ORDER — LIDOCAINE HYDROCHLORIDE 20 MG/ML
0.25 INJECTION, SOLUTION INFILTRATION; PERINEURAL ONCE
Status: COMPLETED | OUTPATIENT
Start: 2019-04-22 | End: 2019-04-22

## 2019-04-22 RX ORDER — METHYLPREDNISOLONE ACETATE 80 MG/ML
80 INJECTION, SUSPENSION INTRA-ARTICULAR; INTRALESIONAL; INTRAMUSCULAR; SOFT TISSUE ONCE
Status: COMPLETED | OUTPATIENT
Start: 2019-04-22 | End: 2019-04-22

## 2019-04-22 RX ORDER — METHYLPREDNISOLONE ACETATE 80 MG/ML
20 INJECTION, SUSPENSION INTRA-ARTICULAR; INTRALESIONAL; INTRAMUSCULAR; SOFT TISSUE ONCE
Status: COMPLETED | OUTPATIENT
Start: 2019-04-22 | End: 2019-04-22

## 2019-04-22 RX ADMIN — LIDOCAINE HYDROCHLORIDE 0.3 ML: 20 INJECTION, SOLUTION INFILTRATION; PERINEURAL at 15:29

## 2019-04-22 RX ADMIN — METHYLPREDNISOLONE ACETATE 20 MG: 80 INJECTION, SUSPENSION INTRA-ARTICULAR; INTRALESIONAL; INTRAMUSCULAR; SOFT TISSUE at 15:37

## 2019-04-22 RX ADMIN — METHYLPREDNISOLONE ACETATE 80 MG: 80 INJECTION, SUSPENSION INTRA-ARTICULAR; INTRALESIONAL; INTRAMUSCULAR; SOFT TISSUE at 15:37

## 2019-04-22 RX ADMIN — LIDOCAINE HYDROCHLORIDE 2 ML: 20 INJECTION, SOLUTION INFILTRATION; PERINEURAL at 15:36

## 2019-04-22 NOTE — PROGRESS NOTES
Subjective   Magdalena Jerry is a 89 y.o. female. Presents today for   Chief Complaint   Patient presents with   • Hand Pain     lt hand trigger finger .  lt shoulder pain from fx 10 years ago       Hand Pain    The incident occurred more than 1 week ago. The incident occurred at home. The pain is present in the left hand. The quality of the pain is described as aching (3rd digit sticks as well). The pain is moderate. Pertinent negatives include no numbness. Associated symptoms comments: Left shoulder pain as well, would like injected today for the pain.. The symptoms are aggravated by movement. She has tried nothing for the symptoms. The treatment provided no relief.       Review of Systems   Musculoskeletal: Positive for arthralgias. Negative for joint swelling.   Neurological: Negative for numbness.       Patient Active Problem List   Diagnosis   • Adjustment disorder with mixed anxiety and depressed mood   • Anemia   • Benign essential hypertension   • Hereditary essential tremor   • Type 2 diabetes mellitus with diabetic polyneuropathy, without long-term current use of insulin (CMS/[facility])   • Dry skin dermatitis   • Dyslipidemia   • Gastroesophageal reflux disease with esophagitis   • Pain of hand   • Hearing loss   • Arthralgia of hip   • Impacted cerumen   • Pruritus   • Knee pain   • Pain in extremity   • Chronic low back pain   • Weakness of lower extremity   • Spinal stenosis of lumbar region   • Senile osteoporosis   • Piriformis syndrome   • Primary insomnia   • Tinea pedis   • Vitamin D deficiency   • Left leg cellulitis   • CKD (chronic kidney disease) stage 3, GFR 30-59 ml/min (CMS/[facility])   • Allergic rhinitis   • Pneumonia due to influenza A virus   • MRSA (methicillin resistant Staphylococcus aureus) infection   • Bacteria in urine   • Cellulitis of lower extremity   • Chronic venous stasis   • Anemia of chronic disease       Social History     Socioeconomic History   • Marital status:       Spouse name: Not on file   • Number of children: Not on file   • Years of education: Not on file   • Highest education level: Not on file   Tobacco Use   • Smoking status: Never Smoker   • Smokeless tobacco: Never Used   Substance and Sexual Activity   • Alcohol use: No   • Drug use: No       Allergies   Allergen Reactions   • Amoxicillin Other (See Comments)     unsure   • Benzodiazepines    • Codeine    • Cortisone    • Macrolides And Ketolides    • Melatonin    • Neomycin    • Penicillins    • Sulfa Antibiotics    • Tetracyclines & Related    • Erythromycin Rash       Current Outpatient Medications on File Prior to Visit   Medication Sig Dispense Refill   • acetaminophen (TYLENOL) 325 MG tablet Take 2 tablets by mouth Every 6 (Six) Hours As Needed for Mild Pain (1-3).  0   • amLODIPine (NORVASC) 5 MG tablet Take 1 tablet by mouth Daily. 90 tablet 3   • Delafloxacin Meglumine (BAXDELA) 450 MG tablet Take 1 tablet by mouth 2 (Two) Times a Day. 14 tablet 0   • fexofenadine (ALLEGRA) 180 MG tablet Take 1 tablet by mouth Daily. 30 tablet 2   • fluticasone (FLONASE) 50 MCG/ACT nasal spray USE TWO SPRAYS IN EACH NOSTRIL ONCE DAILY 16 mL 2   • LANCETS MICRO THIN 33G misc USE AS DIRECTED TO TEST BLOOD GLUCOSE DAILY 100 each 5   • losartan (COZAAR) 50 MG tablet TAKE 1 TABLET EVERY DAY FOR BLOOD PRESSURE 90 tablet 1   • mirtazapine (REMERON) 15 MG tablet TAKE 1 TABLET EVERY NIGHT 90 tablet 1   • primidone (MYSOLINE) 50 MG tablet TAKE 1 TABLET TWICE DAILY  FOR  TREMOR 180 tablet 1   • raNITIdine (ZANTAC) 150 MG tablet TAKE 1 TABLET TWICE DAILY  FOR  REFLUX  ESOPHAGITIS 180 tablet 1   • sertraline (ZOLOFT) 100 MG tablet TAKE 1 TABLET EVERY DAY  FOR  MOOD  AND  WELL  BEING 90 tablet 1   • TRUE METRIX BLOOD GLUCOSE TEST test strip USE TO TEST BLOOD SUGAR ONCE DAILY AS DIRECTED 100 each 0     No current facility-administered medications on file prior to visit.        Objective   Vitals:    04/22/19 1459   BP: 142/60   Pulse: 64    Temp: 98.1 °F (36.7 °C)   SpO2: 96%   Weight: 81.6 kg (180 lb)         Physical Exam   Constitutional: She is oriented to person, place, and time. She appears well-developed and well-nourished.   Musculoskeletal:        Left shoulder: She exhibits decreased range of motion and tenderness.        Left hand: She exhibits decreased range of motion and tenderness.   Neurological: She is alert and oriented to person, place, and time.   Skin: Skin is warm and dry.   Psychiatric: She has a normal mood and affect. Her behavior is normal.   Nursing note and vitals reviewed.    Arthrocentesis  Date/Time: 4/19/2019 4:13 PM  Performed by: Fly Sanchez DO  Authorized by: Fly Sanchez DO   Consent: Verbal consent obtained.  Risks and benefits: risks, benefits and alternatives were discussed  Consent given by: patient  Patient understanding: patient states understanding of the procedure being performed  Site marked: the operative site was marked  Required items: required blood products, implants, devices, and special equipment available  Patient identity confirmed: verbally with patient  Indications: pain   Body area: shoulder  Joint: left shoulder  Local anesthesia used: yes    Anesthesia:  Local anesthesia used: yes  Local Anesthetic: topical anesthetic    Sedation:  Patient sedated: no    Preparation: Patient was prepped and draped in the usual sterile fashion.  Needle size: 22 G  Ultrasound guidance: no  Approach: posterior  Patient tolerance: Patient tolerated the procedure well with no immediate complications    Injection Tendon or Ligament - left middle finger  Date/Time: 4/19/2019 4:14 PM  Performed by: Fly Sanchez DO  Authorized by: Fly Sanchez DO   Consent: Verbal consent obtained.  Risks and benefits: risks, benefits and alternatives were discussed  Consent given by: patient  Patient understanding: patient states understanding of the procedure being performed  Site marked: the operative site  "was marked  Required items: required blood products, implants, devices, and special equipment available  Patient identity confirmed: verbally with patient  Time out: Immediately prior to procedure a \"time out\" was called to verify the correct patient, procedure, equipment, support staff and site/side marked as required.  Preparation: Patient was prepped and draped in the usual sterile fashion.  Local anesthesia used: yes    Anesthesia:  Local anesthesia used: yes  Local Anesthetic: topical anesthetic    Sedation:  Patient sedated: no    Patient tolerance: Patient tolerated the procedure well with no immediate complications  Comments: Lidocaine without epi 2% 0.25ml  Depo Medrol 20mg        Assessment/Plan   Magdalena was seen today for hand pain.    Diagnoses and all orders for this visit:    Type 2 diabetes mellitus with diabetic polyneuropathy, without long-term current use of insulin (CMS/[facility])  -     Cancel: POCT microalbumin    Trigger middle finger of left hand  -     methylPREDNISolone acetate (DEPO-medrol) injection 20 mg  -     lidocaine (XYLOCAINE) 2% injection 0.3 mL  -     Injection Tendon or Ligament    Primary osteoarthritis of left shoulder  -     methylPREDNISolone acetate (DEPO-medrol) injection 80 mg  -     lidocaine (XYLOCAINE) 2% injection 2 mL  -     Arthrocentesis    patient unable to give urine for mal/cr today (hx of dm2 and due, will check next time).  Post-joint injection instructions given, warned may be sore and achy next 2-3 days, recommend ice as directed.         -Follow up: Prn - RTC if worse or no improvement.    "

## 2019-05-22 DIAGNOSIS — G47.09 OTHER INSOMNIA: ICD-10-CM

## 2019-05-23 RX ORDER — PRIMIDONE 50 MG/1
TABLET ORAL
Qty: 180 TABLET | Refills: 1 | Status: SHIPPED | OUTPATIENT
Start: 2019-05-23 | End: 2019-08-07 | Stop reason: HOSPADM

## 2019-05-23 RX ORDER — LOSARTAN POTASSIUM 50 MG/1
TABLET ORAL
Qty: 90 TABLET | Refills: 1 | Status: SHIPPED | OUTPATIENT
Start: 2019-05-23 | End: 2019-08-07 | Stop reason: HOSPADM

## 2019-05-23 RX ORDER — RANITIDINE 150 MG/1
TABLET ORAL
Qty: 180 TABLET | Refills: 1 | Status: ON HOLD | OUTPATIENT
Start: 2019-05-23 | End: 2023-02-09

## 2019-05-23 RX ORDER — MIRTAZAPINE 15 MG/1
TABLET, FILM COATED ORAL
Qty: 90 TABLET | Refills: 1 | Status: SHIPPED | OUTPATIENT
Start: 2019-05-23 | End: 2019-08-07 | Stop reason: HOSPADM

## 2019-05-23 RX ORDER — SERTRALINE HYDROCHLORIDE 100 MG/1
TABLET, FILM COATED ORAL
Qty: 90 TABLET | Refills: 1 | Status: SHIPPED | OUTPATIENT
Start: 2019-05-23

## 2019-05-30 RX ORDER — CALCIUM CITRATE/VITAMIN D3 200MG-6.25
TABLET ORAL
Qty: 100 EACH | Refills: 1 | Status: ON HOLD | OUTPATIENT
Start: 2019-05-30 | End: 2023-02-09

## 2019-06-17 ENCOUNTER — OFFICE VISIT (OUTPATIENT)
Dept: FAMILY MEDICINE CLINIC | Facility: CLINIC | Age: 84
End: 2019-06-17

## 2019-06-17 VITALS
HEIGHT: 67 IN | SYSTOLIC BLOOD PRESSURE: 150 MMHG | BODY MASS INDEX: 27.78 KG/M2 | HEART RATE: 70 BPM | OXYGEN SATURATION: 97 % | WEIGHT: 177 LBS | RESPIRATION RATE: 16 BRPM | DIASTOLIC BLOOD PRESSURE: 74 MMHG

## 2019-06-17 DIAGNOSIS — L98.499 CALLOUS ULCER, UNSPECIFIED ULCER STAGE (HCC): ICD-10-CM

## 2019-06-17 DIAGNOSIS — I73.9 CLAUDICATION (HCC): ICD-10-CM

## 2019-06-17 DIAGNOSIS — T14.8XXA NONHEALING NONSURGICAL WOUND: Primary | ICD-10-CM

## 2019-06-17 PROCEDURE — 99213 OFFICE O/P EST LOW 20 MIN: CPT | Performed by: FAMILY MEDICINE

## 2019-06-17 RX ORDER — SODIUM HYPOCHLORITE 2.5 MG/ML
SOLUTION TOPICAL
Qty: 473 ML | Refills: 1 | Status: SHIPPED | OUTPATIENT
Start: 2019-06-17 | End: 2019-08-07 | Stop reason: HOSPADM

## 2019-06-17 NOTE — PROGRESS NOTES
Subjective   Magdalena Jerry is a 89 y.o. female. Presents today for   Chief Complaint   Patient presents with   • Toe Pain     right foot. middle toe is turning a black color.        History of Present Illness  Patient with right 3rd toe lesion and right 1st MTP early wound.  Walks bare foot at [place], but doesn't recall any injuries;  She mostly wears slippers at home.  Family report she does have gait change since stroke and often will walk w/o use of her walker and suspect possible continual rubbing.  No fevers or chills; no rashes;  She lives at home independently but is not able to drive or travel far outside of home.  Last [facility] was this past winter.  No recent falls.  Has some leg pain, but not very mobile to note claudication    aquaphor ok put on arms for  Itching as helped legs.  Review of Systems   Constitutional: Negative for chills and fever.   Skin: Positive for color change and wound.       Patient Active Problem List   Diagnosis   • Adjustment disorder with mixed anxiety and depressed mood   • Anemia   • Benign essential hypertension   • Hereditary essential tremor   • Type 2 diabetes mellitus with diabetic polyneuropathy, without long-term current use of insulin (CMS/[facility])   • Dry skin dermatitis   • Dyslipidemia   • Gastroesophageal reflux disease with esophagitis   • Pain of hand   • Hearing loss   • Arthralgia of hip   • Impacted cerumen   • Pruritus   • Knee pain   • Pain in extremity   • Chronic low back pain   • Weakness of lower extremity   • Spinal stenosis of lumbar region   • Senile osteoporosis   • Piriformis syndrome   • Primary insomnia   • Tinea pedis   • Vitamin D deficiency   • Left leg cellulitis   • CKD (chronic kidney disease) stage 3, GFR 30-59 ml/min (CMS/[facility])   • Allergic rhinitis   • Pneumonia due to influenza A virus   • MRSA (methicillin resistant Staphylococcus aureus) infection   • Bacteria in urine   • Cellulitis of lower extremity   • Chronic venous stasis   • Anemia of  chronic disease       Social History     Socioeconomic History   • Marital status:      Spouse name: Not on file   • Number of children: Not on file   • Years of education: Not on file   • Highest education level: Not on file   Tobacco Use   • Smoking status: Never Smoker   • Smokeless tobacco: Never Used   Substance and Sexual Activity   • Alcohol use: No   • Drug use: No       Allergies   Allergen Reactions   • Amoxicillin Other (See Comments)     unsure   • Benzodiazepines    • Codeine    • Cortisone    • Macrolides And Ketolides    • Melatonin    • Neomycin    • Penicillins    • Sulfa Antibiotics    • Tetracyclines & Related    • Erythromycin Rash       Current Outpatient Medications on File Prior to Visit   Medication Sig Dispense Refill   • acetaminophen (TYLENOL) 325 MG tablet Take 2 tablets by mouth Every 6 (Six) Hours As Needed for Mild Pain (1-3).  0   • amLODIPine (NORVASC) 5 MG tablet Take 1 tablet by mouth Daily. 90 tablet 3   • fexofenadine (ALLEGRA) 180 MG tablet Take 1 tablet by mouth Daily. 30 tablet 2   • fluticasone (FLONASE) 50 MCG/ACT nasal spray USE TWO SPRAYS IN EACH NOSTRIL ONCE DAILY 16 mL 2   • LANCETS MICRO THIN 33G misc USE AS DIRECTED TO TEST BLOOD GLUCOSE DAILY 100 each 5   • losartan (COZAAR) 50 MG tablet TAKE 1 TABLET EVERY DAY FOR BLOOD PRESSURE 90 tablet 1   • mirtazapine (REMERON) 15 MG tablet TAKE 1 TABLET EVERY NIGHT 90 tablet 1   • primidone (MYSOLINE) 50 MG tablet TAKE 1 TABLET TWICE DAILY  FOR  TREMOR 180 tablet 1   • raNITIdine (ZANTAC) 150 MG tablet TAKE 1 TABLET TWICE DAILY  FOR  REFLUX  ESOPHAGITIS 180 tablet 1   • sertraline (ZOLOFT) 100 MG tablet TAKE 1 TABLET EVERY DAY  FOR  MOOD  AND  WELL  BEING 90 tablet 1   • TRUE METRIX BLOOD GLUCOSE TEST test strip USE TO TEST BLOOD SUGAR ONCE DAILY AS DIRECTED 100 each 0   • TRUE METRIX BLOOD GLUCOSE TEST test strip USE TO TEST BLOOD SUGAR ONCE DAILY AS DIRECTED 100 each 1   • [DISCONTINUED] Delafloxacin Meglumine  "(BAXDELA) 450 MG tablet Take 1 tablet by mouth 2 (Two) Times a Day. 14 tablet 0     No current facility-administered medications on file prior to visit.        Objective   Vitals:    06/17/19 1707   BP: 150/74   BP Location: Left arm   Patient Position: Sitting   Cuff Size: Adult   Pulse: 70   Resp: 16   SpO2: 97%   Weight: 80.3 kg (177 lb)   Height: 170.2 cm (67.01\")       Physical Exam   Constitutional: She is oriented to person, place, and time. She appears well-developed and well-nourished.   Neurological: She is alert and oriented to person, place, and time.   Skin: Skin is warm and dry.   3rd toe with thickened callous with early ulceration and eschar and mtp eschar noted, thickened skin.    Skin toes/foot warm to touch with cap refill ,3 sec around wound.  Pedal pulses palpable but reduced.   Psychiatric: She has a normal mood and affect. Her behavior is normal.   Nursing note and vitals reviewed.      Assessment/Plan   Magdalena was seen today for toe pain.    Diagnoses and all orders for this visit:    Nonhealing nonsurgical wound  Comments:  1st MTP  Orders:  -     Doppler Ankle Brachial Index Single Level CAR; Future  -     sodium hypochlorite (DAKIN'S) 0.25 % topical solution; Apply daily to wound then cover with sterile gauze    Callous ulcer, unspecified ulcer stage (CMS/HCC)  -     Doppler Ankle Brachial Index Single Level CAR; Future  -     sodium hypochlorite (DAKIN'S) 0.25 % topical solution; Apply daily to wound then cover with sterile gauze    Claudication (CMS/HCC)  -     Doppler Ankle Brachial Index Single Level CAR; Future    3rd toe and mtp both on right;   Will try hypertonic saline to debride and have wound care come to see patient.  She is homebound and high risk for progressive wound formation, she would benefit from [facility] for in home wound care as too challenging to travel to wound care clinic.  HHC would reduce potential need for surgical intervention or hospitalization.  Given " non-healing status of wounds, screen for PVD.         -Follow up: as scheduled already in July.

## 2019-07-03 ENCOUNTER — HOSPITAL ENCOUNTER (OUTPATIENT)
Dept: CARDIOLOGY | Facility: HOSPITAL | Age: 84
Discharge: HOME | End: 2019-07-03
Admitting: FAMILY MEDICINE

## 2019-07-03 DIAGNOSIS — I73.9 CLAUDICATION (HCC): ICD-10-CM

## 2019-07-03 DIAGNOSIS — L98.499 CALLOUS ULCER, UNSPECIFIED ULCER STAGE (HCC): ICD-10-CM

## 2019-07-03 DIAGNOSIS — T14.8XXA NONHEALING NONSURGICAL WOUND: ICD-10-CM

## 2019-07-03 PROCEDURE — 93923 UPR/LXTR ART STDY 3+ LVLS: CPT

## 2019-07-04 LAB
BH CV LOWER ARTERIAL LEFT GREAT TOE SYS MAX: 146 MMHG
BH CV LOWER ARTERIAL LEFT TBI RATIO: 0.69
BH CV LOWER ARTERIAL RIGHT 3RD DIGIT SYS MAX: 119 MMHG
BH CV LOWER ARTERIAL RIGHT ABI RATIO: 0.99
BH CV LOWER ARTERIAL RIGHT DORSALIS PEDIS SYS MAX: 206 MMHG
BH CV LOWER ARTERIAL RIGHT GREAT TOE SYS MAX: 107 MMHG
BH CV LOWER ARTERIAL RIGHT POST TIBIAL SYS MAX: 201 MMHG
BH CV LOWER ARTERIAL RIGHT TBI RATIO: 0.51
UPPER ARTERIAL LEFT ARM BRACHIAL SYS MAX: 207 MMHG
UPPER ARTERIAL RIGHT ARM BRACHIAL SYS MAX: 211 MMHG

## 2019-07-07 DIAGNOSIS — I73.9 PVD (PERIPHERAL VASCULAR DISEASE) (HCC): Primary | ICD-10-CM

## 2019-07-07 DIAGNOSIS — T14.8XXA NONHEALING NONSURGICAL WOUND: ICD-10-CM

## 2019-07-08 NOTE — PROGRESS NOTES
Call results to patient.  Doppler suggest some vascular disease at high level.  I placed referral to vascular for an opinion.

## 2019-07-29 ENCOUNTER — HOSPITAL ENCOUNTER (INPATIENT)
Facility: HOSPITAL | Age: 84
LOS: 9 days | Discharge: NURSING, REHAB OR LONG-TERM CARE | End: 2019-08-07
Attending: EMERGENCY MEDICINE | Admitting: INTERNAL MEDICINE

## 2019-07-29 ENCOUNTER — APPOINTMENT (OUTPATIENT)
Dept: GENERAL RADIOLOGY | Facility: HOSPITAL | Age: 84
End: 2019-07-29

## 2019-07-29 DIAGNOSIS — S72.332A DISPLACED OBLIQUE FRACTURE OF SHAFT OF LEFT FEMUR, INITIAL ENCOUNTER FOR CLOSED FRACTURE (HCC): Primary | ICD-10-CM

## 2019-07-29 PROBLEM — J96.01 ACUTE RESPIRATORY FAILURE WITH HYPOXIA (HCC): Status: ACTIVE | Noted: 2019-07-29

## 2019-07-29 PROBLEM — N17.9 AKI (ACUTE KIDNEY INJURY): Status: ACTIVE | Noted: 2019-07-29

## 2019-07-29 PROBLEM — N18.9 ACUTE KIDNEY INJURY SUPERIMPOSED ON CHRONIC KIDNEY DISEASE: Status: ACTIVE | Noted: 2019-07-29

## 2019-07-29 LAB
ABO GROUP BLD: NORMAL
ALBUMIN SERPL-MCNC: 3.8 G/DL (ref 3.5–5.2)
ALBUMIN/GLOB SERPL: 1.2 G/DL
ALP SERPL-CCNC: 88 U/L (ref 39–117)
ALT SERPL W P-5'-P-CCNC: 10 U/L (ref 1–33)
ANION GAP SERPL CALCULATED.3IONS-SCNC: 13.2 MMOL/L (ref 5–15)
AST SERPL-CCNC: 18 U/L (ref 1–32)
BASOPHILS # BLD AUTO: 0.04 10*3/MM3 (ref 0–0.2)
BASOPHILS NFR BLD AUTO: 0.6 % (ref 0–1.5)
BILIRUB SERPL-MCNC: 0.2 MG/DL (ref 0.2–1.2)
BLD GP AB SCN SERPL QL: NEGATIVE
BUN BLD-MCNC: 20 MG/DL (ref 8–23)
BUN/CREAT SERPL: 14.6 (ref 7–25)
CALCIUM SPEC-SCNC: 8.9 MG/DL (ref 8.2–9.6)
CHLORIDE SERPL-SCNC: 98 MMOL/L (ref 98–107)
CO2 SERPL-SCNC: 23.8 MMOL/L (ref 22–29)
CREAT BLD-MCNC: 1.37 MG/DL (ref 0.57–1)
DEPRECATED RDW RBC AUTO: 46.8 FL (ref 37–54)
EOSINOPHIL # BLD AUTO: 0.17 10*3/MM3 (ref 0–0.4)
EOSINOPHIL NFR BLD AUTO: 2.7 % (ref 0.3–6.2)
ERYTHROCYTE [DISTWIDTH] IN BLOOD BY AUTOMATED COUNT: 13 % (ref 12.3–15.4)
GFR SERPL CREATININE-BSD FRML MDRD: 36 ML/MIN/1.73
GLOBULIN UR ELPH-MCNC: 3.3 GM/DL
GLUCOSE BLD-MCNC: 163 MG/DL (ref 65–99)
GLUCOSE BLDC GLUCOMTR-MCNC: 196 MG/DL (ref 70–130)
GLUCOSE BLDC GLUCOMTR-MCNC: 202 MG/DL (ref 70–130)
HCT VFR BLD AUTO: 35 % (ref 34–46.6)
HGB BLD-MCNC: 11 G/DL (ref 12–15.9)
IMM GRANULOCYTES # BLD AUTO: 0.03 10*3/MM3 (ref 0–0.05)
IMM GRANULOCYTES NFR BLD AUTO: 0.5 % (ref 0–0.5)
INR PPP: 1.02 (ref 0.9–1.1)
LYMPHOCYTES # BLD AUTO: 2.19 10*3/MM3 (ref 0.7–3.1)
LYMPHOCYTES NFR BLD AUTO: 35.2 % (ref 19.6–45.3)
MCH RBC QN AUTO: 30.8 PG (ref 26.6–33)
MCHC RBC AUTO-ENTMCNC: 31.4 G/DL (ref 31.5–35.7)
MCV RBC AUTO: 98 FL (ref 79–97)
MONOCYTES # BLD AUTO: 0.53 10*3/MM3 (ref 0.1–0.9)
MONOCYTES NFR BLD AUTO: 8.5 % (ref 5–12)
NEUTROPHILS # BLD AUTO: 3.26 10*3/MM3 (ref 1.7–7)
NEUTROPHILS NFR BLD AUTO: 52.5 % (ref 42.7–76)
NRBC BLD AUTO-RTO: 0 /100 WBC (ref 0–0.2)
PLATELET # BLD AUTO: 198 10*3/MM3 (ref 140–450)
PMV BLD AUTO: 9.1 FL (ref 6–12)
POTASSIUM BLD-SCNC: 4.4 MMOL/L (ref 3.5–5.2)
PROT SERPL-MCNC: 7.1 G/DL (ref 6–8.5)
PROTHROMBIN TIME: 13.1 SECONDS (ref 11.7–14.2)
RBC # BLD AUTO: 3.57 10*6/MM3 (ref 3.77–5.28)
RH BLD: POSITIVE
SODIUM BLD-SCNC: 135 MMOL/L (ref 136–145)
T&S EXPIRATION DATE: NORMAL
WBC NRBC COR # BLD: 6.22 10*3/MM3 (ref 3.4–10.8)

## 2019-07-29 PROCEDURE — 94799 UNLISTED PULMONARY SVC/PX: CPT

## 2019-07-29 PROCEDURE — 86900 BLOOD TYPING SEROLOGIC ABO: CPT | Performed by: EMERGENCY MEDICINE

## 2019-07-29 PROCEDURE — 85610 PROTHROMBIN TIME: CPT | Performed by: EMERGENCY MEDICINE

## 2019-07-29 PROCEDURE — 86901 BLOOD TYPING SEROLOGIC RH(D): CPT | Performed by: EMERGENCY MEDICINE

## 2019-07-29 PROCEDURE — 63710000001 INSULIN LISPRO (HUMAN) PER 5 UNITS: Performed by: NURSE PRACTITIONER

## 2019-07-29 PROCEDURE — 80053 COMPREHEN METABOLIC PANEL: CPT | Performed by: EMERGENCY MEDICINE

## 2019-07-29 PROCEDURE — 93005 ELECTROCARDIOGRAM TRACING: CPT | Performed by: NURSE PRACTITIONER

## 2019-07-29 PROCEDURE — 99222 1ST HOSP IP/OBS MODERATE 55: CPT | Performed by: NURSE PRACTITIONER

## 2019-07-29 PROCEDURE — 94640 AIRWAY INHALATION TREATMENT: CPT

## 2019-07-29 PROCEDURE — 93010 ELECTROCARDIOGRAM REPORT: CPT | Performed by: INTERNAL MEDICINE

## 2019-07-29 PROCEDURE — 25010000002 HYDROMORPHONE PER 4 MG: Performed by: EMERGENCY MEDICINE

## 2019-07-29 PROCEDURE — 73552 X-RAY EXAM OF FEMUR 2/>: CPT

## 2019-07-29 PROCEDURE — 85025 COMPLETE CBC W/AUTO DIFF WBC: CPT | Performed by: EMERGENCY MEDICINE

## 2019-07-29 PROCEDURE — 99285 EMERGENCY DEPT VISIT HI MDM: CPT

## 2019-07-29 PROCEDURE — 82962 GLUCOSE BLOOD TEST: CPT

## 2019-07-29 PROCEDURE — 86923 COMPATIBILITY TEST ELECTRIC: CPT

## 2019-07-29 PROCEDURE — 25010000002 MORPHINE PER 10 MG: Performed by: INTERNAL MEDICINE

## 2019-07-29 PROCEDURE — 71045 X-RAY EXAM CHEST 1 VIEW: CPT

## 2019-07-29 PROCEDURE — 73560 X-RAY EXAM OF KNEE 1 OR 2: CPT

## 2019-07-29 PROCEDURE — 86850 RBC ANTIBODY SCREEN: CPT | Performed by: EMERGENCY MEDICINE

## 2019-07-29 RX ORDER — CETIRIZINE HYDROCHLORIDE 10 MG/1
10 TABLET ORAL DAILY
Status: DISCONTINUED | OUTPATIENT
Start: 2019-07-29 | End: 2019-08-01

## 2019-07-29 RX ORDER — SODIUM CHLORIDE 0.9 % (FLUSH) 0.9 %
10 SYRINGE (ML) INJECTION AS NEEDED
Status: DISCONTINUED | OUTPATIENT
Start: 2019-07-29 | End: 2019-08-07 | Stop reason: HOSPADM

## 2019-07-29 RX ORDER — BISACODYL 5 MG/1
5 TABLET, DELAYED RELEASE ORAL DAILY PRN
Status: DISCONTINUED | OUTPATIENT
Start: 2019-07-29 | End: 2019-08-07 | Stop reason: HOSPADM

## 2019-07-29 RX ORDER — MIRTAZAPINE 15 MG/1
15 TABLET, FILM COATED ORAL NIGHTLY
Status: DISCONTINUED | OUTPATIENT
Start: 2019-07-29 | End: 2019-07-31

## 2019-07-29 RX ORDER — HYDROCODONE BITARTRATE AND ACETAMINOPHEN 7.5; 325 MG/1; MG/1
2 TABLET ORAL EVERY 4 HOURS PRN
Status: DISCONTINUED | OUTPATIENT
Start: 2019-07-29 | End: 2019-08-07 | Stop reason: HOSPADM

## 2019-07-29 RX ORDER — NICOTINE POLACRILEX 4 MG
15 LOZENGE BUCCAL
Status: DISCONTINUED | OUTPATIENT
Start: 2019-07-29 | End: 2019-08-07 | Stop reason: HOSPADM

## 2019-07-29 RX ORDER — SODIUM CHLORIDE 9 MG/ML
50 INJECTION, SOLUTION INTRAVENOUS CONTINUOUS
Status: DISCONTINUED | OUTPATIENT
Start: 2019-07-29 | End: 2019-08-01

## 2019-07-29 RX ORDER — AMLODIPINE BESYLATE 5 MG/1
5 TABLET ORAL
Status: DISCONTINUED | OUTPATIENT
Start: 2019-07-29 | End: 2019-07-31

## 2019-07-29 RX ORDER — ONDANSETRON 4 MG/1
4 TABLET, FILM COATED ORAL EVERY 6 HOURS PRN
Status: DISCONTINUED | OUTPATIENT
Start: 2019-07-29 | End: 2019-08-07 | Stop reason: HOSPADM

## 2019-07-29 RX ORDER — HYDROMORPHONE HYDROCHLORIDE 1 MG/ML
0.5 INJECTION, SOLUTION INTRAMUSCULAR; INTRAVENOUS; SUBCUTANEOUS ONCE
Status: COMPLETED | OUTPATIENT
Start: 2019-07-29 | End: 2019-07-29

## 2019-07-29 RX ORDER — HYDRALAZINE HYDROCHLORIDE 20 MG/ML
10 INJECTION INTRAMUSCULAR; INTRAVENOUS EVERY 6 HOURS PRN
Status: DISCONTINUED | OUTPATIENT
Start: 2019-07-29 | End: 2019-08-07 | Stop reason: HOSPADM

## 2019-07-29 RX ORDER — DEXTROSE MONOHYDRATE 25 G/50ML
25 INJECTION, SOLUTION INTRAVENOUS
Status: DISCONTINUED | OUTPATIENT
Start: 2019-07-29 | End: 2019-08-07 | Stop reason: HOSPADM

## 2019-07-29 RX ORDER — HYDROMORPHONE HYDROCHLORIDE 1 MG/ML
0.25 INJECTION, SOLUTION INTRAMUSCULAR; INTRAVENOUS; SUBCUTANEOUS ONCE
Status: COMPLETED | OUTPATIENT
Start: 2019-07-29 | End: 2019-07-29

## 2019-07-29 RX ORDER — ACETAMINOPHEN 325 MG/1
650 TABLET ORAL EVERY 6 HOURS PRN
Status: DISCONTINUED | OUTPATIENT
Start: 2019-07-29 | End: 2019-08-07 | Stop reason: HOSPADM

## 2019-07-29 RX ORDER — MORPHINE SULFATE 2 MG/ML
2 INJECTION, SOLUTION INTRAMUSCULAR; INTRAVENOUS EVERY 4 HOURS PRN
Status: DISCONTINUED | OUTPATIENT
Start: 2019-07-29 | End: 2019-08-07 | Stop reason: HOSPADM

## 2019-07-29 RX ORDER — SERTRALINE HYDROCHLORIDE 100 MG/1
100 TABLET, FILM COATED ORAL DAILY
Status: DISCONTINUED | OUTPATIENT
Start: 2019-07-29 | End: 2019-07-31

## 2019-07-29 RX ORDER — MORPHINE SULFATE 2 MG/ML
4 INJECTION, SOLUTION INTRAMUSCULAR; INTRAVENOUS EVERY 4 HOURS PRN
Status: DISCONTINUED | OUTPATIENT
Start: 2019-07-29 | End: 2019-08-07 | Stop reason: HOSPADM

## 2019-07-29 RX ORDER — IPRATROPIUM BROMIDE AND ALBUTEROL SULFATE 2.5; .5 MG/3ML; MG/3ML
3 SOLUTION RESPIRATORY (INHALATION) 3 TIMES DAILY
Status: DISCONTINUED | OUTPATIENT
Start: 2019-07-29 | End: 2019-08-07 | Stop reason: HOSPADM

## 2019-07-29 RX ORDER — HYOSCYAMINE SULFATE 16 OZ
SOLUTION MISCELLANEOUS
Status: DISCONTINUED | OUTPATIENT
Start: 2019-07-29 | End: 2019-08-06

## 2019-07-29 RX ORDER — FLUTICASONE PROPIONATE 50 MCG
2 SPRAY, SUSPENSION (ML) NASAL DAILY
Status: DISCONTINUED | OUTPATIENT
Start: 2019-07-29 | End: 2019-08-07 | Stop reason: HOSPADM

## 2019-07-29 RX ORDER — ACETAMINOPHEN 325 MG/1
650 TABLET ORAL EVERY 4 HOURS PRN
Status: DISCONTINUED | OUTPATIENT
Start: 2019-07-29 | End: 2019-08-07 | Stop reason: HOSPADM

## 2019-07-29 RX ORDER — ONDANSETRON 2 MG/ML
4 INJECTION INTRAMUSCULAR; INTRAVENOUS EVERY 6 HOURS PRN
Status: DISCONTINUED | OUTPATIENT
Start: 2019-07-29 | End: 2019-08-07 | Stop reason: HOSPADM

## 2019-07-29 RX ORDER — SODIUM CHLORIDE 0.9 % (FLUSH) 0.9 %
3 SYRINGE (ML) INJECTION EVERY 12 HOURS SCHEDULED
Status: DISCONTINUED | OUTPATIENT
Start: 2019-07-29 | End: 2019-08-07 | Stop reason: HOSPADM

## 2019-07-29 RX ORDER — PRIMIDONE 50 MG/1
50 TABLET ORAL EVERY 12 HOURS SCHEDULED
Status: DISCONTINUED | OUTPATIENT
Start: 2019-07-29 | End: 2019-07-31

## 2019-07-29 RX ORDER — FAMOTIDINE 20 MG/1
20 TABLET, FILM COATED ORAL
Status: DISCONTINUED | OUTPATIENT
Start: 2019-07-29 | End: 2019-07-30

## 2019-07-29 RX ORDER — SODIUM CHLORIDE 0.9 % (FLUSH) 0.9 %
3-10 SYRINGE (ML) INJECTION AS NEEDED
Status: DISCONTINUED | OUTPATIENT
Start: 2019-07-29 | End: 2019-08-07 | Stop reason: HOSPADM

## 2019-07-29 RX ORDER — CALCIUM CARBONATE 200(500)MG
2 TABLET,CHEWABLE ORAL 2 TIMES DAILY PRN
Status: DISCONTINUED | OUTPATIENT
Start: 2019-07-29 | End: 2019-08-07 | Stop reason: HOSPADM

## 2019-07-29 RX ADMIN — HYOSCYAMINE SULFATE 473 ML: 16 SOLUTION at 17:31

## 2019-07-29 RX ADMIN — SODIUM CHLORIDE, PRESERVATIVE FREE 3 ML: 5 INJECTION INTRAVENOUS at 17:30

## 2019-07-29 RX ADMIN — HYDROCODONE BITARTRATE AND ACETAMINOPHEN 2 TABLET: 7.5; 325 TABLET ORAL at 11:20

## 2019-07-29 RX ADMIN — FAMOTIDINE 20 MG: 20 TABLET, FILM COATED ORAL at 17:30

## 2019-07-29 RX ADMIN — MORPHINE SULFATE 2 MG: 2 INJECTION, SOLUTION INTRAMUSCULAR; INTRAVENOUS at 09:11

## 2019-07-29 RX ADMIN — INSULIN LISPRO 3 UNITS: 100 INJECTION, SOLUTION INTRAVENOUS; SUBCUTANEOUS at 11:20

## 2019-07-29 RX ADMIN — PRIMIDONE 50 MG: 50 TABLET ORAL at 20:11

## 2019-07-29 RX ADMIN — HYDROMORPHONE HYDROCHLORIDE 0.5 MG: 1 INJECTION, SOLUTION INTRAMUSCULAR; INTRAVENOUS; SUBCUTANEOUS at 04:13

## 2019-07-29 RX ADMIN — HYDROMORPHONE HYDROCHLORIDE 0.5 MG: 1 INJECTION, SOLUTION INTRAMUSCULAR; INTRAVENOUS; SUBCUTANEOUS at 04:33

## 2019-07-29 RX ADMIN — CETIRIZINE HYDROCHLORIDE 10 MG: 10 TABLET, FILM COATED ORAL at 20:11

## 2019-07-29 RX ADMIN — HYDROMORPHONE HYDROCHLORIDE 0.25 MG: 1 INJECTION, SOLUTION INTRAMUSCULAR; INTRAVENOUS; SUBCUTANEOUS at 06:10

## 2019-07-29 RX ADMIN — HYDROCODONE BITARTRATE AND ACETAMINOPHEN 2 TABLET: 7.5; 325 TABLET ORAL at 22:44

## 2019-07-29 RX ADMIN — MIRTAZAPINE 15 MG: 15 TABLET, FILM COATED ORAL at 20:11

## 2019-07-29 RX ADMIN — INSULIN LISPRO 2 UNITS: 100 INJECTION, SOLUTION INTRAVENOUS; SUBCUTANEOUS at 17:31

## 2019-07-29 RX ADMIN — SODIUM CHLORIDE 100 ML/HR: 9 INJECTION, SOLUTION INTRAVENOUS at 06:26

## 2019-07-29 RX ADMIN — IPRATROPIUM BROMIDE AND ALBUTEROL SULFATE 3 ML: 2.5; .5 SOLUTION RESPIRATORY (INHALATION) at 20:22

## 2019-07-30 ENCOUNTER — APPOINTMENT (OUTPATIENT)
Dept: GENERAL RADIOLOGY | Facility: HOSPITAL | Age: 84
End: 2019-07-30

## 2019-07-30 ENCOUNTER — ANESTHESIA (OUTPATIENT)
Dept: PERIOP | Facility: HOSPITAL | Age: 84
End: 2019-07-30

## 2019-07-30 ENCOUNTER — ANESTHESIA EVENT (OUTPATIENT)
Dept: PERIOP | Facility: HOSPITAL | Age: 84
End: 2019-07-30

## 2019-07-30 LAB
ANION GAP SERPL CALCULATED.3IONS-SCNC: 8.7 MMOL/L (ref 5–15)
BUN BLD-MCNC: 24 MG/DL (ref 8–23)
BUN/CREAT SERPL: 18.8 (ref 7–25)
CALCIUM SPEC-SCNC: 7.8 MG/DL (ref 8.2–9.6)
CHLORIDE SERPL-SCNC: 103 MMOL/L (ref 98–107)
CO2 SERPL-SCNC: 24.3 MMOL/L (ref 22–29)
CREAT BLD-MCNC: 1.28 MG/DL (ref 0.57–1)
DEPRECATED RDW RBC AUTO: 48.4 FL (ref 37–54)
DEPRECATED RDW RBC AUTO: 52.9 FL (ref 37–54)
ERYTHROCYTE [DISTWIDTH] IN BLOOD BY AUTOMATED COUNT: 13.2 % (ref 12.3–15.4)
ERYTHROCYTE [DISTWIDTH] IN BLOOD BY AUTOMATED COUNT: 14.5 % (ref 12.3–15.4)
GFR SERPL CREATININE-BSD FRML MDRD: 39 ML/MIN/1.73
GLUCOSE BLD-MCNC: 121 MG/DL (ref 65–99)
GLUCOSE BLDC GLUCOMTR-MCNC: 107 MG/DL (ref 70–130)
GLUCOSE BLDC GLUCOMTR-MCNC: 126 MG/DL (ref 70–130)
GLUCOSE BLDC GLUCOMTR-MCNC: 142 MG/DL (ref 70–130)
GLUCOSE BLDC GLUCOMTR-MCNC: 150 MG/DL (ref 70–130)
HCT VFR BLD AUTO: 25.5 % (ref 34–46.6)
HCT VFR BLD AUTO: 32.1 % (ref 34–46.6)
HGB BLD-MCNC: 7.8 G/DL (ref 12–15.9)
HGB BLD-MCNC: 9.7 G/DL (ref 12–15.9)
MCH RBC QN AUTO: 30.3 PG (ref 26.6–33)
MCH RBC QN AUTO: 30.7 PG (ref 26.6–33)
MCHC RBC AUTO-ENTMCNC: 30.2 G/DL (ref 31.5–35.7)
MCHC RBC AUTO-ENTMCNC: 30.6 G/DL (ref 31.5–35.7)
MCV RBC AUTO: 100.3 FL (ref 79–97)
MCV RBC AUTO: 100.4 FL (ref 79–97)
PLATELET # BLD AUTO: 135 10*3/MM3 (ref 140–450)
PLATELET # BLD AUTO: 135 10*3/MM3 (ref 140–450)
PMV BLD AUTO: 9.5 FL (ref 6–12)
PMV BLD AUTO: 9.8 FL (ref 6–12)
POTASSIUM BLD-SCNC: 5.2 MMOL/L (ref 3.5–5.2)
RBC # BLD AUTO: 2.54 10*6/MM3 (ref 3.77–5.28)
RBC # BLD AUTO: 3.2 10*6/MM3 (ref 3.77–5.28)
SODIUM BLD-SCNC: 136 MMOL/L (ref 136–145)
WBC NRBC COR # BLD: 5.92 10*3/MM3 (ref 3.4–10.8)
WBC NRBC COR # BLD: 9.11 10*3/MM3 (ref 3.4–10.8)

## 2019-07-30 PROCEDURE — C1713 ANCHOR/SCREW BN/BN,TIS/BN: HCPCS | Performed by: ORTHOPAEDIC SURGERY

## 2019-07-30 PROCEDURE — 76000 FLUOROSCOPY <1 HR PHYS/QHP: CPT

## 2019-07-30 PROCEDURE — 80048 BASIC METABOLIC PNL TOTAL CA: CPT | Performed by: INTERNAL MEDICINE

## 2019-07-30 PROCEDURE — P9016 RBC LEUKOCYTES REDUCED: HCPCS

## 2019-07-30 PROCEDURE — 86900 BLOOD TYPING SEROLOGIC ABO: CPT

## 2019-07-30 PROCEDURE — 85027 COMPLETE CBC AUTOMATED: CPT | Performed by: ORTHOPAEDIC SURGERY

## 2019-07-30 PROCEDURE — 85027 COMPLETE CBC AUTOMATED: CPT | Performed by: INTERNAL MEDICINE

## 2019-07-30 PROCEDURE — 25010000002 HYDROMORPHONE PER 4 MG: Performed by: NURSE ANESTHETIST, CERTIFIED REGISTERED

## 2019-07-30 PROCEDURE — 0QSC04Z REPOSITION LEFT LOWER FEMUR WITH INTERNAL FIXATION DEVICE, OPEN APPROACH: ICD-10-PCS | Performed by: ORTHOPAEDIC SURGERY

## 2019-07-30 PROCEDURE — 25010000002 PROPOFOL 10 MG/ML EMULSION: Performed by: NURSE ANESTHETIST, CERTIFIED REGISTERED

## 2019-07-30 PROCEDURE — 25010000002 MORPHINE PER 10 MG: Performed by: INTERNAL MEDICINE

## 2019-07-30 PROCEDURE — 25010000002 FENTANYL CITRATE (PF) 100 MCG/2ML SOLUTION: Performed by: NURSE ANESTHETIST, CERTIFIED REGISTERED

## 2019-07-30 PROCEDURE — 25010000002 NEOSTIGMINE PER 0.5 MG: Performed by: NURSE ANESTHETIST, CERTIFIED REGISTERED

## 2019-07-30 PROCEDURE — 25010000002 ONDANSETRON PER 1 MG: Performed by: NURSE ANESTHETIST, CERTIFIED REGISTERED

## 2019-07-30 PROCEDURE — 94799 UNLISTED PULMONARY SVC/PX: CPT

## 2019-07-30 PROCEDURE — 86901 BLOOD TYPING SEROLOGIC RH(D): CPT

## 2019-07-30 PROCEDURE — 36430 TRANSFUSION BLD/BLD COMPNT: CPT

## 2019-07-30 PROCEDURE — 73552 X-RAY EXAM OF FEMUR 2/>: CPT

## 2019-07-30 PROCEDURE — 82962 GLUCOSE BLOOD TEST: CPT

## 2019-07-30 PROCEDURE — 25010000003 CEFAZOLIN IN DEXTROSE 2-4 GM/100ML-% SOLUTION: Performed by: ORTHOPAEDIC SURGERY

## 2019-07-30 PROCEDURE — 25010000002 VANCOMYCIN 10 G RECONSTITUTED SOLUTION: Performed by: ORTHOPAEDIC SURGERY

## 2019-07-30 DEVICE — PERI-LOC 4.5MM T25 LOCKING SCREW                                    30MM SELF-TAPPING
Type: IMPLANTABLE DEVICE | Status: FUNCTIONAL
Brand: PERI-LOC

## 2019-07-30 DEVICE — PERI-LOC 4.5MM T25 LOCKING SCREW                                    34MM SELF-TAPPING
Type: IMPLANTABLE DEVICE | Status: FUNCTIONAL
Brand: PERI-LOC

## 2019-07-30 DEVICE — PERI-LOC 4.5MM T25 LOCKING SCREW                                    38MM SELF-TAPPING
Type: IMPLANTABLE DEVICE | Status: FUNCTIONAL
Brand: PERI-LOC

## 2019-07-30 DEVICE — 4.5MM LATERAL DISTAL FEMUR LOCKING                                    PLATE 16 HOLE LEFT 342MM
Type: IMPLANTABLE DEVICE | Site: FEMUR | Status: FUNCTIONAL
Brand: PERI-LOC

## 2019-07-30 DEVICE — PERI-LOC 4.5MM T25 LOCKING SCREW                                    52MM SELF-TAPPING
Type: IMPLANTABLE DEVICE | Status: FUNCTIONAL
Brand: PERI-LOC

## 2019-07-30 DEVICE — PERI-LOC 4.5MM T25 CORTEX SCREW                                    30MM SELF-TAPPING
Type: IMPLANTABLE DEVICE | Status: FUNCTIONAL
Brand: PERI-LOC

## 2019-07-30 DEVICE — PERI-LOC 4.5MM T25 LOCKING SCREW                                    76MM SELF-TAPPING
Type: IMPLANTABLE DEVICE | Status: FUNCTIONAL
Brand: PERI-LOC

## 2019-07-30 DEVICE — ACCORD 2.0MM COBALT CHROME CABLE                                    WITH CLAMP
Type: IMPLANTABLE DEVICE | Site: FEMUR | Status: FUNCTIONAL
Brand: ACCORD

## 2019-07-30 DEVICE — PERI-LOC TARGETER 3.5MM                                    PROVISIONAL FIXATION PIN 18MM
Type: IMPLANTABLE DEVICE | Status: FUNCTIONAL
Brand: PERI-LOC

## 2019-07-30 DEVICE — PERI-LOC 4.5MM T25 CORTEX SCREW                                    32MM SELF-TAPPING
Type: IMPLANTABLE DEVICE | Status: FUNCTIONAL
Brand: PERI-LOC

## 2019-07-30 DEVICE — PERI-LOC 4.5MM T25 LOCKING SCREW                                    18MM SELF-TAPPING
Type: IMPLANTABLE DEVICE | Status: FUNCTIONAL
Brand: PERI-LOC

## 2019-07-30 DEVICE — PERI-LOC 4.5MM T25 LOCKING SCREW                                    80MM SELF-TAPPING
Type: IMPLANTABLE DEVICE | Status: FUNCTIONAL
Brand: PERI-LOC

## 2019-07-30 RX ORDER — SODIUM CHLORIDE 0.9 % (FLUSH) 0.9 %
1-10 SYRINGE (ML) INJECTION AS NEEDED
Status: DISCONTINUED | OUTPATIENT
Start: 2019-07-30 | End: 2019-07-30 | Stop reason: HOSPADM

## 2019-07-30 RX ORDER — PROPOFOL 10 MG/ML
VIAL (ML) INTRAVENOUS AS NEEDED
Status: DISCONTINUED | OUTPATIENT
Start: 2019-07-30 | End: 2019-07-30 | Stop reason: SURG

## 2019-07-30 RX ORDER — ASPIRIN 325 MG
325 TABLET ORAL DAILY
Status: DISCONTINUED | OUTPATIENT
Start: 2019-07-31 | End: 2019-08-04

## 2019-07-30 RX ORDER — LIDOCAINE HYDROCHLORIDE 20 MG/ML
INJECTION, SOLUTION INFILTRATION; PERINEURAL AS NEEDED
Status: DISCONTINUED | OUTPATIENT
Start: 2019-07-30 | End: 2019-07-30 | Stop reason: SURG

## 2019-07-30 RX ORDER — EPHEDRINE SULFATE 50 MG/ML
5 INJECTION, SOLUTION INTRAVENOUS ONCE AS NEEDED
Status: DISCONTINUED | OUTPATIENT
Start: 2019-07-30 | End: 2019-07-30

## 2019-07-30 RX ORDER — GLYCOPYRROLATE 0.2 MG/ML
INJECTION INTRAMUSCULAR; INTRAVENOUS AS NEEDED
Status: DISCONTINUED | OUTPATIENT
Start: 2019-07-30 | End: 2019-07-30 | Stop reason: SURG

## 2019-07-30 RX ORDER — DIPHENHYDRAMINE HCL 25 MG
25 CAPSULE ORAL
Status: DISCONTINUED | OUTPATIENT
Start: 2019-07-30 | End: 2019-07-30

## 2019-07-30 RX ORDER — EPHEDRINE SULFATE 50 MG/ML
INJECTION, SOLUTION INTRAVENOUS AS NEEDED
Status: DISCONTINUED | OUTPATIENT
Start: 2019-07-30 | End: 2019-07-30 | Stop reason: SURG

## 2019-07-30 RX ORDER — HYDROMORPHONE HYDROCHLORIDE 1 MG/ML
0.5 INJECTION, SOLUTION INTRAMUSCULAR; INTRAVENOUS; SUBCUTANEOUS
Status: DISCONTINUED | OUTPATIENT
Start: 2019-07-30 | End: 2019-07-30

## 2019-07-30 RX ORDER — FAMOTIDINE 10 MG/ML
20 INJECTION, SOLUTION INTRAVENOUS ONCE
Status: COMPLETED | OUTPATIENT
Start: 2019-07-30 | End: 2019-07-30

## 2019-07-30 RX ORDER — ROCURONIUM BROMIDE 10 MG/ML
INJECTION, SOLUTION INTRAVENOUS AS NEEDED
Status: DISCONTINUED | OUTPATIENT
Start: 2019-07-30 | End: 2019-07-30 | Stop reason: SURG

## 2019-07-30 RX ORDER — HYDROCODONE BITARTRATE AND ACETAMINOPHEN 7.5; 325 MG/1; MG/1
1 TABLET ORAL ONCE AS NEEDED
Status: DISCONTINUED | OUTPATIENT
Start: 2019-07-30 | End: 2019-07-30

## 2019-07-30 RX ORDER — PROMETHAZINE HYDROCHLORIDE 25 MG/ML
6.25 INJECTION, SOLUTION INTRAMUSCULAR; INTRAVENOUS
Status: DISCONTINUED | OUTPATIENT
Start: 2019-07-30 | End: 2019-07-30

## 2019-07-30 RX ORDER — FAMOTIDINE 20 MG/1
20 TABLET, FILM COATED ORAL DAILY
Status: DISCONTINUED | OUTPATIENT
Start: 2019-07-31 | End: 2019-07-31 | Stop reason: ALTCHOICE

## 2019-07-30 RX ORDER — SODIUM CHLORIDE, SODIUM LACTATE, POTASSIUM CHLORIDE, CALCIUM CHLORIDE 600; 310; 30; 20 MG/100ML; MG/100ML; MG/100ML; MG/100ML
9 INJECTION, SOLUTION INTRAVENOUS CONTINUOUS
Status: DISCONTINUED | OUTPATIENT
Start: 2019-07-30 | End: 2019-08-01

## 2019-07-30 RX ORDER — LABETALOL HYDROCHLORIDE 5 MG/ML
5 INJECTION, SOLUTION INTRAVENOUS
Status: DISCONTINUED | OUTPATIENT
Start: 2019-07-30 | End: 2019-07-30

## 2019-07-30 RX ORDER — ONDANSETRON 2 MG/ML
4 INJECTION INTRAMUSCULAR; INTRAVENOUS ONCE AS NEEDED
Status: DISCONTINUED | OUTPATIENT
Start: 2019-07-30 | End: 2019-07-30

## 2019-07-30 RX ORDER — NALOXONE HCL 0.4 MG/ML
0.2 VIAL (ML) INJECTION AS NEEDED
Status: DISCONTINUED | OUTPATIENT
Start: 2019-07-30 | End: 2019-07-30

## 2019-07-30 RX ORDER — FENTANYL CITRATE 50 UG/ML
50 INJECTION, SOLUTION INTRAMUSCULAR; INTRAVENOUS
Status: DISCONTINUED | OUTPATIENT
Start: 2019-07-30 | End: 2019-07-30 | Stop reason: HOSPADM

## 2019-07-30 RX ORDER — FENTANYL CITRATE 50 UG/ML
INJECTION, SOLUTION INTRAMUSCULAR; INTRAVENOUS AS NEEDED
Status: DISCONTINUED | OUTPATIENT
Start: 2019-07-30 | End: 2019-07-30 | Stop reason: SURG

## 2019-07-30 RX ORDER — PROMETHAZINE HYDROCHLORIDE 25 MG/1
25 SUPPOSITORY RECTAL ONCE AS NEEDED
Status: DISCONTINUED | OUTPATIENT
Start: 2019-07-30 | End: 2019-07-30

## 2019-07-30 RX ORDER — LIDOCAINE HYDROCHLORIDE 10 MG/ML
0.5 INJECTION, SOLUTION EPIDURAL; INFILTRATION; INTRACAUDAL; PERINEURAL ONCE AS NEEDED
Status: DISCONTINUED | OUTPATIENT
Start: 2019-07-30 | End: 2019-07-30 | Stop reason: HOSPADM

## 2019-07-30 RX ORDER — HYDRALAZINE HYDROCHLORIDE 20 MG/ML
5 INJECTION INTRAMUSCULAR; INTRAVENOUS
Status: DISCONTINUED | OUTPATIENT
Start: 2019-07-30 | End: 2019-07-30

## 2019-07-30 RX ORDER — OXYCODONE AND ACETAMINOPHEN 7.5; 325 MG/1; MG/1
1 TABLET ORAL ONCE AS NEEDED
Status: DISCONTINUED | OUTPATIENT
Start: 2019-07-30 | End: 2019-07-30

## 2019-07-30 RX ORDER — HYDROCODONE BITARTRATE AND ACETAMINOPHEN 5; 325 MG/1; MG/1
1 TABLET ORAL EVERY 6 HOURS PRN
Status: DISCONTINUED | OUTPATIENT
Start: 2019-07-30 | End: 2019-08-07 | Stop reason: HOSPADM

## 2019-07-30 RX ORDER — ACETAMINOPHEN 325 MG/1
650 TABLET ORAL ONCE AS NEEDED
Status: DISCONTINUED | OUTPATIENT
Start: 2019-07-30 | End: 2019-07-30

## 2019-07-30 RX ORDER — ONDANSETRON 2 MG/ML
INJECTION INTRAMUSCULAR; INTRAVENOUS AS NEEDED
Status: DISCONTINUED | OUTPATIENT
Start: 2019-07-30 | End: 2019-07-30 | Stop reason: SURG

## 2019-07-30 RX ORDER — FENTANYL CITRATE 50 UG/ML
50 INJECTION, SOLUTION INTRAMUSCULAR; INTRAVENOUS
Status: DISCONTINUED | OUTPATIENT
Start: 2019-07-30 | End: 2019-07-30

## 2019-07-30 RX ORDER — CEFAZOLIN SODIUM 2 G/100ML
2 INJECTION, SOLUTION INTRAVENOUS EVERY 8 HOURS
Status: COMPLETED | OUTPATIENT
Start: 2019-07-30 | End: 2019-07-31

## 2019-07-30 RX ORDER — PROMETHAZINE HYDROCHLORIDE 25 MG/ML
12.5 INJECTION, SOLUTION INTRAMUSCULAR; INTRAVENOUS ONCE AS NEEDED
Status: DISCONTINUED | OUTPATIENT
Start: 2019-07-30 | End: 2019-07-30

## 2019-07-30 RX ORDER — SODIUM CHLORIDE 9 MG/ML
INJECTION, SOLUTION INTRAVENOUS CONTINUOUS PRN
Status: DISCONTINUED | OUTPATIENT
Start: 2019-07-30 | End: 2019-07-30 | Stop reason: SURG

## 2019-07-30 RX ORDER — FLUMAZENIL 0.1 MG/ML
0.2 INJECTION INTRAVENOUS AS NEEDED
Status: DISCONTINUED | OUTPATIENT
Start: 2019-07-30 | End: 2019-07-30

## 2019-07-30 RX ORDER — PROMETHAZINE HYDROCHLORIDE 25 MG/1
25 TABLET ORAL ONCE AS NEEDED
Status: DISCONTINUED | OUTPATIENT
Start: 2019-07-30 | End: 2019-07-30

## 2019-07-30 RX ORDER — DIPHENHYDRAMINE HYDROCHLORIDE 50 MG/ML
12.5 INJECTION INTRAMUSCULAR; INTRAVENOUS
Status: DISCONTINUED | OUTPATIENT
Start: 2019-07-30 | End: 2019-07-30

## 2019-07-30 RX ADMIN — AMLODIPINE BESYLATE 5 MG: 5 TABLET ORAL at 07:58

## 2019-07-30 RX ADMIN — SODIUM CHLORIDE, POTASSIUM CHLORIDE, SODIUM LACTATE AND CALCIUM CHLORIDE 9 ML/HR: 600; 310; 30; 20 INJECTION, SOLUTION INTRAVENOUS at 13:43

## 2019-07-30 RX ADMIN — HYDROMORPHONE HYDROCHLORIDE 0.5 MG: 1 INJECTION, SOLUTION INTRAMUSCULAR; INTRAVENOUS; SUBCUTANEOUS at 17:35

## 2019-07-30 RX ADMIN — ROCURONIUM BROMIDE 20 MG: 10 INJECTION INTRAVENOUS at 15:20

## 2019-07-30 RX ADMIN — MIRTAZAPINE 15 MG: 15 TABLET, FILM COATED ORAL at 20:42

## 2019-07-30 RX ADMIN — HYDROCODONE BITARTRATE AND ACETAMINOPHEN 2 TABLET: 7.5; 325 TABLET ORAL at 20:24

## 2019-07-30 RX ADMIN — ROCURONIUM BROMIDE 50 MG: 10 INJECTION INTRAVENOUS at 14:49

## 2019-07-30 RX ADMIN — FENTANYL CITRATE 25 MCG: 50 INJECTION INTRAMUSCULAR; INTRAVENOUS at 17:02

## 2019-07-30 RX ADMIN — IPRATROPIUM BROMIDE AND ALBUTEROL SULFATE 3 ML: 2.5; .5 SOLUTION RESPIRATORY (INHALATION) at 20:27

## 2019-07-30 RX ADMIN — PROPOFOL 100 MG: 10 INJECTION, EMULSION INTRAVENOUS at 14:49

## 2019-07-30 RX ADMIN — ONDANSETRON 4 MG: 2 INJECTION INTRAMUSCULAR; INTRAVENOUS at 16:45

## 2019-07-30 RX ADMIN — CETIRIZINE HYDROCHLORIDE 10 MG: 10 TABLET, FILM COATED ORAL at 20:42

## 2019-07-30 RX ADMIN — FENTANYL CITRATE 50 MCG: 50 INJECTION INTRAMUSCULAR; INTRAVENOUS at 15:12

## 2019-07-30 RX ADMIN — ROCURONIUM BROMIDE 10 MG: 10 INJECTION INTRAVENOUS at 15:47

## 2019-07-30 RX ADMIN — SODIUM CHLORIDE: 0.9 INJECTION, SOLUTION INTRAVENOUS at 15:30

## 2019-07-30 RX ADMIN — LIDOCAINE HYDROCHLORIDE 60 MG: 20 INJECTION, SOLUTION INFILTRATION; PERINEURAL at 14:49

## 2019-07-30 RX ADMIN — HYOSCYAMINE SULFATE 473 ML: 16 SOLUTION at 10:00

## 2019-07-30 RX ADMIN — NEOSTIGMINE METHYLSULFATE 2 MG: 1 INJECTION INTRAMUSCULAR; INTRAVENOUS; SUBCUTANEOUS at 16:40

## 2019-07-30 RX ADMIN — FAMOTIDINE 20 MG: 10 INJECTION INTRAVENOUS at 13:43

## 2019-07-30 RX ADMIN — FENTANYL CITRATE 25 MCG: 50 INJECTION INTRAMUSCULAR; INTRAVENOUS at 16:21

## 2019-07-30 RX ADMIN — MORPHINE SULFATE 2 MG: 2 INJECTION, SOLUTION INTRAMUSCULAR; INTRAVENOUS at 03:54

## 2019-07-30 RX ADMIN — EPHEDRINE SULFATE 10 MG: 50 INJECTION INTRAMUSCULAR; INTRAVENOUS; SUBCUTANEOUS at 15:28

## 2019-07-30 RX ADMIN — MORPHINE SULFATE 2 MG: 2 INJECTION, SOLUTION INTRAMUSCULAR; INTRAVENOUS at 10:03

## 2019-07-30 RX ADMIN — FENTANYL CITRATE 25 MCG: 50 INJECTION INTRAMUSCULAR; INTRAVENOUS at 16:00

## 2019-07-30 RX ADMIN — VANCOMYCIN HYDROCHLORIDE 1250 MG: 10 INJECTION, POWDER, LYOPHILIZED, FOR SOLUTION INTRAVENOUS at 13:07

## 2019-07-30 RX ADMIN — IPRATROPIUM BROMIDE AND ALBUTEROL SULFATE 3 ML: 2.5; .5 SOLUTION RESPIRATORY (INHALATION) at 07:23

## 2019-07-30 RX ADMIN — FENTANYL CITRATE 50 MCG: 50 INJECTION, SOLUTION INTRAMUSCULAR; INTRAVENOUS at 17:35

## 2019-07-30 RX ADMIN — FENTANYL CITRATE 25 MCG: 50 INJECTION INTRAMUSCULAR; INTRAVENOUS at 16:47

## 2019-07-30 RX ADMIN — GLYCOPYRROLATE 0.3 MG: 0.2 INJECTION INTRAMUSCULAR; INTRAVENOUS at 16:40

## 2019-07-30 RX ADMIN — FENTANYL CITRATE 50 MCG: 50 INJECTION INTRAMUSCULAR; INTRAVENOUS at 15:40

## 2019-07-30 RX ADMIN — SODIUM CHLORIDE 50 ML/HR: 9 INJECTION, SOLUTION INTRAVENOUS at 10:00

## 2019-07-30 RX ADMIN — ROCURONIUM BROMIDE 10 MG: 10 INJECTION INTRAVENOUS at 15:59

## 2019-07-30 RX ADMIN — MORPHINE SULFATE 2 MG: 2 INJECTION, SOLUTION INTRAMUSCULAR; INTRAVENOUS at 00:09

## 2019-07-30 RX ADMIN — PRIMIDONE 50 MG: 50 TABLET ORAL at 20:42

## 2019-07-30 RX ADMIN — FENTANYL CITRATE 50 MCG: 50 INJECTION, SOLUTION INTRAMUSCULAR; INTRAVENOUS at 17:19

## 2019-07-30 RX ADMIN — SODIUM CHLORIDE, POTASSIUM CHLORIDE, SODIUM LACTATE AND CALCIUM CHLORIDE 9 ML/HR: 600; 310; 30; 20 INJECTION, SOLUTION INTRAVENOUS at 22:14

## 2019-07-30 RX ADMIN — CEFAZOLIN SODIUM 2 G: 2 INJECTION, SOLUTION INTRAVENOUS at 20:38

## 2019-07-30 NOTE — ANESTHESIA POSTPROCEDURE EVALUATION
Patient: Magdalena Jerry    Procedure Summary     Date:  07/30/19 Room / Location:  [facility] OR [address] [place] [address] MAIN OR    Anesthesia Start:  1444 Anesthesia Stop:  1704    Procedure:  OPEN REDUCTION INTERNAL FIXATION LEFT FEMUR (Left Leg Lower) Diagnosis:       Displaced oblique fracture of shaft of left femur, initial encounter for closed fracture (CMS/HCC)      (Displaced oblique fracture of shaft of left femur, initial encounter for closed fracture (CMS/HCC) [S72.332A])    Surgeon:  Nazario Epstein II, MD Provider:  Song Cobos MD    Anesthesia Type:  general ASA Status:  3          Anesthesia Type: general  Last vitals  BP   162/59 (07/30/19 1750)   Temp   37.4 °C (99.3 °F) (07/30/19 1702)   Pulse   69 (07/30/19 1750)   Resp   16 (07/30/19 1750)     SpO2   93 % (07/30/19 1750)     Post Anesthesia Care and Evaluation    Patient location during evaluation: PACU  Patient participation: complete - patient participated  Level of consciousness: awake  Pain management: adequate  Airway patency: patent  Anesthetic complications: No anesthetic complications  PONV Status: none  Cardiovascular status: acceptable  Respiratory status: acceptable  Hydration status: acceptable

## 2019-07-30 NOTE — ANESTHESIA PREPROCEDURE EVALUATION
Anesthesia Evaluation     Patient summary reviewed   no history of anesthetic complications:  NPO Solid Status: > 8 hours  NPO Liquid Status: > 2 hours           Airway   Mallampati: I  TM distance: >3 FB  Neck ROM: full  Dental      Comment: She has one tooth    Pulmonary     breath sounds clear to auscultation  (+) pneumonia ,   (-) shortness of breath, not a smoker  Cardiovascular     ECG reviewed  Rhythm: regular  Rate: normal    (+) hypertension, hyperlipidemia,   (-) angina, JIM      Neuro/Psych  (+) CVA, numbness, psychiatric history Anxiety and Depression,     GI/Hepatic/Renal/Endo    (+)  GERD,  renal disease (stage III) CRI, diabetes mellitus,     Musculoskeletal     Abdominal    Substance History      OB/GYN          Other   (+) blood dyscrasia (anemia, hgb 7.8)     ROS/Med Hx Other: Still lives independantly at home, ? Mechanical fall. Echo pending but low risk per cardiology for significant cardiac issues.                 Anesthesia Plan    ASA 3     general   (Benzo allergy)  intravenous induction   Anesthetic plan, all risks, benefits, and alternatives have been provided, discussed and informed consent has been obtained with: patient.

## 2019-07-31 ENCOUNTER — APPOINTMENT (OUTPATIENT)
Dept: CARDIOLOGY | Facility: HOSPITAL | Age: 84
End: 2019-07-31

## 2019-07-31 ENCOUNTER — APPOINTMENT (OUTPATIENT)
Dept: GENERAL RADIOLOGY | Facility: HOSPITAL | Age: 84
End: 2019-07-31

## 2019-07-31 PROBLEM — E87.5 HYPERKALEMIA: Status: ACTIVE | Noted: 2019-07-31

## 2019-07-31 LAB
ABO + RH BLD: NORMAL
ABO + RH BLD: NORMAL
ANION GAP SERPL CALCULATED.3IONS-SCNC: 11.1 MMOL/L (ref 5–15)
ANION GAP SERPL CALCULATED.3IONS-SCNC: 9.6 MMOL/L (ref 5–15)
AORTIC DIMENSIONLESS INDEX: 0.7 (DI)
BH BB BLOOD EXPIRATION DATE: NORMAL
BH BB BLOOD EXPIRATION DATE: NORMAL
BH BB BLOOD TYPE BARCODE: 6200
BH BB BLOOD TYPE BARCODE: 6200
BH BB DISPENSE STATUS: NORMAL
BH BB DISPENSE STATUS: NORMAL
BH BB PRODUCT CODE: NORMAL
BH BB PRODUCT CODE: NORMAL
BH BB UNIT NUMBER: NORMAL
BH BB UNIT NUMBER: NORMAL
BH CV ECHO MEAS - ACS: 2.3 CM
BH CV ECHO MEAS - AO MAX PG: 14 MMHG
BH CV ECHO MEAS - AO MEAN PG (FULL): 3 MMHG
BH CV ECHO MEAS - AO MEAN PG: 7 MMHG
BH CV ECHO MEAS - AO ROOT AREA (BSA CORRECTED): 1.7
BH CV ECHO MEAS - AO ROOT AREA: 9.1 CM^2
BH CV ECHO MEAS - AO ROOT DIAM: 3.4 CM
BH CV ECHO MEAS - AO V2 MAX: 186 CM/SEC
BH CV ECHO MEAS - AO V2 MEAN: 125 CM/SEC
BH CV ECHO MEAS - AO V2 VTI: 38.8 CM
BH CV ECHO MEAS - ASC AORTA: 3.7 CM
BH CV ECHO MEAS - AVA(I,A): 2.2 CM^2
BH CV ECHO MEAS - AVA(I,D): 2.2 CM^2
BH CV ECHO MEAS - BSA(HAYCOCK): 2 M^2
BH CV ECHO MEAS - BSA: 2 M^2
BH CV ECHO MEAS - BZI_BMI: 29.4 KILOGRAMS/M^2
BH CV ECHO MEAS - BZI_METRIC_HEIGHT: 170 CM
BH CV ECHO MEAS - BZI_METRIC_WEIGHT: 85 KG
BH CV ECHO MEAS - EDV(CUBED): 85.2 ML
BH CV ECHO MEAS - EDV(MOD-SP2): 54 ML
BH CV ECHO MEAS - EDV(MOD-SP4): 61 ML
BH CV ECHO MEAS - EDV(TEICH): 87.7 ML
BH CV ECHO MEAS - EF(CUBED): 45.2 %
BH CV ECHO MEAS - EF(MOD-BP): 64 %
BH CV ECHO MEAS - EF(MOD-SP2): 64.8 %
BH CV ECHO MEAS - EF(MOD-SP4): 62.3 %
BH CV ECHO MEAS - EF(TEICH): 37.9 %
BH CV ECHO MEAS - ESV(CUBED): 46.7 ML
BH CV ECHO MEAS - ESV(MOD-SP2): 19 ML
BH CV ECHO MEAS - ESV(MOD-SP4): 23 ML
BH CV ECHO MEAS - ESV(TEICH): 54.4 ML
BH CV ECHO MEAS - FS: 18.2 %
BH CV ECHO MEAS - IVS/LVPW: 1.5
BH CV ECHO MEAS - IVSD: 0.9 CM
BH CV ECHO MEAS - LAT PEAK E' VEL: 4.5 CM/SEC
BH CV ECHO MEAS - LV DIASTOLIC VOL/BSA (35-75): 31 ML/M^2
BH CV ECHO MEAS - LV MASS(C)D: 100.6 GRAMS
BH CV ECHO MEAS - LV MASS(C)DI: 51.2 GRAMS/M^2
BH CV ECHO MEAS - LV MAX PG: 8.6 MMHG
BH CV ECHO MEAS - LV MEAN PG: 4 MMHG
BH CV ECHO MEAS - LV SYSTOLIC VOL/BSA (12-30): 11.7 ML/M^2
BH CV ECHO MEAS - LV V1 MAX: 146 CM/SEC
BH CV ECHO MEAS - LV V1 MEAN: 85.6 CM/SEC
BH CV ECHO MEAS - LV V1 VTI: 27.7 CM
BH CV ECHO MEAS - LVIDD: 4.4 CM
BH CV ECHO MEAS - LVIDS: 3.6 CM
BH CV ECHO MEAS - LVLD AP2: 7.4 CM
BH CV ECHO MEAS - LVLD AP4: 6.5 CM
BH CV ECHO MEAS - LVLS AP2: 5.7 CM
BH CV ECHO MEAS - LVLS AP4: 5.2 CM
BH CV ECHO MEAS - LVOT AREA (M): 3.1 CM^2
BH CV ECHO MEAS - LVOT AREA: 3.1 CM^2
BH CV ECHO MEAS - LVOT DIAM: 2 CM
BH CV ECHO MEAS - LVPWD: 0.6 CM
BH CV ECHO MEAS - MED PEAK E' VEL: 5.15 CM/SEC
BH CV ECHO MEAS - MV A DUR: 158 SEC
BH CV ECHO MEAS - MV A MAX VEL: 105 CM/SEC
BH CV ECHO MEAS - MV DEC SLOPE: 355 CM/SEC^2
BH CV ECHO MEAS - MV DEC TIME: 204 SEC
BH CV ECHO MEAS - MV E MAX VEL: 87.5 CM/SEC
BH CV ECHO MEAS - MV E/A: 0.83
BH CV ECHO MEAS - MV MEAN PG: 2 MMHG
BH CV ECHO MEAS - MV P1/2T MAX VEL: 111 CM/SEC
BH CV ECHO MEAS - MV P1/2T: 91.6 MSEC
BH CV ECHO MEAS - MV V2 MEAN: 65.6 CM/SEC
BH CV ECHO MEAS - MV V2 VTI: 37.3 CM
BH CV ECHO MEAS - MVA P1/2T LCG: 2 CM^2
BH CV ECHO MEAS - MVA(P1/2T): 2.4 CM^2
BH CV ECHO MEAS - MVA(VTI): 2.3 CM^2
BH CV ECHO MEAS - PA ACC SLOPE: 1028 CM/SEC^2
BH CV ECHO MEAS - PA ACC TIME: 0.09 SEC
BH CV ECHO MEAS - PA MAX PG (FULL): 4.8 MMHG
BH CV ECHO MEAS - PA MAX PG: 7.7 MMHG
BH CV ECHO MEAS - PA PR(ACCEL): 39.4 MMHG
BH CV ECHO MEAS - PA V2 MAX: 139 CM/SEC
BH CV ECHO MEAS - PULM A REVS DUR: 0.15 SEC
BH CV ECHO MEAS - PULM A REVS VEL: 36.5 CM/SEC
BH CV ECHO MEAS - PULM DIAS VEL: 38.5 CM/SEC
BH CV ECHO MEAS - PULM S/D: 1.4
BH CV ECHO MEAS - PULM SYS VEL: 52.7 CM/SEC
BH CV ECHO MEAS - PVA(V,A): 1.9 CM^2
BH CV ECHO MEAS - PVA(V,D): 1.9 CM^2
BH CV ECHO MEAS - QP/QS: 0.63
BH CV ECHO MEAS - RV MAX PG: 3 MMHG
BH CV ECHO MEAS - RV MEAN PG: 1 MMHG
BH CV ECHO MEAS - RV V1 MAX: 86 CM/SEC
BH CV ECHO MEAS - RV V1 MEAN: 56.7 CM/SEC
BH CV ECHO MEAS - RV V1 VTI: 17.4 CM
BH CV ECHO MEAS - RVOT AREA: 3.1 CM^2
BH CV ECHO MEAS - RVOT DIAM: 2 CM
BH CV ECHO MEAS - SI(AO): 179.2 ML/M^2
BH CV ECHO MEAS - SI(CUBED): 19.6 ML/M^2
BH CV ECHO MEAS - SI(LVOT): 44.3 ML/M^2
BH CV ECHO MEAS - SI(MOD-SP2): 17.8 ML/M^2
BH CV ECHO MEAS - SI(MOD-SP4): 19.3 ML/M^2
BH CV ECHO MEAS - SI(TEICH): 16.9 ML/M^2
BH CV ECHO MEAS - SV(AO): 352.3 ML
BH CV ECHO MEAS - SV(CUBED): 38.5 ML
BH CV ECHO MEAS - SV(LVOT): 87 ML
BH CV ECHO MEAS - SV(MOD-SP2): 35 ML
BH CV ECHO MEAS - SV(MOD-SP4): 38 ML
BH CV ECHO MEAS - SV(RVOT): 54.7 ML
BH CV ECHO MEAS - SV(TEICH): 33.3 ML
BH CV ECHO MEAS - TAPSE (>1.6): 2.7 CM2
BH CV ECHO MEASUREMENTS AVERAGE E/E' RATIO: 18.13
BH CV XLRA - RV BASE: 3.8 CM
BH CV XLRA - TDI S': 22.3 CM/SEC
BUN BLD-MCNC: 28 MG/DL (ref 8–23)
BUN BLD-MCNC: 30 MG/DL (ref 8–23)
BUN/CREAT SERPL: 17.6 (ref 7–25)
BUN/CREAT SERPL: 19.6 (ref 7–25)
CALCIUM SPEC-SCNC: 7.2 MG/DL (ref 8.2–9.6)
CALCIUM SPEC-SCNC: 7.6 MG/DL (ref 8.2–9.6)
CHLORIDE SERPL-SCNC: 105 MMOL/L (ref 98–107)
CHLORIDE SERPL-SCNC: 109 MMOL/L (ref 98–107)
CO2 SERPL-SCNC: 22.9 MMOL/L (ref 22–29)
CO2 SERPL-SCNC: 23.4 MMOL/L (ref 22–29)
CREAT BLD-MCNC: 1.43 MG/DL (ref 0.57–1)
CREAT BLD-MCNC: 1.7 MG/DL (ref 0.57–1)
DEPRECATED RDW RBC AUTO: 54.6 FL (ref 37–54)
ERYTHROCYTE [DISTWIDTH] IN BLOOD BY AUTOMATED COUNT: 15.1 % (ref 12.3–15.4)
GFR SERPL CREATININE-BSD FRML MDRD: 28 ML/MIN/1.73
GFR SERPL CREATININE-BSD FRML MDRD: 34 ML/MIN/1.73
GLUCOSE BLD-MCNC: 141 MG/DL (ref 65–99)
GLUCOSE BLD-MCNC: 181 MG/DL (ref 65–99)
GLUCOSE BLDC GLUCOMTR-MCNC: 129 MG/DL (ref 70–130)
GLUCOSE BLDC GLUCOMTR-MCNC: 149 MG/DL (ref 70–130)
GLUCOSE BLDC GLUCOMTR-MCNC: 199 MG/DL (ref 70–130)
GLUCOSE BLDC GLUCOMTR-MCNC: 199 MG/DL (ref 70–130)
HCT VFR BLD AUTO: 29.5 % (ref 34–46.6)
HGB BLD-MCNC: 9 G/DL (ref 12–15.9)
LEFT ATRIUM VOLUME INDEX: 51 ML/M2
LV EF 2D ECHO EST: 64 %
MAXIMAL PREDICTED HEART RATE: 127 BPM
MCH RBC QN AUTO: 30.1 PG (ref 26.6–33)
MCHC RBC AUTO-ENTMCNC: 30.5 G/DL (ref 31.5–35.7)
MCV RBC AUTO: 98.7 FL (ref 79–97)
PLATELET # BLD AUTO: 127 10*3/MM3 (ref 140–450)
PMV BLD AUTO: 9.5 FL (ref 6–12)
POTASSIUM BLD-SCNC: 5.1 MMOL/L (ref 3.5–5.2)
POTASSIUM BLD-SCNC: 5.7 MMOL/L (ref 3.5–5.2)
RBC # BLD AUTO: 2.99 10*6/MM3 (ref 3.77–5.28)
SODIUM BLD-SCNC: 139 MMOL/L (ref 136–145)
SODIUM BLD-SCNC: 142 MMOL/L (ref 136–145)
STRESS TARGET HR: 108 BPM
UNIT  ABO: NORMAL
UNIT  ABO: NORMAL
UNIT  RH: NORMAL
UNIT  RH: NORMAL
WBC NRBC COR # BLD: 10.52 10*3/MM3 (ref 3.4–10.8)

## 2019-07-31 PROCEDURE — 80048 BASIC METABOLIC PNL TOTAL CA: CPT | Performed by: NURSE PRACTITIONER

## 2019-07-31 PROCEDURE — 93306 TTE W/DOPPLER COMPLETE: CPT

## 2019-07-31 PROCEDURE — 99232 SBSQ HOSP IP/OBS MODERATE 35: CPT | Performed by: INTERNAL MEDICINE

## 2019-07-31 PROCEDURE — 82962 GLUCOSE BLOOD TEST: CPT

## 2019-07-31 PROCEDURE — 97162 PT EVAL MOD COMPLEX 30 MIN: CPT

## 2019-07-31 PROCEDURE — 25010000003 CEFAZOLIN IN DEXTROSE 2-4 GM/100ML-% SOLUTION: Performed by: ORTHOPAEDIC SURGERY

## 2019-07-31 PROCEDURE — 63710000001 INSULIN LISPRO (HUMAN) PER 5 UNITS: Performed by: INTERNAL MEDICINE

## 2019-07-31 PROCEDURE — 80048 BASIC METABOLIC PNL TOTAL CA: CPT | Performed by: INTERNAL MEDICINE

## 2019-07-31 PROCEDURE — 93306 TTE W/DOPPLER COMPLETE: CPT | Performed by: INTERNAL MEDICINE

## 2019-07-31 PROCEDURE — 85027 COMPLETE CBC AUTOMATED: CPT | Performed by: INTERNAL MEDICINE

## 2019-07-31 PROCEDURE — 97110 THERAPEUTIC EXERCISES: CPT

## 2019-07-31 PROCEDURE — 71045 X-RAY EXAM CHEST 1 VIEW: CPT

## 2019-07-31 PROCEDURE — 94799 UNLISTED PULMONARY SVC/PX: CPT

## 2019-07-31 RX ORDER — SERTRALINE HYDROCHLORIDE 100 MG/1
100 TABLET, FILM COATED ORAL DAILY
Status: DISCONTINUED | OUTPATIENT
Start: 2019-08-01 | End: 2019-08-07 | Stop reason: HOSPADM

## 2019-07-31 RX ORDER — PRIMIDONE 50 MG/1
50 TABLET ORAL EVERY 12 HOURS SCHEDULED
Status: DISCONTINUED | OUTPATIENT
Start: 2019-07-31 | End: 2019-08-01

## 2019-07-31 RX ORDER — RANITIDINE 150 MG/1
150 TABLET ORAL
Status: DISCONTINUED | OUTPATIENT
Start: 2019-08-01 | End: 2019-08-01

## 2019-07-31 RX ORDER — AMLODIPINE BESYLATE 5 MG/1
5 TABLET ORAL
Status: DISCONTINUED | OUTPATIENT
Start: 2019-08-01 | End: 2019-08-03

## 2019-07-31 RX ORDER — MIRTAZAPINE 15 MG/1
15 TABLET, FILM COATED ORAL NIGHTLY
Status: DISCONTINUED | OUTPATIENT
Start: 2019-07-31 | End: 2019-08-06

## 2019-07-31 RX ORDER — SENNA AND DOCUSATE SODIUM 50; 8.6 MG/1; MG/1
2 TABLET, FILM COATED ORAL NIGHTLY
Status: DISCONTINUED | OUTPATIENT
Start: 2019-07-31 | End: 2019-08-07 | Stop reason: HOSPADM

## 2019-07-31 RX ADMIN — IPRATROPIUM BROMIDE AND ALBUTEROL SULFATE 3 ML: 2.5; .5 SOLUTION RESPIRATORY (INHALATION) at 12:39

## 2019-07-31 RX ADMIN — HYOSCYAMINE SULFATE 473 ML: 16 SOLUTION at 09:57

## 2019-07-31 RX ADMIN — SODIUM CHLORIDE, PRESERVATIVE FREE 3 ML: 5 INJECTION INTRAVENOUS at 09:52

## 2019-07-31 RX ADMIN — HYDROCODONE BITARTRATE AND ACETAMINOPHEN 2 TABLET: 7.5; 325 TABLET ORAL at 06:14

## 2019-07-31 RX ADMIN — FLUTICASONE PROPIONATE 2 SPRAY: 50 SPRAY, METERED NASAL at 09:52

## 2019-07-31 RX ADMIN — ASPIRIN 325 MG: 325 TABLET ORAL at 13:12

## 2019-07-31 RX ADMIN — CEFAZOLIN SODIUM 2 G: 2 INJECTION, SOLUTION INTRAVENOUS at 15:12

## 2019-07-31 RX ADMIN — IPRATROPIUM BROMIDE AND ALBUTEROL SULFATE 3 ML: 2.5; .5 SOLUTION RESPIRATORY (INHALATION) at 19:32

## 2019-07-31 RX ADMIN — CEFAZOLIN SODIUM 2 G: 2 INJECTION, SOLUTION INTRAVENOUS at 04:30

## 2019-07-31 RX ADMIN — INSULIN LISPRO 2 UNITS: 100 INJECTION, SOLUTION INTRAVENOUS; SUBCUTANEOUS at 00:14

## 2019-07-31 RX ADMIN — HYDROCODONE BITARTRATE AND ACETAMINOPHEN 2 TABLET: 7.5; 325 TABLET ORAL at 02:15

## 2019-07-31 RX ADMIN — HYDROCODONE BITARTRATE AND ACETAMINOPHEN 2 TABLET: 7.5; 325 TABLET ORAL at 13:12

## 2019-08-01 PROBLEM — D69.6 THROMBOCYTOPENIA (HCC): Status: ACTIVE | Noted: 2019-08-01

## 2019-08-01 LAB
ALBUMIN SERPL-MCNC: 2.4 G/DL (ref 3.5–5.2)
ALBUMIN/GLOB SERPL: 0.8 G/DL
ALP SERPL-CCNC: 97 U/L (ref 39–117)
ALT SERPL W P-5'-P-CCNC: <5 U/L (ref 1–33)
ANION GAP SERPL CALCULATED.3IONS-SCNC: 11.6 MMOL/L (ref 5–15)
ANION GAP SERPL CALCULATED.3IONS-SCNC: 9.4 MMOL/L (ref 5–15)
AST SERPL-CCNC: 42 U/L (ref 1–32)
BACTERIA UR QL AUTO: ABNORMAL /HPF
BILIRUB SERPL-MCNC: 0.3 MG/DL (ref 0.2–1.2)
BILIRUB UR QL STRIP: NEGATIVE
BUN BLD-MCNC: 39 MG/DL (ref 8–23)
BUN BLD-MCNC: 41 MG/DL (ref 8–23)
BUN/CREAT SERPL: 20.3 (ref 7–25)
BUN/CREAT SERPL: 23.7 (ref 7–25)
CALCIUM SPEC-SCNC: 8 MG/DL (ref 8.2–9.6)
CALCIUM SPEC-SCNC: 8 MG/DL (ref 8.2–9.6)
CHLORIDE SERPL-SCNC: 106 MMOL/L (ref 98–107)
CHLORIDE SERPL-SCNC: 106 MMOL/L (ref 98–107)
CLARITY UR: ABNORMAL
CO2 SERPL-SCNC: 22.4 MMOL/L (ref 22–29)
CO2 SERPL-SCNC: 23.6 MMOL/L (ref 22–29)
COLOR UR: YELLOW
CREAT BLD-MCNC: 1.73 MG/DL (ref 0.57–1)
CREAT BLD-MCNC: 1.92 MG/DL (ref 0.57–1)
CREAT UR-MCNC: 40.8 MG/DL
DEPRECATED RDW RBC AUTO: 53.2 FL (ref 37–54)
ERYTHROCYTE [DISTWIDTH] IN BLOOD BY AUTOMATED COUNT: 14.5 % (ref 12.3–15.4)
GFR SERPL CREATININE-BSD FRML MDRD: 24 ML/MIN/1.73
GFR SERPL CREATININE-BSD FRML MDRD: 27 ML/MIN/1.73
GLOBULIN UR ELPH-MCNC: 3.1 GM/DL
GLUCOSE BLD-MCNC: 195 MG/DL (ref 65–99)
GLUCOSE BLD-MCNC: 197 MG/DL (ref 65–99)
GLUCOSE BLDC GLUCOMTR-MCNC: 153 MG/DL (ref 70–130)
GLUCOSE BLDC GLUCOMTR-MCNC: 226 MG/DL (ref 70–130)
GLUCOSE BLDC GLUCOMTR-MCNC: 254 MG/DL (ref 70–130)
GLUCOSE UR STRIP-MCNC: NEGATIVE MG/DL
HCT VFR BLD AUTO: 27.9 % (ref 34–46.6)
HGB BLD-MCNC: 8.4 G/DL (ref 12–15.9)
HGB UR QL STRIP.AUTO: ABNORMAL
HYALINE CASTS UR QL AUTO: ABNORMAL /LPF
KETONES UR QL STRIP: NEGATIVE
LEUKOCYTE ESTERASE UR QL STRIP.AUTO: ABNORMAL
MCH RBC QN AUTO: 30.1 PG (ref 26.6–33)
MCHC RBC AUTO-ENTMCNC: 30.1 G/DL (ref 31.5–35.7)
MCV RBC AUTO: 100 FL (ref 79–97)
NITRITE UR QL STRIP: NEGATIVE
PH UR STRIP.AUTO: 7 [PH] (ref 5–8)
PLATELET # BLD AUTO: 129 10*3/MM3 (ref 140–450)
PMV BLD AUTO: 10.2 FL (ref 6–12)
POTASSIUM BLD-SCNC: 5.5 MMOL/L (ref 3.5–5.2)
POTASSIUM BLD-SCNC: 5.7 MMOL/L (ref 3.5–5.2)
PROT SERPL-MCNC: 5.5 G/DL (ref 6–8.5)
PROT UR QL STRIP: ABNORMAL
RBC # BLD AUTO: 2.79 10*6/MM3 (ref 3.77–5.28)
RBC # UR: ABNORMAL /HPF
REF LAB TEST METHOD: ABNORMAL
SODIUM BLD-SCNC: 139 MMOL/L (ref 136–145)
SODIUM BLD-SCNC: 140 MMOL/L (ref 136–145)
SODIUM UR-SCNC: <20 MMOL/L
SP GR UR STRIP: 1.01 (ref 1–1.03)
SQUAMOUS #/AREA URNS HPF: ABNORMAL /HPF
UROBILINOGEN UR QL STRIP: ABNORMAL
WBC NRBC COR # BLD: 11.08 10*3/MM3 (ref 3.4–10.8)
WBC UR QL AUTO: ABNORMAL /HPF

## 2019-08-01 PROCEDURE — 84300 ASSAY OF URINE SODIUM: CPT | Performed by: INTERNAL MEDICINE

## 2019-08-01 PROCEDURE — 85027 COMPLETE CBC AUTOMATED: CPT | Performed by: NURSE PRACTITIONER

## 2019-08-01 PROCEDURE — 94799 UNLISTED PULMONARY SVC/PX: CPT

## 2019-08-01 PROCEDURE — 81001 URINALYSIS AUTO W/SCOPE: CPT | Performed by: INTERNAL MEDICINE

## 2019-08-01 PROCEDURE — 82962 GLUCOSE BLOOD TEST: CPT

## 2019-08-01 PROCEDURE — 82570 ASSAY OF URINE CREATININE: CPT | Performed by: INTERNAL MEDICINE

## 2019-08-01 PROCEDURE — 63710000001 INSULIN LISPRO (HUMAN) PER 5 UNITS: Performed by: INTERNAL MEDICINE

## 2019-08-01 PROCEDURE — 25010000002 MORPHINE PER 10 MG: Performed by: INTERNAL MEDICINE

## 2019-08-01 PROCEDURE — 80053 COMPREHEN METABOLIC PANEL: CPT | Performed by: NURSE PRACTITIONER

## 2019-08-01 PROCEDURE — 25010000002 FUROSEMIDE PER 20 MG: Performed by: INTERNAL MEDICINE

## 2019-08-01 PROCEDURE — 97110 THERAPEUTIC EXERCISES: CPT

## 2019-08-01 RX ORDER — RANITIDINE 150 MG/1
150 TABLET ORAL
Status: DISCONTINUED | OUTPATIENT
Start: 2019-08-01 | End: 2019-08-07 | Stop reason: HOSPADM

## 2019-08-01 RX ORDER — FUROSEMIDE 10 MG/ML
40 INJECTION INTRAMUSCULAR; INTRAVENOUS ONCE
Status: COMPLETED | OUTPATIENT
Start: 2019-08-01 | End: 2019-08-01

## 2019-08-01 RX ORDER — PRIMIDONE 50 MG/1
50 TABLET ORAL NIGHTLY
Status: DISCONTINUED | OUTPATIENT
Start: 2019-08-01 | End: 2019-08-04

## 2019-08-01 RX ADMIN — IPRATROPIUM BROMIDE AND ALBUTEROL SULFATE 3 ML: 2.5; .5 SOLUTION RESPIRATORY (INHALATION) at 15:20

## 2019-08-01 RX ADMIN — MORPHINE SULFATE 2 MG: 2 INJECTION, SOLUTION INTRAMUSCULAR; INTRAVENOUS at 00:32

## 2019-08-01 RX ADMIN — PRIMIDONE 50 MG: 50 TABLET ORAL at 09:35

## 2019-08-01 RX ADMIN — ASPIRIN 325 MG: 325 TABLET ORAL at 09:02

## 2019-08-01 RX ADMIN — IPRATROPIUM BROMIDE AND ALBUTEROL SULFATE 3 ML: 2.5; .5 SOLUTION RESPIRATORY (INHALATION) at 20:12

## 2019-08-01 RX ADMIN — INSULIN LISPRO 3 UNITS: 100 INJECTION, SOLUTION INTRAVENOUS; SUBCUTANEOUS at 12:36

## 2019-08-01 RX ADMIN — INSULIN LISPRO 2 UNITS: 100 INJECTION, SOLUTION INTRAVENOUS; SUBCUTANEOUS at 06:24

## 2019-08-01 RX ADMIN — SERTRALINE HYDROCHLORIDE 100 MG: 100 TABLET, FILM COATED ORAL at 09:35

## 2019-08-01 RX ADMIN — FLUTICASONE PROPIONATE 2 SPRAY: 50 SPRAY, METERED NASAL at 09:02

## 2019-08-01 RX ADMIN — SODIUM CHLORIDE, PRESERVATIVE FREE 3 ML: 5 INJECTION INTRAVENOUS at 09:06

## 2019-08-01 RX ADMIN — HYDROCODONE BITARTRATE AND ACETAMINOPHEN 2 TABLET: 7.5; 325 TABLET ORAL at 09:04

## 2019-08-01 RX ADMIN — IPRATROPIUM BROMIDE AND ALBUTEROL SULFATE 3 ML: 2.5; .5 SOLUTION RESPIRATORY (INHALATION) at 07:06

## 2019-08-01 RX ADMIN — AMLODIPINE BESYLATE 5 MG: 5 TABLET ORAL at 09:35

## 2019-08-01 RX ADMIN — INSULIN LISPRO 2 UNITS: 100 INJECTION, SOLUTION INTRAVENOUS; SUBCUTANEOUS at 00:33

## 2019-08-01 RX ADMIN — SODIUM CHLORIDE, PRESERVATIVE FREE 3 ML: 5 INJECTION INTRAVENOUS at 23:30

## 2019-08-01 RX ADMIN — HYOSCYAMINE SULFATE: 16 SOLUTION at 09:36

## 2019-08-01 RX ADMIN — FUROSEMIDE 40 MG: 10 INJECTION, SOLUTION INTRAMUSCULAR; INTRAVENOUS at 15:15

## 2019-08-01 RX ADMIN — PRIMIDONE 50 MG: 50 TABLET ORAL at 23:44

## 2019-08-01 NOTE — THERAPY TREATMENT NOTE
Acute Care - Physical Therapy Treatment Note  [facility]     Patient Name: Magdalena Jerry  : 1926  MRN: [id number]  Today's Date: 2019  Onset of Illness/Injury or Date of Surgery: 19          Admit Date: 2019    Visit Dx:    ICD-10-CM ICD-9-CM   1. Displaced oblique fracture of shaft of left femur, initial encounter for closed fracture (CMS/[facility]) S72.332A 821.01     Patient Active Problem List   Diagnosis   • Adjustment disorder with mixed anxiety and depressed mood   • Anemia due to chronic kidney disease   • Benign essential hypertension   • Hereditary essential tremor   • Type 2 diabetes mellitus with diabetic polyneuropathy, without long-term current use of insulin (CMS/[facility])   • Dry skin dermatitis   • Dyslipidemia   • Gastroesophageal reflux disease with esophagitis   • Pain of hand   • Hearing loss   • Arthralgia of hip   • Impacted cerumen   • Pruritus   • Knee pain   • Pain in extremity   • Chronic low back pain   • Weakness of lower extremity   • Spinal stenosis of lumbar region   • Senile osteoporosis   • Piriformis syndrome   • Primary insomnia   • Tinea pedis   • Vitamin D deficiency   • Left leg cellulitis   • CKD (chronic kidney disease) stage 3, GFR 30-59 ml/min (CMS/[facility])   • Allergic rhinitis   • Pneumonia due to influenza A virus   • Hx of MRSA (methicillin resistant Staphylococcus aureus) infection   • Bacteria in urine   • Cellulitis of lower extremity   • Chronic venous stasis   • Anemia of chronic disease   • Displaced oblique fracture of shaft of left femur, initial encounter for closed fracture (CMS/[facility])   • Acute kidney injury superimposed on chronic kidney disease (CMS/[facility])   • Acute respiratory failure with hypoxia (CMS/[facility])   • Hyperkalemia       Therapy Treatment    Rehabilitation Treatment Summary     Row Name 19 1500             Treatment Time/Intention    Discipline  physical therapy assistant  -BRIJESH      Document Type  therapy note (daily note)  -BRIJESH       Subjective Information  complains of;pain  -      Care Plan Review  -- not much conversation, did scream out when moved  -      Comment  confused  -      Recorded by [] Ayse Lambert PTA 08/01/19 1623      Row Name 08/01/19 1500             Bed Mobility Assessment/Treatment    Supine-Sit [place] (Bed Mobility)  2 person assist;dependent (less than 25% patient effort)  -      Sit-Supine [place] (Bed Mobility)  2 person assist;dependent (less than 25% patient effort)  -      Bed Mobility, Safety Issues  cognitive deficits limit understanding;decreased use of arms for pushing/pulling;decreased use of legs for bridging/pushing;impaired trunk control for bed mobility  -      Comment (Bed Mobility)  heavy post lean  -      Recorded by [] Ayse Lambert PTA 08/01/19 1625      Row Name 08/01/19 1500             Positioning and Restraints    Pre-Treatment Position  in bed  -JM      In Bed  fowlers;call light within reach;encouraged to call for assist;exit alarm on;SCD pump applied;side rails up x3;with nsg  -      Recorded by [] Ayse Lambert PTA 08/01/19 1625      Row Name 08/01/19 1500             Pain Scale: Numbers Pre/Post-Treatment    Pain Location - Side  Left  -      Pain Location - Orientation  lower  -      Pain Location  extremity  -      Recorded by [] Ayse Lambert PTA 08/01/19 1625      Row Name 08/01/19 1500             Pain Scale: Word Pre/Post-Treatment    Pain: Word Scale, Pretreatment  6 - moderate-severe pain  -      Pain: Word Scale, Post-Treatment  8 - severe pain  -      Recorded by [] Ayse Lambert PTA 08/01/19 1625      Row Name                Wound 07/30/19 1647 Left leg incision    Wound - Properties Group Date first assessed: 07/30/19 [MC] Time first assessed: 1647 [MC] Side: Left [MC] Location: leg [MC] Type: incision [MC] Recorded by:  [ELINOR] Justyna Andre RN 07/30/19 1647      User Key  (r) = Recorded By, (t) = Taken By, (c)  = Cosigned By    Initials Name Effective Dates Discipline    Ayse Ham PTA 03/07/18 -  PT    Justyna Lopez RN 02/23/18 -  Nurse          Wound 07/30/19 6737 Left leg incision (Active)   Dressing Appearance dry;intact;dried drainage 8/1/2019 12:38 PM   Drainage Amount small 8/1/2019 12:38 PM           Physical Therapy Education     Title: PT OT SLP Therapies (In Progress)     Topic: Physical Therapy (In Progress)     Point: Mobility training (In Progress)     Learning Progress Summary           Patient Acceptance, E,D, NL by BRIJESH at 8/1/2019  4:27 PM                   Point: Home exercise program (In Progress)     Learning Progress Summary           Patient Acceptance, E,D, NL by BRIJESH at 8/1/2019  4:27 PM                   Point: Body mechanics (In Progress)     Learning Progress Summary           Patient Acceptance, E,D, NL by BRIJESH at 8/1/2019  4:27 PM                   Point: Precautions (In Progress)     Learning Progress Summary           Patient Acceptance, E,D, NL by BRIJESH at 8/1/2019  4:27 PM                               User Key     Initials Effective Dates Name Provider Type Discipline     03/07/18 -  Ayse Lambert PTA Physical Therapy Assistant PT                PT Recommendation and Plan     Plan of Care Reviewed With: patient  Outcome Summary: pt continues to be fearful and painful during tfer to EOB, heavy post lean req assist of 2; unsafe to attempt standing-pt also NWB LLE  Outcome Measures     Row Name 08/01/19 1600 08/01/19 1500          How much help from another person do you currently need...    Turning from your back to your side while in flat bed without using bedrails?  --  1  -JM     Moving from lying on back to sitting on the side of a flat bed without bedrails?  --  1  -JM     Moving to and from a bed to a chair (including a wheelchair)?  --  1  -JM     Standing up from a chair using your arms (e.g., wheelchair, bedside chair)?  --  1  -JM     Climbing 3-5 steps with a  railing?  --  1  -     To walk in hospital room?  --  1  -     AM-PAC 6 Clicks Score (PT)  --  6  -        Functional Assessment    Outcome Measure Options  AM-PAC 6 Clicks Basic Mobility (PT)  -  --       User Key  (r) = Recorded By, (t) = Taken By, (c) = Cosigned By    Initials Name Provider Type    Ayse Ham PTA Physical Therapy Assistant         Time Calculation:   PT Charges     Row Name 08/01/19 1628 08/01/19 1132          Time Calculation    Start Time  1500  -  --     Stop Time  1520  -  --     Time Calculation (min)  20 min  -  --     PT Received On  08/01/19  -BRIJESH  --     PT - Next Appointment  08/02/19  -  08/01/19  -       User Key  (r) = Recorded By, (t) = Taken By, (c) = Cosigned By    Initials Name Provider Type    Ayse Ham PTA Physical Therapy Assistant        Therapy Charges for Today     Code Description Service Date Service Provider Modifiers Qty    [id number] HC PT THER PROC EA 15 MIN 8/1/2019 Ayse Lambert PTA GP 1    06712746942 HC PT THER SUPP EA 15 MIN 8/1/2019 Ayse Lambert PTA GP 1          PT G-Codes  Outcome Measure Options: AM-PAC 6 Clicks Basic Mobility (PT)  AM-PAC 6 Clicks Score (PT): 6    Ayse Lambert PTA  8/1/2019

## 2019-08-01 NOTE — PROGRESS NOTES
Orthopaedic Surgery   Daily Progress Note  Dr. CHARITY Epstein II  [phone]  DEMOGRAPHICS:   · Magdalena Jerry   · Age:89 y.o.   · MRN:[id number]  · Admitted: 7/29/2019    PROCEDURE: 2 Days Post-Op s/p Procedure(s):  OPEN REDUCTION INTERNAL FIXATION LEFT FEMUR     HOSPITAL PROGRESS  · Patient Issues: Renal consult called for elevated creatinine.  To see today.  · Ambulation/Activity: Doing some bed mobility exercises with therapy    VITALS:  Vitals:    08/01/19 0331 08/01/19 0354 08/01/19 0706 08/01/19 0725   BP: 120/66   150/68   BP Location: Right arm   Right arm   Patient Position: Lying   Lying   Pulse: 83  85 88   Resp: 18  18 18   Temp: (!) 100.6 °F (38.1 °C) 99 °F (37.2 °C)  100.4 °F (38 °C)   TempSrc: Oral   Oral   SpO2: 99%  96% 98%   Weight:       Height:           PHYSICAL EXAM:  · LUNGS: Equal chest rise, no shortness of air  · CARDIOVASCULAR: brisk capillary refill intact  · WOUND: RANDELL Wound Vac System working in good condition, minimal drainage  · EXTREMITY: Operative Leg  · Pulses: brisk capillary refill intact  · Sensation: Sensation intact to light touch to the saphenous/sural/tibial/deep peroneal/superficial peroneal nerves, and grossly throughout the extremity.  · Motor: 5/5 EHL/FHL/TA/GS motor complexes    LABS:   Lab Results   Component Value Date    HGB 8.4 (L) 08/01/2019     Lab Results   Component Value Date    WBC 11.08 (H) 08/01/2019     Lab Results   Component Value Date    GLUCOSE 197 (H) 08/01/2019    CALCIUM 8.0 (L) 08/01/2019     08/01/2019    K 5.5 (H) 08/01/2019    CO2 23.6 08/01/2019     08/01/2019    BUN 41 (H) 08/01/2019    CREATININE 1.73 (H) 08/01/2019    EGFRIFAFRI 44 (L) 08/06/2018    EGFRIFNONA 27 (L) 08/01/2019    BCR 23.7 08/01/2019    ANIONGAP 9.4 08/01/2019       ASSESSMENT: Patient is a 89 y.o. female who is 2 Days Post-Op s/p Procedure(s):  OPEN REDUCTION INTERNAL FIXATION LEFT FEMUR     PLAN:   · Weight Bearing: Non Weight Bearing  · Labs: Above  "lab values review. Plan: Potassium remains a little high, creatinine further elevated from yesterday at 1.7.  Nephrology to see today  · PT/OT: To Mobilize  · DVT PPX: Eliquis 2.5mg BID  · Post-Op Xray: Hardware in good position. Acceptable bony reduction.   · Dispo: Acute.  Will need SNF and further rehab    R \"Adam\" Tete VILLA MD  Orthopaedic Surgery  [facility]  [phone]    "

## 2019-08-01 NOTE — PROGRESS NOTES
Progress Note    Name: Magdalena Jerry ADMIT: 2019   : 1926  PCP: Fly Sanchez DO    MRN: [id number] LOS: 3 days   AGE/SEX: 89 y.o. female  ROOM: P892/1   Date of Encounter Visit: 19    Subjective:     Interval History: spiked a temp this am.     REVIEW OF SYSTEMS:   Still with low-grade fever 100.6 3 AM this morning and 100.4 about 7 AM.  Patient is hard of hearing but denies complaint.    Objective:   Temp:  [99 °F (37.2 °C)-100.6 °F (38.1 °C)] 99.2 °F (37.3 °C)  Heart Rate:  [72-88] 72  Resp:  [16-18] 16  BP: (109-150)/(50-68) 134/66   SpO2:  [90 %-99 %] 91 %  on  Flow (L/min):  [2] 2 Device (Oxygen Therapy): room air    Intake/Output Summary (Last 24 hours) at 2019 1641  Last data filed at 2019 1300  Gross per 24 hour   Intake 517 ml   Output --   Net 517 ml     Body mass index is 29.41 kg/m².      19  0408 19  0740 19  0829   Weight: 82.1 kg (181 lb) 85.4 kg (188 lb 4.8 oz) 85 kg (187 lb 6.3 oz)     Weight change:     Physical Exam   Constitutional: No distress.   HENT:   Head: Atraumatic.   Nose: Nose normal.   Eyes: Conjunctivae are normal.   Cardiovascular: Normal rate and regular rhythm.   No murmur heard.  Pulmonary/Chest: Effort normal. She has no wheezes. She has no rales.   Abdominal: Soft. Bowel sounds are normal. There is no tenderness.   Musculoskeletal: She exhibits edema.   Neurological: She is alert.   Skin: Skin is warm and dry. She is not diaphoretic.     Results Review:      Results from last 7 days   Lab Units 19  0333 19  1457 19  0417 19  0333 19  0414   SODIUM mmol/L 140  139 139 142 136 135*   POTASSIUM mmol/L 5.7*  5.5* 5.1 5.7* 5.2 4.4   CHLORIDE mmol/L 106  106 105 109* 103 98   CO2 mmol/L 22.4  23.6 22.9 23.4 24.3 23.8   BUN mg/dL 39*  41* 30* 28* 24* 20   CREATININE mg/dL 1.92*  1.73* 1.70* 1.43* 1.28* 1.37*   GLUCOSE mg/dL 195*  197* 181* 141* 121* 163*   CALCIUM mg/dL 8.0*  8.0* 7.2* 7.6*  7.8* 8.9   AST (SGOT) U/L 42*  --   --   --  18   ALT (SGPT) U/L <5  --   --   --  10     Estimated Creatinine Clearance: 22.7 mL/min (A) (by C-G formula based on SCr of 1.73 mg/dL (H)).      Results from last 7 days   Lab Units 08/01/19  1229 08/01/19  0637 07/31/19  2155 07/31/19  1312 07/31/19  0602 07/31/19  0006 07/30/19  1717 07/30/19  1223   GLUCOSE mg/dL 226* 254* 199* 129 149* 199* 150* 142*     Results from last 7 days   Lab Units 08/01/19  0333 07/31/19  0417 07/30/19 1715 07/30/19  0333 07/29/19  0414   WBC 10*3/mm3 11.08* 10.52 9.11 5.92 6.22   HEMOGLOBIN g/dL 8.4* 9.0* 9.7* 7.8* 11.0*   HEMATOCRIT % 27.9* 29.5* 32.1* 25.5* 35.0   PLATELETS 10*3/mm3 129* 127* 135* 135* 198   MCV fL 100.0* 98.7* 100.3* 100.4* 98.0*   MCH pg 30.1 30.1 30.3 30.7 30.8   MCHC g/dL 30.1* 30.5* 30.2* 30.6* 31.4*   RDW % 14.5 15.1 14.5 13.2 13.0   RDW-SD fl 53.2 54.6* 52.9 48.4 46.8   MPV fL 10.2 9.5 9.5 9.8 9.1   NEUTROPHIL % %  --   --   --   --  52.5   LYMPHOCYTE % %  --   --   --   --  35.2   MONOCYTES % %  --   --   --   --  8.5   EOSINOPHIL % %  --   --   --   --  2.7   BASOPHIL % %  --   --   --   --  0.6   IMM GRAN % %  --   --   --   --  0.5   NEUTROS ABS 10*3/mm3  --   --   --   --  3.26   LYMPHS ABS 10*3/mm3  --   --   --   --  2.19   MONOS ABS 10*3/mm3  --   --   --   --  0.53   EOS ABS 10*3/mm3  --   --   --   --  0.17   BASOS ABS 10*3/mm3  --   --   --   --  0.04   IMMATURE GRANS (ABS) 10*3/mm3  --   --   --   --  0.03   NRBC /100 WBC  --   --   --   --  0.0     Results from last 7 days   Lab Units 07/29/19  0414   INR  1.02     Results from last 7 days   Lab Units 08/01/19  1528   NITRITE UA  Negative   WBC UA /HPF 31-50*   BACTERIA UA /HPF 1+*   SQUAM EPITHEL UA /HPF 7-12*     Results from last 7 days   Lab Units 08/01/19  1528   SODIUM UR mmol/L <20   CREATININE UR mg/dL 40.8     I reviewed the patient's new clinical results and medications.         Medication Review:   Scheduled Meds:    amLODIPine 5 mg Oral  Q24H   aspirin 325 mg Oral Daily   fluticasone 2 spray Each Nare Daily   insulin lispro 0-7 Units Subcutaneous Q6H   ipratropium-albuterol 3 mL Nebulization TID   mirtazapine 15 mg Oral Nightly   primidone 50 mg Oral Nightly   raNITIdine 150 mg Oral QAM AC   sennosides-docusate sodium 2 tablet Oral Nightly   sertraline 100 mg Oral Daily   sodium chloride 3 mL Intravenous Q12H   sodium hypochlorite  Topical Q24H     Continuous Infusions:   PRN Meds:  •  acetaminophen  •  acetaminophen  •  bisacodyl  •  calcium carbonate  •  dextrose  •  dextrose  •  glucagon (human recombinant)  •  hydrALAZINE  •  HYDROcodone-acetaminophen  •  HYDROcodone-acetaminophen  •  Morphine **OR** Morphine  •  ondansetron **OR** ondansetron  •  polyethylene glycol  •  [COMPLETED] Insert peripheral IV **AND** sodium chloride  •  sodium chloride  Glucose   Date/Time Value Ref Range Status   08/01/2019 1229 226 (H) 70 - 130 mg/dL Final   08/01/2019 0637 254 (H) 70 - 130 mg/dL Final   07/31/2019 2155 199 (H) 70 - 130 mg/dL Final   07/31/2019 1312 129 70 - 130 mg/dL Final   07/31/2019 0602 149 (H) 70 - 130 mg/dL Final   07/31/2019 0006 199 (H) 70 - 130 mg/dL Final   07/30/2019 1717 150 (H) 70 - 130 mg/dL Final   07/30/2019 1223 142 (H) 70 - 130 mg/dL Final      I personally reviewed CXR    Problem List:     Active Hospital Problems    Diagnosis  POA   • **Displaced oblique fracture of shaft of left femur, initial encounter for closed fracture (CMS/[facility]) [S72.332A]  Yes   • Thrombocytopenia (CMS/[facility]) [D69.6]  Unknown   • Hyperkalemia [E87.5]  Unknown   • Acute kidney injury superimposed on chronic kidney disease (CMS/[facility]) [N17.9, N18.9]  Unknown   • Acute respiratory failure with hypoxia (CMS/[facility]) [J96.01]  Unknown   • Hx of MRSA (methicillin resistant Staphylococcus aureus) infection [A49.02]  Yes   • Gastroesophageal reflux disease with esophagitis [K21.0]  Yes   • Type 2 diabetes mellitus with diabetic polyneuropathy, without long-term current  use of insulin (CMS/[facility]) [E11.42]  Yes   • Benign essential hypertension [I10]  Yes   • Anemia due to chronic kidney disease [N18.9, D63.1]  Yes      Resolved Hospital Problems   No resolved problems to display.       Assessment and Plan:     · Status post left distal femoral fracture ORIF 7/30/2019.  Nonweightbearing left lower extremity  · Monitoring postop acute blood loss anemia  · Acute kidney injury and chronic kidney disease stage III, hyperkalemia: Appreciate nephrology-plan is to give a dose of a diuretic  · Postop thrombocytopenia monitoring  · Hypoxia.    Now on room air.  Mini nebs and incentive spirometry.  Activity as tolerated.  Chest x-ray shows atelectasis.   · Low-grade fever may be related to atelectasis.  Urinalysis with pyuria however has epithelial cells.  No respiratory,  complaints.  WBC mildly elevated will monitor.  · Diabetes mellitus type 2.  Continue sliding scale.  Glucose is a little higher today we will monitor overnight and adjust as appropriate  · Left anterior fascicular block and heart murmur.  Echo showed aortic sclerosis and RA abnormality with no further planned work up recommended by cardiology due to age and comorbidities    VTE prophylaxis: SCDs   CODE status: full code  Disposition: TBD- will need prolonged rehab given NWB to LLE for about 3 months.     I discussed the patients findings and my recommendations with patient.    Ghassan Luque MD   08/01/19  4:41 PM

## 2019-08-01 NOTE — PLAN OF CARE
Problem: Patient Care Overview  Goal: Plan of Care Review   07/31/19 2156 08/01/19 0223   Coping/Psychosocial   Plan of Care Reviewed With patient --    Plan of Care Review   Progress improving --    OTHER   Outcome Summary --  Dressing CDI. C/O pain upon movement. Pain controlled with current pain medication. Nephrology has been consulted and will be see in the AM. Vitals stable. NWB at this time. Will d/c to SNF when stable.        Problem: Fall Risk (Adult)  Goal: Absence of Fall  Outcome: Ongoing (interventions implemented as appropriate)   08/01/19 0223   Fall Risk (Adult)   Absence of Fall achieves outcome       Problem: Skin Injury Risk (Adult)  Goal: Skin Health and Integrity  Outcome: Ongoing (interventions implemented as appropriate)   07/31/19 2156   Skin Injury Risk (Adult)   Skin Health and Integrity making progress toward outcome

## 2019-08-01 NOTE — PLAN OF CARE
Problem: Patient Care Overview  Goal: Plan of Care Review  Outcome: Ongoing (interventions implemented as appropriate)   07/31/19 2156   Coping/Psychosocial   Plan of Care Reviewed With patient   Plan of Care Review   Progress improving   OTHER   Outcome Summary Patient able to sit on side of bed today, unable to get OOB d/t weakness and pain. Vitals are stable and voiding function is intact. Nephrology consulted d/t elevated labs, to see in am. Pain managed with po meds. Skin injury prevention measures in place. Patient to d/c to snf when stable.        Problem: Fall Risk (Adult)  Goal: Absence of Fall  Outcome: Ongoing (interventions implemented as appropriate)   07/31/19 2156   Fall Risk (Adult)   Absence of Fall achieves outcome       Problem: Skin Injury Risk (Adult)  Goal: Identify Related Risk Factors and Signs and Symptoms  Outcome: Outcome(s) achieved Date Met: 07/31/19 07/29/19 1508   Skin Injury Risk (Adult)   Related Risk Factors (Skin Injury Risk) advanced age;fluid intake inadequate;mobility impaired     Goal: Skin Health and Integrity  Outcome: Ongoing (interventions implemented as appropriate)   07/31/19 2156   Skin Injury Risk (Adult)   Skin Health and Integrity making progress toward outcome       Problem: Fracture Orthopaedic (Adult)  Goal: Signs and Symptoms of Listed Potential Problems Will be Absent, Minimized or Managed (Fracture Orthopaedic)  Outcome: Ongoing (interventions implemented as appropriate)   07/31/19 2156   Goal/Outcome Evaluation   Problems Assessed (Orthopaedic Fracture) all   Problems Present (Orthopaedic Fracture) pain;skin integrity impairment;situational response;functional deficit/self-care deficit

## 2019-08-01 NOTE — PLAN OF CARE
Problem: Patient Care Overview  Goal: Plan of Care Review  Outcome: Ongoing (interventions implemented as appropriate)   08/01/19 7857   Coping/Psychosocial   Plan of Care Reviewed With patient   OTHER   Outcome Summary pt continues to be fearful and painful during tfer to EOB, heavy post lean req assist of 2; unsafe to attempt standing-pt also NWB LLE

## 2019-08-01 NOTE — CONSULTS
Referring Provider: joana  Reason for Consultation: CARLOS on CKD 2    Subjective     Chief complaint   Chief Complaint   Patient presents with   • Fall   • Knee Injury       History of present illness:   89 yo WF with history of CKD 2 baseline crt 1.3, DM2, HTN, CVA without residual.  Admitted after fall at home with left femur fracture.  ORIF 7/30.  Has had increasing agitation since surgery.  Yesterday , very restless and did not recognize daughter in law.  Overnight, refused po med for pain, so IV morphine given.  RN reports this am, patient did take po meds, ate very little.  Now somnolent.  Daughter in law reports patient sleeps a lot at home, at times 14 hours a day.  Short term memory very poor.  Lives alone with children's support .  Urine not recorded due to incontinence, but bladder scan 99 cc.  Has been getting IVF since OR.  Meds reviewed. No NSAIDS or diuretic.  Weight up 6 pounds .  Pt very [place],  Does awaken briefly and squeezes my hand. Not verbal.    Past Medical History:   Diagnosis Date   • Adjustment disorder with mixed anxiety and depressed mood    • Anemia    • Anxiety    • Atrophy of hand muscles     LEFT HAND   • Benign familial tremor    • Carpal tunnel syndrome    • Cataract    • Chronic rhinosinusitis    • Depression    • Diabetes mellitus (CMS/HCC)    • Dyslipidemia    • Esophagitis, reflux    • Fracture of hip (CMS/HCC)    • Frequent falls    • GERD (gastroesophageal reflux disease)    • Hand pain    • Hearing loss    • Hip pain    • Hyperlipidemia    • Hypertension    • Impacted cerumen    • Insomnia    • Knee pain    • Limb pain    • Loss of hearing    • Low back pain    • Lower extremity weakness    • Lumbar canal stenosis    • Osteoporosis, senile    • Piriformis syndrome    • Renal insufficiency 2006   • Sensorineural hearing loss    • Stroke (CMS/HCC) 2004   • Tinea pedis    • Vitamin D deficiency      Past Surgical History:   Procedure Laterality Date   • CHOLECYSTECTOMY  2013   •  COLONOSCOPY     • ERCP WITH SPHINCTEROTOMY/PAPILLOTOMY  2012   • FRACTURE SURGERY Left 2006    HIP   • VARICOSE VEIN SURGERY Right      History reviewed. No pertinent family history.    Social History     Tobacco Use   • Smoking status: Never Smoker   • Smokeless tobacco: Never Used   Substance Use Topics   • Alcohol use: No   • Drug use: No     Medications Prior to Admission   Medication Sig Dispense Refill Last Dose   • acetaminophen (TYLENOL) 325 MG tablet Take 2 tablets by mouth Every 6 (Six) Hours As Needed for Mild Pain (1-3).  0 Taking   • amLODIPine (NORVASC) 5 MG tablet Take 1 tablet by mouth Daily. 90 tablet 3 Taking   • fexofenadine (ALLEGRA) 180 MG tablet Take 1 tablet by mouth Daily. 30 tablet 2 Taking   • fluticasone (FLONASE) 50 MCG/ACT nasal spray USE TWO SPRAYS IN EACH NOSTRIL ONCE DAILY 16 mL 2 Taking   • LANCETS MICRO THIN 33G misc USE AS DIRECTED TO TEST BLOOD GLUCOSE DAILY 100 each 5 Taking   • losartan (COZAAR) 50 MG tablet TAKE 1 TABLET EVERY DAY FOR BLOOD PRESSURE 90 tablet 1 Taking   • mirtazapine (REMERON) 15 MG tablet TAKE 1 TABLET EVERY NIGHT 90 tablet 1 Taking   • primidone (MYSOLINE) 50 MG tablet TAKE 1 TABLET TWICE DAILY  FOR  TREMOR 180 tablet 1 Taking   • raNITIdine (ZANTAC) 150 MG tablet TAKE 1 TABLET TWICE DAILY  FOR  REFLUX  ESOPHAGITIS 180 tablet 1 Taking   • sertraline (ZOLOFT) 100 MG tablet TAKE 1 TABLET EVERY DAY  FOR  MOOD  AND  WELL  BEING 90 tablet 1 Taking   • sodium hypochlorite (DAKIN'S) 0.25 % topical solution Apply daily to wound then cover with sterile gauze 473 mL 1    • TRUE METRIX BLOOD GLUCOSE TEST test strip USE TO TEST BLOOD SUGAR ONCE DAILY AS DIRECTED 100 each 0 Taking   • TRUE METRIX BLOOD GLUCOSE TEST test strip USE TO TEST BLOOD SUGAR ONCE DAILY AS DIRECTED 100 each 1 Taking     Allergies:  Tetracyclines & related; Amoxicillin; Benzodiazepines; Codeine; Erythromycin; Macrolides and ketolides; Melatonin; Neomycin; Penicillins; and Sulfa antibiotics    Review  "of Systems  From RN and Daughter in law.  See HPI.  Bowels did move yesterday.     Objective     Vital Signs  Temp:  [98.9 °F (37.2 °C)-100.6 °F (38.1 °C)] 99.4 °F (37.4 °C)  Heart Rate:  [72-88] 72  Resp:  [18] 18  BP: (109-160)/(50-68) 116/57    Flowsheet Rows      First Filed Value   Admission Height  170.2 cm (67\") Documented at 07/29/2019 0354   Admission Weight  82.1 kg (181 lb) Documented at 07/29/2019 0408           I/O this shift:  In: 387 [P.O.:50; I.V.:337]  Out: -   I/O last 3 completed shifts:  In: 917.2 [P.O.:730; I.V.:87.2; IV Piggyback:100]  Out: -     Intake/Output Summary (Last 24 hours) at 8/1/2019 1302  Last data filed at 8/1/2019 0920  Gross per 24 hour   Intake 587 ml   Output --   Net 587 ml       Physical Exam:  Elderly female, looks stated age  HEENT oral mucosa dry.  Periorbital edema  Very [place]   Does squeeze my hand with prompting. Not verbal  Neck JVD at 30 degrees  Heart RRR no s3 or rub  Lungs clear anteriorly, decreased bs bases  Abd +bs, soft, nontender.  Body wall edema  Ext left femur bandage. Trace to 1+ lower ext edema  1+ upper ext edema  Skin scattered ecchymoses        Results Review:  Results for orders placed or performed during the hospital encounter of 07/29/19   Comprehensive Metabolic Panel   Result Value Ref Range    Glucose 163 (H) 65 - 99 mg/dL    BUN 20 8 - 23 mg/dL    Creatinine 1.37 (H) 0.57 - 1.00 mg/dL    Sodium 135 (L) 136 - 145 mmol/L    Potassium 4.4 3.5 - 5.2 mmol/L    Chloride 98 98 - 107 mmol/L    CO2 23.8 22.0 - 29.0 mmol/L    Calcium 8.9 8.2 - 9.6 mg/dL    Total Protein 7.1 6.0 - 8.5 g/dL    Albumin 3.80 3.50 - 5.20 g/dL    ALT (SGPT) 10 1 - 33 U/L    AST (SGOT) 18 1 - 32 U/L    Alkaline Phosphatase 88 39 - 117 U/L    Total Bilirubin 0.2 0.2 - 1.2 mg/dL    eGFR Non African Amer 36 (L) >60 mL/min/1.73    Globulin 3.3 gm/dL    A/G Ratio 1.2 g/dL    BUN/Creatinine Ratio 14.6 7.0 - 25.0    Anion Gap 13.2 5.0 - 15.0 mmol/L   Protime-INR   Result Value Ref Range "    Protime 13.1 11.7 - 14.2 Seconds    INR 1.02 0.90 - 1.10   CBC Auto Differential   Result Value Ref Range    WBC 6.22 3.40 - 10.80 10*3/mm3    RBC 3.57 (L) 3.77 - 5.28 10*6/mm3    Hemoglobin 11.0 (L) 12.0 - 15.9 g/dL    Hematocrit 35.0 34.0 - 46.6 %    MCV 98.0 (H) 79.0 - 97.0 fL    MCH 30.8 26.6 - 33.0 pg    MCHC 31.4 (L) 31.5 - 35.7 g/dL    RDW 13.0 12.3 - 15.4 %    RDW-SD 46.8 37.0 - 54.0 fl    MPV 9.1 6.0 - 12.0 fL    Platelets 198 140 - 450 10*3/mm3    Neutrophil % 52.5 42.7 - 76.0 %    Lymphocyte % 35.2 19.6 - 45.3 %    Monocyte % 8.5 5.0 - 12.0 %    Eosinophil % 2.7 0.3 - 6.2 %    Basophil % 0.6 0.0 - 1.5 %    Immature Grans % 0.5 0.0 - 0.5 %    Neutrophils, Absolute 3.26 1.70 - 7.00 10*3/mm3    Lymphocytes, Absolute 2.19 0.70 - 3.10 10*3/mm3    Monocytes, Absolute 0.53 0.10 - 0.90 10*3/mm3    Eosinophils, Absolute 0.17 0.00 - 0.40 10*3/mm3    Basophils, Absolute 0.04 0.00 - 0.20 10*3/mm3    Immature Grans, Absolute 0.03 0.00 - 0.05 10*3/mm3    nRBC 0.0 0.0 - 0.2 /100 WBC   CBC (No Diff)   Result Value Ref Range    WBC 5.92 3.40 - 10.80 10*3/mm3    RBC 2.54 (L) 3.77 - 5.28 10*6/mm3    Hemoglobin 7.8 (L) 12.0 - 15.9 g/dL    Hematocrit 25.5 (L) 34.0 - 46.6 %    .4 (H) 79.0 - 97.0 fL    MCH 30.7 26.6 - 33.0 pg    MCHC 30.6 (L) 31.5 - 35.7 g/dL    RDW 13.2 12.3 - 15.4 %    RDW-SD 48.4 37.0 - 54.0 fl    MPV 9.8 6.0 - 12.0 fL    Platelets 135 (L) 140 - 450 10*3/mm3   Basic Metabolic Panel   Result Value Ref Range    Glucose 121 (H) 65 - 99 mg/dL    BUN 24 (H) 8 - 23 mg/dL    Creatinine 1.28 (H) 0.57 - 1.00 mg/dL    Sodium 136 136 - 145 mmol/L    Potassium 5.2 3.5 - 5.2 mmol/L    Chloride 103 98 - 107 mmol/L    CO2 24.3 22.0 - 29.0 mmol/L    Calcium 7.8 (L) 8.2 - 9.6 mg/dL    eGFR Non African Amer 39 (L) >60 mL/min/1.73    BUN/Creatinine Ratio 18.8 7.0 - 25.0    Anion Gap 8.7 5.0 - 15.0 mmol/L   CBC (No Diff)   Result Value Ref Range    WBC 9.11 3.40 - 10.80 10*3/mm3    RBC 3.20 (L) 3.77 - 5.28 10*6/mm3     Hemoglobin 9.7 (L) 12.0 - 15.9 g/dL    Hematocrit 32.1 (L) 34.0 - 46.6 %    .3 (H) 79.0 - 97.0 fL    MCH 30.3 26.6 - 33.0 pg    MCHC 30.2 (L) 31.5 - 35.7 g/dL    RDW 14.5 12.3 - 15.4 %    RDW-SD 52.9 37.0 - 54.0 fl    MPV 9.5 6.0 - 12.0 fL    Platelets 135 (L) 140 - 450 10*3/mm3   CBC (No Diff)   Result Value Ref Range    WBC 10.52 3.40 - 10.80 10*3/mm3    RBC 2.99 (L) 3.77 - 5.28 10*6/mm3    Hemoglobin 9.0 (L) 12.0 - 15.9 g/dL    Hematocrit 29.5 (L) 34.0 - 46.6 %    MCV 98.7 (H) 79.0 - 97.0 fL    MCH 30.1 26.6 - 33.0 pg    MCHC 30.5 (L) 31.5 - 35.7 g/dL    RDW 15.1 12.3 - 15.4 %    RDW-SD 54.6 (H) 37.0 - 54.0 fl    MPV 9.5 6.0 - 12.0 fL    Platelets 127 (L) 140 - 450 10*3/mm3   Basic Metabolic Panel   Result Value Ref Range    Glucose 141 (H) 65 - 99 mg/dL    BUN 28 (H) 8 - 23 mg/dL    Creatinine 1.43 (H) 0.57 - 1.00 mg/dL    Sodium 142 136 - 145 mmol/L    Potassium 5.7 (H) 3.5 - 5.2 mmol/L    Chloride 109 (H) 98 - 107 mmol/L    CO2 23.4 22.0 - 29.0 mmol/L    Calcium 7.6 (L) 8.2 - 9.6 mg/dL    eGFR Non African Amer 34 (L) >60 mL/min/1.73    BUN/Creatinine Ratio 19.6 7.0 - 25.0    Anion Gap 9.6 5.0 - 15.0 mmol/L   Basic Metabolic Panel   Result Value Ref Range    Glucose 181 (H) 65 - 99 mg/dL    BUN 30 (H) 8 - 23 mg/dL    Creatinine 1.70 (H) 0.57 - 1.00 mg/dL    Sodium 139 136 - 145 mmol/L    Potassium 5.1 3.5 - 5.2 mmol/L    Chloride 105 98 - 107 mmol/L    CO2 22.9 22.0 - 29.0 mmol/L    Calcium 7.2 (L) 8.2 - 9.6 mg/dL    eGFR Non African Amer 28 (L) >60 mL/min/1.73    BUN/Creatinine Ratio 17.6 7.0 - 25.0    Anion Gap 11.1 5.0 - 15.0 mmol/L   CBC (No Diff)   Result Value Ref Range    WBC 11.08 (H) 3.40 - 10.80 10*3/mm3    RBC 2.79 (L) 3.77 - 5.28 10*6/mm3    Hemoglobin 8.4 (L) 12.0 - 15.9 g/dL    Hematocrit 27.9 (L) 34.0 - 46.6 %    .0 (H) 79.0 - 97.0 fL    MCH 30.1 26.6 - 33.0 pg    MCHC 30.1 (L) 31.5 - 35.7 g/dL    RDW 14.5 12.3 - 15.4 %    RDW-SD 53.2 37.0 - 54.0 fl    MPV 10.2 6.0 - 12.0 fL     Platelets 129 (L) 140 - 450 10*3/mm3   Basic Metabolic Panel   Result Value Ref Range    Glucose 197 (H) 65 - 99 mg/dL    BUN 41 (H) 8 - 23 mg/dL    Creatinine 1.73 (H) 0.57 - 1.00 mg/dL    Sodium 139 136 - 145 mmol/L    Potassium 5.5 (H) 3.5 - 5.2 mmol/L    Chloride 106 98 - 107 mmol/L    CO2 23.6 22.0 - 29.0 mmol/L    Calcium 8.0 (L) 8.2 - 9.6 mg/dL    eGFR Non African Amer 27 (L) >60 mL/min/1.73    BUN/Creatinine Ratio 23.7 7.0 - 25.0    Anion Gap 9.4 5.0 - 15.0 mmol/L   POC Glucose Once   Result Value Ref Range    Glucose 202 (H) 70 - 130 mg/dL   POC Glucose Once   Result Value Ref Range    Glucose 196 (H) 70 - 130 mg/dL   POC Glucose Once   Result Value Ref Range    Glucose 107 70 - 130 mg/dL   POC Glucose Once   Result Value Ref Range    Glucose 126 70 - 130 mg/dL   POC Glucose Once   Result Value Ref Range    Glucose 142 (H) 70 - 130 mg/dL   POC Glucose Once   Result Value Ref Range    Glucose 150 (H) 70 - 130 mg/dL   POC Glucose Once   Result Value Ref Range    Glucose 199 (H) 70 - 130 mg/dL   POC Glucose Once   Result Value Ref Range    Glucose 149 (H) 70 - 130 mg/dL   POC Glucose Once   Result Value Ref Range    Glucose 129 70 - 130 mg/dL   POC Glucose Once   Result Value Ref Range    Glucose 199 (H) 70 - 130 mg/dL   POC Glucose Once   Result Value Ref Range    Glucose 254 (H) 70 - 130 mg/dL   POC Glucose Once   Result Value Ref Range    Glucose 226 (H) 70 - 130 mg/dL   Adult Transthoracic Echo Complete W/ Cont if Necessary Per Protocol   Result Value Ref Range    BSA 2.0 m^2    IVSd 0.9 cm    LVIDd 4.4 cm    LVIDs 3.6 cm    LVPWd 0.6 cm    IVS/LVPW 1.5     FS 18.2 %    EDV(Teich) 87.7 ml    ESV(Teich) 54.4 ml    EF(Teich) 37.9 %    EDV(cubed) 85.2 ml    ESV(cubed) 46.7 ml    EF(cubed) 45.2 %    LV mass(C)d 100.6 grams    LV mass(C)dI 51.2 grams/m^2    SV(Teich) 33.3 ml    SI(Teich) 16.9 ml/m^2    SV(cubed) 38.5 ml    SI(cubed) 19.6 ml/m^2    Ao root diam 3.4 cm    Ao root area 9.1 cm^2    ACS 2.3 cm     asc Aorta Diam 3.7 cm    LVOT diam 2.0 cm    LVOT area 3.1 cm^2    LVOT area(traced) 3.1 cm^2    RVOT diam 2.0 cm    RVOT area 3.1 cm^2    LVLd ap4 6.5 cm    EDV(MOD-sp4) 61.0 ml    LVLs ap4 5.2 cm    ESV(MOD-sp4) 23.0 ml    EF(MOD-sp4) 62.3 %    LVLd ap2 7.4 cm    EDV(MOD-sp2) 54.0 ml    LVLs ap2 5.7 cm    ESV(MOD-sp2) 19.0 ml    EF(MOD-sp2) 64.8 %    SV(MOD-sp4) 38.0 ml    SI(MOD-sp4) 19.3 ml/m^2    SV(MOD-sp2) 35.0 ml    SI(MOD-sp2) 17.8 ml/m^2    Ao root area (BSA corrected) 1.7     LV Guzman Vol (BSA corrected) 31.0 ml/m^2    LV Sys Vol (BSA corrected) 11.7 ml/m^2    MV A dur 158.0 sec    MV E max ranulfo 87.5 cm/sec    MV A max ranulfo 105.0 cm/sec    MV E/A 0.83     MV V2 mean 65.6 cm/sec    MV mean PG 2.0 mmHg    MV V2 VTI 37.3 cm    MVA(VTI) 2.3 cm^2    MV P1/2t max ranulfo 111.0 cm/sec    MV P1/2t 91.6 msec    MVA(P1/2t) 2.4 cm^2    MV dec slope 355.0 cm/sec^2    MV dec time 204 sec    Ao V2 mean 125.0 cm/sec    Ao mean PG 7.0 mmHg    Ao mean PG (full) 3.0 mmHg    Ao V2 VTI 38.8 cm    LEANN(I,A) 2.2 cm^2    LEANN(I,D) 2.2 cm^2    LV V1 mean PG 4.0 mmHg    LV V1 mean 85.6 cm/sec    LV V1 VTI 27.7 cm    SV(Ao) 352.3 ml    SI(Ao) 179.2 ml/m^2    SV(LVOT) 87.0 ml    SV(RVOT) 54.7 ml    SI(LVOT) 44.3 ml/m^2    PA V2 max 139.0 cm/sec    PA max PG 7.7 mmHg    PA max PG (full) 4.8 mmHg     CV ECHO EVA - PVA(V,A) 1.9 cm^2     CV ECHO EVA - PVA(V,D) 1.9 cm^2    PA acc slope 1,028 cm/sec^2    PA acc time 0.09 sec    RV V1 max PG 3.0 mmHg    RV V1 mean PG 1.0 mmHg    RV V1 max 86.0 cm/sec    RV V1 mean 56.7 cm/sec    RV V1 VTI 17.4 cm    PA pr(Accel) 39.4 mmHg    Pulm Sys Ranulfo 52.7 cm/sec    Pulm Guzman Ranulfo 38.5 cm/sec    Pulm S/D 1.4     Qp/Qs 0.63     Pulm A Revs Dur 0.15 sec    Pulm A Revs Ranulfo 36.5 cm/sec    MVA P1/2T LCG 2.0 cm^2     CV ECHO EVA - BZI_BMI 29.4 kilograms/m^2     CV ECHO EVA - BSA(HAYCOCK) 2.0 m^2     CV ECHO EVA - BZI_METRIC_WEIGHT 85.0 kg     CV ECHO EVA - BZI_METRIC_HEIGHT 170.0 cm    Target HR  (85%) 108 bpm    Max. Pred. HR (100%) 127 bpm    TDI S' 22.30 cm/sec    RV Base 3.80 cm    Dimensionless Index 0.7 (DI)    LA Volume Index 51.0 mL/m2    Avg E/e' ratio 18.13     Ao pk ranulfo 186.0 cm/sec    EF(MOD-bp) 64.0 %    Lat Peak E' Ranulfo 4.5 cm/sec    LV V1 max PG 8.6 mmHg    LV V1 max 146.0 cm/sec    Med Peak E' Ranulfo 5.15 cm/sec    Ao max PG 14.0 mmHg    TAPSE (>1.6) 2.70 cm2    Echo EF Estimated 64 %   Type & Screen   Result Value Ref Range    ABO Type A     RH type Positive     Antibody Screen Negative     T&S Expiration Date 8/1/2019 11:59:59 PM    Prepare RBC, 2 Units   Result Value Ref Range    Product Code J2854Z72     Unit Number [id number]     UNIT  ABO A     UNIT  RH POS     Dispense Status PT     Blood Type APOS     Blood Expiration Date 201908202359     Blood Type Barcode 6200     Product Code E3376D54     Unit Number [id number]     UNIT  ABO A     UNIT  RH POS     Dispense Status PT     Blood Type APOS     Blood Expiration Date 222768836522     Blood Type Barcode 6200      Imaging Results (last 72 hours)     Procedure Component Value Units Date/Time    XR Chest 1 View [[id number]] Collected:  07/31/19 1606     Updated:  07/31/19 1611    Narrative:       XR CHEST 1 VW-     HISTORY: Female who is 89 years-old,  atelectasis     TECHNIQUE: Frontal views of the chest     COMPARISON: 07/29/2019     FINDINGS: Heart size is borderline. Aorta is tortuous. Pulmonary  vasculature is unremarkable. Persistent likely atelectasis at the left  base. No mild likely atelectasis has developed at the right base. No  pleural effusion, or pneumothorax. No acute osseous process.       Impression:       Bibasilar likely atelectasis. Borderline heart size.  Tortuous aorta.     This report was finalized on 7/31/2019 4:08 PM by Dr. Jimmy Peace M.D.       XR Femur 2 View Left [[id number]] Collected:  07/30/19 1700     Updated:  07/31/19 0824    Narrative:       2 VIEW PORTABLE LEFT FEMUR IN OR     HISTORY:  Imaging during fracture fixation.     FINDINGS: Imaging in the operating room was performed showing fixation  of a fracture of the distal shaft of the femur with lateral plate and  multiple screws and encircling wires and the alignment appears  satisfactory. Six images were obtained and the fluoroscopy time measures  56 seconds.     This report was finalized on 7/31/2019 8:21 AM by Dr. Koffi Wiseman M.D.       XR Femur 2 View Left [[id number]] Collected:  07/30/19 1716     Updated:  07/30/19 1721    Narrative:       XR FEMUR 2 VW LEFT-     INDICATIONS: Postoperative evaluation.     TECHNIQUE: 4 views of the left femur     COMPARISON: 07/29/2019     FINDINGS:     Plate and screw hardware fixation of the distal femur fracture is seen,  with cerclage wires, adjacent surgical soft tissue gas, overlying skin  staples. Arterial calcifications are seen.          Impression:          Postsurgical changes.           This report was finalized on 7/30/2019 5:18 PM by Dr. Jimmy Peace M.D.       FL C Arm During Surgery [[id number]] Resulted:  07/30/19 1638     Updated:  07/30/19 1638    Narrative:       This procedure was auto-finalized with no dictation required.              amLODIPine 5 mg Oral Q24H   aspirin 325 mg Oral Daily   fluticasone 2 spray Each Nare Daily   furosemide 40 mg Intravenous Once   insulin lispro 0-7 Units Subcutaneous Q6H   ipratropium-albuterol 3 mL Nebulization TID   mirtazapine 15 mg Oral Nightly   primidone 50 mg Oral Nightly   raNITIdine 150 mg Oral QAM AC   sennosides-docusate sodium 2 tablet Oral Nightly   sertraline 100 mg Oral Daily   sodium chloride 3 mL Intravenous Q12H   sodium hypochlorite  Topical Q24H          Assessment/Plan   1. CARLOS on CKD 2, baseline crt 1.3.  No evidence of NSAID use, bp has  been labile from 130-180 systolic, but past 24 hours 109-150.  Mild volume excess by exam.  No retention on bladder scan.  Asked for purwik catheter  For strict output.  One dose diuretic  today for volume and Generous K.   2. Left femur fracture SP ORIF 7/30.   3. DM2  4. HTN. Norvasc added this am.  Watch closely .  5. Anemia, acute on chronic . Blood loss.  Macrocytic.  Check B12 folate .  6. Delirium superimposed on dementia.    7. Tremor, reduce mysoline dose for renal failure .  8. Fever.  Check UA, CXR yesterday without infiltrate.   9. TCP.     I discussed the patients findings and my recommendations with family at bedside .    Hyacinth Samuels MD  08/01/19  1:02 PM

## 2019-08-01 NOTE — PLAN OF CARE
Problem: Patient Care Overview  Goal: Plan of Care Review  Outcome: Ongoing (interventions implemented as appropriate)   08/01/19 0361   Coping/Psychosocial   Plan of Care Reviewed With patient   Plan of Care Review   Progress improving   OTHER   Outcome Summary POD 2 ORIF L femur. 2 RANDELL dressing intact, dried drainage. DM with SSI. Purewick placed per nephrology request, UA sent to lab. Brief in place, multiple BM today. Increased potassium, IV lasix given. Patient has been very sleepy today, awakes easily and is alert when awake, but falls back to sleep quickly. Will cont to monitor.

## 2019-08-02 LAB
ABO GROUP BLD: NORMAL
ALBUMIN SERPL-MCNC: 2.3 G/DL (ref 3.5–5.2)
ALBUMIN/GLOB SERPL: 0.7 G/DL
ALP SERPL-CCNC: 93 U/L (ref 39–117)
ALT SERPL W P-5'-P-CCNC: <5 U/L (ref 1–33)
ANION GAP SERPL CALCULATED.3IONS-SCNC: 8.8 MMOL/L (ref 5–15)
AST SERPL-CCNC: 28 U/L (ref 1–32)
BILIRUB SERPL-MCNC: 0.4 MG/DL (ref 0.2–1.2)
BLD GP AB SCN SERPL QL: NEGATIVE
BUN BLD-MCNC: 50 MG/DL (ref 8–23)
BUN/CREAT SERPL: 25.9 (ref 7–25)
CALCIUM SPEC-SCNC: 8.2 MG/DL (ref 8.2–9.6)
CHLORIDE SERPL-SCNC: 106 MMOL/L (ref 98–107)
CO2 SERPL-SCNC: 25.2 MMOL/L (ref 22–29)
CREAT BLD-MCNC: 1.93 MG/DL (ref 0.57–1)
DEPRECATED RDW RBC AUTO: 52.4 FL (ref 37–54)
ERYTHROCYTE [DISTWIDTH] IN BLOOD BY AUTOMATED COUNT: 14.4 % (ref 12.3–15.4)
FERRITIN SERPL-MCNC: 273 NG/ML (ref 13–150)
FOLATE SERPL-MCNC: >20 NG/ML (ref 4.78–24.2)
GFR SERPL CREATININE-BSD FRML MDRD: 24 ML/MIN/1.73
GLOBULIN UR ELPH-MCNC: 3.3 GM/DL
GLUCOSE BLD-MCNC: 156 MG/DL (ref 65–99)
GLUCOSE BLDC GLUCOMTR-MCNC: 134 MG/DL (ref 70–130)
GLUCOSE BLDC GLUCOMTR-MCNC: 155 MG/DL (ref 70–130)
GLUCOSE BLDC GLUCOMTR-MCNC: 163 MG/DL (ref 70–130)
GLUCOSE BLDC GLUCOMTR-MCNC: 164 MG/DL (ref 70–130)
GLUCOSE BLDC GLUCOMTR-MCNC: 179 MG/DL (ref 70–130)
HCT VFR BLD AUTO: 24.4 % (ref 34–46.6)
HGB BLD-MCNC: 7.3 G/DL (ref 12–15.9)
IRON 24H UR-MRATE: 10 MCG/DL (ref 37–145)
IRON SATN MFR SERPL: 6 % (ref 20–50)
MCH RBC QN AUTO: 30 PG (ref 26.6–33)
MCHC RBC AUTO-ENTMCNC: 29.9 G/DL (ref 31.5–35.7)
MCV RBC AUTO: 100.4 FL (ref 79–97)
PLATELET # BLD AUTO: 156 10*3/MM3 (ref 140–450)
PMV BLD AUTO: 10.3 FL (ref 6–12)
POTASSIUM BLD-SCNC: 5.5 MMOL/L (ref 3.5–5.2)
PROT SERPL-MCNC: 5.6 G/DL (ref 6–8.5)
RBC # BLD AUTO: 2.43 10*6/MM3 (ref 3.77–5.28)
RH BLD: POSITIVE
SODIUM BLD-SCNC: 140 MMOL/L (ref 136–145)
T&S EXPIRATION DATE: NORMAL
TIBC SERPL-MCNC: 155 MCG/DL (ref 298–536)
TRANSFERRIN SERPL-MCNC: 104 MG/DL (ref 200–360)
VIT B12 BLD-MCNC: 902 PG/ML (ref 211–946)
WBC NRBC COR # BLD: 8.01 10*3/MM3 (ref 3.4–10.8)

## 2019-08-02 PROCEDURE — 84466 ASSAY OF TRANSFERRIN: CPT | Performed by: NURSE PRACTITIONER

## 2019-08-02 PROCEDURE — 86923 COMPATIBILITY TEST ELECTRIC: CPT

## 2019-08-02 PROCEDURE — 63710000001 INSULIN LISPRO (HUMAN) PER 5 UNITS: Performed by: INTERNAL MEDICINE

## 2019-08-02 PROCEDURE — 86901 BLOOD TYPING SEROLOGIC RH(D): CPT | Performed by: HOSPITALIST

## 2019-08-02 PROCEDURE — 86900 BLOOD TYPING SEROLOGIC ABO: CPT | Performed by: HOSPITALIST

## 2019-08-02 PROCEDURE — 80053 COMPREHEN METABOLIC PANEL: CPT | Performed by: INTERNAL MEDICINE

## 2019-08-02 PROCEDURE — P9016 RBC LEUKOCYTES REDUCED: HCPCS

## 2019-08-02 PROCEDURE — 82607 VITAMIN B-12: CPT | Performed by: NURSE PRACTITIONER

## 2019-08-02 PROCEDURE — 86850 RBC ANTIBODY SCREEN: CPT | Performed by: HOSPITALIST

## 2019-08-02 PROCEDURE — 83540 ASSAY OF IRON: CPT | Performed by: NURSE PRACTITIONER

## 2019-08-02 PROCEDURE — 82728 ASSAY OF FERRITIN: CPT | Performed by: NURSE PRACTITIONER

## 2019-08-02 PROCEDURE — 86900 BLOOD TYPING SEROLOGIC ABO: CPT

## 2019-08-02 PROCEDURE — 36430 TRANSFUSION BLD/BLD COMPNT: CPT

## 2019-08-02 PROCEDURE — 97110 THERAPEUTIC EXERCISES: CPT

## 2019-08-02 PROCEDURE — 85027 COMPLETE CBC AUTOMATED: CPT | Performed by: INTERNAL MEDICINE

## 2019-08-02 PROCEDURE — 82746 ASSAY OF FOLIC ACID SERUM: CPT | Performed by: NURSE PRACTITIONER

## 2019-08-02 PROCEDURE — 94799 UNLISTED PULMONARY SVC/PX: CPT

## 2019-08-02 PROCEDURE — 82962 GLUCOSE BLOOD TEST: CPT

## 2019-08-02 RX ORDER — FUROSEMIDE 10 MG/ML
20 INJECTION INTRAMUSCULAR; INTRAVENOUS AS NEEDED
Status: ACTIVE | OUTPATIENT
Start: 2019-08-02 | End: 2019-08-03

## 2019-08-02 RX ADMIN — HYDROCODONE BITARTRATE AND ACETAMINOPHEN 2 TABLET: 7.5; 325 TABLET ORAL at 01:47

## 2019-08-02 RX ADMIN — HYDROCODONE BITARTRATE AND ACETAMINOPHEN 1 TABLET: 5; 325 TABLET ORAL at 06:34

## 2019-08-02 RX ADMIN — AMLODIPINE BESYLATE 5 MG: 5 TABLET ORAL at 10:39

## 2019-08-02 RX ADMIN — HYDROCODONE BITARTRATE AND ACETAMINOPHEN 1 TABLET: 7.5; 325 TABLET ORAL at 11:28

## 2019-08-02 RX ADMIN — INSULIN LISPRO 2 UNITS: 100 INJECTION, SOLUTION INTRAVENOUS; SUBCUTANEOUS at 12:40

## 2019-08-02 RX ADMIN — ASPIRIN 325 MG: 325 TABLET ORAL at 10:39

## 2019-08-02 RX ADMIN — RANITIDINE 150 MG: 150 TABLET ORAL at 06:35

## 2019-08-02 RX ADMIN — MIRTAZAPINE 15 MG: 15 TABLET, FILM COATED ORAL at 01:48

## 2019-08-02 RX ADMIN — IPRATROPIUM BROMIDE AND ALBUTEROL SULFATE 3 ML: 2.5; .5 SOLUTION RESPIRATORY (INHALATION) at 15:16

## 2019-08-02 RX ADMIN — SENNOSIDES AND DOCUSATE SODIUM 2 TABLET: 8.6; 5 TABLET ORAL at 21:59

## 2019-08-02 RX ADMIN — HYOSCYAMINE SULFATE: 16 SOLUTION at 10:44

## 2019-08-02 RX ADMIN — PRIMIDONE 50 MG: 50 TABLET ORAL at 21:59

## 2019-08-02 RX ADMIN — INSULIN LISPRO 2 UNITS: 100 INJECTION, SOLUTION INTRAVENOUS; SUBCUTANEOUS at 06:34

## 2019-08-02 RX ADMIN — IPRATROPIUM BROMIDE AND ALBUTEROL SULFATE 3 ML: 2.5; .5 SOLUTION RESPIRATORY (INHALATION) at 07:07

## 2019-08-02 RX ADMIN — INSULIN LISPRO 2 UNITS: 100 INJECTION, SOLUTION INTRAVENOUS; SUBCUTANEOUS at 17:51

## 2019-08-02 RX ADMIN — MIRTAZAPINE 15 MG: 15 TABLET, FILM COATED ORAL at 21:58

## 2019-08-02 RX ADMIN — SODIUM CHLORIDE, PRESERVATIVE FREE 3 ML: 5 INJECTION INTRAVENOUS at 10:40

## 2019-08-02 RX ADMIN — IPRATROPIUM BROMIDE AND ALBUTEROL SULFATE 3 ML: 2.5; .5 SOLUTION RESPIRATORY (INHALATION) at 21:05

## 2019-08-02 RX ADMIN — SERTRALINE HYDROCHLORIDE 100 MG: 100 TABLET, FILM COATED ORAL at 10:39

## 2019-08-02 RX ADMIN — SODIUM CHLORIDE, PRESERVATIVE FREE 3 ML: 5 INJECTION INTRAVENOUS at 22:00

## 2019-08-02 RX ADMIN — FLUTICASONE PROPIONATE 2 SPRAY: 50 SPRAY, METERED NASAL at 10:40

## 2019-08-02 NOTE — PLAN OF CARE
Problem: Patient Care Overview  Goal: Plan of Care Review  Outcome: Ongoing (interventions implemented as appropriate)  Continued with neb tx asordered per physician. Pt was combative during last tx nd refused to allow mask to stay on her face. o2 continues at 2 l/m

## 2019-08-02 NOTE — PLAN OF CARE
Problem: Patient Care Overview  Goal: Plan of Care Review  Outcome: Ongoing (interventions implemented as appropriate)   08/02/19 0803   Coping/Psychosocial   Plan of Care Reviewed With patient;family   Plan of Care Review   Progress improving   OTHER   Outcome Summary much improved, less post lean , but still max 2; plans SNU at [place]

## 2019-08-02 NOTE — PROGRESS NOTES
Progress Note    Name: Magdalena Jerry ADMIT: 2019   : 1926  PCP: Fly Sanchez DO    MRN: [id number] LOS: 4 days   AGE/SEX: 89 y.o. female  ROOM: \[facility] [place] [facility]\""/1   Date of Encounter Visit: 19    Subjective:     Interval History: no complaints. alert today. mental status is baseline and family confirms.    REVIEW OF SYSTEMS:   hard of hearing. no recurrence of low grade fever.    Objective:   Temp:  [98.3 °F (36.8 °C)-100.2 °F (37.9 °C)] 99 °F (37.2 °C)  Heart Rate:  [70-80] 70  Resp:  [16-20] 16  BP: (121-164)/(59-74) 121/63   SpO2:  [89 %-98 %] 96 %  on  Flow (L/min):  [1] 1 Device (Oxygen Therapy): nasal cannula    Intake/Output Summary (Last 24 hours) at 2019 1317  Last data filed at 2019 0543  Gross per 24 hour   Intake 60 ml   Output 300 ml   Net -240 ml     Body mass index is 29.41 kg/m².      19  0408 19  0740 19  0829   Weight: 82.1 kg (181 lb) 85.4 kg (188 lb 4.8 oz) 85 kg (187 lb 6.3 oz)     Weight change:     Physical Exam   Constitutional: No distress.   HENT:   Head: Atraumatic.   Nose: Nose normal.   Eyes: Conjunctivae are normal.   Cardiovascular: Normal rate and regular rhythm.   No murmur heard.  Pulmonary/Chest: Effort normal. She has no wheezes. She has no rales.   Abdominal: Soft. Bowel sounds are normal. There is no tenderness. There is no rebound and no guarding.   Musculoskeletal: She exhibits no edema.   Neurological: She is alert.   Skin: Skin is warm and dry. She is not diaphoretic.   Psychiatric: She has a normal mood and affect. Her behavior is normal.     Results Review:      Results from last 7 days   Lab Units 19  0416 19  0333 19  1457 19  0417 19  0333 19  0414   SODIUM mmol/L 140 140  139 139 142 136 135*   POTASSIUM mmol/L 5.5* 5.7*  5.5* 5.1 5.7* 5.2 4.4   CHLORIDE mmol/L 106 106  106 105 109* 103 98   CO2 mmol/L 25.2 22.4  23.6 22.9 23.4 24.3 23.8   BUN mg/dL 50* 39*  41* 30* 28* 24* 20    CREATININE mg/dL 1.93* 1.92*  1.73* 1.70* 1.43* 1.28* 1.37*   GLUCOSE mg/dL 156* 195*  197* 181* 141* 121* 163*   CALCIUM mg/dL 8.2 8.0*  8.0* 7.2* 7.6* 7.8* 8.9   AST (SGOT) U/L 28 42*  --   --   --  18   ALT (SGPT) U/L <5 <5  --   --   --  10     Estimated Creatinine Clearance: 20.4 mL/min (A) (by C-G formula based on SCr of 1.93 mg/dL (H)).      Results from last 7 days   Lab Units 08/02/19  1158 08/02/19  0605 08/02/19  0033 08/01/19  1829 08/01/19  1229 08/01/19  0637 07/31/19  2155 07/31/19  1312   GLUCOSE mg/dL 179* 164* 155* 153* 226* 254* 199* 129     Results from last 7 days   Lab Units 08/02/19  0416 08/01/19  0333 07/31/19  0417 07/30/19  1715 07/30/19  0333 07/29/19  0414   WBC 10*3/mm3 8.01 11.08* 10.52 9.11 5.92 6.22   HEMOGLOBIN g/dL 7.3* 8.4* 9.0* 9.7* 7.8* 11.0*   HEMATOCRIT % 24.4* 27.9* 29.5* 32.1* 25.5* 35.0   PLATELETS 10*3/mm3 156 129* 127* 135* 135* 198   MCV fL 100.4* 100.0* 98.7* 100.3* 100.4* 98.0*   MCH pg 30.0 30.1 30.1 30.3 30.7 30.8   MCHC g/dL 29.9* 30.1* 30.5* 30.2* 30.6* 31.4*   RDW % 14.4 14.5 15.1 14.5 13.2 13.0   RDW-SD fl 52.4 53.2 54.6* 52.9 48.4 46.8   MPV fL 10.3 10.2 9.5 9.5 9.8 9.1   NEUTROPHIL % %  --   --   --   --   --  52.5   LYMPHOCYTE % %  --   --   --   --   --  35.2   MONOCYTES % %  --   --   --   --   --  8.5   EOSINOPHIL % %  --   --   --   --   --  2.7   BASOPHIL % %  --   --   --   --   --  0.6   IMM GRAN % %  --   --   --   --   --  0.5   NEUTROS ABS 10*3/mm3  --   --   --   --   --  3.26   LYMPHS ABS 10*3/mm3  --   --   --   --   --  2.19   MONOS ABS 10*3/mm3  --   --   --   --   --  0.53   EOS ABS 10*3/mm3  --   --   --   --   --  0.17   BASOS ABS 10*3/mm3  --   --   --   --   --  0.04   IMMATURE GRANS (ABS) 10*3/mm3  --   --   --   --   --  0.03   NRBC /100 WBC  --   --   --   --   --  0.0     Results from last 7 days   Lab Units 07/29/19  0414   INR  1.02     Results from last 7 days   Lab Units 08/01/19  1528   NITRITE UA  Negative   WBC UA /HPF  31-50*   BACTERIA UA /HPF 1+*   SQUAM EPITHEL UA /HPF 7-12*     Results from last 7 days   Lab Units 08/01/19  1528   SODIUM UR mmol/L <20   CREATININE UR mg/dL 40.8     I reviewed the patient's new clinical results and medications.         Medication Review:   Scheduled Meds:    amLODIPine 5 mg Oral Q24H   aspirin 325 mg Oral Daily   fluticasone 2 spray Each Nare Daily   insulin lispro 0-7 Units Subcutaneous Q6H   ipratropium-albuterol 3 mL Nebulization TID   mirtazapine 15 mg Oral Nightly   primidone 50 mg Oral Nightly   raNITIdine 150 mg Oral QAM AC   sennosides-docusate sodium 2 tablet Oral Nightly   sertraline 100 mg Oral Daily   sodium chloride 3 mL Intravenous Q12H   sodium hypochlorite  Topical Q24H     Continuous Infusions:   PRN Meds:  •  acetaminophen  •  acetaminophen  •  bisacodyl  •  calcium carbonate  •  dextrose  •  dextrose  •  glucagon (human recombinant)  •  hydrALAZINE  •  HYDROcodone-acetaminophen  •  HYDROcodone-acetaminophen  •  Morphine **OR** Morphine  •  ondansetron **OR** ondansetron  •  polyethylene glycol  •  [COMPLETED] Insert peripheral IV **AND** sodium chloride  •  sodium chloride  Glucose   Date/Time Value Ref Range Status   08/02/2019 1158 179 (H) 70 - 130 mg/dL Final   08/02/2019 0605 164 (H) 70 - 130 mg/dL Final   08/02/2019 0033 155 (H) 70 - 130 mg/dL Final   08/01/2019 1829 153 (H) 70 - 130 mg/dL Final   08/01/2019 1229 226 (H) 70 - 130 mg/dL Final   08/01/2019 0637 254 (H) 70 - 130 mg/dL Final   07/31/2019 2155 199 (H) 70 - 130 mg/dL Final   07/31/2019 1312 129 70 - 130 mg/dL Final        Problem List:     Active Hospital Problems    Diagnosis  POA   • **Displaced oblique fracture of shaft of left femur, initial encounter for closed fracture (CMS/[facility]) [S72.492A]  Yes   • Thrombocytopenia (CMS/[facility]) [D69.6]  Unknown   • Hyperkalemia [E87.5]  Unknown   • Acute kidney injury superimposed on chronic kidney disease (CMS/[facility]) [N17.9, N18.9]  Unknown   • Acute respiratory failure with  hypoxia (CMS/[facility]) [J96.01]  Unknown   • Hx of MRSA (methicillin resistant Staphylococcus aureus) infection [A49.02]  Yes   • Gastroesophageal reflux disease with esophagitis [K21.0]  Yes   • Type 2 diabetes mellitus with diabetic polyneuropathy, without long-term current use of insulin (CMS/[facility]) [E11.42]  Yes   • Benign essential hypertension [I10]  Yes   • Anemia due to chronic kidney disease [N18.9, D63.1]  Yes      Resolved Hospital Problems   No resolved problems to display.       Assessment and Plan:     · Status post left distal femoral fracture ORIF 7/30/2019.  Nonweightbearing left lower extremity  · postop acute blood loss anemia. hgb down to 7.3 will give one unit PRBC and defer lasix to nephrology  · Acute kidney injury and chronic kidney disease stage III, hyperkalemia: nephrology following  · Postop thrombocytopenia resolved  · Hypoxia. Chest x-ray shows atelectasis.   · Low-grade fever resolved. Probably was atelectasis.  Urinalysis with pyuria however has epithelial cells.  No respiratory,  complaints. mild leukocytosis resolved.  · Diabetes mellitus type 2.  Continue sliding scale.  BS 100s today  · Left anterior fascicular block and heart murmur.  Echo showed aortic sclerosis and RA abnormality with no further planned work up recommended by cardiology due to age and comorbidities    VTE prophylaxis: SCDs   CODE status: full code  Disposition: TBD- will need prolonged rehab given NWB to LLE for about 3 months. couple days maybe    I discussed the patients findings and my recommendations with patient, family and nursing staff    Ghassan Luque MD   08/02/19  1:17 PM

## 2019-08-02 NOTE — THERAPY TREATMENT NOTE
Acute Care - Physical Therapy Treatment Note  [facility]     Patient Name: Magdalena Jerry  : 1926  MRN: [id number]  Today's Date: 2019  Onset of Illness/Injury or Date of Surgery: 19          Admit Date: 2019    Visit Dx:    ICD-10-CM ICD-9-CM   1. Displaced oblique fracture of shaft of left femur, initial encounter for closed fracture (CMS/[facility]) S72.332A 821.01     Patient Active Problem List   Diagnosis   • Adjustment disorder with mixed anxiety and depressed mood   • Anemia due to chronic kidney disease   • Benign essential hypertension   • Hereditary essential tremor   • Type 2 diabetes mellitus with diabetic polyneuropathy, without long-term current use of insulin (CMS/[facility])   • Dry skin dermatitis   • Dyslipidemia   • Gastroesophageal reflux disease with esophagitis   • Pain of hand   • Hearing loss   • Arthralgia of hip   • Impacted cerumen   • Pruritus   • Knee pain   • Pain in extremity   • Chronic low back pain   • Weakness of lower extremity   • Spinal stenosis of lumbar region   • Senile osteoporosis   • Piriformis syndrome   • Primary insomnia   • Tinea pedis   • Vitamin D deficiency   • Left leg cellulitis   • CKD (chronic kidney disease) stage 3, GFR 30-59 ml/min (CMS/[facility])   • Allergic rhinitis   • Pneumonia due to influenza A virus   • Hx of MRSA (methicillin resistant Staphylococcus aureus) infection   • Bacteria in urine   • Cellulitis of lower extremity   • Chronic venous stasis   • Anemia of chronic disease   • Displaced oblique fracture of shaft of left femur, initial encounter for closed fracture (CMS/[facility])   • Acute kidney injury superimposed on chronic kidney disease (CMS/[facility])   • Acute respiratory failure with hypoxia (CMS/[facility])   • Hyperkalemia   • Thrombocytopenia (CMS/[facility])       Therapy Treatment    Rehabilitation Treatment Summary     Row Name 19 1141             Treatment Time/Intention    Discipline  physical therapy assistant  -      Document Type   therapy note (daily note)  -      Subjective Information  complains of;weakness;fatigue;pain  -      Care Plan Review  patient/other agree to care plan  -      Care Plan Review, Other Participant(s)  family  -      Comment  much more alert  -      Existing Precautions/Restrictions  fall;non-weight bearing  -      Treatment Considerations/Comments  NWB LLE  -      Recorded by [JM] Ayse Lambert PTA 08/02/19 1729      Row Name 08/02/19 1141             Bed Mobility Assessment/Treatment    Supine-Sit [place] (Bed Mobility)  2 person assist;maximum assist (25% patient effort)  -      Sit-Supine [place] (Bed Mobility)  2 person assist;dependent (less than 25% patient effort)  -      Bed Mobility, Safety Issues  cognitive deficits limit understanding;decreased use of arms for pushing/pulling;decreased use of legs for bridging/pushing;impaired trunk control for bed mobility  -      Comment (Bed Mobility)  needs rest breaks to complete task due to pain issues closes eyes and screams at times  -      Recorded by [] Ayse Lambert PTA 08/02/19 1729      Row Name 08/02/19 1141             Positioning and Restraints    Pre-Treatment Position  in bed  -      In Bed  fowlers;call light within reach;encouraged to call for assist;exit alarm on  -      Recorded by [] Ayse Lambert PTA 08/02/19 1729      Row Name 08/02/19 1141             Pain Scale: Numbers Pre/Post-Treatment    Pain Location - Side  Left  -      Pain Location - Orientation  lower  -      Pain Location  extremity  -      Recorded by [] Ayse Lambert PTA 08/02/19 1729      Row Name 08/02/19 1141             Pain Scale: Word Pre/Post-Treatment    Pain: Word Scale, Pretreatment  6 - moderate-severe pain  -      Pain: Word Scale, Post-Treatment  8 - severe pain  -      Recorded by [] Ayse Lambert, JEFFREY 08/02/19 1729      Row Name                Wound 07/30/19 1647 Left leg incision    Wound -  Properties Group Date first assessed: 07/30/19 [MC] Time first assessed: 1647 [] Side: Left [] Location: leg [] Type: incision [] Recorded by:  [ELINOR] Justyna Andre RN 07/30/19 1647      User Key  (r) = Recorded By, (t) = Taken By, (c) = Cosigned By    Initials Name Effective Dates Discipline    Ayse Ham PTA 03/07/18 -  PT    Justyna Lopez RN 02/23/18 -  Nurse          Wound 07/30/19 1647 Left leg incision (Active)   Dressing Appearance dry;intact;dried drainage 8/2/2019 12:41 AM   Drainage Amount small 8/2/2019 12:41 AM           Physical Therapy Education     Title: PT OT SLP Therapies (In Progress)     Topic: Physical Therapy (In Progress)     Point: Mobility training (In Progress)     Learning Progress Summary           Patient Acceptance, E,D, NR by BRIJESH at 8/2/2019  5:30 PM    Acceptance, E,D, NL by BRIJESH at 8/1/2019  4:27 PM   Family Acceptance, E,D, NR by BRIJESH at 8/2/2019  5:30 PM                   Point: Home exercise program (In Progress)     Learning Progress Summary           Patient Acceptance, E,D, NR by BRIJESH at 8/2/2019  5:30 PM    Acceptance, E,D, NL by BRIJESH at 8/1/2019  4:27 PM   Family Acceptance, E,D, NR by BRIJESH at 8/2/2019  5:30 PM                   Point: Body mechanics (In Progress)     Learning Progress Summary           Patient Acceptance, E,D, NR by BRIJESH at 8/2/2019  5:30 PM    Acceptance, E,D, NL by BRIJESH at 8/1/2019  4:27 PM   Family Acceptance, E,D, NR by BRIJESH at 8/2/2019  5:30 PM                   Point: Precautions (In Progress)     Learning Progress Summary           Patient Acceptance, E,D, NR by BRIJESH at 8/2/2019  5:30 PM    Acceptance, E,D, NL by BRIJESH at 8/1/2019  4:27 PM   Family Acceptance, E,D, NR by BRIJESH at 8/2/2019  5:30 PM                               User Key     Initials Effective Dates Name Provider Type Discipline     03/07/18 -  Ayse Lambert PTA Physical Therapy Assistant PT                PT Recommendation and Plan     Plan of Care Reviewed With: patient,  family  Progress: improving  Outcome Summary: much improved, less post lean , but still max 2; plans SNU at DC  Outcome Measures     Row Name 08/02/19 1700 08/01/19 1600 08/01/19 1500       How much help from another person do you currently need...    Turning from your back to your side while in flat bed without using bedrails?  2  -JM  --  1  -JM    Moving from lying on back to sitting on the side of a flat bed without bedrails?  2  -JM  --  1  -JM    Moving to and from a bed to a chair (including a wheelchair)?  1  -JM  --  1  -JM    Standing up from a chair using your arms (e.g., wheelchair, bedside chair)?  1  -JM  --  1  -JM    Climbing 3-5 steps with a railing?  1  -JM  --  1  -JM    To walk in hospital room?  1  -JM  --  1  -JM    AM-PAC 6 Clicks Score (PT)  8  -JM  --  6  -JM       Functional Assessment    Outcome Measure Options  --  AM-PAC 6 Clicks Basic Mobility (PT)  -  --      User Key  (r) = Recorded By, (t) = Taken By, (c) = Cosigned By    Initials Name Provider Type    Ayse Ham PTA Physical Therapy Assistant         Time Calculation:   PT Charges     Row Name 08/02/19 1731             Time Calculation    Start Time  1141  -      Stop Time  1200  -      Time Calculation (min)  19 min  -      PT Received On  08/02/19  -      PT - Next Appointment  08/03/19  -        User Key  (r) = Recorded By, (t) = Taken By, (c) = Cosigned By    Initials Name Provider Type    Ayse Ham PTA Physical Therapy Assistant        Therapy Charges for Today     Code Description Service Date Service Provider Modifiers Qty    06140408170 HC PT THER PROC EA 15 MIN 8/1/2019 Ayse Lambert PTA GP 1    70625667860 HC PT THER SUPP EA 15 MIN 8/1/2019 Ayse Lambert PTA GP 1    92865267691 HC PT THER PROC EA 15 MIN 8/2/2019 Ayse Lambert PTA GP 1    32770509402 HC PT THER SUPP EA 15 MIN 8/2/2019 Ayse Lambert PTA GP 1          PT G-Codes  Outcome Measure Options: AM-PAC 6 Clicks Basic  Mobility (PT)  AM-PAC 6 Clicks Score (PT): 8    Ayse Lambert, PTA  8/2/2019

## 2019-08-02 NOTE — PROGRESS NOTES
"   LOS: 4 days    Patient Care Team:  Fly Sanchez DO as PCP - General  Marisol Kapadia APRN as PCP - Claims Attributed    Chief Complaint:    Chief Complaint   Patient presents with   • Fall   • Knee Injury     Follow UP CARLOS CKD3  Subjective     Interval History: pt is confused and refusing transfusion.  RN trying to get family to come in      Objective     Vital Signs  Temp:  [98.3 °F (36.8 °C)-100.2 °F (37.9 °C)] 99 °F (37.2 °C)  Heart Rate:  [70-80] 70  Resp:  [16-20] 16  BP: (121-164)/(59-74) 121/63    Flowsheet Rows      First Filed Value   Admission Height  170.2 cm (67\") Documented at 07/29/2019 0354   Admission Weight  82.1 kg (181 lb) Documented at 07/29/2019 0408          No intake/output data recorded.  I/O last 3 completed shifts:  In: 497 [P.O.:160; I.V.:337]  Out: 300 [Urine:300]    Intake/Output Summary (Last 24 hours) at 8/2/2019 1401  Last data filed at 8/2/2019 0543  Gross per 24 hour   Intake 60 ml   Output 300 ml   Net -240 ml       Physical Exam:  GEN frail, no acute distress, confused  Neck supple no JVD  Lungs CTA bilat no rales  CV RRR   abd soft NT/ND  vasc trace pedal/ankle edema     Results Review:    Results from last 7 days   Lab Units 08/02/19  0416 08/01/19  0333 07/31/19  1457  07/29/19  0414   SODIUM mmol/L 140 140  139 139   < > 135*   POTASSIUM mmol/L 5.5* 5.7*  5.5* 5.1   < > 4.4   CHLORIDE mmol/L 106 106  106 105   < > 98   CO2 mmol/L 25.2 22.4  23.6 22.9   < > 23.8   BUN mg/dL 50* 39*  41* 30*   < > 20   CREATININE mg/dL 1.93* 1.92*  1.73* 1.70*   < > 1.37*   CALCIUM mg/dL 8.2 8.0*  8.0* 7.2*   < > 8.9   BILIRUBIN mg/dL 0.4 0.3  --   --  0.2   ALK PHOS U/L 93 97  --   --  88   ALT (SGPT) U/L <5 <5  --   --  10   AST (SGOT) U/L 28 42*  --   --  18   GLUCOSE mg/dL 156* 195*  197* 181*   < > 163*    < > = values in this interval not displayed.       Estimated Creatinine Clearance: 20.4 mL/min (A) (by C-G formula based on SCr of 1.93 mg/dL (H)).            "     Results from last 7 days   Lab Units 08/02/19  0416 08/01/19  0333 07/31/19  0417 07/30/19  1715 07/30/19  0333   WBC 10*3/mm3 8.01 11.08* 10.52 9.11 5.92   HEMOGLOBIN g/dL 7.3* 8.4* 9.0* 9.7* 7.8*   PLATELETS 10*3/mm3 156 129* 127* 135* 135*       Results from last 7 days   Lab Units 07/29/19  0414   INR  1.02         Imaging Results (last 24 hours)     ** No results found for the last 24 hours. **          amLODIPine 5 mg Oral Q24H   aspirin 325 mg Oral Daily   fluticasone 2 spray Each Nare Daily   insulin lispro 0-7 Units Subcutaneous Q6H   ipratropium-albuterol 3 mL Nebulization TID   mirtazapine 15 mg Oral Nightly   primidone 50 mg Oral Nightly   raNITIdine 150 mg Oral QAM AC   sennosides-docusate sodium 2 tablet Oral Nightly   sertraline 100 mg Oral Daily   sodium chloride 3 mL Intravenous Q12H   sodium hypochlorite  Topical Q24H          Medication Review:   Current Facility-Administered Medications   Medication Dose Route Frequency Provider Last Rate Last Dose   • acetaminophen (TYLENOL) tablet 650 mg  650 mg Oral Q4H PRN Ayse Beasley APRN       • acetaminophen (TYLENOL) tablet 650 mg  650 mg Oral Q6H PRN Cici Owusu APRN       • amLODIPine (NORVASC) tablet 5 mg  5 mg Oral Q24H Cici Owusu APRN   5 mg at 08/02/19 1039   • aspirin tablet 325 mg  325 mg Oral Daily Nazario Epstein II, MD   325 mg at 08/02/19 1039   • bisacodyl (DULCOLAX) EC tablet 5 mg  5 mg Oral Daily PRN Ayse Beasley APRN       • calcium carbonate (TUMS) chewable tablet 500 mg (200 mg elemental)  2 tablet Oral BID PRN Ayse Beasley APRN       • dextrose (D50W) 25 g/ 50mL Intravenous Solution 25 g  25 g Intravenous Q15 Min PRN Cici Owusu APRN       • dextrose (GLUTOSE) oral gel 15 g  15 g Oral Q15 Min PRN Cici Owusu APRN       • fluticasone (FLONASE) 50 MCG/ACT nasal spray 2 spray  2 spray Each Nare Daily Cici Owusu APRN   2 spray at 08/02/19 1040   • furosemide (LASIX)  injection 20 mg  20 mg Intravenous PRN Ghassan Luque MD       • glucagon (human recombinant) (GLUCAGEN DIAGNOSTIC) injection 1 mg  1 mg Subcutaneous PRN Cici Owusu APRN       • hydrALAZINE (APRESOLINE) injection 10 mg  10 mg Intravenous Q6H PRN Cici Owusu APRN       • HYDROcodone-acetaminophen (NORCO) 5-325 MG per tablet 1 tablet  1 tablet Oral Q6H PRN Nazario Epstein II, MD   1 tablet at 08/02/19 0634   • HYDROcodone-acetaminophen (NORCO) 7.5-325 MG per tablet 2 tablet  2 tablet Oral Q4H PRN Zita Pace MD   1 tablet at 08/02/19 1128   • insulin lispro (humaLOG) injection 0-7 Units  0-7 Units Subcutaneous Q6H Zita Pace MD   2 Units at 08/02/19 1240   • ipratropium-albuterol (DUO-NEB) nebulizer solution 3 mL  3 mL Nebulization TID Zita Pace MD   3 mL at 08/02/19 0707   • mirtazapine (REMERON) tablet 15 mg  15 mg Oral Nightly Cici Owusu APRN   15 mg at 08/02/19 0148   • morphine injection 2 mg  2 mg Intravenous Q4H PRN Zita Pace MD   2 mg at 08/01/19 0032    Or   • morphine injection 4 mg  4 mg Intravenous Q4H PRN Zita Pace MD       • ondansetron (ZOFRAN) tablet 4 mg  4 mg Oral Q6H PRN Ayse Beasley APRN        Or   • ondansetron (ZOFRAN) injection 4 mg  4 mg Intravenous Q6H PRN Ayse Beasley APRN       • polyethylene glycol 3350 powder (packet)  17 g Oral Daily PRN Cici Owusu APRN       • primidone (MYSOLINE) tablet 50 mg  50 mg Oral Nightly Hyacinth Samuels MD   50 mg at 08/01/19 2344   • raNITIdine (ZANTAC) tablet 150 mg  150 mg Oral QAM AC Cici Owusu APRN   150 mg at 08/02/19 0635   • sennosides-docusate sodium (SENOKOT-S) 8.6-50 MG tablet 2 tablet  2 tablet Oral Nightly [place], Cici, APRN       • sertraline (ZOLOFT) tablet 100 mg  100 mg Oral Daily Cici Owusu APRN   100 mg at 08/02/19 1039   • sodium chloride 0.9 % flush 10 mL  10 mL Intravenous PRN Jose Rafael Ibanez MD       • sodium chloride 0.9 % flush 3 mL   3 mL Intravenous Q12H Ayse Beasley APRN   3 mL at 08/02/19 1040   • sodium chloride 0.9 % flush 3-10 mL  3-10 mL Intravenous PRN Ayse Beasley APRN       • sodium hypochlorite (HYSEPT) 0.25 % topical solution   Topical Q24H Cici Owusu APRN           Assessment/Plan   1. CARLOS on CKD 2, BL Cr 1.3.  Cr stagnant but stable 1.9 today.  No retention on bladder scan.  Remains hyperkalemic, K 5.5, no better with lasix x1 yest, and BUN up to 50  2. Left femur fracture SP ORIF 7/30.   3. DM2  4. HTN. BP improved, norvasc added yest.  5. Anemia, acute on chronic . Blood loss. Hgb down to 7.3, pt refusing transfusion .  6. Delirium superimposed on dementia.  Quite confused right now  7. Tremor, reduced mysoline dose for renal failure .  8. Hyperkalemia - K remains mid 5's  9. TCP.     Plan  - transfusion if she allows it  - ok with IV lasix x1 with above  - add patiromer in AM   - added K restriction to diet            Displaced oblique fracture of shaft of left femur, initial encounter for closed fracture (CMS/HCC)    Anemia due to chronic kidney disease    Benign essential hypertension    Type 2 diabetes mellitus with diabetic polyneuropathy, without long-term current use of insulin (CMS/HCC)    Gastroesophageal reflux disease with esophagitis    Hx of MRSA (methicillin resistant Staphylococcus aureus) infection    Acute kidney injury superimposed on chronic kidney disease (CMS/HCC)    Acute respiratory failure with hypoxia (CMS/HCC)    Hyperkalemia    Thrombocytopenia (CMS/HCC)              Alonso Ken MD  08/02/19  2:01 PM

## 2019-08-02 NOTE — PROGRESS NOTES
Orthopaedic Surgery   Daily Progress Note  Dr. CHARITY Epstein II  [phone]  DEMOGRAPHICS:   · Magdalena Jerry   · Age:89 y.o.   · MRN:[id number]  · Admitted: 7/29/2019    PROCEDURE: 3 Days Post-Op s/p Procedure(s):  OPEN REDUCTION INTERNAL FIXATION LEFT FEMUR     HOSPITAL PROGRESS  · Patient Issues: Still very drowsy, will awaken for questions but then falls back to sleep  · Ambulation/Activity: Doing minimal therapy    VITALS:  Vitals:    08/01/19 2300 08/02/19 0425 08/02/19 0707 08/02/19 0715   BP: 138/59 163/74     BP Location: Left arm Right arm     Patient Position: Lying Lying     Pulse: 80 80 78 78   Resp: 20 20 16 16   Temp: 98.3 °F (36.8 °C) 100.2 °F (37.9 °C)     TempSrc: Oral Oral     SpO2: 97% 98% 96% 98%   Weight:       Height:           PHYSICAL EXAM:  · LUNGS: Equal chest rise, no shortness of air  · CARDIOVASCULAR: brisk capillary refill intact  · WOUND: RANDELL Wound Vac System working in good condition, minimal drainage  · EXTREMITY: Operative Leg  · Pulses: brisk capillary refill intact  · Sensation: Sensation intact to light touch to the saphenous/sural/tibial/deep peroneal/superficial peroneal nerves, and grossly throughout the extremity.  · Motor: 5/5 EHL/FHL/TA/GS motor complexes    LABS:   Lab Results   Component Value Date    HGB 7.3 (L) 08/02/2019     Lab Results   Component Value Date    WBC 8.01 08/02/2019     Lab Results   Component Value Date    GLUCOSE 156 (H) 08/02/2019    CALCIUM 8.2 08/02/2019     08/02/2019    K 5.5 (H) 08/02/2019    CO2 25.2 08/02/2019     08/02/2019    BUN 50 (H) 08/02/2019    CREATININE 1.93 (H) 08/02/2019    EGFRIFAFRI 44 (L) 08/06/2018    EGFRIFNONA 24 (L) 08/02/2019    BCR 25.9 (H) 08/02/2019    ANIONGAP 8.8 08/02/2019       ASSESSMENT: Patient is a 89 y.o. female who is 3 Days Post-Op s/p Procedure(s):  OPEN REDUCTION INTERNAL FIXATION LEFT FEMUR     PLAN:   · Weight Bearing: Non Weight Bearing  · Labs: Above lab values review. Plan:  "Hemoglobin down to 7.3, probably reasonable to transfuse again today  · PT/OT: To Mobilize  · DVT PPX: Switch to aspirin for problems with low hemoglobin  · Post-Op Xray: Hardware in good position. Acceptable bony reduction.    · Nephrology consult: Appreciate comanagement  · Dispo: Acute.  Will need SNF and further rehab    R \"Adam\" Tete VILLA MD  Orthopaedic Surgery  [facility]  [phone]    "

## 2019-08-02 NOTE — PLAN OF CARE
Problem: Patient Care Overview  Goal: Plan of Care Review   08/01/19 1737 08/02/19 0314   Coping/Psychosocial   Plan of Care Reviewed With patient --    Plan of Care Review   Progress improving --    OTHER   Outcome Summary --  POD 3 with L femur ORIF. Oneil dressing CDI. Pain well controlled with current pain medication. Alert when awake but falls back to sleep and sleeps inbetween care. Refuses to eat. Was able to encourage fluids. Purewick in place d/t incont.        Problem: Fall Risk (Adult)  Goal: Absence of Fall  Outcome: Ongoing (interventions implemented as appropriate)   08/01/19 0223   Fall Risk (Adult)   Absence of Fall achieves outcome       Problem: Skin Injury Risk (Adult)  Goal: Skin Health and Integrity  Outcome: Ongoing (interventions implemented as appropriate)   07/31/19 2156   Skin Injury Risk (Adult)   Skin Health and Integrity making progress toward outcome       Problem: Fracture Orthopaedic (Adult)  Goal: Signs and Symptoms of Listed Potential Problems Will be Absent, Minimized or Managed (Fracture Orthopaedic)  Outcome: Ongoing (interventions implemented as appropriate)

## 2019-08-02 NOTE — PROGRESS NOTES
Continued Stay Note  Psychiatric     Patient Name: Magdalena Jerry  MRN: [id number]  Today's Date: 8/2/2019    Admit Date: 7/29/2019    Discharge Plan     Row Name 08/02/19 1319       Plan    Plan Comments  Per Alicia Gastelum, pts bed will not be available until early next week. Notified MD. Plan remains SNF when medically stable.        Discharge Codes    No documentation.             Tracy Rodríguez RN

## 2019-08-03 LAB
ABO + RH BLD: NORMAL
ALBUMIN SERPL-MCNC: 2.6 G/DL (ref 3.5–5.2)
ANION GAP SERPL CALCULATED.3IONS-SCNC: 12.3 MMOL/L (ref 5–15)
BH BB BLOOD EXPIRATION DATE: NORMAL
BH BB BLOOD TYPE BARCODE: 6200
BH BB DISPENSE STATUS: NORMAL
BH BB PRODUCT CODE: NORMAL
BH BB UNIT NUMBER: NORMAL
BUN BLD-MCNC: 55 MG/DL (ref 8–23)
BUN/CREAT SERPL: 40.7 (ref 7–25)
CALCIUM SPEC-SCNC: 8.4 MG/DL (ref 8.2–9.6)
CHLORIDE SERPL-SCNC: 106 MMOL/L (ref 98–107)
CO2 SERPL-SCNC: 21.7 MMOL/L (ref 22–29)
CREAT BLD-MCNC: 1.35 MG/DL (ref 0.57–1)
GFR SERPL CREATININE-BSD FRML MDRD: 37 ML/MIN/1.73
GLUCOSE BLD-MCNC: 169 MG/DL (ref 65–99)
GLUCOSE BLDC GLUCOMTR-MCNC: 138 MG/DL (ref 70–130)
GLUCOSE BLDC GLUCOMTR-MCNC: 151 MG/DL (ref 70–130)
GLUCOSE BLDC GLUCOMTR-MCNC: 157 MG/DL (ref 70–130)
GLUCOSE BLDC GLUCOMTR-MCNC: 170 MG/DL (ref 70–130)
GLUCOSE BLDC GLUCOMTR-MCNC: 174 MG/DL (ref 70–130)
HCT VFR BLD AUTO: 27.1 % (ref 34–46.6)
HCT VFR BLD AUTO: 28.1 % (ref 34–46.6)
HGB BLD-MCNC: 8.3 G/DL (ref 12–15.9)
HGB BLD-MCNC: 8.5 G/DL (ref 12–15.9)
PHOSPHATE SERPL-MCNC: 3 MG/DL (ref 2.5–4.5)
POTASSIUM BLD-SCNC: 5.2 MMOL/L (ref 3.5–5.2)
SODIUM BLD-SCNC: 140 MMOL/L (ref 136–145)
UNIT  ABO: NORMAL
UNIT  RH: NORMAL

## 2019-08-03 PROCEDURE — 85018 HEMOGLOBIN: CPT | Performed by: HOSPITALIST

## 2019-08-03 PROCEDURE — 94799 UNLISTED PULMONARY SVC/PX: CPT

## 2019-08-03 PROCEDURE — 63710000001 INSULIN LISPRO (HUMAN) PER 5 UNITS: Performed by: INTERNAL MEDICINE

## 2019-08-03 PROCEDURE — 80069 RENAL FUNCTION PANEL: CPT | Performed by: HOSPITALIST

## 2019-08-03 PROCEDURE — 97150 GROUP THERAPEUTIC PROCEDURES: CPT

## 2019-08-03 PROCEDURE — 85014 HEMATOCRIT: CPT | Performed by: HOSPITALIST

## 2019-08-03 PROCEDURE — 85014 HEMATOCRIT: CPT | Performed by: NURSE PRACTITIONER

## 2019-08-03 PROCEDURE — 85018 HEMOGLOBIN: CPT | Performed by: NURSE PRACTITIONER

## 2019-08-03 PROCEDURE — 97110 THERAPEUTIC EXERCISES: CPT

## 2019-08-03 PROCEDURE — 25010000002 HYDRALAZINE PER 20 MG: Performed by: NURSE PRACTITIONER

## 2019-08-03 PROCEDURE — 82962 GLUCOSE BLOOD TEST: CPT

## 2019-08-03 RX ORDER — AMLODIPINE BESYLATE 10 MG/1
10 TABLET ORAL
Status: DISCONTINUED | OUTPATIENT
Start: 2019-08-04 | End: 2019-08-07 | Stop reason: HOSPADM

## 2019-08-03 RX ADMIN — HYDRALAZINE HYDROCHLORIDE 10 MG: 20 INJECTION INTRAMUSCULAR; INTRAVENOUS at 02:30

## 2019-08-03 RX ADMIN — HYOSCYAMINE SULFATE: 16 SOLUTION at 08:21

## 2019-08-03 RX ADMIN — FLUTICASONE PROPIONATE 2 SPRAY: 50 SPRAY, METERED NASAL at 08:19

## 2019-08-03 RX ADMIN — HYDROCODONE BITARTRATE AND ACETAMINOPHEN 1 TABLET: 5; 325 TABLET ORAL at 14:11

## 2019-08-03 RX ADMIN — ASPIRIN 325 MG: 325 TABLET ORAL at 08:20

## 2019-08-03 RX ADMIN — MIRTAZAPINE 15 MG: 15 TABLET, FILM COATED ORAL at 21:42

## 2019-08-03 RX ADMIN — IPRATROPIUM BROMIDE AND ALBUTEROL SULFATE 3 ML: 2.5; .5 SOLUTION RESPIRATORY (INHALATION) at 08:53

## 2019-08-03 RX ADMIN — PATIROMER 1 PACKET: 8.4 POWDER, FOR SUSPENSION ORAL at 08:18

## 2019-08-03 RX ADMIN — RANITIDINE 150 MG: 150 TABLET ORAL at 08:20

## 2019-08-03 RX ADMIN — SERTRALINE HYDROCHLORIDE 100 MG: 100 TABLET, FILM COATED ORAL at 08:20

## 2019-08-03 RX ADMIN — PRIMIDONE 50 MG: 50 TABLET ORAL at 21:42

## 2019-08-03 RX ADMIN — AMLODIPINE BESYLATE 5 MG: 5 TABLET ORAL at 08:20

## 2019-08-03 RX ADMIN — INSULIN LISPRO 2 UNITS: 100 INJECTION, SOLUTION INTRAVENOUS; SUBCUTANEOUS at 12:02

## 2019-08-03 RX ADMIN — INSULIN LISPRO 2 UNITS: 100 INJECTION, SOLUTION INTRAVENOUS; SUBCUTANEOUS at 01:08

## 2019-08-03 RX ADMIN — SENNOSIDES AND DOCUSATE SODIUM 2 TABLET: 8.6; 5 TABLET ORAL at 21:41

## 2019-08-03 RX ADMIN — SODIUM CHLORIDE, PRESERVATIVE FREE 3 ML: 5 INJECTION INTRAVENOUS at 21:42

## 2019-08-03 RX ADMIN — IPRATROPIUM BROMIDE AND ALBUTEROL SULFATE 3 ML: 2.5; .5 SOLUTION RESPIRATORY (INHALATION) at 19:53

## 2019-08-03 RX ADMIN — SODIUM CHLORIDE, PRESERVATIVE FREE 3 ML: 5 INJECTION INTRAVENOUS at 08:20

## 2019-08-03 RX ADMIN — INSULIN LISPRO 2 UNITS: 100 INJECTION, SOLUTION INTRAVENOUS; SUBCUTANEOUS at 08:18

## 2019-08-03 NOTE — PLAN OF CARE
Problem: Patient Care Overview  Goal: Plan of Care Review  Outcome: Ongoing (interventions implemented as appropriate)   08/03/19 1300   Coping/Psychosocial   Plan of Care Reviewed With patient   Plan of Care Review   Progress improving   OTHER   Outcome Summary pt tolerated treatment with c/o left LE pain. Pt is Max assist of 2 for bed mobility. Pt able to sit on EOB with CGA, performing LE exercises. Pt required ModAX2 from bed to chair transfer HHAX2. will continue to progress pt as able.

## 2019-08-03 NOTE — PROGRESS NOTES
Progress Note    Name: Magdalena Jerry ADMIT: 2019   : 1926  PCP: Fly Sanchez DO    MRN: [id number] LOS: 5 days   AGE/SEX: 89 y.o. female  ROOM: 92/1   Date of Encounter Visit: 19    Subjective:     Interval History: low grade temp this am.     REVIEW OF SYSTEMS:   Difficult to obtain due to [place] and memory issues.   No chest pain, palpitations. Cough with no sputum.    No nausea, vomiting or diarrhea. Bm yesterday.   Left leg pain especially with movement    Objective:   Temp:  [97 °F (36.1 °C)-100.3 °F (37.9 °C)] 99.2 °F (37.3 °C)  Heart Rate:  [63-96] 79  Resp:  [16-18] 18  BP: (121-192)/(56-85) 159/69   SpO2:  [93 %-98 %] 95 %  on  Flow (L/min):  [1-2] 1 Device (Oxygen Therapy): nasal cannula    Intake/Output Summary (Last 24 hours) at 8/3/2019 0821  Last data filed at 8/3/2019 0414  Gross per 24 hour   Intake 380.83 ml   Output 750 ml   Net -369.17 ml     Body mass index is 29.41 kg/m².      19  0408 19  0740 19  0829   Weight: 82.1 kg (181 lb) 85.4 kg (188 lb 4.8 oz) 85 kg (187 lb 6.3 oz)     Weight change:     Physical Exam   Constitutional: No distress.   HENT:   Head: Atraumatic.   Nose: Nose normal.   Eyes: Conjunctivae are normal.   Cardiovascular: Normal rate and regular rhythm.   Systolic murmur  Pulmonary/Chest: Effort normal. She has no wheezes. She has no rales.   Abdominal: Soft. Bowel sounds are normal. There is no tenderness.   Musculoskeletal: She exhibits edema (1+ LLE with dependent edema in thigh.    Neurological: She is alert.   Skin: Skin is warm and dry. She is not diaphoretic. There is lateral left knee ecchymosis with tenderness to palpation.       Results Review:      Results from last 7 days   Lab Units 19  0429 19  0416 19  0333 19  1457 19  0417 19  0333 19  0414   SODIUM mmol/L 140 140 140  139 139 142 136 135*   POTASSIUM mmol/L 5.2 5.5* 5.7*  5.5* 5.1 5.7* 5.2 4.4   CHLORIDE mmol/L 106 106  106  106 105 109* 103 98   CO2 mmol/L 21.7* 25.2 22.4  23.6 22.9 23.4 24.3 23.8   BUN mg/dL 55* 50* 39*  41* 30* 28* 24* 20   CREATININE mg/dL 1.35* 1.93* 1.92*  1.73* 1.70* 1.43* 1.28* 1.37*   GLUCOSE mg/dL 169* 156* 195*  197* 181* 141* 121* 163*   CALCIUM mg/dL 8.4 8.2 8.0*  8.0* 7.2* 7.6* 7.8* 8.9   AST (SGOT) U/L  --  28 42*  --   --   --  18   ALT (SGPT) U/L  --  <5 <5  --   --   --  10     Estimated Creatinine Clearance: 29.1 mL/min (A) (by C-G formula based on SCr of 1.35 mg/dL (H)).      Results from last 7 days   Lab Units 08/03/19  0601 08/03/19  0059 08/02/19  2040 08/02/19  1618 08/02/19  1158 08/02/19  0605 08/02/19  0033 08/01/19  1829   GLUCOSE mg/dL 174* 151* 134* 163* 179* 164* 155* 153*                       Invalid input(s):  PHOS        Invalid input(s): LDLCALC  Results from last 7 days   Lab Units 08/03/19  0429 08/02/19  0416 08/01/19  0333 07/31/19  0417 07/30/19  1715 07/30/19  0333 07/29/19  0414   WBC 10*3/mm3  --  8.01 11.08* 10.52 9.11 5.92 6.22   HEMOGLOBIN g/dL 8.3* 7.3* 8.4* 9.0* 9.7* 7.8* 11.0*   HEMATOCRIT % 27.1* 24.4* 27.9* 29.5* 32.1* 25.5* 35.0   PLATELETS 10*3/mm3  --  156 129* 127* 135* 135* 198   MCV fL  --  100.4* 100.0* 98.7* 100.3* 100.4* 98.0*   MCH pg  --  30.0 30.1 30.1 30.3 30.7 30.8   MCHC g/dL  --  29.9* 30.1* 30.5* 30.2* 30.6* 31.4*   RDW %  --  14.4 14.5 15.1 14.5 13.2 13.0   RDW-SD fl  --  52.4 53.2 54.6* 52.9 48.4 46.8   MPV fL  --  10.3 10.2 9.5 9.5 9.8 9.1   NEUTROPHIL % %  --   --   --   --   --   --  52.5   LYMPHOCYTE % %  --   --   --   --   --   --  35.2   MONOCYTES % %  --   --   --   --   --   --  8.5   EOSINOPHIL % %  --   --   --   --   --   --  2.7   BASOPHIL % %  --   --   --   --   --   --  0.6   IMM GRAN % %  --   --   --   --   --   --  0.5   NEUTROS ABS 10*3/mm3  --   --   --   --   --   --  3.26   LYMPHS ABS 10*3/mm3  --   --   --   --   --   --  2.19   MONOS ABS 10*3/mm3  --   --   --   --   --   --  0.53   EOS ABS 10*3/mm3  --   --   --    --   --   --  0.17   BASOS ABS 10*3/mm3  --   --   --   --   --   --  0.04   IMMATURE GRANS (ABS) 10*3/mm3  --   --   --   --   --   --  0.03   NRBC /100 WBC  --   --   --   --   --   --  0.0     Results from last 7 days   Lab Units 07/29/19  0414   INR  1.02                             Results from last 7 days   Lab Units 08/01/19  1528   NITRITE UA  Negative   WBC UA /HPF 31-50*   BACTERIA UA /HPF 1+*   SQUAM EPITHEL UA /HPF 7-12*     Results from last 7 days   Lab Units 08/01/19  1528   SODIUM UR mmol/L <20   CREATININE UR mg/dL 40.8       Imaging:  Imaging Results (last 24 hours)     ** No results found for the last 24 hours. **          I reviewed the patient's new clinical results and medications.         Medication Review:   Scheduled Meds:    amLODIPine 5 mg Oral Q24H   aspirin 325 mg Oral Daily   fluticasone 2 spray Each Nare Daily   insulin lispro 0-7 Units Subcutaneous Q6H   ipratropium-albuterol 3 mL Nebulization TID   mirtazapine 15 mg Oral Nightly   Patiromer Sorbitex Calcium 8.4 g Oral Daily   primidone 50 mg Oral Nightly   raNITIdine 150 mg Oral QAM AC   sennosides-docusate sodium 2 tablet Oral Nightly   sertraline 100 mg Oral Daily   sodium chloride 3 mL Intravenous Q12H   sodium hypochlorite  Topical Q24H     Continuous Infusions:   PRN Meds:.•  acetaminophen  •  acetaminophen  •  bisacodyl  •  calcium carbonate  •  dextrose  •  dextrose  •  furosemide  •  glucagon (human recombinant)  •  hydrALAZINE  •  HYDROcodone-acetaminophen  •  HYDROcodone-acetaminophen  •  Morphine **OR** Morphine  •  ondansetron **OR** ondansetron  •  polyethylene glycol  •  [COMPLETED] Insert peripheral IV **AND** sodium chloride  •  sodium chloride    Problem List:     Active Hospital Problems    Diagnosis  POA   • **Displaced oblique fracture of shaft of left femur, initial encounter for closed fracture (CMS/[facility]) [G78.414A]  Yes   • Thrombocytopenia (CMS/[facility]) [D69.6]  Unknown   • Hyperkalemia [E87.5]  Unknown   • Acute  kidney injury superimposed on chronic kidney disease (CMS/HCC) [N17.9, N18.9]  Unknown   • Acute respiratory failure with hypoxia (CMS/HCC) [J96.01]  Unknown   • Hx of MRSA (methicillin resistant Staphylococcus aureus) infection [A49.02]  Yes   • Gastroesophageal reflux disease with esophagitis [K21.0]  Yes   • Type 2 diabetes mellitus with diabetic polyneuropathy, without long-term current use of insulin (CMS/[facility]) [E11.42]  Yes   • Benign essential hypertension [I10]  Yes   • Anemia due to chronic kidney disease [N18.9, D63.1]  Yes      Resolved Hospital Problems   No resolved problems to display.       Assessment and Plan:     · Status post left distal femoral fracture ORIF 7/30/2019.  Nonweightbearing left lower extremity, aspirin for DVT prophylaxis  · postop acute blood loss anemia. hgb up to 8.3 s/p 1 unit PRBC  · Acute kidney injury and chronic kidney disease stage III, hyperkalemia: nephrology following. Renal function improved. Started patiromer today  · Hypoxia. Chest x-ray shows atelectasis.   · Low-grade fever. Probably related to atelectasis. Encouraged deep breathing. Wean oxygen.  · Urinalysis with pyuria however has epithelial cells.  No respiratory,  complaints. mild leukocytosis resolved.  · Diabetes mellitus type 2.  Continue sliding scale.  BS 100s today  · Left anterior fascicular block and heart murmur.  Echo showed aortic sclerosis and RA abnormality with no further planned work up recommended by cardiology due to age and comorbidities  · HTN- BP spiked last night. increase Norvasc. Holding ARB given hyperkalemia.      VTE prophylaxis: SCDs   CODE status: full code  Disposition: TBD- will need prolonged rehab given NWB to LLE for about 3 months. Possible dc early next week    I discussed the patients findings and my recommendations with patient and nursing staff.    Cici Owusu, JANET  08/03/19  8:21 AM

## 2019-08-03 NOTE — PLAN OF CARE
Problem: Patient Care Overview  Goal: Plan of Care Review  Outcome: Ongoing (interventions implemented as appropriate)   08/02/19 2026   Coping/Psychosocial   Plan of Care Reviewed With patient;family   Plan of Care Review   Progress improving   OTHER   Outcome Summary POD 3 ORIF L femur, A&O x4 but confused at times, pt does much better when family is bedside, incontinent-carmen, KOFIB LLE, RA, receiving 1U blood, accucheck, renal labs elevated, has home meds, [place], precert pending for rehab, continue to monitor, educated on bp monitoring       Problem: Fall Risk (Adult)  Goal: Absence of Fall  Outcome: Ongoing (interventions implemented as appropriate)      Problem: Skin Injury Risk (Adult)  Goal: Skin Health and Integrity  Outcome: Ongoing (interventions implemented as appropriate)      Problem: Fracture Orthopaedic (Adult)  Goal: Signs and Symptoms of Listed Potential Problems Will be Absent, Minimized or Managed (Fracture Orthopaedic)  Outcome: Ongoing (interventions implemented as appropriate)

## 2019-08-03 NOTE — PLAN OF CARE
Problem: Patient Care Overview  Goal: Plan of Care Review  Outcome: Ongoing (interventions implemented as appropriate)   08/03/19 1523   Coping/Psychosocial   Plan of Care Reviewed With patient   Plan of Care Review   Progress improving   OTHER   Outcome Summary VSS. Pain controlled with PO pain med. Up to chair and worked with PT. NWB. Pivot to chair and bed with assist x2. Discussed bp med and monitoring r/t HTN. Plans to d/c to rehab.        Problem: Fall Risk (Adult)  Goal: Absence of Fall  Outcome: Ongoing (interventions implemented as appropriate)      Problem: Skin Injury Risk (Adult)  Goal: Skin Health and Integrity  Outcome: Ongoing (interventions implemented as appropriate)      Problem: Fracture Orthopaedic (Adult)  Goal: Signs and Symptoms of Listed Potential Problems Will be Absent, Minimized or Managed (Fracture Orthopaedic)  Outcome: Ongoing (interventions implemented as appropriate)

## 2019-08-03 NOTE — PROGRESS NOTES
Orthopaedic Surgery   Daily Progress Note  Dr. CHARITY Epstein II  [phone]  DEMOGRAPHICS:   · Magdalena Jerry   · Age:89 y.o.   · MRN:[id number]  · Admitted: 7/29/2019    PROCEDURE: 4 Days Post-Op s/p Procedure(s):  OPEN REDUCTION INTERNAL FIXATION LEFT FEMUR     HOSPITAL PROGRESS  · Patient Issues: Very drowsy and confused, reportedly much better when family is around.  Got 1 unit of blood yesterday  · Ambulation/Activity: Seems she did much better in therapy yesterday    VITALS:  Vitals:    08/03/19 0100 08/03/19 0230 08/03/19 0414 08/03/19 0732   BP: (!) 192/75 (!) 192/75 177/63 159/69   BP Location: Right arm  Left arm Right arm   Patient Position: Lying  Lying Lying   Pulse: 90 90 90 79   Resp:   16 18   Temp:   100.3 °F (37.9 °C) 99.2 °F (37.3 °C)   TempSrc:   Oral Oral   SpO2:   96% 95%   Weight:       Height:           PHYSICAL EXAM:  · LUNGS: Equal chest rise, no shortness of air  · CARDIOVASCULAR: brisk capillary refill intact  · WOUND: RANDELL Wound Vac System working in good condition, minimal drainage  · EXTREMITY: Operative Leg  · Pulses: brisk capillary refill intact  · Sensation: Sensation intact to light touch to the saphenous/sural/tibial/deep peroneal/superficial peroneal nerves, and grossly throughout the extremity.  · Motor: 5/5 EHL/FHL/TA/GS motor complexes    LABS:   Lab Results   Component Value Date    HGB 8.3 (L) 08/03/2019     Lab Results   Component Value Date    WBC 8.01 08/02/2019     Lab Results   Component Value Date    GLUCOSE 169 (H) 08/03/2019    CALCIUM 8.4 08/03/2019     08/03/2019    K 5.2 08/03/2019    CO2 21.7 (L) 08/03/2019     08/03/2019    BUN 55 (H) 08/03/2019    CREATININE 1.35 (H) 08/03/2019    EGFRIFAFRI 44 (L) 08/06/2018    EGFRIFNONA 37 (L) 08/03/2019    BCR 40.7 (H) 08/03/2019    ANIONGAP 12.3 08/03/2019       ASSESSMENT: Patient is a 89 y.o. female who is 4 Days Post-Op s/p Procedure(s):  OPEN REDUCTION INTERNAL FIXATION LEFT FEMUR     PLAN:  "  · Weight Bearing: Non Weight Bearing  · Labs: Above lab values review. Plan: Hemoglobin 8.3 this morning  · PT/OT: To Mobilize  · DVT PPX: Aspirin 325 daily  · Post-Op Xray: Hardware in good position. Acceptable bony reduction.    · Nephrology consult: Appreciate comanagement  · Dispo: SNF when bed available    R \"Adam\" Tete VILLA MD  Orthopaedic Surgery  [facility]  [phone]    "

## 2019-08-03 NOTE — THERAPY TREATMENT NOTE
Acute Care - Physical Therapy Treatment Note  [facility]     Patient Name: Magdalena Jerry  : 1926  MRN: [id number]  Today's Date: 8/3/2019  Onset of Illness/Injury or Date of Surgery: 19          Admit Date: 2019    Visit Dx:    ICD-10-CM ICD-9-CM   1. Displaced oblique fracture of shaft of left femur, initial encounter for closed fracture (CMS/[facility]) S72.332A 821.01     Patient Active Problem List   Diagnosis   • Adjustment disorder with mixed anxiety and depressed mood   • Anemia due to chronic kidney disease   • Benign essential hypertension   • Hereditary essential tremor   • Type 2 diabetes mellitus with diabetic polyneuropathy, without long-term current use of insulin (CMS/[facility])   • Dry skin dermatitis   • Dyslipidemia   • Gastroesophageal reflux disease with esophagitis   • Pain of hand   • Hearing loss   • Arthralgia of hip   • Impacted cerumen   • Pruritus   • Knee pain   • Pain in extremity   • Chronic low back pain   • Weakness of lower extremity   • Spinal stenosis of lumbar region   • Senile osteoporosis   • Piriformis syndrome   • Primary insomnia   • Tinea pedis   • Vitamin D deficiency   • Left leg cellulitis   • CKD (chronic kidney disease) stage 3, GFR 30-59 ml/min (CMS/[facility])   • Allergic rhinitis   • Pneumonia due to influenza A virus   • Hx of MRSA (methicillin resistant Staphylococcus aureus) infection   • Bacteria in urine   • Cellulitis of lower extremity   • Chronic venous stasis   • Anemia of chronic disease   • Displaced oblique fracture of shaft of left femur, initial encounter for closed fracture (CMS/[facility])   • Acute kidney injury superimposed on chronic kidney disease (CMS/[facility])   • Acute respiratory failure with hypoxia (CMS/[facility])   • Hyperkalemia   • Thrombocytopenia (CMS/[facility])       Therapy Treatment    Rehabilitation Treatment Summary     Row Name 19 1257             Treatment Time/Intention    Discipline  physical therapy assistant  -      Document Type   therapy note (daily note)  -      Subjective Information  complains of;pain  -      Mode of Treatment  physical therapy  -      Patient/Family Observations  pt supine in bed, family present in room   -      Care Plan Review  patient/other agree to care plan  -      Therapy Frequency (PT Clinical Impression)  daily  -      Patient Effort  good  -      Existing Precautions/Restrictions  fall;non-weight bearing;left  -      Recorded by [] Mirna Strauss, hospitals 08/03/19 1300      Row Name 08/03/19 1257             Cognitive Assessment/Intervention- PT/OT    Orientation Status (Cognition)  oriented to;person;place  -      Follows Commands (Cognition)  does not follow one step commands;50-74% accuracy  -      Safety Deficit (Cognitive)  moderate deficit  -      Recorded by [] Mirna Strauss hospitals 08/03/19 1300      Row Name 08/03/19 1257             Bed Mobility Assessment/Treatment    Supine-Sit [place] (Bed Mobility)  maximum assist (25% patient effort);2 person assist  -      Sit-Supine [place] (Bed Mobility)  not tested  -      Assistive Device (Bed Mobility)  bed rails;head of bed elevated;draw sheet  -      Recorded by [] Mirna Strauss hospitals 08/03/19 1300      Row Name 08/03/19 1257             Bed-Chair Transfer    Bed-Chair [place] (Transfers)  moderate assist (50% patient effort);2 person assist  -      Assistive Device (Bed-Chair Transfers)  -- HHAX2  -      Recorded by [] Mirna Strauss hospitals 08/03/19 1300      Row Name 08/03/19 1257             Squat Pivot Transfer    Squat Pivot [place] (Transfers)  moderate assist (50% patient effort);2 person assist  -      Assistive Device (Squat Pivot Transfers)  -- HHAX2  -      Recorded by [] Mirna Strauss hospitals 08/03/19 1300      Row Name 08/03/19 1257             Therapeutic Exercise    Comment (Therapeutic Exercise)  Left LE: LAQs, AP X10, glute sets X10  -      Recorded by [] Mirna Strauss hospitals 08/03/19  1300      Row Name 08/03/19 1257             Positioning and Restraints    Pre-Treatment Position  in bed  -      Post Treatment Position  chair  -      In Chair  reclined;call light within reach;encouraged to call for assist;exit alarm on;with family/caregiver  -      Recorded by [] Mirna Strauss PTA 08/03/19 1300      Row Name 08/03/19 1257             Pain Scale: Numbers Pre/Post-Treatment    Pain Location - Side  Left  -EH      Pain Location - Orientation  lower  -EH      Pain Location  extremity  -      Pain Intervention(s)  Repositioned;Cold applied;Medication (See MAR)  -      Recorded by [] Mirna Strauss PTA 08/03/19 1300      Row Name 08/03/19 1257             Pain Scale: Word Pre/Post-Treatment    Pain: Word Scale, Pretreatment  4 - moderate pain  -      Recorded by [] Mirna Strauss PTA 08/03/19 1300      Row Name                Wound 07/30/19 1647 Left leg incision    Wound - Properties Group Date first assessed: 07/30/19 [] Time first assessed: 1647 [] Side: Left [] Location: leg [] Type: incision [] Recorded by:  [] Justyna Andre RN 07/30/19 1647      User Key  (r) = Recorded By, (t) = Taken By, (c) = Cosigned By    Initials Name Effective Dates Discipline     Mirna Strauss PTA 08/19/18 -  PT     Justyna Andre RN 02/23/18 -  Nurse          Wound 07/30/19 1647 Left leg incision (Active)   Dressing Appearance dry;intact;dried drainage 8/3/2019  8:18 AM   Closure RADHA 8/3/2019  8:18 AM   Base dressing in place, unable to visualize 8/3/2019  8:18 AM   Drainage Amount small 8/3/2019  4:00 AM           Physical Therapy Education     Title: PT OT SLP Therapies (In Progress)     Topic: Physical Therapy (In Progress)     Point: Mobility training (In Progress)     Learning Progress Summary           Patient Acceptance, E,D, NR by  at 8/3/2019 12:57 PM    Acceptance, E,D, NR by BRIJESH at 8/2/2019  5:30 PM    Acceptance, E,D, NL by BRIJESH at 8/1/2019  4:27 PM   Family  Acceptance, E,D, NR by  at 8/2/2019  5:30 PM                   Point: Home exercise program (In Progress)     Learning Progress Summary           Patient Acceptance, E,D, NR by  at 8/3/2019 12:57 PM    Acceptance, E,D, NR by  at 8/2/2019  5:30 PM    Acceptance, E,D, NL by  at 8/1/2019  4:27 PM   Family Acceptance, E,D, NR by  at 8/2/2019  5:30 PM                   Point: Body mechanics (In Progress)     Learning Progress Summary           Patient Acceptance, E,D, NR by  at 8/3/2019 12:57 PM    Acceptance, E,D, NR by  at 8/2/2019  5:30 PM    Acceptance, E,D, NL by  at 8/1/2019  4:27 PM   Family Acceptance, E,D, NR by  at 8/2/2019  5:30 PM                   Point: Precautions (In Progress)     Learning Progress Summary           Patient Acceptance, E,D, NR by  at 8/3/2019 12:57 PM    Acceptance, E,D, NR by  at 8/2/2019  5:30 PM    Acceptance, E,D, NL by  at 8/1/2019  4:27 PM   Family Acceptance, E,D, NR by  at 8/2/2019  5:30 PM                               User Key     Initials Effective Dates Name Provider Type Discipline     03/07/18 -  Ayse Lambert PTA Physical Therapy Assistant PT     08/19/18 -  Mirna Strauss PTA Physical Therapy Assistant PT                PT Recommendation and Plan  Therapy Frequency (PT Clinical Impression): daily     Outcome Measures     Row Name 08/03/19 1200 08/02/19 1700 08/01/19 1600       How much help from another person do you currently need...    Turning from your back to your side while in flat bed without using bedrails?  2  -  2  -  --    Moving from lying on back to sitting on the side of a flat bed without bedrails?  2  -  2  -  --    Moving to and from a bed to a chair (including a wheelchair)?  2  -  1  -  --    Standing up from a chair using your arms (e.g., wheelchair, bedside chair)?  2  -  1  -  --    Climbing 3-5 steps with a railing?  1  -  1  -JM  --    To walk in hospital room?  1  -  1  -JM  --    AM-PAC 6 Clicks  Score (PT)  10  -  8  -  --       Functional Assessment    Outcome Measure Options  --  --  AM-PAC 6 Clicks Basic Mobility (PT)  -    Row Name 08/01/19 1500             How much help from another person do you currently need...    Turning from your back to your side while in flat bed without using bedrails?  1  -JM      Moving from lying on back to sitting on the side of a flat bed without bedrails?  1  -JM      Moving to and from a bed to a chair (including a wheelchair)?  1  -JM      Standing up from a chair using your arms (e.g., wheelchair, bedside chair)?  1  -JM      Climbing 3-5 steps with a railing?  1  -JM      To walk in hospital room?  1  -      AM-PAC 6 Clicks Score (PT)  6  -        User Key  (r) = Recorded By, (t) = Taken By, (c) = Cosigned By    Initials Name Provider Type     Ayse Lambert PTA Physical Therapy Assistant     Mirna Strauss PTA Physical Therapy Assistant         Time Calculation:   PT Charges     Row Name 08/03/19 1256             Time Calculation    Start Time  1122  -      Stop Time  1146  -      Time Calculation (min)  24 min  -      PT Received On  08/03/19  -      PT - Next Appointment  08/05/19  -         Time Calculation- PT    Total Timed Code Minutes- PT  24 minute(s)  -        User Key  (r) = Recorded By, (t) = Taken By, (c) = Cosigned By    Initials Name Provider Type     Mirna Strauss PTA Physical Therapy Assistant        Therapy Charges for Today     Code Description Service Date Service Provider Modifiers Qty    82258571793 HC PT THER PROC EA 15 MIN 8/3/2019 Mirna Strauss PTA GP 2    80310172130 HC PT THER PROC GROUP 8/3/2019 Mirna Strauss PTA GP 2          PT G-Codes  Outcome Measure Options: AM-PAC 6 Clicks Basic Mobility (PT)  AM-PAC 6 Clicks Score (PT): 10    Mirna Strauss PTA  8/3/2019

## 2019-08-03 NOTE — PLAN OF CARE
Problem: Patient Care Overview  Goal: Plan of Care Review  Outcome: Ongoing (interventions implemented as appropriate)   08/03/19 0140   Coping/Psychosocial   Plan of Care Reviewed With patient   Plan of Care Review   Progress improving   OTHER   Outcome Summary POD#4. VSS. ZERO COMPLAIN OF PAIN AT THIS TIME. I UNIT PRBC . PUREWICK IN PLACE. [place]. EDUCATION PROVIDED ON BLOOD SUGAR MONITORING.      Goal: Individualization and Mutuality  Outcome: Ongoing (interventions implemented as appropriate)    Goal: Discharge Needs Assessment  Outcome: Ongoing (interventions implemented as appropriate)      Problem: Fall Risk (Adult)  Goal: Absence of Fall  Outcome: Ongoing (interventions implemented as appropriate)      Problem: Skin Injury Risk (Adult)  Goal: Skin Health and Integrity  Outcome: Ongoing (interventions implemented as appropriate)      Problem: Fracture Orthopaedic (Adult)  Goal: Signs and Symptoms of Listed Potential Problems Will be Absent, Minimized or Managed (Fracture Orthopaedic)  Outcome: Ongoing (interventions implemented as appropriate)

## 2019-08-04 ENCOUNTER — APPOINTMENT (OUTPATIENT)
Dept: CARDIOLOGY | Facility: HOSPITAL | Age: 84
End: 2019-08-04

## 2019-08-04 LAB
ANION GAP SERPL CALCULATED.3IONS-SCNC: 7.7 MMOL/L (ref 5–15)
BACTERIA UR QL AUTO: ABNORMAL /HPF
BH CV LOW VAS LEFT GASTRONEMIUS VESSEL: 1
BH CV LOW VAS LEFT PERONEAL VESSEL: 1
BH CV LOW VAS LEFT POPLITEAL SPONT: 1
BH CV LOWER VASCULAR LEFT COMMON FEMORAL AUGMENT: NORMAL
BH CV LOWER VASCULAR LEFT COMMON FEMORAL COMPETENT: NORMAL
BH CV LOWER VASCULAR LEFT COMMON FEMORAL COMPRESS: NORMAL
BH CV LOWER VASCULAR LEFT COMMON FEMORAL PHASIC: NORMAL
BH CV LOWER VASCULAR LEFT COMMON FEMORAL SPONT: NORMAL
BH CV LOWER VASCULAR LEFT DISTAL FEMORAL COMPRESS: NORMAL
BH CV LOWER VASCULAR LEFT GASTRONEMIUS COMPRESS: NORMAL
BH CV LOWER VASCULAR LEFT GASTRONEMIUS THROMBUS: NORMAL
BH CV LOWER VASCULAR LEFT GREATER SAPH AK COMPRESS: NORMAL
BH CV LOWER VASCULAR LEFT GREATER SAPH BK COMPRESS: NORMAL
BH CV LOWER VASCULAR LEFT MID FEMORAL AUGMENT: NORMAL
BH CV LOWER VASCULAR LEFT MID FEMORAL COMPETENT: NORMAL
BH CV LOWER VASCULAR LEFT MID FEMORAL COMPRESS: NORMAL
BH CV LOWER VASCULAR LEFT MID FEMORAL PHASIC: NORMAL
BH CV LOWER VASCULAR LEFT MID FEMORAL SPONT: NORMAL
BH CV LOWER VASCULAR LEFT PERONEAL COMPRESS: NORMAL
BH CV LOWER VASCULAR LEFT PERONEAL THROMBUS: NORMAL
BH CV LOWER VASCULAR LEFT POPLITEAL AUGMENT: NORMAL
BH CV LOWER VASCULAR LEFT POPLITEAL COMPETENT: NORMAL
BH CV LOWER VASCULAR LEFT POPLITEAL COMPRESS: NORMAL
BH CV LOWER VASCULAR LEFT POPLITEAL PHASIC: NORMAL
BH CV LOWER VASCULAR LEFT POPLITEAL SPONT: NORMAL
BH CV LOWER VASCULAR LEFT POPLITEAL THROMBUS: NORMAL
BH CV LOWER VASCULAR LEFT POSTERIOR TIBIAL COMPRESS: NORMAL
BH CV LOWER VASCULAR LEFT PROXIMAL FEMORAL COMPRESS: NORMAL
BH CV LOWER VASCULAR LEFT SAPHENOFEMORAL JUNCTION COMPRESS: NORMAL
BH CV LOWER VASCULAR RIGHT COMMON FEMORAL AUGMENT: NORMAL
BH CV LOWER VASCULAR RIGHT COMMON FEMORAL COMPETENT: NORMAL
BH CV LOWER VASCULAR RIGHT COMMON FEMORAL COMPRESS: NORMAL
BH CV LOWER VASCULAR RIGHT COMMON FEMORAL PHASIC: NORMAL
BH CV LOWER VASCULAR RIGHT COMMON FEMORAL SPONT: NORMAL
BH CV LOWER VASCULAR RIGHT DISTAL FEMORAL COMPRESS: NORMAL
BH CV LOWER VASCULAR RIGHT GASTRONEMIUS COMPRESS: NORMAL
BH CV LOWER VASCULAR RIGHT GREATER SAPH BK COMPRESS: NORMAL
BH CV LOWER VASCULAR RIGHT MID FEMORAL AUGMENT: NORMAL
BH CV LOWER VASCULAR RIGHT MID FEMORAL COMPETENT: NORMAL
BH CV LOWER VASCULAR RIGHT MID FEMORAL COMPRESS: NORMAL
BH CV LOWER VASCULAR RIGHT MID FEMORAL PHASIC: NORMAL
BH CV LOWER VASCULAR RIGHT MID FEMORAL SPONT: NORMAL
BH CV LOWER VASCULAR RIGHT PERONEAL COMPRESS: NORMAL
BH CV LOWER VASCULAR RIGHT POPLITEAL AUGMENT: NORMAL
BH CV LOWER VASCULAR RIGHT POPLITEAL COMPETENT: NORMAL
BH CV LOWER VASCULAR RIGHT POPLITEAL COMPRESS: NORMAL
BH CV LOWER VASCULAR RIGHT POPLITEAL PHASIC: NORMAL
BH CV LOWER VASCULAR RIGHT POPLITEAL SPONT: NORMAL
BH CV LOWER VASCULAR RIGHT POSTERIOR TIBIAL COMPRESS: NORMAL
BH CV LOWER VASCULAR RIGHT PROXIMAL FEMORAL COMPRESS: NORMAL
BILIRUB UR QL STRIP: NEGATIVE
BUN BLD-MCNC: 51 MG/DL (ref 8–23)
BUN/CREAT SERPL: 43.2 (ref 7–25)
CALCIUM SPEC-SCNC: 8.4 MG/DL (ref 8.2–9.6)
CHLORIDE SERPL-SCNC: 106 MMOL/L (ref 98–107)
CLARITY UR: ABNORMAL
CO2 SERPL-SCNC: 24.3 MMOL/L (ref 22–29)
COLOR UR: YELLOW
CREAT BLD-MCNC: 1.18 MG/DL (ref 0.57–1)
DEPRECATED RDW RBC AUTO: 56.2 FL (ref 37–54)
ERYTHROCYTE [DISTWIDTH] IN BLOOD BY AUTOMATED COUNT: 15.8 % (ref 12.3–15.4)
GFR SERPL CREATININE-BSD FRML MDRD: 43 ML/MIN/1.73
GLUCOSE BLD-MCNC: 143 MG/DL (ref 65–99)
GLUCOSE BLDC GLUCOMTR-MCNC: 127 MG/DL (ref 70–130)
GLUCOSE BLDC GLUCOMTR-MCNC: 136 MG/DL (ref 70–130)
GLUCOSE BLDC GLUCOMTR-MCNC: 158 MG/DL (ref 70–130)
GLUCOSE BLDC GLUCOMTR-MCNC: 180 MG/DL (ref 70–130)
GLUCOSE BLDC GLUCOMTR-MCNC: 227 MG/DL (ref 70–130)
GLUCOSE UR STRIP-MCNC: NEGATIVE MG/DL
HCT VFR BLD AUTO: 26.8 % (ref 34–46.6)
HCT VFR BLD AUTO: 28.2 % (ref 34–46.6)
HGB BLD-MCNC: 8.1 G/DL (ref 12–15.9)
HGB BLD-MCNC: 8.7 G/DL (ref 12–15.9)
HGB UR QL STRIP.AUTO: ABNORMAL
HYALINE CASTS UR QL AUTO: ABNORMAL /LPF
KETONES UR QL STRIP: NEGATIVE
LEUKOCYTE ESTERASE UR QL STRIP.AUTO: ABNORMAL
MCH RBC QN AUTO: 29.1 PG (ref 26.6–33)
MCHC RBC AUTO-ENTMCNC: 30.2 G/DL (ref 31.5–35.7)
MCV RBC AUTO: 96.4 FL (ref 79–97)
NITRITE UR QL STRIP: NEGATIVE
PH UR STRIP.AUTO: 8 [PH] (ref 5–8)
PLATELET # BLD AUTO: 204 10*3/MM3 (ref 140–450)
PMV BLD AUTO: 9.6 FL (ref 6–12)
POTASSIUM BLD-SCNC: 4.8 MMOL/L (ref 3.5–5.2)
PROT UR QL STRIP: ABNORMAL
RBC # BLD AUTO: 2.78 10*6/MM3 (ref 3.77–5.28)
RBC # UR: ABNORMAL /HPF
REF LAB TEST METHOD: ABNORMAL
SODIUM BLD-SCNC: 138 MMOL/L (ref 136–145)
SP GR UR STRIP: 1.02 (ref 1–1.03)
SQUAMOUS #/AREA URNS HPF: ABNORMAL /HPF
UROBILINOGEN UR QL STRIP: ABNORMAL
WBC NRBC COR # BLD: 5.22 10*3/MM3 (ref 3.4–10.8)
WBC UR QL AUTO: ABNORMAL /HPF

## 2019-08-04 PROCEDURE — 85027 COMPLETE CBC AUTOMATED: CPT | Performed by: NURSE PRACTITIONER

## 2019-08-04 PROCEDURE — 87086 URINE CULTURE/COLONY COUNT: CPT | Performed by: NURSE PRACTITIONER

## 2019-08-04 PROCEDURE — 82962 GLUCOSE BLOOD TEST: CPT

## 2019-08-04 PROCEDURE — 87186 SC STD MICRODIL/AGAR DIL: CPT | Performed by: NURSE PRACTITIONER

## 2019-08-04 PROCEDURE — 93970 EXTREMITY STUDY: CPT

## 2019-08-04 PROCEDURE — 94799 UNLISTED PULMONARY SVC/PX: CPT

## 2019-08-04 PROCEDURE — 25010000002 IRON SUCROSE PER 1 MG: Performed by: NURSE PRACTITIONER

## 2019-08-04 PROCEDURE — 63710000001 INSULIN LISPRO (HUMAN) PER 5 UNITS: Performed by: INTERNAL MEDICINE

## 2019-08-04 PROCEDURE — 25010000002 CEFTRIAXONE PER 250 MG: Performed by: NURSE PRACTITIONER

## 2019-08-04 PROCEDURE — 25010000002 HYDRALAZINE PER 20 MG: Performed by: NURSE PRACTITIONER

## 2019-08-04 PROCEDURE — 85014 HEMATOCRIT: CPT | Performed by: NURSE PRACTITIONER

## 2019-08-04 PROCEDURE — 81001 URINALYSIS AUTO W/SCOPE: CPT | Performed by: NURSE PRACTITIONER

## 2019-08-04 PROCEDURE — 87077 CULTURE AEROBIC IDENTIFY: CPT | Performed by: NURSE PRACTITIONER

## 2019-08-04 PROCEDURE — 80048 BASIC METABOLIC PNL TOTAL CA: CPT | Performed by: NURSE PRACTITIONER

## 2019-08-04 PROCEDURE — 85018 HEMOGLOBIN: CPT | Performed by: NURSE PRACTITIONER

## 2019-08-04 PROCEDURE — 25010000002 FUROSEMIDE PER 20 MG: Performed by: NURSE PRACTITIONER

## 2019-08-04 RX ORDER — CEFTRIAXONE SODIUM 1 G/50ML
1 INJECTION, SOLUTION INTRAVENOUS EVERY 24 HOURS
Status: COMPLETED | OUTPATIENT
Start: 2019-08-04 | End: 2019-08-06

## 2019-08-04 RX ORDER — FUROSEMIDE 10 MG/ML
20 INJECTION INTRAMUSCULAR; INTRAVENOUS ONCE
Status: COMPLETED | OUTPATIENT
Start: 2019-08-04 | End: 2019-08-04

## 2019-08-04 RX ADMIN — FLUTICASONE PROPIONATE 2 SPRAY: 50 SPRAY, METERED NASAL at 10:23

## 2019-08-04 RX ADMIN — IRON SUCROSE 200 MG: 20 INJECTION, SOLUTION INTRAVENOUS at 12:58

## 2019-08-04 RX ADMIN — SENNOSIDES AND DOCUSATE SODIUM 2 TABLET: 8.6; 5 TABLET ORAL at 21:45

## 2019-08-04 RX ADMIN — SERTRALINE HYDROCHLORIDE 100 MG: 100 TABLET, FILM COATED ORAL at 10:24

## 2019-08-04 RX ADMIN — CEFTRIAXONE SODIUM 1 G: 1 INJECTION, SOLUTION INTRAVENOUS at 17:01

## 2019-08-04 RX ADMIN — SODIUM CHLORIDE, PRESERVATIVE FREE 3 ML: 5 INJECTION INTRAVENOUS at 10:20

## 2019-08-04 RX ADMIN — APIXABAN 2.5 MG: 2.5 TABLET, FILM COATED ORAL at 21:45

## 2019-08-04 RX ADMIN — RANITIDINE 150 MG: 150 TABLET ORAL at 10:24

## 2019-08-04 RX ADMIN — IPRATROPIUM BROMIDE AND ALBUTEROL SULFATE 3 ML: 2.5; .5 SOLUTION RESPIRATORY (INHALATION) at 16:26

## 2019-08-04 RX ADMIN — INSULIN LISPRO 2 UNITS: 100 INJECTION, SOLUTION INTRAVENOUS; SUBCUTANEOUS at 12:27

## 2019-08-04 RX ADMIN — IPRATROPIUM BROMIDE AND ALBUTEROL SULFATE 3 ML: 2.5; .5 SOLUTION RESPIRATORY (INHALATION) at 08:36

## 2019-08-04 RX ADMIN — HYDRALAZINE HYDROCHLORIDE 10 MG: 20 INJECTION INTRAMUSCULAR; INTRAVENOUS at 00:16

## 2019-08-04 RX ADMIN — HYDROCODONE BITARTRATE AND ACETAMINOPHEN 1 TABLET: 5; 325 TABLET ORAL at 10:23

## 2019-08-04 RX ADMIN — ASPIRIN 325 MG: 325 TABLET ORAL at 10:23

## 2019-08-04 RX ADMIN — FUROSEMIDE 20 MG: 10 INJECTION, SOLUTION INTRAMUSCULAR; INTRAVENOUS at 13:39

## 2019-08-04 RX ADMIN — SODIUM CHLORIDE, PRESERVATIVE FREE 3 ML: 5 INJECTION INTRAVENOUS at 21:47

## 2019-08-04 RX ADMIN — HYOSCYAMINE SULFATE: 16 SOLUTION at 10:20

## 2019-08-04 RX ADMIN — INSULIN LISPRO 2 UNITS: 100 INJECTION, SOLUTION INTRAVENOUS; SUBCUTANEOUS at 00:17

## 2019-08-04 RX ADMIN — IPRATROPIUM BROMIDE AND ALBUTEROL SULFATE 3 ML: 2.5; .5 SOLUTION RESPIRATORY (INHALATION) at 19:46

## 2019-08-04 RX ADMIN — PATIROMER 1 PACKET: 8.4 POWDER, FOR SUSPENSION ORAL at 10:23

## 2019-08-04 RX ADMIN — MIRTAZAPINE 15 MG: 15 TABLET, FILM COATED ORAL at 21:45

## 2019-08-04 RX ADMIN — AMLODIPINE BESYLATE 10 MG: 10 TABLET ORAL at 10:23

## 2019-08-04 NOTE — PLAN OF CARE
Problem: Patient Care Overview  Goal: Plan of Care Review  Outcome: Ongoing (interventions implemented as appropriate)   08/04/19 0059   Coping/Psychosocial   Plan of Care Reviewed With patient   Plan of Care Review   Progress improving   OTHER   Outcome Summary POD#5. VSS. ZERO COMPLAIN OF PAIN. VOIDING PER PUREWICK. NWB TO LEFT LEG. PATIENT VERY [place]. EDUCATION PROVIDED ON BP MONITORING .     Goal: Individualization and Mutuality  Outcome: Ongoing (interventions implemented as appropriate)    Goal: Discharge Needs Assessment  Outcome: Ongoing (interventions implemented as appropriate)      Problem: Fall Risk (Adult)  Goal: Absence of Fall  Outcome: Ongoing (interventions implemented as appropriate)      Problem: Skin Injury Risk (Adult)  Goal: Skin Health and Integrity  Outcome: Ongoing (interventions implemented as appropriate)      Problem: Fracture Orthopaedic (Adult)  Goal: Signs and Symptoms of Listed Potential Problems Will be Absent, Minimized or Managed (Fracture Orthopaedic)  Outcome: Ongoing (interventions implemented as appropriate)

## 2019-08-04 NOTE — PROGRESS NOTES
Orthopaedic Surgery   Daily Progress Note  Dr. CHARITY Epstein II  [phone]  DEMOGRAPHICS:   · Magdalena Jerry   · Age:89 y.o.   · MRN:[id number]  · Admitted: 7/29/2019    PROCEDURE: 5 Days Post-Op s/p Procedure(s):  OPEN REDUCTION INTERNAL FIXATION LEFT FEMUR     HOSPITAL PROGRESS  · Patient Issues: Sleeping soundly this morning after some restlessness last night  · Ambulation/Activity: Very limited mobility due to age, confusion, and weightbearing restrictions    VITALS:  Vitals:    08/04/19 0327 08/04/19 0703 08/04/19 0719 08/04/19 0837   BP: (!) 189/79 168/73 174/68    BP Location: Right arm  Right arm    Patient Position: Lying  Lying    Pulse: 76 79 77 72   Resp: 16 18 18   Temp: 100.5 °F (38.1 °C)  (!) 101 °F (38.3 °C)    TempSrc: Oral  Oral    SpO2: 94%  95% 98%   Weight:       Height:           PHYSICAL EXAM:  · LUNGS: Equal chest rise, no shortness of air  · CARDIOVASCULAR: brisk capillary refill intact  · WOUND: Incision clean dry and intact, minimal drainage  · EXTREMITY: Operative Leg  · Pulses: brisk capillary refill intact  · Sensation: Sensation intact to light touch to the saphenous/sural/tibial/deep peroneal/superficial peroneal nerves, and grossly throughout the extremity.  · Motor: 5/5 EHL/FHL/TA/GS motor complexes    LABS:   Lab Results   Component Value Date    HGB 8.1 (L) 08/04/2019     Lab Results   Component Value Date    WBC 5.22 08/04/2019     Lab Results   Component Value Date    GLUCOSE 143 (H) 08/04/2019    CALCIUM 8.4 08/04/2019     08/04/2019    K 4.8 08/04/2019    CO2 24.3 08/04/2019     08/04/2019    BUN 51 (H) 08/04/2019    CREATININE 1.18 (H) 08/04/2019    EGFRIFAFRI 44 (L) 08/06/2018    EGFRIFNONA 43 (L) 08/04/2019    BCR 43.2 (H) 08/04/2019    ANIONGAP 7.7 08/04/2019       ASSESSMENT: Patient is a 89 y.o. female who is 5 Days Post-Op s/p Procedure(s):  OPEN REDUCTION INTERNAL FIXATION LEFT FEMUR     PLAN:   · Weight Bearing: Non Weight Bearing  · Labs:  "Above lab values review. Plan: Hemoglobin still at 8.1 despite patient getting transfusion recently  · PT/OT: To Mobilize  · DVT PPX: Aspirin 325 daily  · Post-Op Xray: Hardware in good position. Acceptable bony reduction.    · Nephrology consult: Appreciate comanagement  · Dispo: SNF soon    R \"Adam\" Tete VILLA MD  Orthopaedic Surgery  [facility]  [phone]    "

## 2019-08-04 NOTE — PROGRESS NOTES
Progress Note    Name: Magdalena Jerry ADMIT: 2019   : 1926  PCP: Fly Sanchez DO    MRN: [id number] LOS: 6 days   AGE/SEX: 89 y.o. female  ROOM: P892/1   Date of Encounter Visit: 19    Subjective:     Interval History: low grade temp again this morning.     REVIEW OF SYSTEMS:   Difficult to obtain due to [place] and memory issues.   No chest pain, palpitations. Denies cough.   No nausea, vomiting or diarrhea.  Left leg pain especially with movement.   Urinary incontinence at times, but denies any dysuria.     Objective:   Temp:  [97.5 °F (36.4 °C)-101 °F (38.3 °C)] 101 °F (38.3 °C)  Heart Rate:  [64-86] 77  Resp:  [16-18] 18  BP: (138-189)/(57-92) 174/68   SpO2:  [92 %-97 %] 95 %  on  Flow (L/min):  [1] 1 Device (Oxygen Therapy): nasal cannula    Intake/Output Summary (Last 24 hours) at 2019 0818  Last data filed at 2019 0633  Gross per 24 hour   Intake 240 ml   Output 1425 ml   Net -1185 ml     Body mass index is 29.41 kg/m².      19  0408 19  0740 19  0829   Weight: 82.1 kg (181 lb) 85.4 kg (188 lb 4.8 oz) 85 kg (187 lb 6.3 oz)     Weight change:     Physical Exam   Constitutional: No distress.   HENT:   Head: Atraumatic.   Nose: Nose normal.   Eyes: Conjunctivae are normal.   Cardiovascular: Normal rate and regular rhythm.   Systolic murmur  Pulmonary/Chest: Effort normal. She has no wheezes. She has no rales. Diminished bases worse on the right   Abdominal: Soft. Bowel sounds are normal. There is no tenderness.   Musculoskeletal: She exhibits edema (1+ LLE with dependent edema in thigh.    Neurological: She is alert.   Skin: Skin is warm and dry. She is not diaphoretic. There is lateral left knee ecchymosis with tenderness to palpation.       Results Review:      Results from last 7 days   Lab Units 19  0451 19  0429 19  0416 19  0333 19  1457 19  0417 19  0333 19  0414   SODIUM mmol/L 138 140 140 140  139 139  142 136 135*   POTASSIUM mmol/L 4.8 5.2 5.5* 5.7*  5.5* 5.1 5.7* 5.2 4.4   CHLORIDE mmol/L 106 106 106 106  106 105 109* 103 98   CO2 mmol/L 24.3 21.7* 25.2 22.4  23.6 22.9 23.4 24.3 23.8   BUN mg/dL 51* 55* 50* 39*  41* 30* 28* 24* 20   CREATININE mg/dL 1.18* 1.35* 1.93* 1.92*  1.73* 1.70* 1.43* 1.28* 1.37*   GLUCOSE mg/dL 143* 169* 156* 195*  197* 181* 141* 121* 163*   CALCIUM mg/dL 8.4 8.4 8.2 8.0*  8.0* 7.2* 7.6* 7.8* 8.9   AST (SGOT) U/L  --   --  28 42*  --   --   --  18   ALT (SGPT) U/L  --   --  <5 <5  --   --   --  10     Estimated Creatinine Clearance: 33.3 mL/min (A) (by C-G formula based on SCr of 1.18 mg/dL (H)).      Results from last 7 days   Lab Units 08/04/19  0603 08/04/19  0000 08/03/19  2056 08/03/19  1638 08/03/19  1151 08/03/19  0601 08/03/19  0059 08/02/19  2040   GLUCOSE mg/dL 136* 158* 170* 138* 157* 174* 151* 134*                       Invalid input(s):  PHOS        Invalid input(s): LDLCALC  Results from last 7 days   Lab Units 08/04/19  0451 08/03/19  1443 08/03/19  0429 08/02/19  0416 08/01/19  0333 07/31/19  0417 07/30/19  1715 07/30/19  0333 07/29/19  0414   WBC 10*3/mm3 5.22  --   --  8.01 11.08* 10.52 9.11 5.92 6.22   HEMOGLOBIN g/dL 8.1* 8.5* 8.3* 7.3* 8.4* 9.0* 9.7* 7.8* 11.0*   HEMATOCRIT % 26.8* 28.1* 27.1* 24.4* 27.9* 29.5* 32.1* 25.5* 35.0   PLATELETS 10*3/mm3 204  --   --  156 129* 127* 135* 135* 198   MCV fL 96.4  --   --  100.4* 100.0* 98.7* 100.3* 100.4* 98.0*   MCH pg 29.1  --   --  30.0 30.1 30.1 30.3 30.7 30.8   MCHC g/dL 30.2*  --   --  29.9* 30.1* 30.5* 30.2* 30.6* 31.4*   RDW % 15.8*  --   --  14.4 14.5 15.1 14.5 13.2 13.0   RDW-SD fl 56.2*  --   --  52.4 53.2 54.6* 52.9 48.4 46.8   MPV fL 9.6  --   --  10.3 10.2 9.5 9.5 9.8 9.1   NEUTROPHIL % %  --   --   --   --   --   --   --   --  52.5   LYMPHOCYTE % %  --   --   --   --   --   --   --   --  35.2   MONOCYTES % %  --   --   --   --   --   --   --   --  8.5   EOSINOPHIL % %  --   --   --   --   --   --   --    --  2.7   BASOPHIL % %  --   --   --   --   --   --   --   --  0.6   IMM GRAN % %  --   --   --   --   --   --   --   --  0.5   NEUTROS ABS 10*3/mm3  --   --   --   --   --   --   --   --  3.26   LYMPHS ABS 10*3/mm3  --   --   --   --   --   --   --   --  2.19   MONOS ABS 10*3/mm3  --   --   --   --   --   --   --   --  0.53   EOS ABS 10*3/mm3  --   --   --   --   --   --   --   --  0.17   BASOS ABS 10*3/mm3  --   --   --   --   --   --   --   --  0.04   IMMATURE GRANS (ABS) 10*3/mm3  --   --   --   --   --   --   --   --  0.03   NRBC /100 WBC  --   --   --   --   --   --   --   --  0.0     Results from last 7 days   Lab Units 07/29/19  0414   INR  1.02                             Results from last 7 days   Lab Units 08/01/19  1528   NITRITE UA  Negative   WBC UA /HPF 31-50*   BACTERIA UA /HPF 1+*   SQUAM EPITHEL UA /HPF 7-12*     Results from last 7 days   Lab Units 08/01/19  1528   SODIUM UR mmol/L <20   CREATININE UR mg/dL 40.8       Imaging:  Imaging Results (last 24 hours)     ** No results found for the last 24 hours. **          I reviewed the patient's new clinical results and medications.         Medication Review:   Scheduled Meds:    amLODIPine 10 mg Oral Q24H   aspirin 325 mg Oral Daily   fluticasone 2 spray Each Nare Daily   furosemide 20 mg Intravenous Once   insulin lispro 0-7 Units Subcutaneous Q6H   ipratropium-albuterol 3 mL Nebulization TID   iron sucrose 200 mg Intravenous Once   mirtazapine 15 mg Oral Nightly   Patiromer Sorbitex Calcium 8.4 g Oral Daily   primidone 50 mg Oral Nightly   raNITIdine 150 mg Oral QAM AC   sennosides-docusate sodium 2 tablet Oral Nightly   sertraline 100 mg Oral Daily   sodium chloride 3 mL Intravenous Q12H   sodium hypochlorite  Topical Q24H     Continuous Infusions:   PRN Meds:.•  acetaminophen  •  acetaminophen  •  bisacodyl  •  calcium carbonate  •  dextrose  •  dextrose  •  glucagon (human recombinant)  •  hydrALAZINE  •  HYDROcodone-acetaminophen  •   HYDROcodone-acetaminophen  •  Morphine **OR** Morphine  •  ondansetron **OR** ondansetron  •  polyethylene glycol  •  [COMPLETED] Insert peripheral IV **AND** sodium chloride  •  sodium chloride    Problem List:     Active Hospital Problems    Diagnosis  POA   • **Displaced oblique fracture of shaft of left femur, initial encounter for closed fracture (CMS/[facility]) [S72.332A]  Yes   • Thrombocytopenia (CMS/[facility]) [D69.6]  Unknown   • Hyperkalemia [E87.5]  Unknown   • Acute kidney injury superimposed on chronic kidney disease (CMS/[facility]) [N17.9, N18.9]  Unknown   • Acute respiratory failure with hypoxia (CMS/[facility]) [J96.01]  Unknown   • Hx of MRSA (methicillin resistant Staphylococcus aureus) infection [A49.02]  Yes   • Gastroesophageal reflux disease with esophagitis [K21.0]  Yes   • Type 2 diabetes mellitus with diabetic polyneuropathy, without long-term current use of insulin (CMS/[facility]) [E11.42]  Yes   • Benign essential hypertension [I10]  Yes   • Anemia due to chronic kidney disease [N18.9, D63.1]  Yes      Resolved Hospital Problems   No resolved problems to display.       Assessment and Plan:     · Status post left distal femoral fracture ORIF 7/30/2019.  Nonweightbearing left lower extremity, aspirin for DVT prophylaxis  · postop acute blood loss anemia. s/p transfusions. hgb down to 8.1. Iron deficient. will start IV venofer with lasix post transfusion.   · Acute kidney injury and chronic kidney disease stage III, hyperkalemia: nephrology following. Renal function improved. Started patiromer for hyperkalemia.   · Hypoxia. Chest x-ray showed atelectasis. Encouraged more deep breathing exercise.   · Low-grade fever. Probably related to  atelectasis, but temp increased today. WBC normal. UA a few days ago marginally abnormal. BLE venous dopplers to rule out DVT.   · Urinalysis with pyuria however has epithelial cells. Repeat UA with straight cath  · Diabetes mellitus type 2.  Continue sliding scale.  BS 100s today  · Left  anterior fascicular block and heart murmur.  Echo showed aortic sclerosis and RA abnormality with no further planned work up recommended by cardiology due to age and comorbidities  · HTN- BP spiked last night. Continue Norvasc. Holding ARB given hyperkalemia issues.     VTE prophylaxis: SCDs   CODE status: full code  Disposition: TBD- will need prolonged rehab given NWB to LLE for about 3 months. Possible dc early next week    I discussed the patients findings and my recommendations with patient and nursing staff.    JANET Lott  08/04/19  8:18 AM    Addendum: UA showed 4+ bacteria and numberous WBC. Will treat empirically with rocephin and monitor culture.     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 Prior BLE doppler from 12/2018 was normal and unsure of chronicity of DVT. Will stop aspirin and switch to Eliquis, but will use lower dose with given age and renal function. Will not do full loading dosage given acute anemia and will monitor Hgb closely, getting iron today. Will stop mysoline while on Eliquis.     JANET Lott  08/04/19  3:12 PM

## 2019-08-04 NOTE — PROGRESS NOTES
Bilateral lower extremity venous doppler completed, Prelim to Pat RN of Rt negative and Lt positive for chronic DVT and acute calf vein thrombus.

## 2019-08-04 NOTE — PLAN OF CARE
Problem: Patient Care Overview  Goal: Plan of Care Review  Outcome: Ongoing (interventions implemented as appropriate)   08/04/19 8396   Coping/Psychosocial   Plan of Care Reviewed With patient   Plan of Care Review   Progress improving   OTHER   Outcome Summary VSS. Pain controlled with PO pain med. Dressing c/d/i. Voiding per purewick. UA collected via straight cath. Doppler performed. APRN aware of results. New orders noted. Discussed bp med and monitoring r/tHNT. Plans to d/c to rehab.        Problem: Fall Risk (Adult)  Goal: Absence of Fall  Outcome: Ongoing (interventions implemented as appropriate)      Problem: Skin Injury Risk (Adult)  Goal: Skin Health and Integrity  Outcome: Ongoing (interventions implemented as appropriate)      Problem: Fracture Orthopaedic (Adult)  Goal: Signs and Symptoms of Listed Potential Problems Will be Absent, Minimized or Managed (Fracture Orthopaedic)  Outcome: Ongoing (interventions implemented as appropriate)

## 2019-08-05 PROBLEM — I48.91 ATRIAL FIBRILLATION (HCC): Status: ACTIVE | Noted: 2019-08-05

## 2019-08-05 PROBLEM — I82.409 DEEP VEIN THROMBOSIS (DVT) OF LOWER EXTREMITY: Status: ACTIVE | Noted: 2019-08-05

## 2019-08-05 PROBLEM — N39.0 UTI (URINARY TRACT INFECTION): Status: ACTIVE | Noted: 2019-08-05

## 2019-08-05 LAB
ALBUMIN SERPL-MCNC: 2.5 G/DL (ref 3.5–5.2)
ANION GAP SERPL CALCULATED.3IONS-SCNC: 13.3 MMOL/L (ref 5–15)
BUN BLD-MCNC: 44 MG/DL (ref 8–23)
BUN/CREAT SERPL: 49.4 (ref 7–25)
CALCIUM SPEC-SCNC: 8.7 MG/DL (ref 8.2–9.6)
CHLORIDE SERPL-SCNC: 103 MMOL/L (ref 98–107)
CO2 SERPL-SCNC: 22.7 MMOL/L (ref 22–29)
CREAT BLD-MCNC: 0.89 MG/DL (ref 0.57–1)
GFR SERPL CREATININE-BSD FRML MDRD: 59 ML/MIN/1.73
GLUCOSE BLD-MCNC: 124 MG/DL (ref 65–99)
GLUCOSE BLDC GLUCOMTR-MCNC: 124 MG/DL (ref 70–130)
GLUCOSE BLDC GLUCOMTR-MCNC: 125 MG/DL (ref 70–130)
GLUCOSE BLDC GLUCOMTR-MCNC: 158 MG/DL (ref 70–130)
GLUCOSE BLDC GLUCOMTR-MCNC: 181 MG/DL (ref 70–130)
GLUCOSE BLDC GLUCOMTR-MCNC: 272 MG/DL (ref 70–130)
PHOSPHATE SERPL-MCNC: 2.3 MG/DL (ref 2.5–4.5)
POTASSIUM BLD-SCNC: 4.1 MMOL/L (ref 3.5–5.2)
SODIUM BLD-SCNC: 139 MMOL/L (ref 136–145)

## 2019-08-05 PROCEDURE — 25010000002 CEFTRIAXONE PER 250 MG: Performed by: NURSE PRACTITIONER

## 2019-08-05 PROCEDURE — 82962 GLUCOSE BLOOD TEST: CPT

## 2019-08-05 PROCEDURE — 93010 ELECTROCARDIOGRAM REPORT: CPT | Performed by: INTERNAL MEDICINE

## 2019-08-05 PROCEDURE — 80069 RENAL FUNCTION PANEL: CPT | Performed by: INTERNAL MEDICINE

## 2019-08-05 PROCEDURE — 25010000002 ONDANSETRON PER 1 MG: Performed by: NURSE PRACTITIONER

## 2019-08-05 PROCEDURE — 93005 ELECTROCARDIOGRAM TRACING: CPT | Performed by: INTERNAL MEDICINE

## 2019-08-05 PROCEDURE — 94799 UNLISTED PULMONARY SVC/PX: CPT

## 2019-08-05 PROCEDURE — 93005 ELECTROCARDIOGRAM TRACING: CPT | Performed by: HOSPITALIST

## 2019-08-05 PROCEDURE — 97110 THERAPEUTIC EXERCISES: CPT

## 2019-08-05 PROCEDURE — 99233 SBSQ HOSP IP/OBS HIGH 50: CPT | Performed by: INTERNAL MEDICINE

## 2019-08-05 PROCEDURE — 63710000001 INSULIN LISPRO (HUMAN) PER 5 UNITS: Performed by: INTERNAL MEDICINE

## 2019-08-05 RX ADMIN — HYOSCYAMINE SULFATE: 16 SOLUTION at 10:44

## 2019-08-05 RX ADMIN — ONDANSETRON 4 MG: 2 INJECTION INTRAMUSCULAR; INTRAVENOUS at 11:17

## 2019-08-05 RX ADMIN — PATIROMER 1 PACKET: 8.4 POWDER, FOR SUSPENSION ORAL at 10:26

## 2019-08-05 RX ADMIN — AMLODIPINE BESYLATE 10 MG: 10 TABLET ORAL at 10:26

## 2019-08-05 RX ADMIN — CEFTRIAXONE SODIUM 1 G: 1 INJECTION, SOLUTION INTRAVENOUS at 16:51

## 2019-08-05 RX ADMIN — METOPROLOL TARTRATE 25 MG: 25 TABLET ORAL at 22:21

## 2019-08-05 RX ADMIN — IPRATROPIUM BROMIDE AND ALBUTEROL SULFATE 3 ML: 2.5; .5 SOLUTION RESPIRATORY (INHALATION) at 19:00

## 2019-08-05 RX ADMIN — APIXABAN 2.5 MG: 2.5 TABLET, FILM COATED ORAL at 21:07

## 2019-08-05 RX ADMIN — INSULIN LISPRO 2 UNITS: 100 INJECTION, SOLUTION INTRAVENOUS; SUBCUTANEOUS at 00:25

## 2019-08-05 RX ADMIN — INSULIN LISPRO 2 UNITS: 100 INJECTION, SOLUTION INTRAVENOUS; SUBCUTANEOUS at 13:27

## 2019-08-05 RX ADMIN — INSULIN LISPRO 4 UNITS: 100 INJECTION, SOLUTION INTRAVENOUS; SUBCUTANEOUS at 22:30

## 2019-08-05 RX ADMIN — SERTRALINE HYDROCHLORIDE 100 MG: 100 TABLET, FILM COATED ORAL at 10:23

## 2019-08-05 RX ADMIN — METOPROLOL TARTRATE 25 MG: 25 TABLET ORAL at 14:04

## 2019-08-05 RX ADMIN — SODIUM CHLORIDE, PRESERVATIVE FREE 3 ML: 5 INJECTION INTRAVENOUS at 10:43

## 2019-08-05 RX ADMIN — IPRATROPIUM BROMIDE AND ALBUTEROL SULFATE 3 ML: 2.5; .5 SOLUTION RESPIRATORY (INHALATION) at 17:07

## 2019-08-05 RX ADMIN — RANITIDINE 150 MG: 150 TABLET ORAL at 10:24

## 2019-08-05 RX ADMIN — IPRATROPIUM BROMIDE AND ALBUTEROL SULFATE 3 ML: 2.5; .5 SOLUTION RESPIRATORY (INHALATION) at 07:45

## 2019-08-05 RX ADMIN — APIXABAN 2.5 MG: 2.5 TABLET, FILM COATED ORAL at 10:26

## 2019-08-05 RX ADMIN — SENNOSIDES AND DOCUSATE SODIUM 2 TABLET: 8.6; 5 TABLET ORAL at 21:07

## 2019-08-05 RX ADMIN — MIRTAZAPINE 15 MG: 15 TABLET, FILM COATED ORAL at 22:21

## 2019-08-05 RX ADMIN — SODIUM CHLORIDE, PRESERVATIVE FREE 3 ML: 5 INJECTION INTRAVENOUS at 21:07

## 2019-08-05 RX ADMIN — FLUTICASONE PROPIONATE 2 SPRAY: 50 SPRAY, METERED NASAL at 10:42

## 2019-08-05 NOTE — PLAN OF CARE
Problem: Patient Care Overview  Goal: Plan of Care Review  Outcome: Ongoing (interventions implemented as appropriate)   08/05/19 4577   Coping/Psychosocial   Plan of Care Reviewed With patient   OTHER   Outcome Summary pt has had a setback with DVT and a fib, transferred to a monitored floor, will cont to progress with activity as tolerated, pt with pain, mild confusion, and NWB status limiting progression of activity

## 2019-08-05 NOTE — PROGRESS NOTES
Orthopaedic Surgery   Daily Progress Note  Dr. CHARITY Epstein   [phone]  DEMOGRAPHICS:   · Magdalena Jerry   · Age:89 y.o.   · MRN:[id number]  · Admitted: 7/29/2019    PROCEDURE: 6 Days Post-Op s/p Procedure(s):  OPEN REDUCTION INTERNAL FIXATION LEFT FEMUR     HOSPITAL PROGRESS  · Patient Issues: Patient was doing fine until earlier this morning when she was noted to have increased heart rate and be in atrial fibrillation.  Was transferred to a telemetry bed.  A Doppler was performed yesterday showing an acute calf DVT of the left leg.  Also found to have a UTI yesterday  · Ambulation/Activity: Minimal activity yesterday    VITALS:  Vitals:    08/05/19 0400 08/05/19 0433 08/05/19 0500 08/05/19 0714   BP:    (!) 140/114   BP Location:    Right arm   Patient Position:    Lying   Pulse: (!) 137 (!) 127 (!) 141 (!) 137   Resp:    16   Temp:    99 °F (37.2 °C)   TempSrc:    Oral   SpO2:    93%   Weight:       Height:           PHYSICAL EXAM:  · LUNGS: Equal chest rise, no shortness of air  · CARDIOVASCULAR: brisk capillary refill intact  · WOUND: Incision clean dry and intact, minimal drainage  · EXTREMITY: Operative Leg  · Pulses: brisk capillary refill intact  · Sensation: Sensation intact to light touch to the saphenous/sural/tibial/deep peroneal/superficial peroneal nerves, and grossly throughout the extremity.  · Motor: 5/5 EHL/FHL/TA/GS motor complexes    LABS:   Lab Results   Component Value Date    HGB 8.7 (L) 08/04/2019     Lab Results   Component Value Date    WBC 5.22 08/04/2019     Lab Results   Component Value Date    GLUCOSE 124 (H) 08/05/2019    CALCIUM 8.7 08/05/2019     08/05/2019    K 4.1 08/05/2019    CO2 22.7 08/05/2019     08/05/2019    BUN 44 (H) 08/05/2019    CREATININE 0.89 08/05/2019    EGFRIFAFRI 44 (L) 08/06/2018    EGFRIFNONA 59 (L) 08/05/2019    BCR 49.4 (H) 08/05/2019    ANIONGAP 13.3 08/05/2019       ASSESSMENT: Patient is a 89 y.o. female who is 6 Days Post-Op s/p  "Procedure(s):  OPEN REDUCTION INTERNAL FIXATION LEFT FEMUR     PLAN:   · Weight Bearing: Non Weight Bearing  · Labs: Above lab values review. Plan: Hemoglobin not back this morning  · PT/OT: To Mobilize  · DVT Rx: Now on Eliquis for left calf DVT  · Post-Op Xray: Hardware in good position. Acceptable bony reduction.    · Nephrology consult: Appreciate comanagement  · Atrial fibrillation: Appreciate medical management  · UTI: Being treated with Rocephin  · Dispo: Acute    R \"Adam\" Tete VILLA MD  Orthopaedic Surgery  [facility]  [phone]    "

## 2019-08-05 NOTE — THERAPY TREATMENT NOTE
Acute Care - Physical Therapy Treatment Note  [facility]     Patient Name: Magdalena Jerry  : 1926  MRN: [id number]  Today's Date: 2019  Onset of Illness/Injury or Date of Surgery: 19          Admit Date: 2019    Visit Dx:    ICD-10-CM ICD-9-CM   1. Displaced oblique fracture of shaft of left femur, initial encounter for closed fracture (CMS/[facility]) S72.332A 821.01     Patient Active Problem List   Diagnosis   • Adjustment disorder with mixed anxiety and depressed mood   • Anemia due to chronic kidney disease   • Benign essential hypertension   • Hereditary essential tremor   • Type 2 diabetes mellitus with diabetic polyneuropathy, without long-term current use of insulin (CMS/[facility])   • Dry skin dermatitis   • Dyslipidemia   • Gastroesophageal reflux disease with esophagitis   • Pain of hand   • Hearing loss   • Arthralgia of hip   • Impacted cerumen   • Pruritus   • Knee pain   • Pain in extremity   • Chronic low back pain   • Weakness of lower extremity   • Spinal stenosis of lumbar region   • Senile osteoporosis   • Piriformis syndrome   • Primary insomnia   • Tinea pedis   • Vitamin D deficiency   • Left leg cellulitis   • CKD (chronic kidney disease) stage 3, GFR 30-59 ml/min (CMS/[facility])   • Allergic rhinitis   • Pneumonia due to influenza A virus   • Hx of MRSA (methicillin resistant Staphylococcus aureus) infection   • Bacteria in urine   • Cellulitis of lower extremity   • Chronic venous stasis   • Anemia of chronic disease   • Displaced oblique fracture of shaft of left femur, initial encounter for closed fracture (CMS/[facility])   • Acute kidney injury superimposed on chronic kidney disease (CMS/[facility])   • Acute respiratory failure with hypoxia (CMS/[facility])   • Hyperkalemia   • Thrombocytopenia (CMS/[facility])       Therapy Treatment    Rehabilitation Treatment Summary     Row Name 19 1502             Treatment Time/Intention    Discipline  physical therapist  -PC      Document Type  therapy  note (daily note)  -PC      Subjective Information  complains of;pain  -PC      Mode of Treatment  physical therapy  -PC      Therapy Frequency (PT Clinical Impression)  daily  -PC      Patient Effort  good  -PC      Existing Precautions/Restrictions  fall;non-weight bearing;left  -PC      Recorded by [PC] Kait Caldwell, PT 08/05/19 1509      Row Name 08/05/19 1502             Cognitive Assessment/Intervention- PT/OT    Orientation Status (Cognition)  oriented to;person;place  -PC      Follows Commands (Cognition)  does not follow one step commands;50-74% accuracy  -PC      Recorded by [PC] Kait Caldwell, PT 08/05/19 1509      Row Name 08/05/19 1502             Bed Mobility Assessment/Treatment    Supine-Sit [place] (Bed Mobility)  maximum assist (25% patient effort);2 person assist  -PC      Sit-Supine [place] (Bed Mobility)  maximum assist (25% patient effort);2 person assist  -PC2      Assistive Device (Bed Mobility)  bed rails;head of bed elevated;draw sheet  -PC      Recorded by [PC] Kait Caldwell, PT 08/05/19 1509  [PC2] Kait Caldwell, PT 08/05/19 1517      Row Name 08/05/19 1502             Transfer Assessment/Treatment    Comment (Transfers)  pt getting ready to transfer to another floor, did not transfer pt out of bed today, pt also having difficulty maintaining sitting edge of bed  -PC      Recorded by [PC] Kait Caldwell, PT 08/05/19 1517      Row Name 08/05/19 1502             Bed-Chair Transfer    Bed-Chair [place] (Transfers)  --  -PC      Assistive Device (Bed-Chair Transfers)  --  -PC      Recorded by [PC] Kait Caldwell, PT 08/05/19 1517      Row Name 08/05/19 1502             Squat Pivot Transfer    Squat Pivot [place] (Transfers)  --  -PC      Assistive Device (Squat Pivot Transfers)  --  -PC      Recorded by [PC] Kait Caldwell, PT 08/05/19 1517      Row Name 08/05/19 1502             Therapeutic Exercise    Comment (Therapeutic Exercise)  AP, LAQ, hip abd on left,  10 reps  -PC      Recorded by [PC] Kait Caldwell, PT 08/05/19 1517      Row Name 08/05/19 1502             Positioning and Restraints    Pre-Treatment Position  in bed  -PC      Post Treatment Position  bed  -PC      In Bed  supine;call light within reach;encouraged to call for assist;exit alarm on;with family/caregiver  -PC      Recorded by [PC] Kait Caldwell, PT 08/05/19 1517      Row Name 08/05/19 1502             Pain Scale: Numbers Pre/Post-Treatment    Pain Location - Side  Left  -PC      Pain Location - Orientation  lower  -PC      Pain Location  extremity  -PC      Recorded by [PC] Kait Caldwell, PT 08/05/19 1509      Row Name 08/05/19 1502             Pain Scale: Word Pre/Post-Treatment    Pain: Word Scale, Pretreatment  --  -PC      Pain: Word Scale, Post-Treatment  6 - moderate-severe pain  -PC      Recorded by [PC] Kait Caldwell, PT 08/05/19 1517      Row Name                Wound 07/30/19 1647 Left leg incision    Wound - Properties Group Date first assessed: 07/30/19 [] Time first assessed: 1647 [MC] Side: Left [MC] Location: leg [MC] Type: incision [MC] Recorded by:  [MC] Justyna Andre RN 07/30/19 1647    Row Name 08/05/19 1502             Plan of Care Review    Plan of Care Reviewed With  patient;family  -PC      Recorded by [PC] Kait Caldwell, PT 08/05/19 1517      Row Name 08/05/19 1502             Outcome Summary/Treatment Plan (PT)    Anticipated Discharge Disposition (PT)  skilled nursing facility  -PC      Recorded by [PC] Kait Caldwell, PT 08/05/19 1517        User Key  (r) = Recorded By, (t) = Taken By, (c) = Cosigned By    Initials Name Effective Dates Discipline     Kait Caldwell, PT 04/03/18 -  PT     Justyna Andre RN 02/23/18 -  Nurse          Wound 07/30/19 1647 Left leg incision (Active)   Dressing Appearance dry;intact 8/5/2019 12:14 PM   Closure RADHA 8/5/2019 12:14 PM   Base dressing in place, unable to visualize 8/5/2019 12:14 PM   Periwound ecchymotic  8/5/2019 12:14 PM   Drainage Amount none 8/5/2019 12:14 PM   Dressing Care, Wound dressing changed 8/5/2019  8:45 AM           Physical Therapy Education     Title: PT OT SLP Therapies (In Progress)     Topic: Physical Therapy (In Progress)     Point: Mobility training (In Progress)     Learning Progress Summary           Patient Acceptance, E,D, NR by  at 8/5/2019  3:17 PM    Acceptance, E,D, NR by  at 8/3/2019 12:57 PM    Acceptance, E,D, NR by  at 8/2/2019  5:30 PM    Acceptance, E,D, NL by  at 8/1/2019  4:27 PM   Family Acceptance, E,D, NR by  at 8/2/2019  5:30 PM                   Point: Home exercise program (In Progress)     Learning Progress Summary           Patient Acceptance, E,D, NR by  at 8/5/2019  3:17 PM    Acceptance, E,D, NR by  at 8/3/2019 12:57 PM    Acceptance, E,D, NR by  at 8/2/2019  5:30 PM    Acceptance, E,D, NL by  at 8/1/2019  4:27 PM   Family Acceptance, E,D, NR by  at 8/2/2019  5:30 PM                   Point: Body mechanics (In Progress)     Learning Progress Summary           Patient Acceptance, E,D, NR by  at 8/3/2019 12:57 PM    Acceptance, E,D, NR by  at 8/2/2019  5:30 PM    Acceptance, E,D, NL by  at 8/1/2019  4:27 PM   Family Acceptance, E,D, NR by  at 8/2/2019  5:30 PM                   Point: Precautions (In Progress)     Learning Progress Summary           Patient Acceptance, E,D, NR by  at 8/3/2019 12:57 PM    Acceptance, E,D, NR by  at 8/2/2019  5:30 PM    Acceptance, E,D, NL by  at 8/1/2019  4:27 PM   Family Acceptance, E,D, NR by  at 8/2/2019  5:30 PM                               User Key     Initials Effective Dates Name Provider Type Discipline     04/03/18 -  Kait Caldwell, PT Physical Therapist PT     03/07/18 -  Ayse Lambert PTA Physical Therapy Assistant PT     08/19/18 -  Mirna Strauss PTA Physical Therapy Assistant PT                PT Recommendation and Plan  Anticipated Discharge Disposition (PT): skilled nursing  facility  Therapy Frequency (PT Clinical Impression): daily  Outcome Summary/Treatment Plan (PT)  Anticipated Discharge Disposition (PT): skilled nursing facility  Plan of Care Reviewed With: patient  Outcome Summary: pt has had a setback with DVT and a fib, transferred to a monitored floor, will cont to progress with activity as tolerated, pt with pain, mild confusion, and NWB status limiting progression of activity  Outcome Measures     Row Name 08/05/19 1500 08/03/19 1200 08/02/19 1700       How much help from another person do you currently need...    Turning from your back to your side while in flat bed without using bedrails?  2  -PC  2  -EH  2  -JM    Moving from lying on back to sitting on the side of a flat bed without bedrails?  2  -PC  2  -EH  2  -JM    Moving to and from a bed to a chair (including a wheelchair)?  1  -PC  2  -EH  1  -JM    Standing up from a chair using your arms (e.g., wheelchair, bedside chair)?  1  -PC  2  -EH  1  -JM    Climbing 3-5 steps with a railing?  1  -PC  1  -EH  1  -JM    To walk in hospital room?  1  -PC  1  -EH  1  -JM    AM-PAC 6 Clicks Score (PT)  8  -PC  10  -EH  8  -JM      User Key  (r) = Recorded By, (t) = Taken By, (c) = Cosigned By    Initials Name Provider Type    Kait Barraza, PT Physical Therapist    Ayse Ham, PTA Physical Therapy Assistant     Mirna Strauss, JEFFREY Physical Therapy Assistant         Time Calculation:   PT Charges     Row Name 08/05/19 1520             Time Calculation    Start Time  1438  -PC      Stop Time  1455  -PC      Time Calculation (min)  17 min  -PC      PT Received On  08/05/19  -PC      PT - Next Appointment  08/06/19  -PC        User Key  (r) = Recorded By, (t) = Taken By, (c) = Cosigned By    Initials Name Provider Type    Kait Barraza PT Physical Therapist        Therapy Charges for Today     Code Description Service Date Service Provider Modifiers Qty    68951924801  PT THER PROC EA 15 MIN 8/5/2019  Kait Caldwell, PT GP 1    10829544561 HC PT THER SUPP EA 15 MIN 8/5/2019 Kait Caldwell, PT GP 1          PT G-Codes  Outcome Measure Options: AM-PAC 6 Clicks Basic Mobility (PT)  AM-PAC 6 Clicks Score (PT): 8    Kait Caldwell, PT  8/5/2019

## 2019-08-05 NOTE — PROGRESS NOTES
"   LOS: 6 days    Patient Care Team:  Fly Sanchez DO as PCP - General  Marisol Kapadia APRN as PCP - Claims Attributed    Chief Complaint:    Chief Complaint   Patient presents with   • Fall   • Knee Injury     Follow up CARLOS CKD3  Subjective     Interval History: She feels fine; states that her pain is less today than yesterday; denies shortness of breath on room air; says her appetite is okay      Objective     Vital Signs  Temp:  [97.5 °F (36.4 °C)-101 °F (38.3 °C)] 98 °F (36.7 °C)  Heart Rate:  [65-86] 77  Resp:  [16-20] 18  BP: (123-189)/(57-92) 169/73    Flowsheet Rows      First Filed Value   Admission Height  170.2 cm (67\") Documented at 07/29/2019 0354   Admission Weight  82.1 kg (181 lb) Documented at 07/29/2019 0408          No intake/output data recorded.  I/O last 3 completed shifts:  In: 720 [P.O.:720]  Out: 2175 [Urine:2050; Stool:125]    Intake/Output Summary (Last 24 hours) at 8/4/2019 2103  Last data filed at 8/4/2019 1756  Gross per 24 hour   Intake 480 ml   Output 1375 ml   Net -895 ml       Physical Exam:  GEN frail, no acute distress, does not and mostly appropriate  Neck supple no JVD  Lungs CTA bilat no rales; not labored on room air  CV RRR   abd soft NT/ND, + BS  vasc trace pedal/ankle edema     Results Review:    Results from last 7 days   Lab Units 08/04/19  0451 08/03/19  0429 08/02/19  0416 08/01/19  0333  07/29/19  0414   SODIUM mmol/L 138 140 140 140  139   < > 135*   POTASSIUM mmol/L 4.8 5.2 5.5* 5.7*  5.5*   < > 4.4   CHLORIDE mmol/L 106 106 106 106  106   < > 98   CO2 mmol/L 24.3 21.7* 25.2 22.4  23.6   < > 23.8   BUN mg/dL 51* 55* 50* 39*  41*   < > 20   CREATININE mg/dL 1.18* 1.35* 1.93* 1.92*  1.73*   < > 1.37*   CALCIUM mg/dL 8.4 8.4 8.2 8.0*  8.0*   < > 8.9   BILIRUBIN mg/dL  --   --  0.4 0.3  --  0.2   ALK PHOS U/L  --   --  93 97  --  88   ALT (SGPT) U/L  --   --  <5 <5  --  10   AST (SGOT) U/L  --   --  28 42*  --  18   GLUCOSE mg/dL 143* 169* 156* 195*  " 197*   < > 163*    < > = values in this interval not displayed.       Estimated Creatinine Clearance: 33.3 mL/min (A) (by C-G formula based on SCr of 1.18 mg/dL (H)).    Results from last 7 days   Lab Units 08/03/19  0429   PHOSPHORUS mg/dL 3.0             Results from last 7 days   Lab Units 08/04/19  1538 08/04/19  0451 08/03/19  1443 08/03/19  0429 08/02/19  0416 08/01/19  0333 07/31/19  0417 07/30/19  1715   WBC 10*3/mm3  --  5.22  --   --  8.01 11.08* 10.52 9.11   HEMOGLOBIN g/dL 8.7* 8.1* 8.5* 8.3* 7.3* 8.4* 9.0* 9.7*   PLATELETS 10*3/mm3  --  204  --   --  156 129* 127* 135*       Results from last 7 days   Lab Units 07/29/19  0414   INR  1.02         Imaging Results (last 24 hours)     ** No results found for the last 24 hours. **          amLODIPine 10 mg Oral Q24H   apixaban 2.5 mg Oral Q12H   ceftriaxone 1 g Intravenous Q24H   fluticasone 2 spray Each Nare Daily   insulin lispro 0-7 Units Subcutaneous Q6H   ipratropium-albuterol 3 mL Nebulization TID   mirtazapine 15 mg Oral Nightly   Patiromer Sorbitex Calcium 8.4 g Oral Daily   raNITIdine 150 mg Oral QAM AC   sennosides-docusate sodium 2 tablet Oral Nightly   sertraline 100 mg Oral Daily   sodium chloride 3 mL Intravenous Q12H   sodium hypochlorite  Topical Q24H          Medication Review:   Current Facility-Administered Medications   Medication Dose Route Frequency Provider Last Rate Last Dose   • acetaminophen (TYLENOL) tablet 650 mg  650 mg Oral Q4H PRN Ayse Beasley APRN       • acetaminophen (TYLENOL) tablet 650 mg  650 mg Oral Q6H PRN Cici Owusu APRN       • amLODIPine (NORVASC) tablet 10 mg  10 mg Oral Q24H Cici Owusu APRN   10 mg at 08/04/19 1023   • apixaban (ELIQUIS) tablet 2.5 mg  2.5 mg Oral Q12H Cici Owusu APRN       • bisacodyl (DULCOLAX) EC tablet 5 mg  5 mg Oral Daily PRN Ayse Beasley APRN       • calcium carbonate (TUMS) chewable tablet 500 mg (200 mg elemental)  2 tablet Oral BID PRN Ayse Beasley  JANET Mckeon       • cefTRIAXone (ROCEPHIN) IVPB 1 g  1 g Intravenous Q24H Cici Owusu APRN 100 mL/hr at 08/04/19 1701 1 g at 08/04/19 1701   • dextrose (D50W) 25 g/ 50mL Intravenous Solution 25 g  25 g Intravenous Q15 Min PRN Cici Owusu APRN       • dextrose (GLUTOSE) oral gel 15 g  15 g Oral Q15 Min PRN Cici Owusu APRN       • fluticasone (FLONASE) 50 MCG/ACT nasal spray 2 spray  2 spray Each Nare Daily Cici Owusu APRN   2 spray at 08/04/19 1023   • glucagon (human recombinant) (GLUCAGEN DIAGNOSTIC) injection 1 mg  1 mg Subcutaneous PRN Ccii Owusu APRN       • hydrALAZINE (APRESOLINE) injection 10 mg  10 mg Intravenous Q6H PRN Cici Owusu APRN   10 mg at 08/04/19 0016   • HYDROcodone-acetaminophen (NORCO) 5-325 MG per tablet 1 tablet  1 tablet Oral Q6H PRN Nazairo Epstein II, MD   1 tablet at 08/04/19 1023   • HYDROcodone-acetaminophen (NORCO) 7.5-325 MG per tablet 2 tablet  2 tablet Oral Q4H PRN Zita Pace MD   1 tablet at 08/02/19 1128   • insulin lispro (humaLOG) injection 0-7 Units  0-7 Units Subcutaneous Q6H Zita Pace MD   2 Units at 08/04/19 1227   • ipratropium-albuterol (DUO-NEB) nebulizer solution 3 mL  3 mL Nebulization TID Zita Pace MD   3 mL at 08/04/19 1946   • mirtazapine (REMERON) tablet 15 mg  15 mg Oral Nightly Cici Owusu APRN   15 mg at 08/03/19 2142   • morphine injection 2 mg  2 mg Intravenous Q4H PRN Zita Pace MD   2 mg at 08/01/19 0032    Or   • morphine injection 4 mg  4 mg Intravenous Q4H PRN Zita Pace MD       • ondansetron (ZOFRAN) tablet 4 mg  4 mg Oral Q6H PRN Ayse Beasley APRN        Or   • ondansetron (ZOFRAN) injection 4 mg  4 mg Intravenous Q6H PRN Ayse Beasley, JANET       • Patiromer Sorbitex Calcium (VELTASSA) 1 packet  8.4 g Oral Daily Alonso Ken MD   1 packet at 08/04/19 1023   • polyethylene glycol 3350 powder (packet)  17 g Oral Daily PRN Cici Owusu, APRN        • raNITIdine (ZANTAC) tablet 150 mg  150 mg Oral QAM AC Cici Owusu APRN   150 mg at 08/04/19 1024   • sennosides-docusate sodium (SENOKOT-S) 8.6-50 MG tablet 2 tablet  2 tablet Oral Nightly Cici Owusu APRN   2 tablet at 08/03/19 2141   • sertraline (ZOLOFT) tablet 100 mg  100 mg Oral Daily Cici Owusu APRN   100 mg at 08/04/19 1024   • sodium chloride 0.9 % flush 10 mL  10 mL Intravenous PRN Jose Rafael Ibanez MD       • sodium chloride 0.9 % flush 3 mL  3 mL Intravenous Q12H Ayse Beasley APRN   3 mL at 08/04/19 1020   • sodium chloride 0.9 % flush 3-10 mL  3-10 mL Intravenous PRN Ayse Beasley APRN       • sodium hypochlorite (HYSEPT) 0.25 % topical solution   Topical Q24H Cici Owusu APRN           Assessment/Plan   1. CARLOS on CKD2, improving and back to her usual BL of Cr 1.3.    Stable potassium.  Volume status fine  2. Left femur fracture SP ORIF 7/30.   3. DM2  4. HTN. BP improved  5. Anemia, acute on chronic . Blood loss.  Stabilizing  6. Delirium superimposed on dementia, better  7. Tremor, proved  8.  Thrombocytopenia, resolved      Plan  1.  Continue Veltassa  2.  Surveillance labs  3.  Rehab at some point      Gera Pace MD  08/04/19  9:03 PM

## 2019-08-05 NOTE — PROGRESS NOTES
"   LOS: 7 days    Patient Care Team:  Fly Sanchez DO as PCP - General  Marisol Kapadia APRN as PCP - Claims Attributed    Chief Complaint:    Chief Complaint   Patient presents with   • Fall   • Knee Injury     Follow up CARLOS CKD3  Subjective     Interval History: Seen and examined.  Doing well this morning.  No shortness of air or chest pain.  Doppler showed acute count DVT of left leg.      Objective     Vital Signs  Temp:  [97.9 °F (36.6 °C)-99.5 °F (37.5 °C)] 99 °F (37.2 °C)  Heart Rate:  [] 127  Resp:  [16-20] 20  BP: (123-177)/() 140/114    Flowsheet Rows      First Filed Value   Admission Height  170.2 cm (67\") Documented at 07/29/2019 0354   Admission Weight  82.1 kg (181 lb) Documented at 07/29/2019 0408          No intake/output data recorded.  I/O last 3 completed shifts:  In: 480 [P.O.:480]  Out: 2475 [Urine:2350; Stool:125]    Intake/Output Summary (Last 24 hours) at 8/5/2019 0803  Last data filed at 8/5/2019 0400  Gross per 24 hour   Intake 480 ml   Output 1850 ml   Net -1370 ml       Physical Exam:  GEN frail, no acute distress, does not and mostly appropriate  Neck supple no JVD  Lungs CTA bilat no rales; not labored on room air  CV RRR   abd soft NT/ND, + BS  vasc trace pedal/ankle edema     Results Review:    Results from last 7 days   Lab Units 08/05/19  0404 08/04/19  0451 08/03/19  0429 08/02/19  0416 08/01/19  0333   SODIUM mmol/L 139 138 140 140 140  139   POTASSIUM mmol/L 4.1 4.8 5.2 5.5* 5.7*  5.5*   CHLORIDE mmol/L 103 106 106 106 106  106   CO2 mmol/L 22.7 24.3 21.7* 25.2 22.4  23.6   BUN mg/dL 44* 51* 55* 50* 39*  41*   CREATININE mg/dL 0.89 1.18* 1.35* 1.93* 1.92*  1.73*   CALCIUM mg/dL 8.7 8.4 8.4 8.2 8.0*  8.0*   BILIRUBIN mg/dL  --   --   --  0.4 0.3   ALK PHOS U/L  --   --   --  93 97   ALT (SGPT) U/L  --   --   --  <5 <5   AST (SGOT) U/L  --   --   --  28 42*   GLUCOSE mg/dL 124* 143* 169* 156* 195*  197*       Estimated Creatinine Clearance: 44.1 mL/min " (by C-G formula based on SCr of 0.89 mg/dL).    Results from last 7 days   Lab Units 08/05/19  0404 08/03/19  0429   PHOSPHORUS mg/dL 2.3* 3.0             Results from last 7 days   Lab Units 08/04/19  1538 08/04/19  0451 08/03/19  1443 08/03/19  0429 08/02/19  0416 08/01/19  0333 07/31/19  0417 07/30/19  1715   WBC 10*3/mm3  --  5.22  --   --  8.01 11.08* 10.52 9.11   HEMOGLOBIN g/dL 8.7* 8.1* 8.5* 8.3* 7.3* 8.4* 9.0* 9.7*   PLATELETS 10*3/mm3  --  204  --   --  156 129* 127* 135*               Imaging Results (last 24 hours)     ** No results found for the last 24 hours. **          amLODIPine 10 mg Oral Q24H   apixaban 2.5 mg Oral Q12H   ceftriaxone 1 g Intravenous Q24H   fluticasone 2 spray Each Nare Daily   insulin lispro 0-7 Units Subcutaneous Q6H   ipratropium-albuterol 3 mL Nebulization TID   mirtazapine 15 mg Oral Nightly   Patiromer Sorbitex Calcium 8.4 g Oral Daily   raNITIdine 150 mg Oral QAM AC   sennosides-docusate sodium 2 tablet Oral Nightly   sertraline 100 mg Oral Daily   sodium chloride 3 mL Intravenous Q12H   sodium hypochlorite  Topical Q24H          Medication Review:   Current Facility-Administered Medications   Medication Dose Route Frequency Provider Last Rate Last Dose   • acetaminophen (TYLENOL) tablet 650 mg  650 mg Oral Q4H PRN Ayse Beasley APRN       • acetaminophen (TYLENOL) tablet 650 mg  650 mg Oral Q6H PRN Cici Owusu APRN       • amLODIPine (NORVASC) tablet 10 mg  10 mg Oral Q24H Cici Owusu APRN   10 mg at 08/04/19 1023   • apixaban (ELIQUIS) tablet 2.5 mg  2.5 mg Oral Q12H Cici Owusu APRN   2.5 mg at 08/04/19 2145   • bisacodyl (DULCOLAX) EC tablet 5 mg  5 mg Oral Daily PRN Ayse Beasley APRN       • calcium carbonate (TUMS) chewable tablet 500 mg (200 mg elemental)  2 tablet Oral BID PRN Ayse Beasley APRN       • cefTRIAXone (ROCEPHIN) IVPB 1 g  1 g Intravenous Q24H Cici Owusu APRN 100 mL/hr at 08/04/19 1701 1 g at 08/04/19 1701    • dextrose (D50W) 25 g/ 50mL Intravenous Solution 25 g  25 g Intravenous Q15 Min PRN Cici Owusu APRN       • dextrose (GLUTOSE) oral gel 15 g  15 g Oral Q15 Min PRN Cici Owusu APRN       • fluticasone (FLONASE) 50 MCG/ACT nasal spray 2 spray  2 spray Each Nare Daily Cici Owusu APRN   2 spray at 08/04/19 1023   • glucagon (human recombinant) (GLUCAGEN DIAGNOSTIC) injection 1 mg  1 mg Subcutaneous PRN Cici Owusu APRN       • hydrALAZINE (APRESOLINE) injection 10 mg  10 mg Intravenous Q6H PRN Cici Owusu APRN   10 mg at 08/04/19 0016   • HYDROcodone-acetaminophen (NORCO) 5-325 MG per tablet 1 tablet  1 tablet Oral Q6H PRN Nazario Epstein II, MD   1 tablet at 08/04/19 1023   • HYDROcodone-acetaminophen (NORCO) 7.5-325 MG per tablet 2 tablet  2 tablet Oral Q4H PRN Zita Pace MD   1 tablet at 08/02/19 1128   • insulin lispro (humaLOG) injection 0-7 Units  0-7 Units Subcutaneous Q6H Zita Pace MD   2 Units at 08/05/19 0025   • ipratropium-albuterol (DUO-NEB) nebulizer solution 3 mL  3 mL Nebulization TID Zita Pace MD   3 mL at 08/05/19 0745   • mirtazapine (REMERON) tablet 15 mg  15 mg Oral Nightly Cici Owusu APRN   15 mg at 08/04/19 2145   • morphine injection 2 mg  2 mg Intravenous Q4H PRN Zita Pace MD   2 mg at 08/01/19 0032    Or   • morphine injection 4 mg  4 mg Intravenous Q4H PRN Zita Pace MD       • ondansetron (ZOFRAN) tablet 4 mg  4 mg Oral Q6H PRN Ayse Beasley APRN        Or   • ondansetron (ZOFRAN) injection 4 mg  4 mg Intravenous Q6H PRN Ayse Beasley APRN       • Patiromer Sorbitex Calcium (VELTASSA) 1 packet  8.4 g Oral Daily Alonso Ken MD   1 packet at 08/04/19 1023   • polyethylene glycol 3350 powder (packet)  17 g Oral Daily PRN Cici Owusu APRN       • raNITIdine (ZANTAC) tablet 150 mg  150 mg Oral QAM AC Cici Owusu APRN   150 mg at 08/04/19 1024   • sennosides-docusate sodium  (SENOKOT-S) 8.6-50 MG tablet 2 tablet  2 tablet Oral Nightly Cici Owusu APRN   2 tablet at 08/04/19 2145   • sertraline (ZOLOFT) tablet 100 mg  100 mg Oral Daily Cici Owusu APRN   100 mg at 08/04/19 1024   • sodium chloride 0.9 % flush 10 mL  10 mL Intravenous PRN Jose Rafael Ibanez MD       • sodium chloride 0.9 % flush 3 mL  3 mL Intravenous Q12H Ayse Beasley APRN   3 mL at 08/04/19 2147   • sodium chloride 0.9 % flush 3-10 mL  3-10 mL Intravenous PRN Ayse Beasley APRN       • sodium hypochlorite (HYSEPT) 0.25 % topical solution   Topical Q24H Cici Owusu APRN           Assessment/Plan   1. CARLOS on CKD2, improving and back to her usual BL of Cr 1.3.    Stable potassium.  Volume status fine  2. Left femur fracture SP ORIF 7/30.   3. DM2  4. HTN. BP improved  5. Anemia, acute on chronic . Blood loss.  Stabilizing  6. Delirium superimposed on dementia, better  7. Tremor, proved  8.  Thrombocytopenia, resolved      Plan  1.  Continue Veltassa  2.  Surveillance labs  3.  Rehab at some point      Licha Moss MD  08/05/19  8:03 AM

## 2019-08-05 NOTE — PROGRESS NOTES
"DAILY PROGRESS NOTE  [facility]    Patient Identification:  Name: Magdalena Jerry  Age: 89 y.o.  Sex: female  :  1926  MRN: [id number]         Primary Care Physician: Fly Sanchez DO    Subjective:  Interval History:  She is sleepy. Went in to A fib. Rate controlled.    Objective:    Scheduled Meds:    amLODIPine 10 mg Oral Q24H   apixaban 2.5 mg Oral Q12H   ceftriaxone 1 g Intravenous Q24H   fluticasone 2 spray Each Nare Daily   insulin lispro 0-7 Units Subcutaneous Q6H   ipratropium-albuterol 3 mL Nebulization TID   metoprolol tartrate 25 mg Oral Q8H   mirtazapine 15 mg Oral Nightly   Patiromer Sorbitex Calcium 8.4 g Oral Daily   raNITIdine 150 mg Oral QAM AC   sennosides-docusate sodium 2 tablet Oral Nightly   sertraline 100 mg Oral Daily   sodium chloride 3 mL Intravenous Q12H   sodium hypochlorite  Topical Q24H     Continuous Infusions:     Vital signs in last 24 hours:  Temp:  [98.1 °F (36.7 °C)-99 °F (37.2 °C)] 98.1 °F (36.7 °C)  Heart Rate:  [] 91  Resp:  [16-20] 16  BP: (117-177)/() 118/73    Intake/Output:    Intake/Output Summary (Last 24 hours) at 2019 1700  Last data filed at 2019 0400  Gross per 24 hour   Intake 240 ml   Output 1750 ml   Net -1510 ml       Exam:  /73 (BP Location: Right arm, Patient Position: Lying)   Pulse 91   Temp 98.1 °F (36.7 °C) (Oral)   Resp 16   Ht 170 cm (66.93\")   Wt 85 kg (187 lb 6.3 oz)   LMP  (LMP Unknown)   SpO2 97%   BMI 29.41 kg/m²     General Appearance:    Alert, cooperative, no distress   Head:    Normocephalic, without obvious abnormality, atraumatic   Eyes:       Throat:   Lips, tongue, gums normal   Neck:   Supple, symmetrical, trachea midline, no JVD   Lungs:     Clear to auscultation bilaterally, respirations unlabored   Chest Wall:    No tenderness or deformity    Heart:    irregular rate and rhythm, S1 and S2 normal, no murmur,no  Rub or gallop   Abdomen:     Soft, non-tender, bowel sounds " active, no masses, no organomegaly    Extremities:   Extremities normal, left leg surgical changes, no cyanosis or edema   Pulses:      Skin:   Skin is warm and dry,  no rashes or palpable lesions   Neurologic:   no focal deficits noted      Lab Results (last 72 hours)     Procedure Component Value Units Date/Time    POC Glucose Once [[id number]]  (Normal) Collected:  08/05/19 1650    Specimen:  Blood Updated:  08/05/19 1652     Glucose 124 mg/dL     POC Glucose Once [[id number]]  (Abnormal) Collected:  08/05/19 1316    Specimen:  Blood Updated:  08/05/19 1317     Glucose 158 mg/dL     Urine Culture - Urine, Urine, Catheter In/Out [[id number]]  (Abnormal) Collected:  08/04/19 1049    Specimen:  Urine, Catheter In/Out Updated:  08/05/19 1108     Urine Culture >100,000 CFU/mL Gram Negative Bacilli      >100,000 CFU/mL Gram Negative Bacilli    POC Glucose Once [[id number]]  (Normal) Collected:  08/05/19 0632    Specimen:  Blood Updated:  08/05/19 0634     Glucose 125 mg/dL     Renal Function Panel [[id number]]  (Abnormal) Collected:  08/05/19 0404    Specimen:  Blood from Arm, Right Updated:  08/05/19 0453     Glucose 124 mg/dL      BUN 44 mg/dL      Creatinine 0.89 mg/dL      Sodium 139 mmol/L      Potassium 4.1 mmol/L      Chloride 103 mmol/L      CO2 22.7 mmol/L      Calcium 8.7 mg/dL      Albumin 2.50 g/dL      Phosphorus 2.3 mg/dL      Anion Gap 13.3 mmol/L      BUN/Creatinine Ratio 49.4     eGFR Non African Amer 59 mL/min/1.73     Narrative:       GFR Normal >60  Chronic Kidney Disease <60  Kidney Failure <15    POC Glucose Once [[id number]]  (Abnormal) Collected:  08/05/19 0016    Specimen:  Blood Updated:  08/05/19 0018     Glucose 181 mg/dL     POC Glucose Once [[id number]]  (Abnormal) Collected:  08/04/19 1944    Specimen:  Blood Updated:  08/04/19 1945     Glucose 227 mg/dL     POC Glucose Once [[id number]]  (Normal) Collected:  08/04/19 1633    Specimen:  Blood Updated:  08/04/19 1635     Glucose 127 mg/dL      Hemoglobin & Hematocrit, Blood [[id number]]  (Abnormal) Collected:  08/04/19 1538    Specimen:  Blood Updated:  08/04/19 1547     Hemoglobin 8.7 g/dL      Hematocrit 28.2 %     POC Glucose Once [[id number]]  (Abnormal) Collected:  08/04/19 1134    Specimen:  Blood Updated:  08/04/19 1137     Glucose 180 mg/dL     Urinalysis With Culture If Indicated - Urine, Catheter In/Out [[id number]]  (Abnormal) Collected:  08/04/19 1049    Specimen:  Urine, Catheter In/Out Updated:  08/04/19 1136     Color, UA Yellow     Appearance, UA Cloudy     pH, UA 8.0     Specific Gravity, UA 1.017     Glucose, UA Negative     Ketones, UA Negative     Bilirubin, UA Negative     Blood, UA Moderate (2+)     Protein, UA 30 mg/dL (1+)     Leuk Esterase, UA Large (3+)     Nitrite, UA Negative     Urobilinogen, UA 0.2 E.U./dL    Urinalysis, Microscopic Only - Urine, Catheter In/Out [[id number]]  (Abnormal) Collected:  08/04/19 1049    Specimen:  Urine, Catheter In/Out Updated:  08/04/19 1136     RBC, UA 31-50 /HPF      WBC, UA Too Numerous to Count /HPF      Bacteria, UA 4+ /HPF      Squamous Epithelial Cells, UA 0-2 /HPF      Hyaline Casts, UA 7-12 /LPF      Methodology Automated Microscopy    POC Glucose Once [[id number]]  (Abnormal) Collected:  08/04/19 0603    Specimen:  Blood Updated:  08/04/19 0604     Glucose 136 mg/dL     Basic Metabolic Panel [[id number]]  (Abnormal) Collected:  08/04/19 0451    Specimen:  Blood from Arm, Right Updated:  08/04/19 0547     Glucose 143 mg/dL      BUN 51 mg/dL      Creatinine 1.18 mg/dL      Sodium 138 mmol/L      Potassium 4.8 mmol/L      Chloride 106 mmol/L      CO2 24.3 mmol/L      Calcium 8.4 mg/dL      eGFR Non African Amer 43 mL/min/1.73      BUN/Creatinine Ratio 43.2     Anion Gap 7.7 mmol/L     Narrative:       GFR Normal >60  Chronic Kidney Disease <60  Kidney Failure <15    CBC (No Diff) [[id number]]  (Abnormal) Collected:  08/04/19 0451    Specimen:  Blood from Arm, Right Updated:  08/04/19 0530      WBC 5.22 10*3/mm3      RBC 2.78 10*6/mm3      Hemoglobin 8.1 g/dL      Hematocrit 26.8 %      MCV 96.4 fL      MCH 29.1 pg      MCHC 30.2 g/dL      RDW 15.8 %      RDW-SD 56.2 fl      MPV 9.6 fL      Platelets 204 10*3/mm3     POC Glucose Once [[id number]]  (Abnormal) Collected:  08/04/19 0000    Specimen:  Blood Updated:  08/04/19 0011     Glucose 158 mg/dL     POC Glucose Once [[id number]]  (Abnormal) Collected:  08/03/19 2056    Specimen:  Blood Updated:  08/03/19 2104     Glucose 170 mg/dL     POC Glucose Once [[id number]]  (Abnormal) Collected:  08/03/19 1638    Specimen:  Blood Updated:  08/03/19 1640     Glucose 138 mg/dL     Hemoglobin & Hematocrit, Blood [[id number]]  (Abnormal) Collected:  08/03/19 1443    Specimen:  Blood Updated:  08/03/19 1502     Hemoglobin 8.5 g/dL      Hematocrit 28.1 %     POC Glucose Once [[id number]]  (Abnormal) Collected:  08/03/19 1151    Specimen:  Blood Updated:  08/03/19 1155     Glucose 157 mg/dL     POC Glucose Once [[id number]]  (Abnormal) Collected:  08/03/19 0601    Specimen:  Blood Updated:  08/03/19 0603     Glucose 174 mg/dL     Renal Function Panel [[id number]]  (Abnormal) Collected:  08/03/19 0429    Specimen:  Blood from Arm, Right Updated:  08/03/19 0505     Glucose 169 mg/dL      BUN 55 mg/dL      Creatinine 1.35 mg/dL      Sodium 140 mmol/L      Potassium 5.2 mmol/L      Chloride 106 mmol/L      CO2 21.7 mmol/L      Calcium 8.4 mg/dL      Albumin 2.60 g/dL      Phosphorus 3.0 mg/dL      Anion Gap 12.3 mmol/L      BUN/Creatinine Ratio 40.7     eGFR Non African Amer 37 mL/min/1.73     Narrative:       GFR Normal >60  Chronic Kidney Disease <60  Kidney Failure <15    Hemoglobin & Hematocrit, Blood [[id number]]  (Abnormal) Collected:  08/03/19 0429    Specimen:  Blood from Arm, Right Updated:  08/03/19 0442     Hemoglobin 8.3 g/dL      Hematocrit 27.1 %     POC Glucose Once [[id number]]  (Abnormal) Collected:  08/03/19 0059    Specimen:  Blood Updated:  08/03/19 0100      Glucose 151 mg/dL     POC Glucose Once [[id number]]  (Abnormal) Collected:  08/02/19 2040    Specimen:  Blood Updated:  08/02/19 2041     Glucose 134 mg/dL         Data Review:  Results from last 7 days   Lab Units 08/05/19  0404 08/04/19  0451 08/03/19  0429   SODIUM mmol/L 139 138 140   POTASSIUM mmol/L 4.1 4.8 5.2   CHLORIDE mmol/L 103 106 106   CO2 mmol/L 22.7 24.3 21.7*   BUN mg/dL 44* 51* 55*   CREATININE mg/dL 0.89 1.18* 1.35*   GLUCOSE mg/dL 124* 143* 169*   CALCIUM mg/dL 8.7 8.4 8.4     Results from last 7 days   Lab Units 08/04/19  1538 08/04/19  0451 08/03/19  1443  08/02/19  0416 08/01/19  0333   WBC 10*3/mm3  --  5.22  --   --  8.01 11.08*   HEMOGLOBIN g/dL 8.7* 8.1* 8.5*   < > 7.3* 8.4*   HEMATOCRIT % 28.2* 26.8* 28.1*   < > 24.4* 27.9*   PLATELETS 10*3/mm3  --  204  --   --  156 129*    < > = values in this interval not displayed.             No results found for: TROPONINT      Results from last 7 days   Lab Units 08/02/19  0416 08/01/19  0333   ALK PHOS U/L 93 97   BILIRUBIN mg/dL 0.4 0.3   ALT (SGPT) U/L <5 <5   AST (SGOT) U/L 28 42*             Glucose   Date/Time Value Ref Range Status   08/05/2019 1650 124 70 - 130 mg/dL Final   08/05/2019 1316 158 (H) 70 - 130 mg/dL Final   08/05/2019 0632 125 70 - 130 mg/dL Final   08/05/2019 0016 181 (H) 70 - 130 mg/dL Final   08/04/2019 1944 227 (H) 70 - 130 mg/dL Final   08/04/2019 1633 127 70 - 130 mg/dL Final   08/04/2019 1134 180 (H) 70 - 130 mg/dL Final   08/04/2019 0603 136 (H) 70 - 130 mg/dL Final           Past Medical History:   Diagnosis Date   • Adjustment disorder with mixed anxiety and depressed mood    • Anemia    • Anxiety    • Atrophy of hand muscles     LEFT HAND   • Benign familial tremor    • Carpal tunnel syndrome    • Cataract    • Chronic rhinosinusitis    • Depression    • Diabetes mellitus (CMS/HCC)    • Dyslipidemia    • Esophagitis, reflux    • Fracture of hip (CMS/HCC)    • Frequent falls    • GERD (gastroesophageal reflux disease)     • Hand pain    • Hearing loss    • Hip pain    • Hyperlipidemia    • Hypertension    • Impacted cerumen    • Insomnia    • Knee pain    • Limb pain    • Loss of hearing    • Low back pain    • Lower extremity weakness    • Lumbar canal stenosis    • Osteoporosis, senile    • Piriformis syndrome    • Renal insufficiency 2006   • Sensorineural hearing loss    • Stroke (CMS/HCC) 2004   • Tinea pedis    • Vitamin D deficiency        Assessment:  Active Hospital Problems    Diagnosis  POA   • **Displaced oblique fracture of shaft of left femur, initial encounter for closed fracture (CMS/HCC) [S72.332A]  Yes   • Atrial fibrillation (CMS/HCC) [I48.91]  Unknown   • UTI (urinary tract infection) [N39.0]  Unknown   • Deep vein thrombosis (DVT) of lower extremity (CMS/HCC) [I82.409]  Unknown   • Thrombocytopenia (CMS/HCC) [D69.6]  Unknown   • Hyperkalemia [E87.5]  Unknown   • Acute kidney injury superimposed on chronic kidney disease (CMS/HCC) [N17.9, N18.9]  Unknown   • Acute respiratory failure with hypoxia (CMS/HCC) [J96.01]  Unknown   • Hx of MRSA (methicillin resistant Staphylococcus aureus) infection [A49.02]  Yes   • Gastroesophageal reflux disease with esophagitis [K21.0]  Yes   • Type 2 diabetes mellitus with diabetic polyneuropathy, without long-term current use of insulin (CMS/HCC) [E11.42]  Yes   • Benign essential hypertension [I10]  Yes   • Anemia due to chronic kidney disease [N18.9, D63.1]  Yes      Resolved Hospital Problems   No resolved problems to display.       Plan:  Continue RX. Rate control, anticoagulation, antibiotics and await cultures.  Follow up labs    Amadou Lawler MD  8/5/2019  5:00 PM

## 2019-08-05 NOTE — PROGRESS NOTES
"Patient Name: Magdalena Jerry  :1926  89 y.o.      Patient Care Team:  Fly Sanchez DO as PCP - General  Marisol Kapadia APRN as PCP - Claims Attributed    Interval History:   S/p ORIF left femur    Subjective:  Following for     Objective   Vital Signs  Temp:  [97.9 °F (36.6 °C)-99 °F (37.2 °C)] 99 °F (37.2 °C)  Heart Rate:  [] 123  Resp:  [16-20] 18  BP: (117-177)/() 117/85    Intake/Output Summary (Last 24 hours) at 2019 1319  Last data filed at 2019 0400  Gross per 24 hour   Intake 240 ml   Output 1750 ml   Net -1510 ml     Flowsheet Rows      First Filed Value   Admission Height  170.2 cm (67\") Documented at 2019 0354   Admission Weight  82.1 kg (181 lb) Documented at 2019 0408          Physical Exam:   General Appearance:    Sleepy. No distress   Lungs:     Clear to auscultation.  Normal respiratory effort and rate.      Heart:    Tachy and irreg irreg.     Chest Wall:    No abnormalities observed   Abdomen:     Soft, nontender, positive bowel sounds.     Extremities:   no cyanosis, clubbing or edema.  No marked joint deformities     Results Review:    Results from last 7 days   Lab Units 19  0404   SODIUM mmol/L 139   POTASSIUM mmol/L 4.1   CHLORIDE mmol/L 103   CO2 mmol/L 22.7   BUN mg/dL 44*   CREATININE mg/dL 0.89   GLUCOSE mg/dL 124*   CALCIUM mg/dL 8.7         Results from last 7 days   Lab Units 19  1538 19  0451   WBC 10*3/mm3  --  5.22   HEMOGLOBIN g/dL 8.7* 8.1*   HEMATOCRIT % 28.2* 26.8*   PLATELETS 10*3/mm3  --  204                         Medication Review:     amLODIPine 10 mg Oral Q24H   apixaban 2.5 mg Oral Q12H   ceftriaxone 1 g Intravenous Q24H   fluticasone 2 spray Each Nare Daily   insulin lispro 0-7 Units Subcutaneous Q6H   ipratropium-albuterol 3 mL Nebulization TID   mirtazapine 15 mg Oral Nightly   Patiromer Sorbitex Calcium 8.4 g Oral Daily   raNITIdine 150 mg Oral QAM AC   sennosides-docusate sodium 2 tablet Oral " Nightly   sertraline 100 mg Oral Daily   sodium chloride 3 mL Intravenous Q12H   sodium hypochlorite  Topical Q24H             Assessment/Plan     1.  New onset atrial fibrillation with tachycardia. Chads-Vasc 7 so there is 12.5% risk of stroke. I agree with low dose Eliquis. Start metoprolol. Decrease or stop amlodipine if BP limits ability to uptitrate the BB  2.  History of fall with left femur fracture  3.  Hypertension  4.  Acute on chronic kidney disease.  Nephrology following.  5.  History of stroke  6.  Diabetes  7.  Acute and chronic lower extremity DVTs    Ayse Alves MD, [facility]  08/05/19  1:19 PM

## 2019-08-06 LAB
ANION GAP SERPL CALCULATED.3IONS-SCNC: 9.8 MMOL/L (ref 5–15)
BACTERIA SPEC AEROBE CULT: ABNORMAL
BACTERIA SPEC AEROBE CULT: ABNORMAL
BASOPHILS # BLD AUTO: 0.03 10*3/MM3 (ref 0–0.2)
BASOPHILS NFR BLD AUTO: 0.5 % (ref 0–1.5)
BUN BLD-MCNC: 53 MG/DL (ref 8–23)
BUN/CREAT SERPL: 39.6 (ref 7–25)
CALCIUM SPEC-SCNC: 8.5 MG/DL (ref 8.2–9.6)
CHLORIDE SERPL-SCNC: 107 MMOL/L (ref 98–107)
CO2 SERPL-SCNC: 25.2 MMOL/L (ref 22–29)
CREAT BLD-MCNC: 1.34 MG/DL (ref 0.57–1)
DEPRECATED RDW RBC AUTO: 55.4 FL (ref 37–54)
EOSINOPHIL # BLD AUTO: 0.21 10*3/MM3 (ref 0–0.4)
EOSINOPHIL NFR BLD AUTO: 3.7 % (ref 0.3–6.2)
ERYTHROCYTE [DISTWIDTH] IN BLOOD BY AUTOMATED COUNT: 15.3 % (ref 12.3–15.4)
GFR SERPL CREATININE-BSD FRML MDRD: 37 ML/MIN/1.73
GLUCOSE BLD-MCNC: 143 MG/DL (ref 65–99)
GLUCOSE BLDC GLUCOMTR-MCNC: 132 MG/DL (ref 70–130)
GLUCOSE BLDC GLUCOMTR-MCNC: 166 MG/DL (ref 70–130)
GLUCOSE BLDC GLUCOMTR-MCNC: 185 MG/DL (ref 70–130)
GLUCOSE BLDC GLUCOMTR-MCNC: 205 MG/DL (ref 70–130)
HCT VFR BLD AUTO: 27.2 % (ref 34–46.6)
HGB BLD-MCNC: 8 G/DL (ref 12–15.9)
IMM GRANULOCYTES # BLD AUTO: 0.12 10*3/MM3 (ref 0–0.05)
IMM GRANULOCYTES NFR BLD AUTO: 2.1 % (ref 0–0.5)
LYMPHOCYTES # BLD AUTO: 1.17 10*3/MM3 (ref 0.7–3.1)
LYMPHOCYTES NFR BLD AUTO: 20.5 % (ref 19.6–45.3)
MCH RBC QN AUTO: 28.7 PG (ref 26.6–33)
MCHC RBC AUTO-ENTMCNC: 29.4 G/DL (ref 31.5–35.7)
MCV RBC AUTO: 97.5 FL (ref 79–97)
MONOCYTES # BLD AUTO: 0.72 10*3/MM3 (ref 0.1–0.9)
MONOCYTES NFR BLD AUTO: 12.6 % (ref 5–12)
NEUTROPHILS # BLD AUTO: 3.47 10*3/MM3 (ref 1.7–7)
NEUTROPHILS NFR BLD AUTO: 60.6 % (ref 42.7–76)
NRBC BLD AUTO-RTO: 0 /100 WBC (ref 0–0.2)
PLATELET # BLD AUTO: 276 10*3/MM3 (ref 140–450)
PMV BLD AUTO: 9.6 FL (ref 6–12)
POTASSIUM BLD-SCNC: 4.4 MMOL/L (ref 3.5–5.2)
RBC # BLD AUTO: 2.79 10*6/MM3 (ref 3.77–5.28)
SODIUM BLD-SCNC: 142 MMOL/L (ref 136–145)
WBC NRBC COR # BLD: 5.72 10*3/MM3 (ref 3.4–10.8)

## 2019-08-06 PROCEDURE — 94799 UNLISTED PULMONARY SVC/PX: CPT

## 2019-08-06 PROCEDURE — 25010000002 CEFTRIAXONE PER 250 MG: Performed by: NURSE PRACTITIONER

## 2019-08-06 PROCEDURE — 97110 THERAPEUTIC EXERCISES: CPT

## 2019-08-06 PROCEDURE — 82962 GLUCOSE BLOOD TEST: CPT

## 2019-08-06 PROCEDURE — 85025 COMPLETE CBC W/AUTO DIFF WBC: CPT | Performed by: HOSPITALIST

## 2019-08-06 PROCEDURE — 63710000001 INSULIN LISPRO (HUMAN) PER 5 UNITS: Performed by: INTERNAL MEDICINE

## 2019-08-06 PROCEDURE — 80048 BASIC METABOLIC PNL TOTAL CA: CPT | Performed by: HOSPITALIST

## 2019-08-06 PROCEDURE — 99232 SBSQ HOSP IP/OBS MODERATE 35: CPT | Performed by: NURSE PRACTITIONER

## 2019-08-06 RX ORDER — MIRTAZAPINE 15 MG/1
15 TABLET, FILM COATED ORAL NIGHTLY PRN
Status: DISCONTINUED | OUTPATIENT
Start: 2019-08-06 | End: 2019-08-07 | Stop reason: HOSPADM

## 2019-08-06 RX ORDER — LACTULOSE 10 G/15ML
10 SOLUTION ORAL DAILY
Status: DISCONTINUED | OUTPATIENT
Start: 2019-08-06 | End: 2019-08-07 | Stop reason: HOSPADM

## 2019-08-06 RX ADMIN — INSULIN LISPRO 3 UNITS: 100 INJECTION, SOLUTION INTRAVENOUS; SUBCUTANEOUS at 23:12

## 2019-08-06 RX ADMIN — SODIUM CHLORIDE, PRESERVATIVE FREE 3 ML: 5 INJECTION INTRAVENOUS at 10:30

## 2019-08-06 RX ADMIN — METOPROLOL TARTRATE 25 MG: 25 TABLET ORAL at 15:41

## 2019-08-06 RX ADMIN — PATIROMER 1 PACKET: 8.4 POWDER, FOR SUSPENSION ORAL at 10:28

## 2019-08-06 RX ADMIN — HYOSCYAMINE SULFATE: 16 SOLUTION at 10:42

## 2019-08-06 RX ADMIN — IPRATROPIUM BROMIDE AND ALBUTEROL SULFATE 3 ML: 2.5; .5 SOLUTION RESPIRATORY (INHALATION) at 08:29

## 2019-08-06 RX ADMIN — APIXABAN 2.5 MG: 2.5 TABLET, FILM COATED ORAL at 10:29

## 2019-08-06 RX ADMIN — METOPROLOL TARTRATE 25 MG: 25 TABLET ORAL at 23:07

## 2019-08-06 RX ADMIN — SERTRALINE HYDROCHLORIDE 100 MG: 100 TABLET, FILM COATED ORAL at 10:41

## 2019-08-06 RX ADMIN — RANITIDINE 150 MG: 150 TABLET ORAL at 10:41

## 2019-08-06 RX ADMIN — INSULIN LISPRO 2 UNITS: 100 INJECTION, SOLUTION INTRAVENOUS; SUBCUTANEOUS at 13:03

## 2019-08-06 RX ADMIN — IPRATROPIUM BROMIDE AND ALBUTEROL SULFATE 3 ML: 2.5; .5 SOLUTION RESPIRATORY (INHALATION) at 16:37

## 2019-08-06 RX ADMIN — LACTULOSE 10 G: 10 SOLUTION ORAL at 17:58

## 2019-08-06 RX ADMIN — AMLODIPINE BESYLATE 10 MG: 10 TABLET ORAL at 10:29

## 2019-08-06 RX ADMIN — SENNOSIDES AND DOCUSATE SODIUM 2 TABLET: 8.6; 5 TABLET ORAL at 20:50

## 2019-08-06 RX ADMIN — CEFTRIAXONE SODIUM 1 G: 1 INJECTION, SOLUTION INTRAVENOUS at 15:41

## 2019-08-06 RX ADMIN — APIXABAN 2.5 MG: 2.5 TABLET, FILM COATED ORAL at 20:50

## 2019-08-06 RX ADMIN — SODIUM CHLORIDE, PRESERVATIVE FREE 3 ML: 5 INJECTION INTRAVENOUS at 20:51

## 2019-08-06 RX ADMIN — METOPROLOL TARTRATE 25 MG: 25 TABLET ORAL at 05:50

## 2019-08-06 RX ADMIN — HYDROCODONE BITARTRATE AND ACETAMINOPHEN 1 TABLET: 5; 325 TABLET ORAL at 20:50

## 2019-08-06 RX ADMIN — FLUTICASONE PROPIONATE 2 SPRAY: 50 SPRAY, METERED NASAL at 10:29

## 2019-08-06 RX ADMIN — IPRATROPIUM BROMIDE AND ALBUTEROL SULFATE 3 ML: 2.5; .5 SOLUTION RESPIRATORY (INHALATION) at 21:01

## 2019-08-06 RX ADMIN — INSULIN LISPRO 2 UNITS: 100 INJECTION, SOLUTION INTRAVENOUS; SUBCUTANEOUS at 17:58

## 2019-08-06 NOTE — PROGRESS NOTES
"DAILY PROGRESS NOTE  [facility]    Patient Identification:  Name: Magdalena Jerry  Age: 89 y.o.  Sex: female  :  1926  MRN: [id number]         Primary Care Physician: Fly Sanchez DO    Subjective:  Interval History:  She is sleepy. Back in sinus rhythm..    Objective:    Scheduled Meds:    amLODIPine 10 mg Oral Q24H   apixaban 2.5 mg Oral Q12H   ceftriaxone 1 g Intravenous Q24H   fluticasone 2 spray Each Nare Daily   insulin lispro 0-7 Units Subcutaneous Q6H   ipratropium-albuterol 3 mL Nebulization TID   lactulose 10 g Oral Daily   metoprolol tartrate 25 mg Oral Q8H   Patiromer Sorbitex Calcium 8.4 g Oral Daily   raNITIdine 150 mg Oral QAM AC   sennosides-docusate sodium 2 tablet Oral Nightly   sertraline 100 mg Oral Daily   sodium chloride 3 mL Intravenous Q12H   sodium hypochlorite  Topical Q24H     Continuous Infusions:     Vital signs in last 24 hours:  Temp:  [98.4 °F (36.9 °C)-98.6 °F (37 °C)] 98.5 °F (36.9 °C)  Heart Rate:  [] 67  Resp:  [16-18] 18  BP: ()/(55-71) 136/58    Intake/Output:  No intake or output data in the 24 hours ending 19 1605    Exam:  /58 (BP Location: Right arm, Patient Position: Lying)   Pulse 67   Temp 98.5 °F (36.9 °C) (Oral)   Resp 18   Ht 170 cm (66.93\")   Wt 83.4 kg (183 lb 14.4 oz)   LMP  (LMP Unknown)   SpO2 95%   BMI 28.86 kg/m²     General Appearance:    Alert, cooperative, no distress   Head:    Normocephalic, without obvious abnormality, atraumatic   Eyes:       Throat:   Lips, tongue, gums normal   Neck:   Supple, symmetrical, trachea midline, no JVD   Lungs:     Clear to auscultation bilaterally, respirations unlabored   Chest Wall:    No tenderness or deformity    Heart:    irregular rate and rhythm, S1 and S2 normal, no murmur,no  Rub or gallop   Abdomen:     Soft, non-tender, bowel sounds active, no masses, no organomegaly    Extremities:   Extremities normal, left leg surgical changes, no cyanosis or " edema   Pulses:      Skin:   Skin is warm and dry,  no rashes or palpable lesions   Neurologic:   no focal deficits noted      Lab Results (last 72 hours)     Procedure Component Value Units Date/Time    POC Glucose Once [[id number]]  (Normal) Collected:  08/05/19 1650    Specimen:  Blood Updated:  08/05/19 1652     Glucose 124 mg/dL     POC Glucose Once [[id number]]  (Abnormal) Collected:  08/05/19 1316    Specimen:  Blood Updated:  08/05/19 1317     Glucose 158 mg/dL     Urine Culture - Urine, Urine, Catheter In/Out [[id number]]  (Abnormal) Collected:  08/04/19 1049    Specimen:  Urine, Catheter In/Out Updated:  08/05/19 1108     Urine Culture >100,000 CFU/mL Gram Negative Bacilli      >100,000 CFU/mL Gram Negative Bacilli    POC Glucose Once [[id number]]  (Normal) Collected:  08/05/19 0632    Specimen:  Blood Updated:  08/05/19 0634     Glucose 125 mg/dL     Renal Function Panel [[id number]]  (Abnormal) Collected:  08/05/19 0404    Specimen:  Blood from Arm, Right Updated:  08/05/19 0453     Glucose 124 mg/dL      BUN 44 mg/dL      Creatinine 0.89 mg/dL      Sodium 139 mmol/L      Potassium 4.1 mmol/L      Chloride 103 mmol/L      CO2 22.7 mmol/L      Calcium 8.7 mg/dL      Albumin 2.50 g/dL      Phosphorus 2.3 mg/dL      Anion Gap 13.3 mmol/L      BUN/Creatinine Ratio 49.4     eGFR Non African Amer 59 mL/min/1.73     Narrative:       GFR Normal >60  Chronic Kidney Disease <60  Kidney Failure <15    POC Glucose Once [[id number]]  (Abnormal) Collected:  08/05/19 0016    Specimen:  Blood Updated:  08/05/19 0018     Glucose 181 mg/dL     POC Glucose Once [[id number]]  (Abnormal) Collected:  08/04/19 1944    Specimen:  Blood Updated:  08/04/19 1945     Glucose 227 mg/dL     POC Glucose Once [[id number]]  (Normal) Collected:  08/04/19 1633    Specimen:  Blood Updated:  08/04/19 1635     Glucose 127 mg/dL     Hemoglobin & Hematocrit, Blood [[id number]]  (Abnormal) Collected:  08/04/19 1538    Specimen:  Blood Updated:  08/04/19  1547     Hemoglobin 8.7 g/dL      Hematocrit 28.2 %     POC Glucose Once [[id number]]  (Abnormal) Collected:  08/04/19 1134    Specimen:  Blood Updated:  08/04/19 1137     Glucose 180 mg/dL     Urinalysis With Culture If Indicated - Urine, Catheter In/Out [[id number]]  (Abnormal) Collected:  08/04/19 1049    Specimen:  Urine, Catheter In/Out Updated:  08/04/19 1136     Color, UA Yellow     Appearance, UA Cloudy     pH, UA 8.0     Specific Gravity, UA 1.017     Glucose, UA Negative     Ketones, UA Negative     Bilirubin, UA Negative     Blood, UA Moderate (2+)     Protein, UA 30 mg/dL (1+)     Leuk Esterase, UA Large (3+)     Nitrite, UA Negative     Urobilinogen, UA 0.2 E.U./dL    Urinalysis, Microscopic Only - Urine, Catheter In/Out [[id number]]  (Abnormal) Collected:  08/04/19 1049    Specimen:  Urine, Catheter In/Out Updated:  08/04/19 1136     RBC, UA 31-50 /HPF      WBC, UA Too Numerous to Count /HPF      Bacteria, UA 4+ /HPF      Squamous Epithelial Cells, UA 0-2 /HPF      Hyaline Casts, UA 7-12 /LPF      Methodology Automated Microscopy    POC Glucose Once [[id number]]  (Abnormal) Collected:  08/04/19 0603    Specimen:  Blood Updated:  08/04/19 0604     Glucose 136 mg/dL     Basic Metabolic Panel [[id number]]  (Abnormal) Collected:  08/04/19 0451    Specimen:  Blood from Arm, Right Updated:  08/04/19 0547     Glucose 143 mg/dL      BUN 51 mg/dL      Creatinine 1.18 mg/dL      Sodium 138 mmol/L      Potassium 4.8 mmol/L      Chloride 106 mmol/L      CO2 24.3 mmol/L      Calcium 8.4 mg/dL      eGFR Non African Amer 43 mL/min/1.73      BUN/Creatinine Ratio 43.2     Anion Gap 7.7 mmol/L     Narrative:       GFR Normal >60  Chronic Kidney Disease <60  Kidney Failure <15    CBC (No Diff) [[id number]]  (Abnormal) Collected:  08/04/19 0451    Specimen:  Blood from Arm, Right Updated:  08/04/19 0530     WBC 5.22 10*3/mm3      RBC 2.78 10*6/mm3      Hemoglobin 8.1 g/dL      Hematocrit 26.8 %      MCV 96.4 fL      MCH 29.1 pg       MCHC 30.2 g/dL      RDW 15.8 %      RDW-SD 56.2 fl      MPV 9.6 fL      Platelets 204 10*3/mm3     POC Glucose Once [[id number]]  (Abnormal) Collected:  08/04/19 0000    Specimen:  Blood Updated:  08/04/19 0011     Glucose 158 mg/dL     POC Glucose Once [[id number]]  (Abnormal) Collected:  08/03/19 2056    Specimen:  Blood Updated:  08/03/19 2104     Glucose 170 mg/dL     POC Glucose Once [[id number]]  (Abnormal) Collected:  08/03/19 1638    Specimen:  Blood Updated:  08/03/19 1640     Glucose 138 mg/dL     Hemoglobin & Hematocrit, Blood [[id number]]  (Abnormal) Collected:  08/03/19 1443    Specimen:  Blood Updated:  08/03/19 1502     Hemoglobin 8.5 g/dL      Hematocrit 28.1 %     POC Glucose Once [[id number]]  (Abnormal) Collected:  08/03/19 1151    Specimen:  Blood Updated:  08/03/19 1155     Glucose 157 mg/dL     POC Glucose Once [[id number]]  (Abnormal) Collected:  08/03/19 0601    Specimen:  Blood Updated:  08/03/19 0603     Glucose 174 mg/dL     Renal Function Panel [[id number]]  (Abnormal) Collected:  08/03/19 0429    Specimen:  Blood from Arm, Right Updated:  08/03/19 0505     Glucose 169 mg/dL      BUN 55 mg/dL      Creatinine 1.35 mg/dL      Sodium 140 mmol/L      Potassium 5.2 mmol/L      Chloride 106 mmol/L      CO2 21.7 mmol/L      Calcium 8.4 mg/dL      Albumin 2.60 g/dL      Phosphorus 3.0 mg/dL      Anion Gap 12.3 mmol/L      BUN/Creatinine Ratio 40.7     eGFR Non African Amer 37 mL/min/1.73     Narrative:       GFR Normal >60  Chronic Kidney Disease <60  Kidney Failure <15    Hemoglobin & Hematocrit, Blood [[id number]]  (Abnormal) Collected:  08/03/19 0429    Specimen:  Blood from Arm, Right Updated:  08/03/19 0442     Hemoglobin 8.3 g/dL      Hematocrit 27.1 %     POC Glucose Once [[id number]]  (Abnormal) Collected:  08/03/19 0059    Specimen:  Blood Updated:  08/03/19 0100     Glucose 151 mg/dL     POC Glucose Once [[id number]]  (Abnormal) Collected:  08/02/19 2040    Specimen:  Blood Updated:   08/02/19 2041     Glucose 134 mg/dL         Data Review:  Results from last 7 days   Lab Units 08/06/19  0631 08/05/19  0404 08/04/19  0451   SODIUM mmol/L 142 139 138   POTASSIUM mmol/L 4.4 4.1 4.8   CHLORIDE mmol/L 107 103 106   CO2 mmol/L 25.2 22.7 24.3   BUN mg/dL 53* 44* 51*   CREATININE mg/dL 1.34* 0.89 1.18*   GLUCOSE mg/dL 143* 124* 143*   CALCIUM mg/dL 8.5 8.7 8.4     Results from last 7 days   Lab Units 08/06/19  0631 08/04/19  1538 08/04/19  0451  08/02/19  0416   WBC 10*3/mm3 5.72  --  5.22  --  8.01   HEMOGLOBIN g/dL 8.0* 8.7* 8.1*   < > 7.3*   HEMATOCRIT % 27.2* 28.2* 26.8*   < > 24.4*   PLATELETS 10*3/mm3 276  --  204  --  156    < > = values in this interval not displayed.             No results found for: TROPONINT      Results from last 7 days   Lab Units 08/02/19  0416 08/01/19  0333   ALK PHOS U/L 93 97   BILIRUBIN mg/dL 0.4 0.3   ALT (SGPT) U/L <5 <5   AST (SGOT) U/L 28 42*             Glucose   Date/Time Value Ref Range Status   08/06/2019 1118 166 (H) 70 - 130 mg/dL Final   08/06/2019 0626 132 (H) 70 - 130 mg/dL Final   08/05/2019 2111 272 (H) 70 - 130 mg/dL Final   08/05/2019 1650 124 70 - 130 mg/dL Final   08/05/2019 1316 158 (H) 70 - 130 mg/dL Final   08/05/2019 0632 125 70 - 130 mg/dL Final   08/05/2019 0016 181 (H) 70 - 130 mg/dL Final   08/04/2019 1944 227 (H) 70 - 130 mg/dL Final           Past Medical History:   Diagnosis Date   • Adjustment disorder with mixed anxiety and depressed mood    • Anemia    • Anxiety    • Atrophy of hand muscles     LEFT HAND   • Benign familial tremor    • Carpal tunnel syndrome    • Cataract    • Chronic rhinosinusitis    • Depression    • Diabetes mellitus (CMS/HCC)    • Dyslipidemia    • Esophagitis, reflux    • Fracture of hip (CMS/HCC)    • Frequent falls    • GERD (gastroesophageal reflux disease)    • Hand pain    • Hearing loss    • Hip pain    • Hyperlipidemia    • Hypertension    • Impacted cerumen    • Insomnia    • Knee pain    • Limb pain    •  Loss of hearing    • Low back pain    • Lower extremity weakness    • Lumbar canal stenosis    • Osteoporosis, senile    • Piriformis syndrome    • Renal insufficiency 2006   • Sensorineural hearing loss    • Stroke (CMS/HCC) 2004   • Tinea pedis    • Vitamin D deficiency        Assessment:  Active Hospital Problems    Diagnosis  POA   • **Displaced oblique fracture of shaft of left femur, initial encounter for closed fracture (CMS/HCC) [S72.332A]  Yes   • Atrial fibrillation (CMS/HCC) [I48.91]  Unknown   • UTI (urinary tract infection) [N39.0]  Unknown   • Deep vein thrombosis (DVT) of lower extremity (CMS/HCC) [I82.409]  Unknown   • Thrombocytopenia (CMS/HCC) [D69.6]  Unknown   • Hyperkalemia [E87.5]  Unknown   • Acute kidney injury superimposed on chronic kidney disease (CMS/HCC) [N17.9, N18.9]  Unknown   • Acute respiratory failure with hypoxia (CMS/HCC) [J96.01]  Unknown   • Hx of MRSA (methicillin resistant Staphylococcus aureus) infection [A49.02]  Yes   • Gastroesophageal reflux disease with esophagitis [K21.0]  Yes   • Type 2 diabetes mellitus with diabetic polyneuropathy, without long-term current use of insulin (CMS/HCC) [E11.42]  Yes   • Benign essential hypertension [I10]  Yes   • Anemia due to chronic kidney disease [N18.9, D63.1]  Yes      Resolved Hospital Problems   No resolved problems to display.       Plan:  Continue RX. Rate control, anticoagulation, finish antibiotics for uti.  Follow up labs    Amadou Lawler MD  8/6/2019  4:05 PM

## 2019-08-06 NOTE — THERAPY TREATMENT NOTE
Acute Care - Physical Therapy Treatment Note  [facility]     Patient Name: Magdalena Jerry  : 1926  MRN: [id number]  Today's Date: 2019  Onset of Illness/Injury or Date of Surgery: 19          Admit Date: 2019    Visit Dx:    ICD-10-CM ICD-9-CM   1. Displaced oblique fracture of shaft of left femur, initial encounter for closed fracture (CMS/[facility]) S72.332A 821.01     Patient Active Problem List   Diagnosis   • Adjustment disorder with mixed anxiety and depressed mood   • Anemia due to chronic kidney disease   • Benign essential hypertension   • Hereditary essential tremor   • Type 2 diabetes mellitus with diabetic polyneuropathy, without long-term current use of insulin (CMS/[facility])   • Dry skin dermatitis   • Dyslipidemia   • Gastroesophageal reflux disease with esophagitis   • Pain of hand   • Hearing loss   • Arthralgia of hip   • Impacted cerumen   • Pruritus   • Knee pain   • Pain in extremity   • Chronic low back pain   • Weakness of lower extremity   • Spinal stenosis of lumbar region   • Senile osteoporosis   • Piriformis syndrome   • Primary insomnia   • Tinea pedis   • Vitamin D deficiency   • Left leg cellulitis   • CKD (chronic kidney disease) stage 3, GFR 30-59 ml/min (CMS/[facility])   • Allergic rhinitis   • Pneumonia due to influenza A virus   • Hx of MRSA (methicillin resistant Staphylococcus aureus) infection   • Bacteria in urine   • Cellulitis of lower extremity   • Chronic venous stasis   • Anemia of chronic disease   • Displaced oblique fracture of shaft of left femur, initial encounter for closed fracture (CMS/[facility])   • Acute kidney injury superimposed on chronic kidney disease (CMS/[facility])   • Acute respiratory failure with hypoxia (CMS/[facility])   • Hyperkalemia   • Thrombocytopenia (CMS/[facility])   • Atrial fibrillation (CMS/[facility])   • UTI (urinary tract infection)   • Deep vein thrombosis (DVT) of lower extremity (CMS/[facility])       Therapy Treatment    Rehabilitation Treatment Summary      Row Name 08/06/19 1635             Treatment Time/Intention    Discipline  physical therapist  -PC      Document Type  therapy note (daily note)  -PC      Subjective Information  complains of;pain  -PC      Mode of Treatment  physical therapy  -PC      Patient/Family Observations  pt is in bed, no acute distress at rest, anxious with inc pain with mvmt  -PC      Therapy Frequency (PT Clinical Impression)  daily  -PC      Patient Effort  good  -PC      Existing Precautions/Restrictions  fall;non-weight bearing;left  -PC      Recorded by [PC] Kait Caldwell, PT 08/06/19 1641      Row Name 08/06/19 1635             Cognitive Assessment/Intervention- PT/OT    Orientation Status (Cognition)  oriented to;person;place  -PC      Follows Commands (Cognition)  follows one step commands;50-74% accuracy;increased processing time needed;repetition of directions required  -PC      Safety Deficit (Cognitive)  moderate deficit;insight into deficits/self awareness  -PC      Recorded by [PC] Kait Caldwell, PT 08/06/19 1641      Row Name 08/06/19 1635             Bed Mobility Assessment/Treatment    Supine-Sit [place] (Bed Mobility)  maximum assist (25% patient effort);2 person assist  -PC      Sit-Supine [place] (Bed Mobility)  maximum assist (25% patient effort);2 person assist  -PC      Assistive Device (Bed Mobility)  bed rails;head of bed elevated;draw sheet  -PC      Comment (Bed Mobility)  pt did initiate moving legs over to side of bed  -PC      Recorded by [PC] Kait Caldwell, PT 08/06/19 1641      Row Name 08/06/19 1635             Transfer Assessment/Treatment    Comment (Transfers)  pt with mild confusion, did not attempt sit to stand, pt would be unable to maintain NWB status  -PC      Recorded by [PC] Kait Caldwell, PT 08/06/19 1643      Row Name 08/06/19 1635             Therapeutic Exercise    Comment (Therapeutic Exercise)  AP, SAQ, hip abd  -PC      Recorded by [PC] Kait Caldwell, PT 08/06/19 1643       Row Name 08/06/19 1635             Positioning and Restraints    Pre-Treatment Position  in bed  -PC      Post Treatment Position  bed  -PC      In Bed  supine;call light within reach;encouraged to call for assist;exit alarm on;with family/caregiver  -PC      Recorded by [PC] Kait Caldwell, PT 08/06/19 1643      Row Name 08/06/19 1635             Pain Scale: Numbers Pre/Post-Treatment    Pain Location - Side  Left  -PC      Pain Location - Orientation  lower  -PC      Pain Location  extremity  -PC      Recorded by [PC] Kait Caldwell, PT 08/06/19 1641      Row Name 08/06/19 1635             Pain Scale: Word Pre/Post-Treatment    Pain: Word Scale, Pretreatment  6 - moderate-severe pain  -PC      Pain: Word Scale, Post-Treatment  6 - moderate-severe pain  -PC2      Recorded by [PC] Kait Caldwell, PT 08/06/19 1643  [PC2] Kait Caldwell, PT 08/06/19 1641      Row Name                Wound 07/30/19 1647 Left leg incision    Wound - Properties Group Date first assessed: 07/30/19 [MC] Time first assessed: 1647 [MC] Side: Left [MC] Location: leg [MC] Type: incision [MC] Recorded by:  [MC] Justyna Andre RN 07/30/19 1647    Row Name 08/06/19 1635             Plan of Care Review    Plan of Care Reviewed With  patient  -PC      Recorded by [PC] Kait Caldwell, PT 08/06/19 1643      Row Name 08/06/19 1635             Outcome Summary/Treatment Plan (PT)    Anticipated Discharge Disposition (PT)  skilled nursing facility  -PC      Recorded by [PC] Kait Caldwell, PT 08/06/19 1641        User Key  (r) = Recorded By, (t) = Taken By, (c) = Cosigned By    Initials Name Effective Dates Discipline     Kait Caldwell, PT 04/03/18 -  PT     Justyna Andre RN 02/23/18 -  Nurse          Wound 07/30/19 1647 Left leg incision (Active)   Dressing Appearance dry;intact 8/6/2019  1:04 PM           Physical Therapy Education     Title: PT OT SLP Therapies (In Progress)     Topic: Physical Therapy (In Progress)     Point:  Mobility training (In Progress)     Learning Progress Summary           Patient Acceptance, E,D, NR by  at 8/6/2019  4:43 PM    Acceptance, E,D, NR by  at 8/5/2019  3:17 PM    Acceptance, E,D, NR by  at 8/3/2019 12:57 PM    Acceptance, E,D, NR by  at 8/2/2019  5:30 PM    Acceptance, E,D, NL by  at 8/1/2019  4:27 PM   Family Acceptance, E,D, NR by  at 8/2/2019  5:30 PM                   Point: Home exercise program (In Progress)     Learning Progress Summary           Patient Acceptance, E,D, NR by  at 8/6/2019  4:43 PM    Acceptance, E,D, NR by  at 8/5/2019  3:17 PM    Acceptance, E,D, NR by  at 8/3/2019 12:57 PM    Acceptance, E,D, NR by  at 8/2/2019  5:30 PM    Acceptance, E,D, NL by  at 8/1/2019  4:27 PM   Family Acceptance, E,D, NR by  at 8/2/2019  5:30 PM                   Point: Body mechanics (In Progress)     Learning Progress Summary           Patient Acceptance, E,D, NR by  at 8/3/2019 12:57 PM    Acceptance, E,D, NR by  at 8/2/2019  5:30 PM    Acceptance, E,D, NL by  at 8/1/2019  4:27 PM   Family Acceptance, E,D, NR by  at 8/2/2019  5:30 PM                   Point: Precautions (In Progress)     Learning Progress Summary           Patient Acceptance, E,D, NR by  at 8/3/2019 12:57 PM    Acceptance, E,D, NR by  at 8/2/2019  5:30 PM    Acceptance, E,D, NL by  at 8/1/2019  4:27 PM   Family Acceptance, E,D, NR by  at 8/2/2019  5:30 PM                               User Key     Initials Effective Dates Name Provider Type Discipline     04/03/18 -  Kait Caldwell, PT Physical Therapist PT     03/07/18 -  Ayse Lambert PTA Physical Therapy Assistant PT     08/19/18 -  Mirna Strauss PTA Physical Therapy Assistant PT                PT Recommendation and Plan  Anticipated Discharge Disposition (PT): skilled nursing facility  Therapy Frequency (PT Clinical Impression): daily  Outcome Summary/Treatment Plan (PT)  Anticipated Discharge Disposition (PT): skilled nursing  facility  Plan of Care Reviewed With: patient  Progress: improving  Outcome Summary: pt able to initiate getting to edge of bed, and able to hold self up on edge of bed better today, slow progress and concern about pt ability to maintain NWB status, pt with pain with mvmt, may do betterif premedicated  Outcome Measures     Row Name 08/06/19 1600 08/05/19 1500          How much help from another person do you currently need...    Turning from your back to your side while in flat bed without using bedrails?  2  -PC  2  -PC     Moving from lying on back to sitting on the side of a flat bed without bedrails?  2  -PC  2  -PC     Moving to and from a bed to a chair (including a wheelchair)?  1  -PC  1  -PC     Standing up from a chair using your arms (e.g., wheelchair, bedside chair)?  1  -PC  1  -PC     Climbing 3-5 steps with a railing?  1  -PC  1  -PC     To walk in hospital room?  1  -PC  1  -PC     AM-PAC 6 Clicks Score (PT)  8  -PC  8  -PC       User Key  (r) = Recorded By, (t) = Taken By, (c) = Cosigned By    Initials Name Provider Type    PC Kait Caldwell, PT Physical Therapist         Time Calculation:   PT Charges     Row Name 08/06/19 1646             Time Calculation    Start Time  1515  -PC      Stop Time  1532  -PC      Time Calculation (min)  17 min  -PC      PT Received On  08/06/19  -PC      PT - Next Appointment  08/07/19  -PC        User Key  (r) = Recorded By, (t) = Taken By, (c) = Cosigned By    Initials Name Provider Type    PC Kait Caldwell, PT Physical Therapist        Therapy Charges for Today     Code Description Service Date Service Provider Modifiers Qty    44703504477 HC PT THER PROC EA 15 MIN 8/5/2019 Kait Caldwell, PT GP 1    68858214400 HC PT THER SUPP EA 15 MIN 8/5/2019 Kait Caldwell, PT GP 1    49018795140 HC PT THER PROC EA 15 MIN 8/6/2019 Kiat Caldwell, PT GP 1    88418831945 HC PT THER SUPP EA 15 MIN 8/6/2019 Kait Caldwell, PT GP 1          PT G-Codes  Outcome Measure  Options: AM-PAC 6 Clicks Basic Mobility (PT)  -[facility] 6 Clicks Score (PT): 8    Kait Caldwell, PT  8/6/2019

## 2019-08-06 NOTE — PROGRESS NOTES
LOS: 8 days   Patient Care Team:  Fly Sanchez DO as PCP - General  Marisol Kapadia APRN as PCP - Claims Attributed      Chief Complaint:  Following for new onset atrial fibrillation      Interval History:   VSS.  Back in sinus rhythm.  HR well controlled.  Patient sleepy this morning.  Resting comfortably.          Objective   Vital Signs  Temp:  [98.1 °F (36.7 °C)-98.6 °F (37 °C)] 98.6 °F (37 °C)  Heart Rate:  [] 62  Resp:  [16-18] 16  BP: ()/(55-85) 142/71  No intake or output data in the 24 hours ending 08/06/19 0808        Physical Exam   Constitutional: She appears well-developed and well-nourished. No distress.   Sleeping on and off   Cardiovascular: Normal rate, regular rhythm, normal heart sounds and intact distal pulses.   Pulmonary/Chest: Effort normal and breath sounds normal. No respiratory distress.   Abdominal: Soft. Bowel sounds are normal. There is no guarding.   Musculoskeletal: She exhibits no edema.   Skin: Skin is warm and dry. No erythema.            Results Review:      Results from last 7 days   Lab Units 08/06/19  0631 08/05/19  0404 08/04/19  0451   SODIUM mmol/L 142 139 138   POTASSIUM mmol/L 4.4 4.1 4.8   CHLORIDE mmol/L 107 103 106   CO2 mmol/L 25.2 22.7 24.3   BUN mg/dL 53* 44* 51*   CREATININE mg/dL 1.34* 0.89 1.18*   GLUCOSE mg/dL 143* 124* 143*   CALCIUM mg/dL 8.5 8.7 8.4         Results from last 7 days   Lab Units 08/06/19  0631 08/04/19  1538 08/04/19  0451  08/02/19  0416   WBC 10*3/mm3 5.72  --  5.22  --  8.01   HEMOGLOBIN g/dL 8.0* 8.7* 8.1*   < > 7.3*   HEMATOCRIT % 27.2* 28.2* 26.8*   < > 24.4*   PLATELETS 10*3/mm3 276  --  204  --  156    < > = values in this interval not displayed.                               Medication Review:     amLODIPine 10 mg Oral Q24H   apixaban 2.5 mg Oral Q12H   ceftriaxone 1 g Intravenous Q24H   fluticasone 2 spray Each Nare Daily   insulin lispro 0-7 Units Subcutaneous Q6H   ipratropium-albuterol 3 mL Nebulization TID    metoprolol tartrate 25 mg Oral Q8H   mirtazapine 15 mg Oral Nightly   Patiromer Sorbitex Calcium 8.4 g Oral Daily   raNITIdine 150 mg Oral QAM AC   sennosides-docusate sodium 2 tablet Oral Nightly   sertraline 100 mg Oral Daily   sodium chloride 3 mL Intravenous Q12H   sodium hypochlorite  Topical Q24H                Assessment/Plan     1.  New onset atrial fibrillation with tachycardia: Back in sinus rhythm.  Patient with Chads2-vasc score 7 so on low-dose eliquis for AC.  Can decrease or stop amlodipine if needed for further titration of metoprolol for better HR control if she goes back into atrial fibrillation.    2.  Hypertension: Pressure remains stable with addition of metoprolol.  Continue to monitor BP and HR.    3.  History of fall with left femur fracture    4.  Acute on chronic kidney disease: Nephrology following.    5.  History of stroke    6.  Diabetes    7.  Acute and chronic lower extremity DVTs            Thanks,    Shellie Pace, NANDINI, APRN  08/06/19  8:08 AM

## 2019-08-06 NOTE — PLAN OF CARE
Problem: Patient Care Overview  Goal: Plan of Care Review  Outcome: Ongoing (interventions implemented as appropriate)   08/06/19 0620   Coping/Psychosocial   Plan of Care Reviewed With patient   Plan of Care Review   Progress improving   OTHER   Outcome Summary Dressing clean dry and intact to left hip. Skin pale, hgb 8.7 yesterday.       Problem: Skin Injury Risk (Adult)  Goal: Skin Health and Integrity  Outcome: Ongoing (interventions implemented as appropriate)   08/06/19 0620   Skin Injury Risk (Adult)   Skin Health and Integrity making progress toward outcome

## 2019-08-06 NOTE — PROGRESS NOTES
Orthopaedic Surgery   Daily Progress Note  Dr. CHARITY Epstein II  [phone]  DEMOGRAPHICS:   · Magdalena Jerry   · Age:89 y.o.   · MRN:[id number]  · Admitted: 7/29/2019    PROCEDURE: 7 Days Post-Op s/p Procedure(s):  OPEN REDUCTION INTERNAL FIXATION LEFT FEMUR     HOSPITAL PROGRESS  · Patient Issues: Back in sinus rhythm, feeling better  · Ambulation/Activity: Minimal activity due to cardiac issues    VITALS:  Vitals:    08/06/19 0721 08/06/19 0829 08/06/19 0837 08/06/19 1324   BP: 142/71   136/58   BP Location: Right arm   Right arm   Patient Position: Lying   Lying   Pulse: 62   67   Resp: 16 18 18 18   Temp: 98.6 °F (37 °C)   98.5 °F (36.9 °C)   TempSrc: Oral   Oral   SpO2: 96%   95%   Weight:       Height:           PHYSICAL EXAM:  · LUNGS: Equal chest rise, no shortness of air  · CARDIOVASCULAR: brisk capillary refill intact  · WOUND: Incision clean dry and intact, minimal drainage  · EXTREMITY: Operative Leg  · Pulses: brisk capillary refill intact  · Sensation: Sensation intact to light touch to the saphenous/sural/tibial/deep peroneal/superficial peroneal nerves, and grossly throughout the extremity.  · Motor: 5/5 EHL/FHL/TA/GS motor complexes    LABS:   Lab Results   Component Value Date    HGB 8.0 (L) 08/06/2019     Lab Results   Component Value Date    WBC 5.72 08/06/2019     Lab Results   Component Value Date    GLUCOSE 143 (H) 08/06/2019    CALCIUM 8.5 08/06/2019     08/06/2019    K 4.4 08/06/2019    CO2 25.2 08/06/2019     08/06/2019    BUN 53 (H) 08/06/2019    CREATININE 1.34 (H) 08/06/2019    EGFRIFAFRI 44 (L) 08/06/2018    EGFRIFNONA 37 (L) 08/06/2019    BCR 39.6 (H) 08/06/2019    ANIONGAP 9.8 08/06/2019       ASSESSMENT: Patient is a 89 y.o. female who is 7 Days Post-Op s/p Procedure(s):  OPEN REDUCTION INTERNAL FIXATION LEFT FEMUR     PLAN:   · Weight Bearing: Non Weight Bearing  · Labs: Above lab values review. Plan: No intervention necessary  · PT/OT: To Mobilize  · DVT Rx:  "Now on Eliquis for left calf DVT  · Post-Op Xray: Hardware in good position. Acceptable bony reduction.    · Nephrology consult: Appreciate comanagement  · Atrial fibrillation: Appreciate medical management  · UTI: Being treated with Rocephin  · Dispo: Acute    R \"Adam\" Tete VILLA MD  Orthopaedic Surgery  [facility]  [phone]    "

## 2019-08-06 NOTE — PROGRESS NOTES
"   LOS: 8 days    Patient Care Team:  Fly Sanchez DO as PCP - General  Marisol Kapadia APRN as PCP - Claims Attributed    Chief Complaint:    Chief Complaint   Patient presents with   • Fall   • Knee Injury     Follow UP Gerda on CKD3  Subjective     Interval History:     Awake, conversant. Tribal.  Eating. Not soa. Pain in hip with movement. Last bm recorded Aug1. Afib yesterday , back in SR today .    Review of Systems:   See above    Objective     Vital Signs  Temp:  [98.4 °F (36.9 °C)-98.6 °F (37 °C)] 98.5 °F (36.9 °C)  Heart Rate:  [] 67  Resp:  [16-18] 18  BP: ()/(55-71) 136/58    Flowsheet Rows      First Filed Value   Admission Height  170.2 cm (67\") Documented at 07/29/2019 0354   Admission Weight  82.1 kg (181 lb) Documented at 07/29/2019 0408          No intake/output data recorded.  I/O last 3 completed shifts:  In: -   Out: 1100 [Urine:1100]  No intake or output data in the 24 hours ending 08/06/19 1529    Physical Exam:  Elderly awake, alert, Wearing glasses, sitting up in bed.   Oral mucosa moist  Neck no jvd  Heart RRR no s3 or rub  Lungs clear to auscultation  Abd +bs soft, nontender. Slightly distended  Ext no edema  Skin no rash      Results Review:    Results from last 7 days   Lab Units 08/06/19  0631 08/05/19  0404 08/04/19  0451  08/02/19  0416 08/01/19  0333   SODIUM mmol/L 142 139 138   < > 140 140  139   POTASSIUM mmol/L 4.4 4.1 4.8   < > 5.5* 5.7*  5.5*   CHLORIDE mmol/L 107 103 106   < > 106 106  106   CO2 mmol/L 25.2 22.7 24.3   < > 25.2 22.4  23.6   BUN mg/dL 53* 44* 51*   < > 50* 39*  41*   CREATININE mg/dL 1.34* 0.89 1.18*   < > 1.93* 1.92*  1.73*   CALCIUM mg/dL 8.5 8.7 8.4   < > 8.2 8.0*  8.0*   BILIRUBIN mg/dL  --   --   --   --  0.4 0.3   ALK PHOS U/L  --   --   --   --  93 97   ALT (SGPT) U/L  --   --   --   --  <5 <5   AST (SGOT) U/L  --   --   --   --  28 42*   GLUCOSE mg/dL 143* 124* 143*   < > 156* 195*  197*    < > = values in this interval not " displayed.       Estimated Creatinine Clearance: 29.1 mL/min (A) (by C-G formula based on SCr of 1.34 mg/dL (H)).    Results from last 7 days   Lab Units 08/05/19  0404 08/03/19  0429   PHOSPHORUS mg/dL 2.3* 3.0             Results from last 7 days   Lab Units 08/06/19  0631 08/04/19  1538 08/04/19  0451 08/03/19  1443 08/03/19  0429 08/02/19  0416 08/01/19  0333 07/31/19  0417   WBC 10*3/mm3 5.72  --  5.22  --   --  8.01 11.08* 10.52   HEMOGLOBIN g/dL 8.0* 8.7* 8.1* 8.5* 8.3* 7.3* 8.4* 9.0*   PLATELETS 10*3/mm3 276  --  204  --   --  156 129* 127*               Imaging Results (last 24 hours)     ** No results found for the last 24 hours. **          amLODIPine 10 mg Oral Q24H   apixaban 2.5 mg Oral Q12H   ceftriaxone 1 g Intravenous Q24H   fluticasone 2 spray Each Nare Daily   insulin lispro 0-7 Units Subcutaneous Q6H   ipratropium-albuterol 3 mL Nebulization TID   metoprolol tartrate 25 mg Oral Q8H   mirtazapine 15 mg Oral Nightly   Patiromer Sorbitex Calcium 8.4 g Oral Daily   raNITIdine 150 mg Oral QAM AC   sennosides-docusate sodium 2 tablet Oral Nightly   sertraline 100 mg Oral Daily   sodium chloride 3 mL Intravenous Q12H   sodium hypochlorite  Topical Q24H          Medication Review:   Current Facility-Administered Medications   Medication Dose Route Frequency Provider Last Rate Last Dose   • acetaminophen (TYLENOL) tablet 650 mg  650 mg Oral Q4H PRN Ayse Beasley APRN       • acetaminophen (TYLENOL) tablet 650 mg  650 mg Oral Q6H PRN Cici Owusu APRN       • amLODIPine (NORVASC) tablet 10 mg  10 mg Oral Q24H Cici Owusu APRN   10 mg at 08/06/19 1029   • apixaban (ELIQUIS) tablet 2.5 mg  2.5 mg Oral Q12H Cici Owusu APRN   2.5 mg at 08/06/19 1029   • bisacodyl (DULCOLAX) EC tablet 5 mg  5 mg Oral Daily PRN Ayse Beasley APRN       • calcium carbonate (TUMS) chewable tablet 500 mg (200 mg elemental)  2 tablet Oral BID PRN Ayse Beasley APRN       • cefTRIAXone  (ROCEPHIN) IVPB 1 g  1 g Intravenous Q24H Cici Owusu APRN 100 mL/hr at 08/05/19 1651 1 g at 08/05/19 1651   • dextrose (D50W) 25 g/ 50mL Intravenous Solution 25 g  25 g Intravenous Q15 Min PRN Cici Owusu APRN       • dextrose (GLUTOSE) oral gel 15 g  15 g Oral Q15 Min PRN Cici Owusu APRN       • fluticasone (FLONASE) 50 MCG/ACT nasal spray 2 spray  2 spray Each Nare Daily Cici Owusu APRN   2 spray at 08/06/19 1029   • glucagon (human recombinant) (GLUCAGEN DIAGNOSTIC) injection 1 mg  1 mg Subcutaneous PRN Cici Owusu APRN       • hydrALAZINE (APRESOLINE) injection 10 mg  10 mg Intravenous Q6H PRN Cici Owusu APRN   10 mg at 08/04/19 0016   • HYDROcodone-acetaminophen (NORCO) 5-325 MG per tablet 1 tablet  1 tablet Oral Q6H PRN Nazario Epstein II, MD   1 tablet at 08/04/19 1023   • HYDROcodone-acetaminophen (NORCO) 7.5-325 MG per tablet 2 tablet  2 tablet Oral Q4H PRN Zita Pace MD   1 tablet at 08/02/19 1128   • insulin lispro (humaLOG) injection 0-7 Units  0-7 Units Subcutaneous Q6H Zita Pace MD   2 Units at 08/06/19 1303   • ipratropium-albuterol (DUO-NEB) nebulizer solution 3 mL  3 mL Nebulization TID Zita Pace MD   3 mL at 08/06/19 0829   • metoprolol tartrate (LOPRESSOR) tablet 25 mg  25 mg Oral Q8H Ayse Alves MD   25 mg at 08/06/19 0550   • mirtazapine (REMERON) tablet 15 mg  15 mg Oral Nightly Cici Owusu APRN   15 mg at 08/05/19 2221   • morphine injection 2 mg  2 mg Intravenous Q4H PRN Zita Pace MD   2 mg at 08/01/19 0032    Or   • morphine injection 4 mg  4 mg Intravenous Q4H PRN Zita Pace MD       • ondansetron (ZOFRAN) tablet 4 mg  4 mg Oral Q6H PRN Ayse Beasley APRN        Or   • ondansetron (ZOFRAN) injection 4 mg  4 mg Intravenous Q6H PRN Ayse Beasley APRN   4 mg at 08/05/19 1117   • Patiromer Sorbitex Calcium (VELTASSA) 1 packet  8.4 g Oral Daily Alonso Ken MD   1 packet at  08/06/19 1028   • polyethylene glycol 3350 powder (packet)  17 g Oral Daily PRN Cici Owusu APRN       • raNITIdine (ZANTAC) tablet 150 mg  150 mg Oral QAM AC Cici Owusu APRN   150 mg at 08/06/19 1041   • sennosides-docusate sodium (SENOKOT-S) 8.6-50 MG tablet 2 tablet  2 tablet Oral Nightly Cici Owusu APRN   2 tablet at 08/05/19 2107   • sertraline (ZOLOFT) tablet 100 mg  100 mg Oral Daily Cici Owusu APRN   100 mg at 08/06/19 1041   • sodium chloride 0.9 % flush 10 mL  10 mL Intravenous PRN Jose Rafael Ibanez MD       • sodium chloride 0.9 % flush 3 mL  3 mL Intravenous Q12H Ayse Beasley APRN   3 mL at 08/06/19 1030   • sodium chloride 0.9 % flush 3-10 mL  3-10 mL Intravenous PRN Ayse Beasley APRN       • sodium hypochlorite (HYSEPT) 0.25 % topical solution   Topical Q24H Cici Owusu APRN           Assessment/Plan      1. CARLOS on CKD 2, baseline crt 1.3.  Crt stable. Urine not recorded.   2. Left femur fracture SP ORIF 7/30.   3. DM2  4. HTN. Stable on norvasc, metoprolol.   5. Anemia, acute on chronic . Blood loss.  Macrocytic. b12 normal, folate normal   6. Delirium superimposed on dementia.  Much improved.   7. Tremor, reduced mysoline dose for renal failure .  8. Morganella  And klebsiella UTI. 3 days rocephin ends today .  9. TCP. Resolved.   10. PAF, now in SR .    DW family.  Ok for rehab.       Hyacinth Samuels MD  08/06/19  3:29 PM

## 2019-08-06 NOTE — PLAN OF CARE
Problem: Patient Care Overview  Goal: Plan of Care Review  Outcome: Ongoing (interventions implemented as appropriate)   08/06/19 5746   Coping/Psychosocial   Plan of Care Reviewed With patient   Plan of Care Review   Progress improving   OTHER   Outcome Summary pt able to initiate getting to edge of bed, and able to hold self up on edge of bed better today, slow progress and concern about pt ability to maintain NWB status, pt with pain with mvmt, may do better if premedicated

## 2019-08-06 NOTE — PLAN OF CARE
Problem: Patient Care Overview  Goal: Plan of Care Review  Outcome: Ongoing (interventions implemented as appropriate)   08/06/19 1167   Coping/Psychosocial   Plan of Care Reviewed With patient   Plan of Care Review   Progress improving   OTHER   Outcome Summary Pain with movement only. Family at bedside. laxative was given for constipation. Will continue to monitor.     Goal: Individualization and Mutuality  Outcome: Ongoing (interventions implemented as appropriate)      Problem: Fall Risk (Adult)  Goal: Absence of Fall  Outcome: Ongoing (interventions implemented as appropriate)      Problem: Skin Injury Risk (Adult)  Goal: Skin Health and Integrity  Outcome: Ongoing (interventions implemented as appropriate)      Problem: Fracture Orthopaedic (Adult)  Goal: Signs and Symptoms of Listed Potential Problems Will be Absent, Minimized or Managed (Fracture Orthopaedic)  Outcome: Ongoing (interventions implemented as appropriate)

## 2019-08-06 NOTE — PROGRESS NOTES
Continued Stay Note  [facility]     Patient Name: Magdalena Jerry  MRN: [id number]  Today's Date: 8/6/2019    Admit Date: 7/29/2019    Discharge Plan     Row Name 08/06/19 1623       Plan    Plan  CCP conts to follow for dc needs.  [facility] has approved pending bed aval.  Hyacinth Ray RN/CCP        Discharge Codes    No documentation.             Hyacinth Ray RN

## 2019-08-07 VITALS
HEIGHT: 67 IN | SYSTOLIC BLOOD PRESSURE: 150 MMHG | BODY MASS INDEX: 28.85 KG/M2 | HEART RATE: 62 BPM | WEIGHT: 183.8 LBS | RESPIRATION RATE: 16 BRPM | DIASTOLIC BLOOD PRESSURE: 62 MMHG | OXYGEN SATURATION: 96 % | TEMPERATURE: 99.1 F

## 2019-08-07 LAB
ALBUMIN SERPL-MCNC: 2.6 G/DL (ref 3.5–5.2)
ALBUMIN/GLOB SERPL: 0.9 G/DL
ALP SERPL-CCNC: 69 U/L (ref 39–117)
ALT SERPL W P-5'-P-CCNC: 8 U/L (ref 1–33)
ANION GAP SERPL CALCULATED.3IONS-SCNC: 11.2 MMOL/L (ref 5–15)
AST SERPL-CCNC: 25 U/L (ref 1–32)
BASOPHILS # BLD AUTO: 0.03 10*3/MM3 (ref 0–0.2)
BASOPHILS NFR BLD AUTO: 0.5 % (ref 0–1.5)
BILIRUB SERPL-MCNC: 0.3 MG/DL (ref 0.2–1.2)
BUN BLD-MCNC: 49 MG/DL (ref 8–23)
BUN/CREAT SERPL: 43.4 (ref 7–25)
CALCIUM SPEC-SCNC: 8.1 MG/DL (ref 8.2–9.6)
CHLORIDE SERPL-SCNC: 109 MMOL/L (ref 98–107)
CO2 SERPL-SCNC: 24.8 MMOL/L (ref 22–29)
CREAT BLD-MCNC: 1.13 MG/DL (ref 0.57–1)
DEPRECATED RDW RBC AUTO: 55.2 FL (ref 37–54)
EOSINOPHIL # BLD AUTO: 0.23 10*3/MM3 (ref 0–0.4)
EOSINOPHIL NFR BLD AUTO: 3.8 % (ref 0.3–6.2)
ERYTHROCYTE [DISTWIDTH] IN BLOOD BY AUTOMATED COUNT: 15.1 % (ref 12.3–15.4)
GFR SERPL CREATININE-BSD FRML MDRD: 45 ML/MIN/1.73
GLOBULIN UR ELPH-MCNC: 2.9 GM/DL
GLUCOSE BLD-MCNC: 113 MG/DL (ref 65–99)
GLUCOSE BLDC GLUCOMTR-MCNC: 119 MG/DL (ref 70–130)
GLUCOSE BLDC GLUCOMTR-MCNC: 128 MG/DL (ref 70–130)
GLUCOSE BLDC GLUCOMTR-MCNC: 214 MG/DL (ref 70–130)
HCT VFR BLD AUTO: 27.5 % (ref 34–46.6)
HGB BLD-MCNC: 8 G/DL (ref 12–15.9)
IMM GRANULOCYTES # BLD AUTO: 0.16 10*3/MM3 (ref 0–0.05)
IMM GRANULOCYTES NFR BLD AUTO: 2.6 % (ref 0–0.5)
LYMPHOCYTES # BLD AUTO: 1.29 10*3/MM3 (ref 0.7–3.1)
LYMPHOCYTES NFR BLD AUTO: 21.1 % (ref 19.6–45.3)
MCH RBC QN AUTO: 28.7 PG (ref 26.6–33)
MCHC RBC AUTO-ENTMCNC: 29.1 G/DL (ref 31.5–35.7)
MCV RBC AUTO: 98.6 FL (ref 79–97)
MONOCYTES # BLD AUTO: 0.62 10*3/MM3 (ref 0.1–0.9)
MONOCYTES NFR BLD AUTO: 10.1 % (ref 5–12)
NEUTROPHILS # BLD AUTO: 3.78 10*3/MM3 (ref 1.7–7)
NEUTROPHILS NFR BLD AUTO: 61.9 % (ref 42.7–76)
NRBC BLD AUTO-RTO: 0 /100 WBC (ref 0–0.2)
PLATELET # BLD AUTO: 308 10*3/MM3 (ref 140–450)
PMV BLD AUTO: 9.6 FL (ref 6–12)
POTASSIUM BLD-SCNC: 4.5 MMOL/L (ref 3.5–5.2)
PROT SERPL-MCNC: 5.5 G/DL (ref 6–8.5)
RBC # BLD AUTO: 2.79 10*6/MM3 (ref 3.77–5.28)
SODIUM BLD-SCNC: 145 MMOL/L (ref 136–145)
WBC NRBC COR # BLD: 6.11 10*3/MM3 (ref 3.4–10.8)

## 2019-08-07 PROCEDURE — 94799 UNLISTED PULMONARY SVC/PX: CPT

## 2019-08-07 PROCEDURE — 82962 GLUCOSE BLOOD TEST: CPT

## 2019-08-07 PROCEDURE — 99232 SBSQ HOSP IP/OBS MODERATE 35: CPT | Performed by: NURSE PRACTITIONER

## 2019-08-07 PROCEDURE — 63710000001 INSULIN LISPRO (HUMAN) PER 5 UNITS: Performed by: HOSPITALIST

## 2019-08-07 PROCEDURE — 80053 COMPREHEN METABOLIC PANEL: CPT | Performed by: INTERNAL MEDICINE

## 2019-08-07 PROCEDURE — 85025 COMPLETE CBC W/AUTO DIFF WBC: CPT | Performed by: HOSPITALIST

## 2019-08-07 PROCEDURE — 97110 THERAPEUTIC EXERCISES: CPT

## 2019-08-07 RX ORDER — HYDROCODONE BITARTRATE AND ACETAMINOPHEN 5; 325 MG/1; MG/1
1 TABLET ORAL EVERY 6 HOURS PRN
Qty: 30 TABLET | Refills: 0 | Status: SHIPPED | OUTPATIENT
Start: 2019-08-07 | End: 2019-08-09

## 2019-08-07 RX ORDER — SENNA AND DOCUSATE SODIUM 50; 8.6 MG/1; MG/1
2 TABLET, FILM COATED ORAL NIGHTLY
Start: 2019-08-07

## 2019-08-07 RX ORDER — AMLODIPINE BESYLATE 10 MG/1
10 TABLET ORAL
Start: 2019-08-08 | End: 2023-02-09 | Stop reason: HOSPADM

## 2019-08-07 RX ORDER — LACTULOSE 10 G/15ML
10 SOLUTION ORAL DAILY
Start: 2019-08-08

## 2019-08-07 RX ORDER — MIRTAZAPINE 15 MG/1
15 TABLET, FILM COATED ORAL NIGHTLY PRN
Start: 2019-08-07 | End: 2019-08-23 | Stop reason: ALTCHOICE

## 2019-08-07 RX ORDER — BISACODYL 10 MG
10 SUPPOSITORY, RECTAL RECTAL DAILY
Status: DISCONTINUED | OUTPATIENT
Start: 2019-08-07 | End: 2019-08-07 | Stop reason: HOSPADM

## 2019-08-07 RX ADMIN — LACTULOSE 10 G: 10 SOLUTION ORAL at 09:55

## 2019-08-07 RX ADMIN — AMLODIPINE BESYLATE 10 MG: 10 TABLET ORAL at 09:55

## 2019-08-07 RX ADMIN — RANITIDINE 150 MG: 150 TABLET ORAL at 06:30

## 2019-08-07 RX ADMIN — INSULIN LISPRO 3 UNITS: 100 INJECTION, SOLUTION INTRAVENOUS; SUBCUTANEOUS at 12:15

## 2019-08-07 RX ADMIN — HYDROCODONE BITARTRATE AND ACETAMINOPHEN 1 TABLET: 5; 325 TABLET ORAL at 17:15

## 2019-08-07 RX ADMIN — METOPROLOL TARTRATE 25 MG: 25 TABLET ORAL at 14:23

## 2019-08-07 RX ADMIN — SERTRALINE HYDROCHLORIDE 100 MG: 100 TABLET, FILM COATED ORAL at 09:56

## 2019-08-07 RX ADMIN — IPRATROPIUM BROMIDE AND ALBUTEROL SULFATE 3 ML: 2.5; .5 SOLUTION RESPIRATORY (INHALATION) at 07:01

## 2019-08-07 RX ADMIN — METOPROLOL TARTRATE 25 MG: 25 TABLET ORAL at 06:30

## 2019-08-07 RX ADMIN — FLUTICASONE PROPIONATE 2 SPRAY: 50 SPRAY, METERED NASAL at 09:55

## 2019-08-07 RX ADMIN — APIXABAN 2.5 MG: 2.5 TABLET, FILM COATED ORAL at 09:55

## 2019-08-07 NOTE — THERAPY TREATMENT NOTE
Acute Care - Physical Therapy Treatment Note  [facility]     Patient Name: Magdalena Jerry  : 1926  MRN: [id number]  Today's Date: 2019  Onset of Illness/Injury or Date of Surgery: 19          Admit Date: 2019    Visit Dx:    ICD-10-CM ICD-9-CM   1. Displaced oblique fracture of shaft of left femur, initial encounter for closed fracture (CMS/[facility]) S72.332A 821.01     Patient Active Problem List   Diagnosis   • Adjustment disorder with mixed anxiety and depressed mood   • Anemia due to chronic kidney disease   • Benign essential hypertension   • Hereditary essential tremor   • Type 2 diabetes mellitus with diabetic polyneuropathy, without long-term current use of insulin (CMS/[facility])   • Dry skin dermatitis   • Dyslipidemia   • Gastroesophageal reflux disease with esophagitis   • Pain of hand   • Hearing loss   • Arthralgia of hip   • Impacted cerumen   • Pruritus   • Knee pain   • Pain in extremity   • Chronic low back pain   • Weakness of lower extremity   • Spinal stenosis of lumbar region   • Senile osteoporosis   • Piriformis syndrome   • Primary insomnia   • Tinea pedis   • Vitamin D deficiency   • Left leg cellulitis   • CKD (chronic kidney disease) stage 3, GFR 30-59 ml/min (CMS/[facility])   • Allergic rhinitis   • Pneumonia due to influenza A virus   • Hx of MRSA (methicillin resistant Staphylococcus aureus) infection   • Bacteria in urine   • Cellulitis of lower extremity   • Chronic venous stasis   • Anemia of chronic disease   • Displaced oblique fracture of shaft of left femur, initial encounter for closed fracture (CMS/[facility])   • Acute kidney injury superimposed on chronic kidney disease (CMS/[facility])   • Acute respiratory failure with hypoxia (CMS/[facility])   • Hyperkalemia   • Thrombocytopenia (CMS/[facility])   • Atrial fibrillation (CMS/[facility])   • UTI (urinary tract infection)   • Deep vein thrombosis (DVT) of lower extremity (CMS/[facility])       Therapy Treatment    Rehabilitation Treatment Summary      Row Name 08/07/19 1056             Treatment Time/Intention    Discipline  physical therapist  -EF      Document Type  therapy note (daily note)  -EF      Subjective Information  complains of;weakness;pain  -EF      Mode of Treatment  physical therapy  -EF      Patient/Family Observations  supine in bed  -EF      Patient Effort  good  -EF      Existing Precautions/Restrictions  fall;non-weight bearing;left  -EF      Recorded by [EF] Marisol Madrigal, PT 08/07/19 1059      Row Name 08/07/19 1056             Bed Mobility Assessment/Treatment    Supine-Sit [place] (Bed Mobility)  maximum assist (25% patient effort);2 person assist;verbal cues  -EF      Sit-Supine [place] (Bed Mobility)  maximum assist (25% patient effort);2 person assist;verbal cues  -EF      Bed Mobility, Safety Issues  cognitive deficits limit understanding;decreased use of arms for pushing/pulling;decreased use of legs for bridging/pushing;impaired trunk control for bed mobility  -EF      Assistive Device (Bed Mobility)  bed rails;head of bed elevated;draw sheet  -EF      Recorded by [EF] Marisol Madrigal, PT 08/07/19 1059      Row Name 08/07/19 1056             Transfer Assessment/Treatment    Comment (Transfers)  not attempted due to weakness  -EF      Recorded by [EF] Marisol Madrigal, PT 08/07/19 1059      Row Name 08/07/19 1056             Therapeutic Exercise    Comment (Therapeutic Exercise)  sitting AP, LAQ, GS x 5 reps each  -EF      Recorded by [EF] Marisol Madrigal, PT 08/07/19 1059      Row Name 08/07/19 1056             Positioning and Restraints    Pre-Treatment Position  in bed  -EF      Post Treatment Position  bed  -EF      In Bed  side lying left;call light within reach;encouraged to call for assist;exit alarm on;with family/caregiver;heels elevated;LLE elevated  -EF      Recorded by [EF] Marisol Madrigal, PT 08/07/19 1059      Row Name 08/07/19 1056             Pain Scale: Numbers Pre/Post-Treatment    Pain  Location - Side  Left  -EF      Pain Location  hip  -EF      Recorded by [EF] Marisol Madrigal, PT 08/07/19 1059      Row Name 08/07/19 1056             Pain Scale: Word Pre/Post-Treatment    Pain: Word Scale, Pretreatment  6 - moderate-severe pain  -EF      Pain: Word Scale, Post-Treatment  6 - moderate-severe pain  -EF      Recorded by [EF] Marisol Madrigal, PT 08/07/19 1059      Row Name                Wound 07/30/19 1647 Left leg incision    Wound - Properties Group Date first assessed: 07/30/19 [MC] Time first assessed: 1647 [MC] Side: Left [MC] Location: leg [MC] Type: incision [MC] Recorded by:  [MC] Justyna Andre RN 07/30/19 1647      User Key  (r) = Recorded By, (t) = Taken By, (c) = Cosigned By    Initials Name Effective Dates Discipline    EF Marisol Madrigal, PT 06/08/18 -  PT     Justyna Andre RN 02/23/18 -  Nurse          Wound 07/30/19 1647 Left leg incision (Active)   Dressing Appearance dry;intact 8/6/2019  8:07 PM   Care, Wound cleansed with;sterile normal saline 8/7/2019  7:01 AM   Dressing Care, Wound dressing changed 8/7/2019  7:01 AM       Rehab Goal Summary     Row Name 08/07/19 1100             Physical Therapy Goals    Bed Mobility Goal Selection (PT)  bed mobility, PT goal 1  -EF      Transfer Goal Selection (PT)  transfer, PT goal 1  -EF         Bed Mobility Goal 1 (PT)    Activity/Assistive Device (Bed Mobility Goal 1, PT)  sit to supine/supine to sit  -EF      [place] Level/Cues Needed (Bed Mobility Goal 1, PT)  moderate assist (50-74% patient effort);2 person assist  -EF      Progress/Outcomes (Bed Mobility Goal 1, PT)  progress slower than expected;goal revised this date  -EF         Transfer Goal 1 (PT)    Activity/Assistive Device (Transfer Goal 1, PT)  sit-to-stand/stand-to-sit;bed-to-chair/chair-to-bed;walker, rolling  -EF      [place] Level/Cues Needed (Transfer Goal 1, PT)  moderate assist (50-74% patient effort);2 person assist  -EF      Time Frame  (Transfer Goal 1, PT)  1 week  -EF      Progress/Outcome (Transfer Goal 1, PT)  progress slower than expected;goal revised this date  -EF        User Key  (r) = Recorded By, (t) = Taken By, (c) = Cosigned By    Initials Name Provider Type Discipline    Marisol Lipscomb, PT Physical Therapist PT          Physical Therapy Education     Title: PT OT SLP Therapies (In Progress)     Topic: Physical Therapy (In Progress)     Point: Mobility training (In Progress)     Learning Progress Summary           Patient Acceptance, E, NR by EF at 8/7/2019 10:59 AM    Acceptance, E,D, NR by PC at 8/6/2019  4:43 PM    Acceptance, E,D, NR by  at 8/5/2019  3:17 PM    Acceptance, E,D, NR by  at 8/3/2019 12:57 PM    Acceptance, E,D, NR by  at 8/2/2019  5:30 PM    Acceptance, E,D, NL by  at 8/1/2019  4:27 PM   Family Acceptance, E,D, NR by  at 8/2/2019  5:30 PM                   Point: Home exercise program (In Progress)     Learning Progress Summary           Patient Acceptance, E, NR by NAZ at 8/7/2019 10:59 AM    Acceptance, E,D, NR by PC at 8/6/2019  4:43 PM    Acceptance, E,D, NR by  at 8/5/2019  3:17 PM    Acceptance, E,D, NR by  at 8/3/2019 12:57 PM    Acceptance, E,D, NR by  at 8/2/2019  5:30 PM    Acceptance, E,D, NL by  at 8/1/2019  4:27 PM   Family Acceptance, E,D, NR by  at 8/2/2019  5:30 PM                   Point: Body mechanics (In Progress)     Learning Progress Summary           Patient Acceptance, E, NR by EF at 8/7/2019 10:59 AM    Acceptance, E,D, NR by  at 8/3/2019 12:57 PM    Acceptance, E,D, NR by  at 8/2/2019  5:30 PM    Acceptance, E,D, NL by  at 8/1/2019  4:27 PM   Family Acceptance, E,D, NR by  at 8/2/2019  5:30 PM                   Point: Precautions (In Progress)     Learning Progress Summary           Patient Acceptance, E, NR by NAZ at 8/7/2019 10:59 AM    Acceptance, NAZIA DIAZ, NR by LOUISE at 8/3/2019 12:57 PM    Acceptance, NAZIA DIAZ, NR by BRIJESH at 8/2/2019  5:30 PM    Acceptance, NAZIA DIAZ NL by  BRIJESH at 8/1/2019  4:27 PM   Family Acceptance, E,D, NR by BRIJESH at 8/2/2019  5:30 PM                               User Key     Initials Effective Dates Name Provider Type Discipline    EF 06/08/18 -  Marisol Madrigal, PT Physical Therapist PT    PC 04/03/18 -  Kait Caldwell, PT Physical Therapist PT     03/07/18 -  Ayse Lambert PTA Physical Therapy Assistant PT     08/19/18 -  Mirna Strauss PTA Physical Therapy Assistant PT                PT Recommendation and Plan     Plan of Care Reviewed With: patient  Outcome Summary: Pt requires assist of 2 for bed mobility for safety. Able to actively move L knee & ankle in sitting without significant pain.  Outcome Measures     Row Name 08/07/19 1100 08/06/19 1600 08/05/19 1500       How much help from another person do you currently need...    Turning from your back to your side while in flat bed without using bedrails?  2  -EF  2  -PC  2  -PC    Moving from lying on back to sitting on the side of a flat bed without bedrails?  2  -EF  2  -PC  2  -PC    Moving to and from a bed to a chair (including a wheelchair)?  1  -EF  1  -PC  1  -PC    Standing up from a chair using your arms (e.g., wheelchair, bedside chair)?  1  -EF  1  -PC  1  -PC    Climbing 3-5 steps with a railing?  1  -EF  1  -PC  1  -PC    To walk in hospital room?  1  -EF  1  -PC  1  -PC    AM-PAC 6 Clicks Score (PT)  8  -EF  8  -PC  8  -PC       Functional Assessment    Outcome Measure Options  AM-PAC 6 Clicks Basic Mobility (PT)  -EF  --  --      User Key  (r) = Recorded By, (t) = Taken By, (c) = Cosigned By    Initials Name Provider Type    EF Marisol Madrigal, PT Physical Therapist    PC Kait Caldwell, PT Physical Therapist         Time Calculation:   PT Charges     Row Name 08/07/19 1101             Time Calculation    Start Time  0916  -EF      Stop Time  0930  -EF      Time Calculation (min)  14 min  -EF      PT Received On  08/07/19  -EF      PT - Next Appointment  08/08/19  -EF        User  Key  (r) = Recorded By, (t) = Taken By, (c) = Cosigned By    Initials Name Provider Type     Marisol Madrigal, PT Physical Therapist        Therapy Charges for Today     Code Description Service Date Service Provider Modifiers Qty    02892381969 HC PT THER PROC EA 15 MIN 8/7/2019 Marisol Madrigal, PT GP 1    17303654011 HC PT THER SUPP EA 15 MIN 8/7/2019 Marisol Madrigal, PT GP 1          PT G-Codes  Outcome Measure Options: AM-PAC 6 Clicks Basic Mobility (PT)  AM-PAC 6 Clicks Score (PT): 8    Marisol Madrigal, PT  8/7/2019

## 2019-08-07 NOTE — PLAN OF CARE
Problem: Patient Care Overview  Goal: Plan of Care Review  Outcome: Ongoing (interventions implemented as appropriate)   08/07/19 3284   Coping/Psychosocial   Plan of Care Reviewed With patient   OTHER   Outcome Summary Pt requires assist of 2 for bed mobility for safety. Able to actively move L knee & ankle in sitting without significant pain.

## 2019-08-07 NOTE — PROGRESS NOTES
[facility]    Physicians Statement of Medical Necessity for Ambulance Transportation    It is medically necessary for:    Patient Name: Magdalena Jerry    Insurance Information:  Medicare [id number]    To be transported by ambulance:    From (if nursing facility, specify level of care: skilled, care home, etc): [facility]    To (specify level of care if nursing facility): [facility] [address]    Date of Service: 8/7/19    For dialysis patients state date dialysis began: N/A    Diagnosis: **Displaced oblique fracture of shaft of left femur, initial encounter for closed fracture (CMS/HCC) [S72.332A], Atrial fibrillation (CMS/HCC) [I48.91], UTI (urinary tract infection) [N39.0], Deep vein thrombosis (DVT) of lower extremity (CMS/HCC) [I82.409], Thrombocytopenia (CMS/HCC) [D69.6], Acute kidney injury superimposed on chronic kidney disease (CMS/HCC) [N17.9, N18.9], Acute respiratory failure with hypoxia (CMS/HCC) [J96.01]          Past Medical/Surgical History:  Past Medical History:   Diagnosis Date   • Adjustment disorder with mixed anxiety and depressed mood    • Anemia    • Anxiety    • Atrophy of hand muscles     LEFT HAND   • Benign familial tremor    • Carpal tunnel syndrome    • Cataract    • Chronic rhinosinusitis    • Depression    • Diabetes mellitus (CMS/[facility])    • Dyslipidemia    • Esophagitis, reflux    • Fracture of hip (CMS/[facility])    • Frequent falls    • GERD (gastroesophageal reflux disease)    • Hand pain    • Hearing loss    • Hip pain    • Hyperlipidemia    • Hypertension    • Impacted cerumen    • Insomnia    • Knee pain    • Limb pain    • Loss of hearing    • Low back pain    • Lower extremity weakness    • Lumbar canal stenosis    • Osteoporosis, senile    • Piriformis syndrome    • Renal insufficiency 2006   • Sensorineural hearing loss    • Stroke (CMS/[facility]) 2004   • Tinea pedis    • Vitamin D deficiency       Past Surgical History:    Procedure Laterality Date   • CHOLECYSTECTOMY  2013   • COLONOSCOPY     • ERCP WITH SPHINCTEROTOMY/PAPILLOTOMY  2012   • FEMUR OPEN REDUCTION INTERNAL FIXATION Left 7/30/2019    Procedure: OPEN REDUCTION INTERNAL FIXATION LEFT FEMUR;  Surgeon: Nazario Epstein II, MD;  Location: [facility] OR;  Service: Orthopedics   • FRACTURE SURGERY Left 2006    HIP   • VARICOSE VEIN SURGERY Right         Current Objective Medical Evidence(including physical exam finding to support reason for limitations):    Immobilization syndrome  Confused/combative: may require restraints  Requires oxygen    Other:     Physician Signature:           (RN,NP,PA,CAN, Discharge Planner) Date/Time:      Printed Name:    __________________________________    [facility]   Phone: [phone] Phone: [phone]   Fax: [phone] Fax: [phone]

## 2019-08-07 NOTE — PLAN OF CARE
Problem: Patient Care Overview  Goal: Plan of Care Review  Outcome: Ongoing (interventions implemented as appropriate)      Problem: Fracture Orthopaedic (Adult)  Goal: Signs and Symptoms of Listed Potential Problems Will be Absent, Minimized or Managed (Fracture Orthopaedic)  Outcome: Ongoing (interventions implemented as appropriate)   08/07/19 0675   Goal/Outcome Evaluation   Problems Assessed (Orthopaedic Fracture) respiratory compromise

## 2019-08-07 NOTE — PROGRESS NOTES
Continued Stay Note  [facility]     Patient Name: Magdalena Jerry  MRN: [id number]  Today's Date: 8/7/2019    Admit Date: 7/29/2019    Discharge Plan     Row Name 08/07/19 1113       Plan    Plan  Plan is skilled bed at [facility] upon DC.     Plan Comments  Spoke with Alicia/ Oriana.  Pt is approved and bed is available - [facility] [address].  Pt's son, Anoop notified and is in agreement.          Discharge Codes    No documentation.             Emily Torres RN

## 2019-08-07 NOTE — PROGRESS NOTES
"   LOS: 9 days    Patient Care Team:  Fly Sanchez DO as PCP - General  Marisol Kapadia APRN as PCP - Claims Attributed    Chief Complaint:    Chief Complaint   Patient presents with   • Fall   • Knee Injury     Follow UP Gerda on CKD3  Subjective     Interval History:     Awake, conversant. [place].  Eating breakfast.  Not soa. Pain in hip with movement.  RN reports bm last night, though not in intake/output.     Review of Systems:   See above    Objective     Vital Signs  Temp:  [98.7 °F (37.1 °C)-99.5 °F (37.5 °C)] 99.2 °F (37.3 °C)  Heart Rate:  [60-64] 64  Resp:  [16-18] 16  BP: (140-158)/(58-66) 158/66    Flowsheet Rows      First Filed Value   Admission Height  170.2 cm (67\") Documented at 07/29/2019 0354   Admission Weight  82.1 kg (181 lb) Documented at 07/29/2019 0408          I/O this shift:  In: 210 [P.O.:210]  Out: -   I/O last 3 completed shifts:  In: 200 [P.O.:200]  Out: 200 [Urine:200]    Intake/Output Summary (Last 24 hours) at 8/7/2019 1327  Last data filed at 8/7/2019 0900  Gross per 24 hour   Intake 310 ml   Output 200 ml   Net 110 ml       Physical Exam:  Elderly awake, alert, Wearing glasses, sitting up in bed eating. [place] .   Oral mucosa moist  Neck no jvd  Heart RRR no s3 or rub  Lungs clear to auscultation  Abd +bs soft, nontender. Slightly distended  Ext no edema  Skin no rash      Results Review:    Results from last 7 days   Lab Units 08/07/19  0505 08/06/19  0631 08/05/19  0404  08/02/19  0416 08/01/19  0333   SODIUM mmol/L 145 142 139   < > 140 140  139   POTASSIUM mmol/L 4.5 4.4 4.1   < > 5.5* 5.7*  5.5*   CHLORIDE mmol/L 109* 107 103   < > 106 106  106   CO2 mmol/L 24.8 25.2 22.7   < > 25.2 22.4  23.6   BUN mg/dL 49* 53* 44*   < > 50* 39*  41*   CREATININE mg/dL 1.13* 1.34* 0.89   < > 1.93* 1.92*  1.73*   CALCIUM mg/dL 8.1* 8.5 8.7   < > 8.2 8.0*  8.0*   BILIRUBIN mg/dL 0.3  --   --   --  0.4 0.3   ALK PHOS U/L 69  --   --   --  93 97   ALT (SGPT) U/L 8  --   --   --  <5 <5 "   AST (SGOT) U/L 25  --   --   --  28 42*   GLUCOSE mg/dL 113* 143* 124*   < > 156* 195*  197*    < > = values in this interval not displayed.       Estimated Creatinine Clearance: 34.5 mL/min (A) (by C-G formula based on SCr of 1.13 mg/dL (H)).    Results from last 7 days   Lab Units 08/05/19  0404 08/03/19  0429   PHOSPHORUS mg/dL 2.3* 3.0             Results from last 7 days   Lab Units 08/07/19  0505 08/06/19  0631 08/04/19  1538 08/04/19  0451 08/03/19  1443  08/02/19  0416 08/01/19  0333   WBC 10*3/mm3 6.11 5.72  --  5.22  --   --  8.01 11.08*   HEMOGLOBIN g/dL 8.0* 8.0* 8.7* 8.1* 8.5*   < > 7.3* 8.4*   PLATELETS 10*3/mm3 308 276  --  204  --   --  156 129*    < > = values in this interval not displayed.               Imaging Results (last 24 hours)     ** No results found for the last 24 hours. **          amLODIPine 10 mg Oral Q24H   apixaban 2.5 mg Oral Q12H   fluticasone 2 spray Each Nare Daily   insulin lispro 0-7 Units Subcutaneous 4x Daily With Meals & Nightly   ipratropium-albuterol 3 mL Nebulization TID   lactulose 10 g Oral Daily   metoprolol tartrate 25 mg Oral Q8H   Patiromer Sorbitex Calcium 8.4 g Oral Daily   raNITIdine 150 mg Oral QAM AC   sennosides-docusate sodium 2 tablet Oral Nightly   sertraline 100 mg Oral Daily   sodium chloride 3 mL Intravenous Q12H          Medication Review:   Current Facility-Administered Medications   Medication Dose Route Frequency Provider Last Rate Last Dose   • acetaminophen (TYLENOL) tablet 650 mg  650 mg Oral Q4H PRN Ayes Beasley APRN       • acetaminophen (TYLENOL) tablet 650 mg  650 mg Oral Q6H PRN Cici Owusu APRN       • amLODIPine (NORVASC) tablet 10 mg  10 mg Oral Q24H Cici Owusu APRN   10 mg at 08/07/19 0955   • apixaban (ELIQUIS) tablet 2.5 mg  2.5 mg Oral Q12H Cici Owusu APRN   2.5 mg at 08/07/19 0955   • bisacodyl (DULCOLAX) EC tablet 5 mg  5 mg Oral Daily PRN Ayse Beasley, APRN       • calcium carbonate (TUMS)  chewable tablet 500 mg (200 mg elemental)  2 tablet Oral BID PRN Ayse Beasley APRN       • dextrose (D50W) 25 g/ 50mL Intravenous Solution 25 g  25 g Intravenous Q15 Min PRN Cici Owusu APRN       • dextrose (GLUTOSE) oral gel 15 g  15 g Oral Q15 Min PRN Cici Owusu APRN       • fluticasone (FLONASE) 50 MCG/ACT nasal spray 2 spray  2 spray Each Nare Daily Cici Owusu APRN   2 spray at 08/07/19 0955   • glucagon (human recombinant) (GLUCAGEN DIAGNOSTIC) injection 1 mg  1 mg Subcutaneous PRN Cici Owusu APRN       • hydrALAZINE (APRESOLINE) injection 10 mg  10 mg Intravenous Q6H PRN Cici Owusu APRN   10 mg at 08/04/19 0016   • HYDROcodone-acetaminophen (NORCO) 5-325 MG per tablet 1 tablet  1 tablet Oral Q6H PRN Nazario Epstein II, MD   1 tablet at 08/06/19 2050   • HYDROcodone-acetaminophen (NORCO) 7.5-325 MG per tablet 2 tablet  2 tablet Oral Q4H PRN Amadou Lawler MD   1 tablet at 08/02/19 1128   • insulin lispro (humaLOG) injection 0-7 Units  0-7 Units Subcutaneous 4x Daily With Meals & Nightly Amadou Lawler MD   3 Units at 08/07/19 1215   • ipratropium-albuterol (DUO-NEB) nebulizer solution 3 mL  3 mL Nebulization TID Zita Pace MD   3 mL at 08/07/19 0701   • lactulose (CHRONULAC) 10 GM/15ML solution 10 g  10 g Oral Daily Hyacinth Samuels MD   10 g at 08/07/19 0955   • metoprolol tartrate (LOPRESSOR) tablet 25 mg  25 mg Oral Q8H Ayse Alves MD   25 mg at 08/07/19 0630   • mirtazapine (REMERON) tablet 15 mg  15 mg Oral Nightly PRN Amadou Lawler MD       • morphine injection 2 mg  2 mg Intravenous Q4H PRN Zita Pace MD   2 mg at 08/01/19 0032    Or   • morphine injection 4 mg  4 mg Intravenous Q4H PRN Zita Pace MD       • ondansetron (ZOFRAN) tablet 4 mg  4 mg Oral Q6H PRN Ayse Beasley APRN        Or   • ondansetron (ZOFRAN) injection 4 mg  4 mg Intravenous Q6H PRN Ayse Beasley APRN   4 mg at 08/05/19 1117   • Patiromer  Sorbitex Calcium (VELTASSA) 1 packet  8.4 g Oral Daily Alonso Ken MD   1 packet at 08/06/19 1028   • polyethylene glycol 3350 powder (packet)  17 g Oral Daily PRN Cici Owusu APRN       • raNITIdine (ZANTAC) tablet 150 mg  150 mg Oral QAM AC Cici Owusu APRN   150 mg at 08/07/19 0630   • sennosides-docusate sodium (SENOKOT-S) 8.6-50 MG tablet 2 tablet  2 tablet Oral Nightly Cici Owusu APRN   2 tablet at 08/06/19 2050   • sertraline (ZOLOFT) tablet 100 mg  100 mg Oral Daily Cici Owusu APRN   100 mg at 08/07/19 0956   • sodium chloride 0.9 % flush 10 mL  10 mL Intravenous PRN Jose Rafael Ibanez MD       • sodium chloride 0.9 % flush 3 mL  3 mL Intravenous Q12H Ayse Beasley APRN   3 mL at 08/06/19 2051   • sodium chloride 0.9 % flush 3-10 mL  3-10 mL Intravenous PRN Ayes Beasley APRN           Assessment/Plan      1. CARLOS on CKD 2, baseline crt 1.3.  Crt stable. Urine not recorded.   2. Left femur fracture SP ORIF 7/30.   3. DM2  4. HTN. Stable on norvasc, metoprolol.   5. Anemia, acute on chronic . Blood loss.  Macrocytic. b12 normal, folate normal   6. Delirium superimposed on dementia.  Much improved.   7. Tremor, reduced mysoline dose for renal failure .  8. Morganella  And klebsiella UTI. 3 days rocephin completed.   9. TCP. Resolved.   10. PAF, now in SR .          Hyacinth Samuels MD  08/07/19  1:27 PM

## 2019-08-07 NOTE — DISCHARGE SUMMARY
PHYSICIAN DISCHARGE SUMMARY                                                                        [facility]    Patient Identification:  Name: Magdalena Jerry  Age: 89 y.o.  Sex: female  :  1926  MRN: [id number]  Primary Care Physician: Fly Sanchez DO    Admit date: 2019  Discharge date and time:2019  Discharged Condition: good    Discharge Diagnoses:  Active Hospital Problems    Diagnosis  POA   • **Displaced oblique fracture of shaft of left femur, initial encounter for closed fracture (CMS/HCC) [S72.332A]  Yes   • Atrial fibrillation (CMS/HCC) [I48.91]  Unknown   • UTI (urinary tract infection) [N39.0]  Unknown   • Deep vein thrombosis (DVT) of lower extremity (CMS/HCC) [I82.409]  Unknown   • Thrombocytopenia (CMS/HCC) [D69.6]  Unknown   • Hyperkalemia [E87.5]  Unknown   • Acute kidney injury superimposed on chronic kidney disease (CMS/[facility]) [N17.9, N18.9]  Unknown   • Acute respiratory failure with hypoxia (CMS/HCC) [J96.01]  Unknown   • Hx of MRSA (methicillin resistant Staphylococcus aureus) infection [A49.02]  Yes   • Gastroesophageal reflux disease with esophagitis [K21.0]  Yes   • Type 2 diabetes mellitus with diabetic polyneuropathy, without long-term current use of insulin (CMS/[facility]) [E11.42]  Yes   • Benign essential hypertension [I10]  Yes   • Anemia due to chronic kidney disease [N18.9, D63.1]  Yes      Resolved Hospital Problems   No resolved problems to display.          PMHX:   Past Medical History:   Diagnosis Date   • Adjustment disorder with mixed anxiety and depressed mood    • Anemia    • Anxiety    • Atrophy of hand muscles     LEFT HAND   • Benign familial tremor    • Carpal tunnel syndrome    • Cataract    • Chronic rhinosinusitis    • Depression    • Diabetes mellitus (CMS/[facility])    • Dyslipidemia    • Esophagitis, reflux    • Fracture of hip (CMS/[facility])    • Frequent falls    • GERD  (gastroesophageal reflux disease)    • Hand pain    • Hearing loss    • Hip pain    • Hyperlipidemia    • Hypertension    • Impacted cerumen    • Insomnia    • Knee pain    • Limb pain    • Loss of hearing    • Low back pain    • Lower extremity weakness    • Lumbar canal stenosis    • Osteoporosis, senile    • Piriformis syndrome    • Renal insufficiency 2006   • Sensorineural hearing loss    • Stroke (CMS/HCC) 2004   • Tinea pedis    • Vitamin D deficiency      PSHX:   Past Surgical History:   Procedure Laterality Date   • CHOLECYSTECTOMY  2013   • COLONOSCOPY     • ERCP WITH SPHINCTEROTOMY/PAPILLOTOMY  2012   • FEMUR OPEN REDUCTION INTERNAL FIXATION Left 7/30/2019    Procedure: OPEN REDUCTION INTERNAL FIXATION LEFT FEMUR;  Surgeon: Nazario Epstein II, MD;  Location: [facility];  Service: Orthopedics   • FRACTURE SURGERY Left 2006    HIP   • VARICOSE VEIN SURGERY Right        Hospital Course: Magdalena Jerry  is a 89 y.o. female with a history of diabetes mellitus, HLD, HTN, GERD, stroke with no residual effects, tremors, anxiety, anemia, CKD and hearing loss. Patient presented with complaints of left thigh and knee pain that started after sustaining a fall.  Patient reports she was in her kitchen going to get a bowl of soup out of the microwave and tried to turn and lost her balance and fell onto her left side.  She used her life alert because she was not able to get off the floor.  She denies any head injury, loss of consciousness, recent chest pain, shortness of breath, dizziness, dysuria, urinary frequency, headache, nausea, vomiting or diarrhea.  She describes the pain as intense throbbing that is constant and worsened by movement. She does have tremors and is on Mysoline.  She uses a walker to ambulate at home and is fairly independent.          The patient was admitted to the hospital and seen by orthopedic surgery.  Patient had surgery for her femur fracture on the left side.  The patient  also had some renal failure and was seen by nephrology who made some adjustments on her medicine.  Patient had some paroxysmal atrial fib and was seen by cardiology.  After being in the hospital for several days she was appearing to be stable enough to go to a skilled nursing facility for rehab.  She will follow-up with her primary care doctor after released from rehab.  She will also follow-up with her specialist.    Consults:     Consults     Date and Time Order Name Status Description    8/5/2019 0359 Inpatient Cardiology Consult      7/31/2019 1604 Inpatient Nephrology Consult      7/29/2019 1535 Inpatient Cardiology Consult Completed     7/29/2019 0555 Inpatient Orthopedic Surgery Consult Completed     7/29/2019 0528 [facility] (on-call MD unless specified) Details Completed     7/29/2019 0528 Ortho (on-call MD unless specified) Completed         Results from last 7 days   Lab Units 08/07/19  0505   WBC 10*3/mm3 6.11   HEMOGLOBIN g/dL 8.0*   HEMATOCRIT % 27.5*   PLATELETS 10*3/mm3 308     Results from last 7 days   Lab Units 08/07/19  0505   SODIUM mmol/L 145   POTASSIUM mmol/L 4.5   CHLORIDE mmol/L 109*   CO2 mmol/L 24.8   BUN mg/dL 49*   CREATININE mg/dL 1.13*   GLUCOSE mg/dL 113*   CALCIUM mg/dL 8.1*     Significant Diagnostic Studies:   Lab Results   Component Value Date    WBC 6.11 08/07/2019    HGB 8.0 (L) 08/07/2019    HCT 27.5 (L) 08/07/2019     08/07/2019     Lab Results   Component Value Date     08/07/2019    K 4.5 08/07/2019     (H) 08/07/2019    CO2 24.8 08/07/2019    BUN 49 (H) 08/07/2019    CREATININE 1.13 (H) 08/07/2019    GLUCOSE 113 (H) 08/07/2019     Lab Results   Component Value Date    CALCIUM 8.1 (L) 08/07/2019    PHOS 2.3 (L) 08/05/2019     Lab Results   Component Value Date    AST 25 08/07/2019    ALT 8 08/07/2019    ALKPHOS 69 08/07/2019     No results found for: APTT, INR  No results found for: COLORU, CLARITYU, SPECGRAV, PHUR, PROTEINUR, GLUCOSEU, KETONESU, BLOODU,  NITRITE, LEUKOCYTESUR, BILIRUBINUR, UROBILINOGEN, RBCUA, WBCUA, BACTERIA, UACOMMENT  No results found for: TROPONINT, TROPONINI, BNP  No components found for: HGBA1C;2  No components found for: TSH;2  Imaging Results (all)     Procedure Component Value Units Date/Time    XR Chest 1 View [[id number]] Collected:  07/31/19 1606     Updated:  07/31/19 1611    Narrative:       XR CHEST 1 VW-     HISTORY: Female who is 89 years-old,  atelectasis     TECHNIQUE: Frontal views of the chest     COMPARISON: 07/29/2019     FINDINGS: Heart size is borderline. Aorta is tortuous. Pulmonary  vasculature is unremarkable. Persistent likely atelectasis at the left  base. No mild likely atelectasis has developed at the right base. No  pleural effusion, or pneumothorax. No acute osseous process.       Impression:       Bibasilar likely atelectasis. Borderline heart size.  Tortuous aorta.     This report was finalized on 7/31/2019 4:08 PM by Dr. Jimmy Peace M.D.       XR Femur 2 View Left [[id number]] Collected:  07/30/19 1700     Updated:  07/31/19 0824    Narrative:       2 VIEW PORTABLE LEFT FEMUR IN OR     HISTORY: Imaging during fracture fixation.     FINDINGS: Imaging in the operating room was performed showing fixation  of a fracture of the distal shaft of the femur with lateral plate and  multiple screws and encircling wires and the alignment appears  satisfactory. Six images were obtained and the fluoroscopy time measures  56 seconds.     This report was finalized on 7/31/2019 8:21 AM by Dr. Koffi Wiseman M.D.       XR Femur 2 View Left [[id number]] Collected:  07/30/19 1716     Updated:  07/30/19 1721    Narrative:       XR FEMUR 2 VW LEFT-     INDICATIONS: Postoperative evaluation.     TECHNIQUE: 4 views of the left femur     COMPARISON: 07/29/2019     FINDINGS:     Plate and screw hardware fixation of the distal femur fracture is seen,  with cerclage wires, adjacent surgical soft tissue gas, overlying skin  staples.  Arterial calcifications are seen.          Impression:          Postsurgical changes.           This report was finalized on 7/30/2019 5:18 PM by Dr. Jimmy Peace M.D.       FL C Arm During Surgery [[id number]] Resulted:  07/30/19 1638     Updated:  07/30/19 1638    Narrative:       This procedure was auto-finalized with no dictation required.    XR Chest 1 View [[id number]] Collected:  07/29/19 1129     Updated:  07/29/19 1134    Narrative:       PORTABLE CHEST 07/29/2019 AT 1049 HOURS     CLINICAL HISTORY: Wheezing. Hypertension. History of pneumonia.     Compared to the previous chest x-ray dated 03/17/2017.     There is mild linear atelectasis at the left lung base. The lungs are  otherwise well-expanded and appear free of infiltrates. No pleural  effusion is evident. The heart is borderline enlarged and unchanged.     IMPRESSIONS: No evidence of acute disease within the chest.     This report was finalized on 7/29/2019 11:31 AM by Dr. Jose Rafael Reyes M.D.       XR Knee 1 or 2 View Left [[id number]] Collected:  07/29/19 0525     Updated:  07/29/19 0528    Narrative:       TWO-VIEW LEFT FEMUR     HISTORY: fall, l thigh and knee pain with deformity     FINDINGS: 2 views of the left femur were submitted. There is an  obliquely oriented fracture of the mid femoral diaphysis. There is  approximately 40% anterior and lateral displacement with slight anterior  and lateral angulation as well. There is an old fracture of the more  proximal femur which is status post ORIF. Femoral head retains normal,  well rounded contour. There is mild soft tissue spine about the fracture  site. Small 16 mm Irregular sclerotic lesion in the condylar region  distally likely small enchondroma or infarct. There is generalized  osteopenia.       Impression:       Mid shaft femur fracture as above.              TWO-VIEW LEFT KNEE     HISTORY: Femur fracture     FINDINGS: 2 views of the left knee were submitted. No additional  fracture is  demonstrated about the knee. Minor arthritic changes are  present. Generalized osteopenia. There is mild soft tissue swelling,  greater anteriorly and medially. Vascular calculi noted..     IMPRESSION:  1. No additional fracture.     This report was finalized on 7/29/2019 5:25 AM by Raghav Carlisle M.D.       XR Femur 2 View Left [[id number]] Collected:  07/29/19 0520     Updated:  07/29/19 0528    Narrative:       TWO-VIEW LEFT FEMUR     HISTORY: fall, l thigh and knee pain with deformity     FINDINGS: 2 views of the left femur were submitted. There is an  obliquely oriented fracture of the mid femoral diaphysis. There is  approximately 40% anterior and lateral displacement with slight anterior  and lateral angulation as well. There is an old fracture of the more  proximal femur which is status post ORIF. Femoral head retains normal,  well rounded contour. There is mild soft tissue spine about the fracture  site. Small 16 mm Irregular sclerotic lesion in the condylar region  distally likely small enchondroma or infarct. There is generalized  osteopenia.       Impression:       Mid shaft femur fracture as above.              TWO-VIEW LEFT KNEE     HISTORY: Femur fracture     FINDINGS: 2 views of the left knee were submitted. No additional  fracture is demonstrated about the knee. Minor arthritic changes are  present. Generalized osteopenia. There is mild soft tissue swelling,  greater anteriorly and medially. Vascular calculi noted..     IMPRESSION:  1. No additional fracture.     This report was finalized on 7/29/2019 5:25 AM by Raghav Carlisle M.D.           Lab Results (last 7 days)     Procedure Component Value Units Date/Time    POC Glucose Once [[id number]]  (Abnormal) Collected:  08/07/19 1056    Specimen:  Blood Updated:  08/07/19 1058     Glucose 214 mg/dL     POC Glucose Once [[id number]]  (Normal) Collected:  08/07/19 0628    Specimen:  Blood Updated:  08/07/19 0630     Glucose 119 mg/dL     Comprehensive  Metabolic Panel [[id number]]  (Abnormal) Collected:  08/07/19 0505    Specimen:  Blood Updated:  08/07/19 0613     Glucose 113 mg/dL      BUN 49 mg/dL      Creatinine 1.13 mg/dL      Sodium 145 mmol/L      Potassium 4.5 mmol/L      Chloride 109 mmol/L      CO2 24.8 mmol/L      Calcium 8.1 mg/dL      Total Protein 5.5 g/dL      Albumin 2.60 g/dL      ALT (SGPT) 8 U/L      AST (SGOT) 25 U/L      Alkaline Phosphatase 69 U/L      Total Bilirubin 0.3 mg/dL      eGFR Non African Amer 45 mL/min/1.73      Globulin 2.9 gm/dL      A/G Ratio 0.9 g/dL      BUN/Creatinine Ratio 43.4     Anion Gap 11.2 mmol/L     Narrative:       GFR Normal >60  Chronic Kidney Disease <60  Kidney Failure <15    CBC & Differential [[id number]] Collected:  08/07/19 0505    Specimen:  Blood Updated:  08/07/19 0556    Narrative:       The following orders were created for panel order CBC & Differential.  Procedure                               Abnormality         Status                     ---------                               -----------         ------                     CBC Auto Differential[[id number]]        Abnormal            Final result                 Please view results for these tests on the individual orders.    CBC Auto Differential [[id number]]  (Abnormal) Collected:  08/07/19 0505    Specimen:  Blood Updated:  08/07/19 0556     WBC 6.11 10*3/mm3      RBC 2.79 10*6/mm3      Hemoglobin 8.0 g/dL      Hematocrit 27.5 %      MCV 98.6 fL      MCH 28.7 pg      MCHC 29.1 g/dL      RDW 15.1 %      RDW-SD 55.2 fl      MPV 9.6 fL      Platelets 308 10*3/mm3      Neutrophil % 61.9 %      Lymphocyte % 21.1 %      Monocyte % 10.1 %      Eosinophil % 3.8 %      Basophil % 0.5 %      Immature Grans % 2.6 %      Neutrophils, Absolute 3.78 10*3/mm3      Lymphocytes, Absolute 1.29 10*3/mm3      Monocytes, Absolute 0.62 10*3/mm3      Eosinophils, Absolute 0.23 10*3/mm3      Basophils, Absolute 0.03 10*3/mm3      Immature Grans, Absolute 0.16 10*3/mm3       nRBC 0.0 /100 WBC     POC Glucose Once [[id number]]  (Abnormal) Collected:  08/06/19 2115    Specimen:  Blood Updated:  08/06/19 2116     Glucose 205 mg/dL     POC Glucose Once [[id number]]  (Abnormal) Collected:  08/06/19 1632    Specimen:  Blood Updated:  08/06/19 1635     Glucose 185 mg/dL     POC Glucose Once [[id number]]  (Abnormal) Collected:  08/06/19 1118    Specimen:  Blood Updated:  08/06/19 1122     Glucose 166 mg/dL     Urine Culture - Urine, Urine, Catheter In/Out [[id number]]  (Abnormal)  (Susceptibility) Collected:  08/04/19 1049    Specimen:  Urine, Catheter In/Out Updated:  08/06/19 1105     Urine Culture >100,000 CFU/mL Morganella morganii ssp morganii      >100,000 CFU/mL Klebsiella oxytoca    Susceptibility      Morganella morganii ssp morganii     HARIS     Ampicillin Resistant     Ampicillin + Sulbactam Resistant     Cefazolin Resistant     Cefepime Susceptible     Ceftazidime Susceptible     Ceftriaxone Susceptible     Gentamicin Susceptible     Levofloxacin Susceptible     Nitrofurantoin Resistant     Piperacillin + Tazobactam Susceptible     Tetracycline Resistant     Trimethoprim + Sulfamethoxazole Susceptible                Susceptibility      Klebsiella oxytoca     HARIS     Ampicillin Resistant     Ampicillin + Sulbactam Susceptible     Cefazolin Susceptible     Cefepime Susceptible     Ceftazidime Susceptible     Ceftriaxone Susceptible     Gentamicin Susceptible     Levofloxacin Susceptible     Nitrofurantoin Susceptible     Piperacillin + Tazobactam Susceptible     Tetracycline Susceptible     Trimethoprim + Sulfamethoxazole Susceptible                    Basic Metabolic Panel [[id number]]  (Abnormal) Collected:  08/06/19 0631    Specimen:  Blood Updated:  08/06/19 0743     Glucose 143 mg/dL      BUN 53 mg/dL      Creatinine 1.34 mg/dL      Sodium 142 mmol/L      Potassium 4.4 mmol/L      Chloride 107 mmol/L      CO2 25.2 mmol/L      Calcium 8.5 mg/dL      eGFR Non African Amer 37 mL/min/1.73       BUN/Creatinine Ratio 39.6     Anion Gap 9.8 mmol/L     Narrative:       GFR Normal >60  Chronic Kidney Disease <60  Kidney Failure <15    CBC & Differential [[id number]] Collected:  08/06/19 0631    Specimen:  Blood Updated:  08/06/19 0709    Narrative:       The following orders were created for panel order CBC & Differential.  Procedure                               Abnormality         Status                     ---------                               -----------         ------                     CBC Auto Differential[[id number]]        Abnormal            Final result                 Please view results for these tests on the individual orders.    CBC Auto Differential [[id number]]  (Abnormal) Collected:  08/06/19 0631    Specimen:  Blood Updated:  08/06/19 0709     WBC 5.72 10*3/mm3      RBC 2.79 10*6/mm3      Hemoglobin 8.0 g/dL      Hematocrit 27.2 %      MCV 97.5 fL      MCH 28.7 pg      MCHC 29.4 g/dL      RDW 15.3 %      RDW-SD 55.4 fl      MPV 9.6 fL      Platelets 276 10*3/mm3      Neutrophil % 60.6 %      Lymphocyte % 20.5 %      Monocyte % 12.6 %      Eosinophil % 3.7 %      Basophil % 0.5 %      Immature Grans % 2.1 %      Neutrophils, Absolute 3.47 10*3/mm3      Lymphocytes, Absolute 1.17 10*3/mm3      Monocytes, Absolute 0.72 10*3/mm3      Eosinophils, Absolute 0.21 10*3/mm3      Basophils, Absolute 0.03 10*3/mm3      Immature Grans, Absolute 0.12 10*3/mm3      nRBC 0.0 /100 WBC     POC Glucose Once [[id number]]  (Abnormal) Collected:  08/06/19 0626    Specimen:  Blood Updated:  08/06/19 0628     Glucose 132 mg/dL     POC Glucose Once [[id number]]  (Abnormal) Collected:  08/05/19 2111    Specimen:  Blood Updated:  08/05/19 2113     Glucose 272 mg/dL     POC Glucose Once [[id number]]  (Normal) Collected:  08/05/19 1650    Specimen:  Blood Updated:  08/05/19 1652     Glucose 124 mg/dL     POC Glucose Once [[id number]]  (Abnormal) Collected:  08/05/19 1316    Specimen:  Blood Updated:  08/05/19 1317      Glucose 158 mg/dL     POC Glucose Once [[id number]]  (Normal) Collected:  08/05/19 0632    Specimen:  Blood Updated:  08/05/19 0634     Glucose 125 mg/dL     Renal Function Panel [[id number]]  (Abnormal) Collected:  08/05/19 0404    Specimen:  Blood from Arm, Right Updated:  08/05/19 0453     Glucose 124 mg/dL      BUN 44 mg/dL      Creatinine 0.89 mg/dL      Sodium 139 mmol/L      Potassium 4.1 mmol/L      Chloride 103 mmol/L      CO2 22.7 mmol/L      Calcium 8.7 mg/dL      Albumin 2.50 g/dL      Phosphorus 2.3 mg/dL      Anion Gap 13.3 mmol/L      BUN/Creatinine Ratio 49.4     eGFR Non African Amer 59 mL/min/1.73     Narrative:       GFR Normal >60  Chronic Kidney Disease <60  Kidney Failure <15    POC Glucose Once [[id number]]  (Abnormal) Collected:  08/05/19 0016    Specimen:  Blood Updated:  08/05/19 0018     Glucose 181 mg/dL     POC Glucose Once [[id number]]  (Abnormal) Collected:  08/04/19 1944    Specimen:  Blood Updated:  08/04/19 1945     Glucose 227 mg/dL     POC Glucose Once [[id number]]  (Normal) Collected:  08/04/19 1633    Specimen:  Blood Updated:  08/04/19 1635     Glucose 127 mg/dL     Hemoglobin & Hematocrit, Blood [[id number]]  (Abnormal) Collected:  08/04/19 1538    Specimen:  Blood Updated:  08/04/19 1547     Hemoglobin 8.7 g/dL      Hematocrit 28.2 %     POC Glucose Once [[id number]]  (Abnormal) Collected:  08/04/19 1134    Specimen:  Blood Updated:  08/04/19 1137     Glucose 180 mg/dL     Urinalysis With Culture If Indicated - Urine, Catheter In/Out [[id number]]  (Abnormal) Collected:  08/04/19 1049    Specimen:  Urine, Catheter In/Out Updated:  08/04/19 1136     Color, UA Yellow     Appearance, UA Cloudy     pH, UA 8.0     Specific Gravity, UA 1.017     Glucose, UA Negative     Ketones, UA Negative     Bilirubin, UA Negative     Blood, UA Moderate (2+)     Protein, UA 30 mg/dL (1+)     Leuk Esterase, UA Large (3+)     Nitrite, UA Negative     Urobilinogen, UA 0.2 E.U./dL    Urinalysis, Microscopic  Only - Urine, Catheter In/Out [[id number]]  (Abnormal) Collected:  08/04/19 1049    Specimen:  Urine, Catheter In/Out Updated:  08/04/19 1136     RBC, UA 31-50 /HPF      WBC, UA Too Numerous to Count /HPF      Bacteria, UA 4+ /HPF      Squamous Epithelial Cells, UA 0-2 /HPF      Hyaline Casts, UA 7-12 /LPF      Methodology Automated Microscopy    POC Glucose Once [[id number]]  (Abnormal) Collected:  08/04/19 0603    Specimen:  Blood Updated:  08/04/19 0604     Glucose 136 mg/dL     Basic Metabolic Panel [[id number]]  (Abnormal) Collected:  08/04/19 0451    Specimen:  Blood from Arm, Right Updated:  08/04/19 0547     Glucose 143 mg/dL      BUN 51 mg/dL      Creatinine 1.18 mg/dL      Sodium 138 mmol/L      Potassium 4.8 mmol/L      Chloride 106 mmol/L      CO2 24.3 mmol/L      Calcium 8.4 mg/dL      eGFR Non African Amer 43 mL/min/1.73      BUN/Creatinine Ratio 43.2     Anion Gap 7.7 mmol/L     Narrative:       GFR Normal >60  Chronic Kidney Disease <60  Kidney Failure <15    CBC (No Diff) [[id number]]  (Abnormal) Collected:  08/04/19 0451    Specimen:  Blood from Arm, Right Updated:  08/04/19 0530     WBC 5.22 10*3/mm3      RBC 2.78 10*6/mm3      Hemoglobin 8.1 g/dL      Hematocrit 26.8 %      MCV 96.4 fL      MCH 29.1 pg      MCHC 30.2 g/dL      RDW 15.8 %      RDW-SD 56.2 fl      MPV 9.6 fL      Platelets 204 10*3/mm3     POC Glucose Once [[id number]]  (Abnormal) Collected:  08/04/19 0000    Specimen:  Blood Updated:  08/04/19 0011     Glucose 158 mg/dL     POC Glucose Once [[id number]]  (Abnormal) Collected:  08/03/19 2056    Specimen:  Blood Updated:  08/03/19 2104     Glucose 170 mg/dL     POC Glucose Once [[id number]]  (Abnormal) Collected:  08/03/19 1638    Specimen:  Blood Updated:  08/03/19 1640     Glucose 138 mg/dL     Hemoglobin & Hematocrit, Blood [[id number]]  (Abnormal) Collected:  08/03/19 1443    Specimen:  Blood Updated:  08/03/19 1502     Hemoglobin 8.5 g/dL      Hematocrit 28.1 %     POC Glucose Once  [[id number]]  (Abnormal) Collected:  08/03/19 1151    Specimen:  Blood Updated:  08/03/19 1155     Glucose 157 mg/dL     POC Glucose Once [[id number]]  (Abnormal) Collected:  08/03/19 0601    Specimen:  Blood Updated:  08/03/19 0603     Glucose 174 mg/dL     Renal Function Panel [[id number]]  (Abnormal) Collected:  08/03/19 0429    Specimen:  Blood from Arm, Right Updated:  08/03/19 0505     Glucose 169 mg/dL      BUN 55 mg/dL      Creatinine 1.35 mg/dL      Sodium 140 mmol/L      Potassium 5.2 mmol/L      Chloride 106 mmol/L      CO2 21.7 mmol/L      Calcium 8.4 mg/dL      Albumin 2.60 g/dL      Phosphorus 3.0 mg/dL      Anion Gap 12.3 mmol/L      BUN/Creatinine Ratio 40.7     eGFR Non African Amer 37 mL/min/1.73     Narrative:       GFR Normal >60  Chronic Kidney Disease <60  Kidney Failure <15    Hemoglobin & Hematocrit, Blood [[id number]]  (Abnormal) Collected:  08/03/19 0429    Specimen:  Blood from Arm, Right Updated:  08/03/19 0442     Hemoglobin 8.3 g/dL      Hematocrit 27.1 %     POC Glucose Once [[id number]]  (Abnormal) Collected:  08/03/19 0059    Specimen:  Blood Updated:  08/03/19 0100     Glucose 151 mg/dL     POC Glucose Once [[id number]]  (Abnormal) Collected:  08/02/19 2040    Specimen:  Blood Updated:  08/02/19 2041     Glucose 134 mg/dL     POC Glucose Once [[id number]]  (Abnormal) Collected:  08/02/19 1618    Specimen:  Blood Updated:  08/02/19 1621     Glucose 163 mg/dL     POC Glucose Once [[id number]]  (Abnormal) Collected:  08/02/19 1158    Specimen:  Blood Updated:  08/02/19 1206     Glucose 179 mg/dL     POC Glucose Once [[id number]]  (Abnormal) Collected:  08/02/19 0605    Specimen:  Blood Updated:  08/02/19 0607     Glucose 164 mg/dL     Vitamin B12 [[id number]]  (Normal) Collected:  08/02/19 0416    Specimen:  Blood Updated:  08/02/19 0602     Vitamin B-12 902 pg/mL     Folate [[id number]]  (Normal) Collected:  08/02/19 0416    Specimen:  Blood Updated:  08/02/19 0602     Folate >20.00 ng/mL      Ferritin [[id number]]  (Abnormal) Collected:  08/02/19 0416    Specimen:  Blood Updated:  08/02/19 0554     Ferritin 273.00 ng/mL     Iron Profile [[id number]]  (Abnormal) Collected:  08/02/19 0416    Specimen:  Blood Updated:  08/02/19 0548     Iron 10 mcg/dL      Iron Saturation 6 %      Transferrin 104 mg/dL      TIBC 155 mcg/dL     Comprehensive Metabolic Panel [[id number]]  (Abnormal) Collected:  08/02/19 0416    Specimen:  Blood Updated:  08/02/19 0548     Glucose 156 mg/dL      BUN 50 mg/dL      Creatinine 1.93 mg/dL      Sodium 140 mmol/L      Potassium 5.5 mmol/L      Chloride 106 mmol/L      CO2 25.2 mmol/L      Calcium 8.2 mg/dL      Total Protein 5.6 g/dL      Albumin 2.30 g/dL      ALT (SGPT) <5 U/L      AST (SGOT) 28 U/L      Alkaline Phosphatase 93 U/L      Total Bilirubin 0.4 mg/dL      eGFR Non African Amer 24 mL/min/1.73      Globulin 3.3 gm/dL      A/G Ratio 0.7 g/dL      BUN/Creatinine Ratio 25.9     Anion Gap 8.8 mmol/L     Narrative:       GFR Normal >60  Chronic Kidney Disease <60  Kidney Failure <15    CBC (No Diff) [[id number]]  (Abnormal) Collected:  08/02/19 0416    Specimen:  Blood Updated:  08/02/19 0522     WBC 8.01 10*3/mm3      RBC 2.43 10*6/mm3      Hemoglobin 7.3 g/dL      Hematocrit 24.4 %      .4 fL      MCH 30.0 pg      MCHC 29.9 g/dL      RDW 14.4 %      RDW-SD 52.4 fl      MPV 10.3 fL      Platelets 156 10*3/mm3     POC Glucose Once [[id number]]  (Abnormal) Collected:  08/02/19 0033    Specimen:  Blood Updated:  08/02/19 0035     Glucose 155 mg/dL     POC Glucose Once [[id number]]  (Abnormal) Collected:  08/01/19 1829    Specimen:  Blood Updated:  08/01/19 1830     Glucose 153 mg/dL     Sodium, Urine, Random - Urine, Clean Catch [[id number]] Collected:  08/01/19 1528    Specimen:  Urine, Clean Catch Updated:  08/01/19 1639     Sodium, Urine <20 mmol/L     Narrative:       Reference intervals for random urine have not been established.  Clinical usage is dependent upon  physician's interpretation in combination with other laboratory tests.     Creatinine, Urine, Random - Urine, Clean Catch [[id number]] Collected:  08/01/19 1528    Specimen:  Urine, Clean Catch Updated:  08/01/19 1622     Creatinine, Urine 40.8 mg/dL     Narrative:       Reference intervals for random urine have not been established.  Clinical usage is dependent upon physician's interpretation in combination with other laboratory tests.     Urinalysis, Microscopic Only - Urine, Clean Catch [[id number]]  (Abnormal) Collected:  08/01/19 1528    Specimen:  Urine, Clean Catch Updated:  08/01/19 1609     RBC, UA 3-5 /HPF      WBC, UA 31-50 /HPF      Bacteria, UA 1+ /HPF      Squamous Epithelial Cells, UA 7-12 /HPF      Hyaline Casts, UA 0-2 /LPF      Methodology Manual Light Microscopy    Urinalysis With Microscopic If Indicated (No Culture) - Urine, Clean Catch [[id number]]  (Abnormal) Collected:  08/01/19 1528    Specimen:  Urine, Clean Catch Updated:  08/01/19 1556     Color, UA Yellow     Appearance, UA Cloudy     pH, UA 7.0     Specific Gravity, UA 1.011     Glucose, UA Negative     Ketones, UA Negative     Bilirubin, UA Negative     Blood, UA Small (1+)     Protein, UA Trace     Leuk Esterase, UA Large (3+)     Nitrite, UA Negative     Urobilinogen, UA 0.2 E.U./dL    Comprehensive Metabolic Panel [[id number]]  (Abnormal) Collected:  08/01/19 0333    Specimen:  Blood Updated:  08/01/19 1344     Glucose 195 mg/dL      BUN 39 mg/dL      Creatinine 1.92 mg/dL      Sodium 140 mmol/L      Potassium 5.7 mmol/L      Chloride 106 mmol/L      CO2 22.4 mmol/L      Calcium 8.0 mg/dL      Total Protein 5.5 g/dL      Albumin 2.40 g/dL      ALT (SGPT) <5 U/L      AST (SGOT) 42 U/L      Alkaline Phosphatase 97 U/L      Total Bilirubin 0.3 mg/dL      eGFR Non African Amer 24 mL/min/1.73      Globulin 3.1 gm/dL      A/G Ratio 0.8 g/dL      BUN/Creatinine Ratio 20.3     Anion Gap 11.6 mmol/L     Narrative:       GFR Normal >60  Chronic  Kidney Disease <60  Kidney Failure <15    POC Glucose Once [[id number]]  (Abnormal) Collected:  08/01/19 1229    Specimen:  Blood Updated:  08/01/19 1231     Glucose 226 mg/dL     POC Glucose Once [[id number]]  (Abnormal) Collected:  08/01/19 0637    Specimen:  Blood Updated:  08/01/19 0639     Glucose 254 mg/dL     Basic Metabolic Panel [[id number]]  (Abnormal) Collected:  08/01/19 0333    Specimen:  Blood Updated:  08/01/19 0418     Glucose 197 mg/dL      BUN 41 mg/dL      Creatinine 1.73 mg/dL      Sodium 139 mmol/L      Potassium 5.5 mmol/L      Chloride 106 mmol/L      CO2 23.6 mmol/L      Calcium 8.0 mg/dL      eGFR Non African Amer 27 mL/min/1.73      BUN/Creatinine Ratio 23.7     Anion Gap 9.4 mmol/L     Narrative:       GFR Normal >60  Chronic Kidney Disease <60  Kidney Failure <15    CBC (No Diff) [[id number]]  (Abnormal) Collected:  08/01/19 0333    Specimen:  Blood Updated:  08/01/19 0349     WBC 11.08 10*3/mm3      RBC 2.79 10*6/mm3      Hemoglobin 8.4 g/dL      Hematocrit 27.9 %      .0 fL      MCH 30.1 pg      MCHC 30.1 g/dL      RDW 14.5 %      RDW-SD 53.2 fl      MPV 10.2 fL      Platelets 129 10*3/mm3     POC Glucose Once [[id number]]  (Abnormal) Collected:  07/31/19 2155    Specimen:  Blood Updated:  07/31/19 2156     Glucose 199 mg/dL     Basic Metabolic Panel [[id number]]  (Abnormal) Collected:  07/31/19 1457    Specimen:  Blood Updated:  07/31/19 1546     Glucose 181 mg/dL      BUN 30 mg/dL      Creatinine 1.70 mg/dL      Sodium 139 mmol/L      Potassium 5.1 mmol/L      Chloride 105 mmol/L      CO2 22.9 mmol/L      Calcium 7.2 mg/dL      eGFR Non African Amer 28 mL/min/1.73      BUN/Creatinine Ratio 17.6     Anion Gap 11.1 mmol/L     Narrative:       GFR Normal >60  Chronic Kidney Disease <60  Kidney Failure <15        Glucose   Date/Time Value Ref Range Status   08/07/2019 1056 214 (H) 70 - 130 mg/dL Final   08/07/2019 0628 119 70 - 130 mg/dL Final   08/06/2019 2115 205 (H) 70 - 130 mg/dL  "Final   08/06/2019 1632 185 (H) 70 - 130 mg/dL Final   08/06/2019 1118 166 (H) 70 - 130 mg/dL Final   08/06/2019 0626 132 (H) 70 - 130 mg/dL Final   08/05/2019 2111 272 (H) 70 - 130 mg/dL Final   08/05/2019 1650 124 70 - 130 mg/dL Final       /62 (BP Location: Right arm, Patient Position: Lying)   Pulse 64   Temp 99.1 °F (37.3 °C) (Oral)   Resp 16   Ht 170 cm (66.93\")   Wt 83.4 kg (183 lb 12.8 oz)   LMP  (LMP Unknown)   SpO2 95%   BMI 28.85 kg/m²     Discharge Exam:  General Appearance:    Alert, cooperative, no distress                          Head:    Normocephalic, without obvious abnormality, atraumatic                          Eyes:                            Throat:   Lips, tongue, gums normal                          Neck:   Supple, symmetrical, trachea midline, no JVD                        Lungs:     Clear to auscultation bilaterally, respirations unlabored                Chest Wall:    No tenderness or deformity                        Heart:    Regular rate and rhythm, S1 and S2 normal, no murmur,no  Rub or gallop                  Abdomen:     Soft, non-tender, bowel sounds active, no masses, no organomegaly                  Extremities:   Extremities normal, right hip surgical changes, no cyanosis or edema                             Skin:   Skin is warm and dry,  no rashes or palpable lesions                  Neurologic:   no focal deficits noted     Disposition:  Skilled nursing facility    Patient Instructions:      Discharge Medications      New Medications      Instructions Start Date   apixaban 2.5 MG tablet tablet  Commonly known as:  ELIQUIS   2.5 mg, Oral, Every 12 Hours Scheduled      HYDROcodone-acetaminophen 5-325 MG per tablet  Commonly known as:  NORCO   1 tablet, Oral, Every 6 Hours PRN      lactulose 10 GM/15ML solution  Commonly known as:  CHRONULAC   10 g, Oral, Daily   Start Date:  8/8/2019     metoprolol tartrate 25 MG tablet  Commonly known as:  LOPRESSOR   25 mg, Oral, " Every 8 Hours      Patiromer Sorbitex Calcium 8.4 g pack  Commonly known as:  VELTASSA   8.4 g, Oral, Daily   Start Date:  8/8/2019     polyethylene glycol pack packet  Commonly known as:  MIRALAX   17 g, Oral, Daily PRN      sennosides-docusate sodium 8.6-50 MG tablet  Commonly known as:  SENOKOT-S   2 tablets, Oral, Nightly         Changes to Medications      Instructions Start Date   amLODIPine 10 MG tablet  Commonly known as:  NORVASC  What changed:    · medication strength  · how much to take   10 mg, Oral, Every 24 Hours Scheduled   Start Date:  8/8/2019     mirtazapine 15 MG tablet  Commonly known as:  REMERON  What changed:    · when to take this  · reasons to take this   15 mg, Oral, Nightly PRN         Continue These Medications      Instructions Start Date   acetaminophen 325 MG tablet  Commonly known as:  TYLENOL   650 mg, Oral, Every 6 Hours PRN      fluticasone 50 MCG/ACT nasal spray  Commonly known as:  FLONASE   USE TWO SPRAYS IN EACH NOSTRIL ONCE DAILY      LANCETS MICRO THIN 33G misc   USE AS DIRECTED TO TEST BLOOD GLUCOSE DAILY      raNITIdine 150 MG tablet  Commonly known as:  ZANTAC   TAKE 1 TABLET TWICE DAILY  FOR  REFLUX  ESOPHAGITIS      sertraline 100 MG tablet  Commonly known as:  ZOLOFT   TAKE 1 TABLET EVERY DAY  FOR  MOOD  AND  WELL  BEING      TRUE METRIX BLOOD GLUCOSE TEST test strip  Generic drug:  glucose blood   USE TO TEST BLOOD SUGAR ONCE DAILY AS DIRECTED      TRUE METRIX BLOOD GLUCOSE TEST test strip  Generic drug:  glucose blood   USE TO TEST BLOOD SUGAR ONCE DAILY AS DIRECTED         Stop These Medications    fexofenadine 180 MG tablet  Commonly known as:  ALLEGRA     losartan 50 MG tablet  Commonly known as:  COZAAR     primidone 50 MG tablet  Commonly known as:  MYSOLINE     sodium hypochlorite 0.25 % topical solution  Commonly known as:  DAKIN'S          No future appointments.   Contact information for follow-up providers     Shellie Pace, DNP, APRN. Schedule an  appointment as soon as possible for a visit in 4 week(s).    Specialties:  Nurse Practitioner, Cardiology  Contact information:  [address]  [facility] [id number] [phone]             Ayse Alves MD. Schedule an appointment as soon as possible for a visit in 12 week(s).    Specialty:  Cardiology  Contact information:  [address]  [address]  [place] [address]  [phone]             Fly Sanchez DO Follow up.    Specialties:  Family Medicine, Emergency Medicine  Why:  After released from rehab  Contact information:  [address] [facility] [address]  [facility] [address]  [phone]             Marisol Kapadia APRN Follow up.    Specialty:  Family Medicine  Contact information:  [address] [facility] [address]  [facility] [address]  [phone]             Nazario Epstein II, MD Follow up.    Specialty:  Orthopedic Surgery  Why:  post op follow up  Contact information:  [address]  [facility] [address]  [phone]                   Contact information for after-discharge care     Destination     [facility] Follow up.    Service:  Skilled Nursing  Contact information:  [address] [id number] [phone]                           Discharge Order (From admission, onward)    Start     Ordered    08/07/19 1340  Discharge patient  Once     Expected Discharge Date:  08/07/19    Discharge Disposition:  Skilled Nursing Facility (DC - External)    Physician of Record for Attribution - Please select from Treatment Team:  ANIL CASTRO [[id number]]    Review needed by CMO to determine Physician of Record:  No       Question Answer Comment   Physician of Record for Attribution - Please select from Treatment Team ANIL CASTRO    Review needed by CMO to determine Physician of Record No        08/07/19 134          Total time spent discharging patient including evaluation,post hospitalization follow up,  medication and post hospitalization  instructions and education total time exceeds 30 minutes.    Signed:  Amadou Lawler MD  8/7/2019  1:49 PM

## 2019-08-07 NOTE — PROGRESS NOTES
LOS: 9 days   Patient Care Team:  Fly Sanchez DO as PCP - General  Marisol Kapadia APRN as PCP - Claims Attributed       Chief Complaint:  Following for atrial fibrillation        Interval History:   VSS.    Remains in sinus rhythm.  Resting comfortably and eating a banana.  Denies chest pain or shortness of breath.           Objective   Vital Signs  Temp:  [98.5 °F (36.9 °C)-99.5 °F (37.5 °C)] 99.2 °F (37.3 °C)  Heart Rate:  [60-67] 64  Resp:  [16-18] 16  BP: (136-158)/(58-66) 158/66    Intake/Output Summary (Last 24 hours) at 8/7/2019 0842  Last data filed at 8/6/2019 1754  Gross per 24 hour   Intake 200 ml   Output 200 ml   Net 0 ml           Physical Exam  Constitutional: She appears well-developed and well-nourished. No distress.   Cardiovascular: Normal rate, regular rhythm, normal heart sounds and intact distal pulses.   Pulmonary/Chest: Effort normal and breath sounds normal. No respiratory distress.   Abdominal: Soft. Bowel sounds are normal. There is no guarding.   Musculoskeletal: She exhibits no edema.   Skin: Skin is warm and dry. No erythema.           Results Review:      Results from last 7 days   Lab Units 08/07/19  0505 08/06/19  0631 08/05/19  0404   SODIUM mmol/L 145 142 139   POTASSIUM mmol/L 4.5 4.4 4.1   CHLORIDE mmol/L 109* 107 103   CO2 mmol/L 24.8 25.2 22.7   BUN mg/dL 49* 53* 44*   CREATININE mg/dL 1.13* 1.34* 0.89   GLUCOSE mg/dL 113* 143* 124*   CALCIUM mg/dL 8.1* 8.5 8.7         Results from last 7 days   Lab Units 08/07/19  0505 08/06/19  0631 08/04/19  1538 08/04/19  0451   WBC 10*3/mm3 6.11 5.72  --  5.22   HEMOGLOBIN g/dL 8.0* 8.0* 8.7* 8.1*   HEMATOCRIT % 27.5* 27.2* 28.2* 26.8*   PLATELETS 10*3/mm3 308 276  --  204                               Medication Review:     amLODIPine 10 mg Oral Q24H   apixaban 2.5 mg Oral Q12H   fluticasone 2 spray Each Nare Daily   insulin lispro 0-7 Units Subcutaneous 4x Daily With Meals & Nightly   ipratropium-albuterol 3 mL Nebulization  TID   lactulose 10 g Oral Daily   metoprolol tartrate 25 mg Oral Q8H   Patiromer Sorbitex Calcium 8.4 g Oral Daily   raNITIdine 150 mg Oral QAM AC   sennosides-docusate sodium 2 tablet Oral Nightly   sertraline 100 mg Oral Daily   sodium chloride 3 mL Intravenous Q12H              Assessment/Plan        1.  New onset atrial fibrillation with tachycardia: Patient with Chads2-vasc score 7, on low-dose eliquis for AC.  Remains on metoprolol with stable heart rate and blood pressure.  Remains in sinus rhythm.     2.  Hypertension: Pressure remains stable with addition of metoprolol.  Continue to monitor BP and HR.     3.  History of fall with left femur fracture     4.  Acute on chronic kidney disease: Nephrology following.     5.  History of stroke     6.  Diabetes     7.  Acute and chronic lower extremity DVTs          -Will sign off for now.  Please feel free to re-consult cardiology at any time or contact us with any additional questions or concerns  -Ok to d/c from cardiac standpoint when other teams feel patient is ready  -Follow-up with cardiology APRN within 1 month of hospital discharge and Dr. Alves within 3 months of hospital discharge.             Thanks,    Shellie Pace, NANDINI, APRN  08/07/19  8:42 AM      The above plan of care was discussed with Dr. Long MD.

## 2019-08-07 NOTE — PROGRESS NOTES
Continued Stay Note  [facility]     Patient Name: Magdalena Jerry  MRN: [id number]  Today's Date: 8/7/2019    Admit Date: 7/29/2019    Discharge Plan     Row Name 08/07/19 0101       Plan    Plan Comments  DC orders noted.  Per Alicia/ Oriana, bed remains available.  Pt and her family are in agreement with DC to [facility] today.  DC packet given to RN.  Rx x1 copied for chart and placed in pkt.  [facility] ambulance arranged for 1700    Final Note  Pt to be DC'd to skilled bed at [facility].         Discharge Codes    No documentation.       Expected Discharge Date and Time     Expected Discharge Date Expected Discharge Time    Aug 7, 2019             Emily Torres RN

## 2019-08-08 ENCOUNTER — EPISODE CHANGES (OUTPATIENT)
Dept: CASE MANAGEMENT | Facility: OTHER | Age: 84
End: 2019-08-08

## 2019-08-08 NOTE — PROGRESS NOTES
Case Management Discharge Note    Final Note: Pt to be DC'd to skilled bed at [facility].     Destination - Selection Complete      Service Provider Request Status Selected Services Address Phone Number Fax Number    [facility] Selected Skilled Nursing [address] [address] [phone]      Durable Medical Equipment      No service has been selected for the patient.      Dialysis/Infusion      No service has been selected for the patient.      Home Medical Care      No service has been selected for the patient.      Therapy      No service has been selected for the patient.      Community Resources      No service has been selected for the patient.        Transportation Services  Ambulance: [facility]    Final Discharge Disposition Code: 03 - skilled nursing facility (SNF)([facility])

## 2019-08-23 ENCOUNTER — TELEPHONE (OUTPATIENT)
Dept: CARDIOLOGY | Facility: CLINIC | Age: 84
End: 2019-08-23

## 2019-08-23 ENCOUNTER — OFFICE VISIT (OUTPATIENT)
Dept: CARDIOLOGY | Facility: CLINIC | Age: 84
End: 2019-08-23

## 2019-08-23 VITALS
BODY MASS INDEX: 29.66 KG/M2 | SYSTOLIC BLOOD PRESSURE: 100 MMHG | DIASTOLIC BLOOD PRESSURE: 46 MMHG | HEART RATE: 42 BPM | WEIGHT: 189 LBS | HEIGHT: 67 IN

## 2019-08-23 DIAGNOSIS — I48.0 PAROXYSMAL ATRIAL FIBRILLATION (HCC): Primary | ICD-10-CM

## 2019-08-23 DIAGNOSIS — R03.1 LOW BLOOD PRESSURE READING: ICD-10-CM

## 2019-08-23 DIAGNOSIS — I10 BENIGN ESSENTIAL HYPERTENSION: ICD-10-CM

## 2019-08-23 DIAGNOSIS — R00.1 BRADYCARDIA: ICD-10-CM

## 2019-08-23 PROCEDURE — 93000 ELECTROCARDIOGRAM COMPLETE: CPT | Performed by: NURSE PRACTITIONER

## 2019-08-23 PROCEDURE — 99214 OFFICE O/P EST MOD 30 MIN: CPT | Performed by: NURSE PRACTITIONER

## 2019-08-23 RX ORDER — HYDROCODONE BITARTRATE AND ACETAMINOPHEN 5; 325 MG/1; MG/1
1 TABLET ORAL EVERY 6 HOURS PRN
Status: ON HOLD | COMMUNITY
End: 2019-09-13 | Stop reason: SDUPTHER

## 2019-08-23 NOTE — PROGRESS NOTES
Date of Office Visit: 2019  Encounter Provider: Shellie Pace, NANDINI, APRN  Place of Service: [facility] CARDIOLOGY  Patient Name: Magdalena Jerry  :[id number]        Subjective:     Chief Complaint:  Follow-up, paroxysmal atrial fibrillation, hypertension.      History of Present Illness:  Magdalena Jerry is a 89 y.o. female patient of Dr. Alves.  This is my first time seeing the patient in the office today and I reviewed her records.    Patient has a history of falls, atrial fibrillation with tachycardia, hypertension, hyperlipidemia, diabetes, prior stroke, lower extremity DVT.    Patient presented to the ER 2019 after falling in her kitchen.  She denied feeling lightheaded or dizzy before the fall and denied passing out.  She did not have any chest pain or pressure or shortness of breath.  She was evaluated in the ER and found to have a left femur fracture.  She was seen by orthopedic surgery who recommended surgical intervention with ORIF.  Cardiology was consulted for preoperative clearance.  Chest x-ray was negative for acute processes in the ER.  EKG without ischemic changes, LAFB noted.  Creatinine 1.37.  Patient was cleared to proceed with surgery.  Echocardiogram 2019 showed normal left ventricular systolic function with EF of 64%, grade 1A diastolic dysfunction, severely dilated left atrial cavity, calcification of the aortic valve, some thickening of back wall of right atrium though NOT consistent with vegetation or thrombus.  Patient later developed new onset atrial fibrillation with tachycardia.  She was started on beta-blocker therapy.  Due to stroke risk he was started on low-dose Eliquis.      Patient presents to office today for follow-up appointment.  Patient's son and daughter-in-law are with her in the office today, per patient preference.  Patient and family report that she has been doing well overall since hospital discharge.  She is still  at the [facility] for rehab.  Blood pressure and heart rate are slightly low in the office today.  She has a blood pressure and heart rate log from [place] showing heart rate overall running 50s to 70s and blood pressure running 120s to 160s though most 120s to 130 systolic however these are before medications.  They report that they saw the orthopedic doctor the other day and blood pressure was also around low 100s over 40s and heart rate was in the 40s at that time as well.  At this point we are going to decrease her metoprolol from 3 times a day to twice a day.  She has not had any palpitations since hospital discharge or signs of recurrent atrial fibrillation however they will watch out for this as dosage is decreased.  We are going to have the rehab facility check her blood pressure and heart rate at least 1 to 2 hours after morning medications as well to ensure the blood pressure and heart rate not getting too low.  If blood pressure gets low after morning medications and we will consider decreasing amlodipine dosage.  She gets some occasional lower extremity swelling, more so at the end of the day which sounds dependent.  She has some chronic fatigue.  No falls since hospital discharge.  She is in a wheelchair.  Denies chest pain, shortness of breath, shortness of breath with exertion, palpitations, racing heartbeat sensation, dizziness, syncope, near syncope, falls, or abnormal bleeding.        Past Medical History:   Diagnosis Date   • Adjustment disorder with mixed anxiety and depressed mood    • Anemia    • Anxiety    • Atrial fibrillation (CMS/HCC)    • Atrophy of hand muscles     LEFT HAND   • Benign familial tremor    • Carpal tunnel syndrome    • Cataract    • Chronic rhinosinusitis    • Deep vein thrombosis (CMS/HCC)    • Depression    • Diabetes mellitus (CMS/HCC)    • Dyslipidemia    • Esophagitis, reflux    • Fracture of hip (CMS/HCC)    • Frequent falls    • GERD (gastroesophageal reflux  disease)    • Hand pain    • Hearing loss    • Hip pain    • Hyperkalemia    • Hyperlipidemia    • Hypertension    • Impacted cerumen    • Insomnia    • Knee pain    • Limb pain    • Loss of hearing    • Low back pain    • Lower extremity weakness    • Lumbar canal stenosis    • Osteoporosis, senile    • Piriformis syndrome    • Renal insufficiency 2006   • Sensorineural hearing loss    • Stroke (CMS/HCC) 2004   • Thrombocytopenia (CMS/HCC)    • Tinea pedis    • Vitamin D deficiency      Past Surgical History:   Procedure Laterality Date   • CHOLECYSTECTOMY  2013   • COLONOSCOPY     • ERCP WITH SPHINCTEROTOMY/PAPILLOTOMY  2012   • FEMUR OPEN REDUCTION INTERNAL FIXATION Left 7/30/2019    Procedure: OPEN REDUCTION INTERNAL FIXATION LEFT FEMUR;  Surgeon: Nazario Epstein II, MD;  Location: [facility] OR;  Service: Orthopedics   • FRACTURE SURGERY Left 2006    HIP   • VARICOSE VEIN SURGERY Right      Outpatient Medications Prior to Visit   Medication Sig Dispense Refill   • acetaminophen (TYLENOL) 325 MG tablet Take 2 tablets by mouth Every 6 (Six) Hours As Needed for Mild Pain (1-3).  0   • amLODIPine (NORVASC) 10 MG tablet Take 1 tablet by mouth Daily.     • apixaban (ELIQUIS) 2.5 MG tablet tablet Take 1 tablet by mouth Every 12 (Twelve) Hours. 60 tablet 0   • fluticasone (FLONASE) 50 MCG/ACT nasal spray USE TWO SPRAYS IN EACH NOSTRIL ONCE DAILY 16 mL 2   • HYDROcodone-acetaminophen (NORCO) 5-325 MG per tablet Take 1 tablet by mouth Every 6 (Six) Hours As Needed.     • lactulose (CHRONULAC) 10 GM/15ML solution Take 15 mL by mouth Daily.     • LANCETS MICRO THIN 33G misc USE AS DIRECTED TO TEST BLOOD GLUCOSE DAILY 100 each 5   • O2 (OXYGEN) Inhale 2 L/min As Needed.     • Patiromer Sorbitex Calcium (VELTASSA) 8.4 g pack Take 1 packet by mouth Daily. 30 each 0   • polyethylene glycol (MIRALAX) pack packet Take 17 g by mouth Daily As Needed (constipation).     • raNITIdine (ZANTAC) 150 MG tablet TAKE 1 TABLET  TWICE DAILY  FOR  REFLUX  ESOPHAGITIS 180 tablet 1   • sennosides-docusate sodium (SENOKOT-S) 8.6-50 MG tablet Take 2 tablets by mouth Every Night.     • sertraline (ZOLOFT) 100 MG tablet TAKE 1 TABLET EVERY DAY  FOR  MOOD  AND  WELL  BEING 90 tablet 1   • TRUE METRIX BLOOD GLUCOSE TEST test strip USE TO TEST BLOOD SUGAR ONCE DAILY AS DIRECTED 100 each 0   • TRUE METRIX BLOOD GLUCOSE TEST test strip USE TO TEST BLOOD SUGAR ONCE DAILY AS DIRECTED 100 each 1   • metoprolol tartrate (LOPRESSOR) 25 MG tablet Take 1 tablet by mouth Every 8 (Eight) Hours.     • mirtazapine (REMERON) 15 MG tablet Take 1 tablet by mouth At Night As Needed (insomnia).       No facility-administered medications prior to visit.        Allergies as of 08/23/2019 - Reviewed 08/23/2019   Allergen Reaction Noted   • Tetracyclines & related Dermatitis and Rash 06/03/2016   • Amoxicillin GI Intolerance 12/13/2013   • Benzodiazepines Unknown (See Comments) 06/03/2016   • Codeine GI Intolerance 06/03/2016   • Erythromycin Rash 12/13/2013   • Macrolides and ketolides Unknown (See Comments) 06/03/2016   • Melatonin Unknown (See Comments) 06/03/2016   • Neomycin Rash 06/03/2016   • Penicillins GI Intolerance 06/03/2016   • Sulfa antibiotics Rash 06/03/2016     Social History     Socioeconomic History   • Marital status:      Spouse name: Not on file   • Number of children: Not on file   • Years of education: Not on file   • Highest education level: Not on file   Tobacco Use   • Smoking status: Never Smoker   • Smokeless tobacco: Never Used   Substance and Sexual Activity   • Alcohol use: No   • Drug use: No     History reviewed. No pertinent family history.      Review of Systems   Constitution: Positive for decreased appetite and malaise/fatigue. Negative for chills, fever, weight gain and weight loss.   HENT: Positive for hearing loss. Negative for ear pain, nosebleeds and sore throat.    Eyes: Negative for blurred vision, double vision,  "redness, vision loss in left eye and vision loss in right eye.   Cardiovascular: Positive for leg swelling.        SEE HPI.   Respiratory: Negative for cough, shortness of breath, snoring and wheezing.    Endocrine: Positive for cold intolerance. Negative for heat intolerance.   Skin: Negative for itching, rash and suspicious lesions.   Musculoskeletal: Positive for joint pain, joint swelling and myalgias.   Gastrointestinal: Negative for abdominal pain, diarrhea, hematemesis, melena, nausea and vomiting.   Genitourinary: Negative for dysuria, frequency and hematuria.   Neurological: Negative for dizziness, headaches, numbness, paresthesias and seizures.   Psychiatric/Behavioral: Positive for depression. Negative for altered mental status. The patient is nervous/anxious.           Objective:     Vitals:    08/23/19 1056   BP: 100/46   BP Location: Right arm   Pulse: (!) 42   Weight: 85.7 kg (189 lb)   Height: 170.2 cm (67\")     Body mass index is 29.6 kg/m².      PHYSICAL EXAM:  Physical Exam   Constitutional: She is oriented to person, place, and time. She appears well-developed and well-nourished. No distress.   HENT:   Head: Normocephalic and atraumatic.   Eyes: Pupils are equal, round, and reactive to light.   Neck: Neck supple. No JVD present. Carotid bruit is not present.   Cardiovascular: Normal rate, regular rhythm, normal heart sounds and intact distal pulses. Exam reveals no gallop and no friction rub.   No murmur heard.  Pulses:       Radial pulses are 2+ on the right side, and 2+ on the left side.        Posterior tibial pulses are 2+ on the right side, and 2+ on the left side.   Pulmonary/Chest: Effort normal and breath sounds normal. No respiratory distress. She has no wheezes. She has no rales.   Abdominal: Soft. Bowel sounds are normal. She exhibits no distension. There is no tenderness.   Musculoskeletal: She exhibits edema (Trace to ankles). She exhibits no tenderness.   In wheelchair "   Neurological: She is alert and oriented to person, place, and time.   Skin: Skin is warm and dry. No rash noted. She is not diaphoretic. No erythema.   Psychiatric: She has a normal mood and affect. Her behavior is normal. Judgment normal.           ECG 12 Lead  Date/Time: 8/23/2019 12:11 PM  Performed by: Shellie Pace DNP, APRN  Authorized by: Shellie Pace DNP, JANET   Comparison: compared with previous ECG from 8/5/2019  Comparison to previous ECG: Back in sinus.  Today's ECG similar to previous strips on file from 7/2019.  Rhythm: sinus bradycardia  Rate: bradycardic  BPM: 42  Conduction: left anterior fascicular block  Other findings: left ventricular hypertrophy    Clinical impression: abnormal EKG  Comments: Today's ECG similar to strips on file from 7/2019              Assessment:       Diagnosis Plan   1. Paroxysmal atrial fibrillation (CMS/HCC)  ECG 12 Lead    metoprolol tartrate (LOPRESSOR) 25 MG tablet   2. Benign essential hypertension  ECG 12 Lead   3. Bradycardia  ECG 12 Lead   4. Low blood pressure reading           Plan:     1. Low blood pressure reading and bradycardia:  Blood pressure and heart rate are slightly low in the office today.  She has a blood pressure and heart rate log from [place] showing heart rate overall running 50s to 70s and blood pressure running 120s to 160s though most 120s to 130 systolic however these are before medications.  They report that they saw the orthopedic doctor the other day and blood pressure was also around low 100s over 40s and heart rate was in the 40s at that time as well.  At this point we are going to decrease her metoprolol from 3 times a day to twice a day.  She has not had any palpitations since hospital discharge or signs of recurrent atrial fibrillation however they will watch out for this as dosage is decreased.  We are going to have the rehab facility check her blood pressure and heart rate at least 1 to 2 hours after morning  medications as well to ensure the blood pressure and heart rate not getting too low.  If blood pressure gets low after morning medications and we will consider decreasing amlodipine dosage.  Nursing home to fax us blood pressure and heart rate readings in 1 to 2 weeks.  Letter with instructions provided.  2. Paroxysmal atrial fibrillation: Patient on low-dose Eliquis and metoprolol.  No falls since hospital discharge.  Denies abnormal bleeding.  Metoprolol dosing as above.    Atrial Fibrillation and Atrial Flutter  Assessment  • The patient has paroxysmal atrial fibrillation  • This is non-valvular in etiology  • The patient's CHADS2-VASc score is 7  • A [id number] score of 2 or more is considered a high risk for a thromboembolic event    3. Hypertension: With low normal blood pressure reading today.  Will adjust medications as above.  4. History of lower extremity DVTs: Remains on low-dose Eliquis.  5. Chronic kidney disease  6. Diabetes: Managed by outside provider.  7. History of prior stroke.    Patient to keep 11/11/2019 follow-up with Dr. Alves as scheduled or follow-up sooner if needed for any new, recurrent, or worsening symptoms or other problems/concerns.           Your medication list           Accurate as of 8/23/19  1:02 PM. If you have any questions, ask your nurse or doctor.               CHANGE how you take these medications      Instructions Last Dose Given Next Dose Due   metoprolol tartrate 25 MG tablet  Commonly known as:  LOPRESSOR  What changed:  when to take this  Changed by:  Shellie Pace, DNP, APRN      Take 1 tablet by mouth 2 (Two) Times a Day.          CONTINUE taking these medications      Instructions Last Dose Given Next Dose Due   acetaminophen 325 MG tablet  Commonly known as:  TYLENOL      Take 2 tablets by mouth Every 6 (Six) Hours As Needed for Mild Pain (1-3).       amLODIPine 10 MG tablet  Commonly known as:  NORVASC      Take 1 tablet by mouth Daily.       apixaban 2.5 MG  tablet tablet  Commonly known as:  ELIQUIS      Take 1 tablet by mouth Every 12 (Twelve) Hours.       fluticasone 50 MCG/ACT nasal spray  Commonly known as:  FLONASE      USE TWO SPRAYS IN EACH NOSTRIL ONCE DAILY       HYDROcodone-acetaminophen 5-325 MG per tablet  Commonly known as:  NORCO      Take 1 tablet by mouth Every 6 (Six) Hours As Needed.       lactulose 10 GM/15ML solution  Commonly known as:  CHRONULAC      Take 15 mL by mouth Daily.       LANCETS MICRO THIN 33G misc      USE AS DIRECTED TO TEST BLOOD GLUCOSE DAILY       O2  Commonly known as:  OXYGEN      Inhale 2 L/min As Needed.       Patiromer Sorbitex Calcium 8.4 g pack  Commonly known as:  VELTASSA      Take 1 packet by mouth Daily.       polyethylene glycol pack packet  Commonly known as:  MIRALAX      Take 17 g by mouth Daily As Needed (constipation).       raNITIdine 150 MG tablet  Commonly known as:  ZANTAC      TAKE 1 TABLET TWICE DAILY  FOR  REFLUX  ESOPHAGITIS       sennosides-docusate sodium 8.6-50 MG tablet  Commonly known as:  SENOKOT-S      Take 2 tablets by mouth Every Night.       sertraline 100 MG tablet  Commonly known as:  ZOLOFT      TAKE 1 TABLET EVERY DAY  FOR  MOOD  AND  WELL  BEING       TRUE METRIX BLOOD GLUCOSE TEST test strip  Generic drug:  glucose blood      USE TO TEST BLOOD SUGAR ONCE DAILY AS DIRECTED       TRUE METRIX BLOOD GLUCOSE TEST test strip  Generic drug:  glucose blood      USE TO TEST BLOOD SUGAR ONCE DAILY AS DIRECTED             Where to Get Your Medications      Information about where to get these medications is not yet available    Ask your nurse or doctor about these medications  · metoprolol tartrate 25 MG tablet         I did not stop or change the above medications, except for decreasing metoprolol frequency as discussed above.  Patient's medication list was updated to reflect medications they are currently taking including medication changes made by other providers.        Thanks,    Shellie DAMIAN  NANDINI Pace, APRN  08/23/2019         Dictated utilizing Dragon dictation

## 2019-09-08 ENCOUNTER — LAB REQUISITION (OUTPATIENT)
Dept: LAB | Facility: HOSPITAL | Age: 84
End: 2019-09-08

## 2019-09-08 DIAGNOSIS — Z00.00 ROUTINE GENERAL MEDICAL EXAMINATION AT A HEALTH CARE FACILITY: ICD-10-CM

## 2019-09-08 LAB
ALBUMIN SERPL-MCNC: 3.1 G/DL (ref 3.5–5.2)
ALBUMIN/GLOB SERPL: 1.6 G/DL
ALP SERPL-CCNC: 113 U/L (ref 39–117)
ALT SERPL W P-5'-P-CCNC: <5 U/L (ref 1–33)
ANION GAP SERPL CALCULATED.3IONS-SCNC: 8.7 MMOL/L (ref 5–15)
AST SERPL-CCNC: 10 U/L (ref 1–32)
BASOPHILS # BLD AUTO: 0.02 10*3/MM3 (ref 0–0.2)
BASOPHILS NFR BLD AUTO: 0.4 % (ref 0–1.5)
BILIRUB SERPL-MCNC: 0.2 MG/DL (ref 0.2–1.2)
BUN BLD-MCNC: 32 MG/DL (ref 8–23)
BUN/CREAT SERPL: 29.9 (ref 7–25)
CALCIUM SPEC-SCNC: 8 MG/DL (ref 8.2–9.6)
CHLORIDE SERPL-SCNC: 99 MMOL/L (ref 98–107)
CO2 SERPL-SCNC: 26.3 MMOL/L (ref 22–29)
CREAT BLD-MCNC: 1.07 MG/DL (ref 0.57–1)
DEPRECATED RDW RBC AUTO: 50.3 FL (ref 37–54)
EOSINOPHIL # BLD AUTO: 0.1 10*3/MM3 (ref 0–0.4)
EOSINOPHIL NFR BLD AUTO: 2.1 % (ref 0.3–6.2)
ERYTHROCYTE [DISTWIDTH] IN BLOOD BY AUTOMATED COUNT: 14.5 % (ref 12.3–15.4)
GFR SERPL CREATININE-BSD FRML MDRD: 48 ML/MIN/1.73
GLOBULIN UR ELPH-MCNC: 1.9 GM/DL
GLUCOSE BLD-MCNC: 161 MG/DL (ref 65–99)
HCT VFR BLD AUTO: 29.3 % (ref 34–46.6)
HGB BLD-MCNC: 9 G/DL (ref 12–15.9)
IMM GRANULOCYTES # BLD AUTO: 0.02 10*3/MM3 (ref 0–0.05)
IMM GRANULOCYTES NFR BLD AUTO: 0.4 % (ref 0–0.5)
LYMPHOCYTES # BLD AUTO: 1.62 10*3/MM3 (ref 0.7–3.1)
LYMPHOCYTES NFR BLD AUTO: 34 % (ref 19.6–45.3)
MCH RBC QN AUTO: 29 PG (ref 26.6–33)
MCHC RBC AUTO-ENTMCNC: 30.7 G/DL (ref 31.5–35.7)
MCV RBC AUTO: 94.5 FL (ref 79–97)
MONOCYTES # BLD AUTO: 0.55 10*3/MM3 (ref 0.1–0.9)
MONOCYTES NFR BLD AUTO: 11.6 % (ref 5–12)
NEUTROPHILS # BLD AUTO: 2.45 10*3/MM3 (ref 1.7–7)
NEUTROPHILS NFR BLD AUTO: 51.5 % (ref 42.7–76)
NRBC BLD AUTO-RTO: 0 /100 WBC (ref 0–0.2)
PLATELET # BLD AUTO: 216 10*3/MM3 (ref 140–450)
PMV BLD AUTO: 8.7 FL (ref 6–12)
POTASSIUM BLD-SCNC: 4.2 MMOL/L (ref 3.5–5.2)
PROT SERPL-MCNC: 5 G/DL (ref 6–8.5)
RBC # BLD AUTO: 3.1 10*6/MM3 (ref 3.77–5.28)
SODIUM BLD-SCNC: 134 MMOL/L (ref 136–145)
WBC NRBC COR # BLD: 4.76 10*3/MM3 (ref 3.4–10.8)

## 2019-09-08 PROCEDURE — 85025 COMPLETE CBC W/AUTO DIFF WBC: CPT

## 2019-09-08 PROCEDURE — 80053 COMPREHEN METABOLIC PANEL: CPT

## 2019-09-09 ENCOUNTER — HOSPITAL ENCOUNTER (INPATIENT)
Facility: HOSPITAL | Age: 84
LOS: 4 days | Discharge: NURSING, REHAB OR LONG-TERM CARE | End: 2019-09-13
Attending: EMERGENCY MEDICINE | Admitting: ORTHOPAEDIC SURGERY

## 2019-09-09 DIAGNOSIS — T81.49XA WOUND INFECTION AFTER SURGERY: Primary | ICD-10-CM

## 2019-09-09 DIAGNOSIS — I48.0 PAROXYSMAL ATRIAL FIBRILLATION (HCC): ICD-10-CM

## 2019-09-09 DIAGNOSIS — E87.5 HYPERKALEMIA: ICD-10-CM

## 2019-09-09 LAB
ABO GROUP BLD: NORMAL
ANION GAP SERPL CALCULATED.3IONS-SCNC: 11.2 MMOL/L (ref 5–15)
ANION GAP SERPL CALCULATED.3IONS-SCNC: 7.9 MMOL/L (ref 5–15)
BASOPHILS # BLD AUTO: 0.03 10*3/MM3 (ref 0–0.2)
BASOPHILS NFR BLD AUTO: 0.5 % (ref 0–1.5)
BLD GP AB SCN SERPL QL: NEGATIVE
BUN BLD-MCNC: 36 MG/DL (ref 8–23)
BUN BLD-MCNC: 38 MG/DL (ref 8–23)
BUN/CREAT SERPL: 31.4 (ref 7–25)
BUN/CREAT SERPL: 33.6 (ref 7–25)
CALCIUM SPEC-SCNC: 8.5 MG/DL (ref 8.2–9.6)
CALCIUM SPEC-SCNC: 8.7 MG/DL (ref 8.2–9.6)
CHLORIDE SERPL-SCNC: 100 MMOL/L (ref 98–107)
CHLORIDE SERPL-SCNC: 103 MMOL/L (ref 98–107)
CO2 SERPL-SCNC: 24.1 MMOL/L (ref 22–29)
CO2 SERPL-SCNC: 26.8 MMOL/L (ref 22–29)
CREAT BLD-MCNC: 1.07 MG/DL (ref 0.57–1)
CREAT BLD-MCNC: 1.21 MG/DL (ref 0.57–1)
D-LACTATE SERPL-SCNC: 0.9 MMOL/L (ref 0.5–2)
DEPRECATED RDW RBC AUTO: 53.1 FL (ref 37–54)
EOSINOPHIL # BLD AUTO: 0.13 10*3/MM3 (ref 0–0.4)
EOSINOPHIL NFR BLD AUTO: 2 % (ref 0.3–6.2)
ERYTHROCYTE [DISTWIDTH] IN BLOOD BY AUTOMATED COUNT: 14.7 % (ref 12.3–15.4)
GFR SERPL CREATININE-BSD FRML MDRD: 42 ML/MIN/1.73
GFR SERPL CREATININE-BSD FRML MDRD: 48 ML/MIN/1.73
GLUCOSE BLD-MCNC: 101 MG/DL (ref 65–99)
GLUCOSE BLD-MCNC: 137 MG/DL (ref 65–99)
GLUCOSE BLDC GLUCOMTR-MCNC: 106 MG/DL (ref 70–130)
HCT VFR BLD AUTO: 33.1 % (ref 34–46.6)
HGB BLD-MCNC: 10 G/DL (ref 12–15.9)
IMM GRANULOCYTES # BLD AUTO: 0.03 10*3/MM3 (ref 0–0.05)
IMM GRANULOCYTES NFR BLD AUTO: 0.5 % (ref 0–0.5)
LYMPHOCYTES # BLD AUTO: 2.29 10*3/MM3 (ref 0.7–3.1)
LYMPHOCYTES NFR BLD AUTO: 35.8 % (ref 19.6–45.3)
MCH RBC QN AUTO: 29.6 PG (ref 26.6–33)
MCHC RBC AUTO-ENTMCNC: 30.2 G/DL (ref 31.5–35.7)
MCV RBC AUTO: 97.9 FL (ref 79–97)
MONOCYTES # BLD AUTO: 0.7 10*3/MM3 (ref 0.1–0.9)
MONOCYTES NFR BLD AUTO: 11 % (ref 5–12)
NEUTROPHILS # BLD AUTO: 3.21 10*3/MM3 (ref 1.7–7)
NEUTROPHILS NFR BLD AUTO: 50.2 % (ref 42.7–76)
NRBC BLD AUTO-RTO: 0 /100 WBC (ref 0–0.2)
PLATELET # BLD AUTO: 226 10*3/MM3 (ref 140–450)
PMV BLD AUTO: 8.7 FL (ref 6–12)
POTASSIUM BLD-SCNC: 3.5 MMOL/L (ref 3.5–5.2)
POTASSIUM BLD-SCNC: 6.6 MMOL/L (ref 3.5–5.2)
RBC # BLD AUTO: 3.38 10*6/MM3 (ref 3.77–5.28)
RH BLD: POSITIVE
SODIUM BLD-SCNC: 132 MMOL/L (ref 136–145)
SODIUM BLD-SCNC: 141 MMOL/L (ref 136–145)
T&S EXPIRATION DATE: NORMAL
WBC NRBC COR # BLD: 6.39 10*3/MM3 (ref 3.4–10.8)

## 2019-09-09 PROCEDURE — 94640 AIRWAY INHALATION TREATMENT: CPT

## 2019-09-09 PROCEDURE — 82962 GLUCOSE BLOOD TEST: CPT

## 2019-09-09 PROCEDURE — 86850 RBC ANTIBODY SCREEN: CPT | Performed by: ORTHOPAEDIC SURGERY

## 2019-09-09 PROCEDURE — 86901 BLOOD TYPING SEROLOGIC RH(D): CPT | Performed by: ORTHOPAEDIC SURGERY

## 2019-09-09 PROCEDURE — 80048 BASIC METABOLIC PNL TOTAL CA: CPT | Performed by: EMERGENCY MEDICINE

## 2019-09-09 PROCEDURE — 87040 BLOOD CULTURE FOR BACTERIA: CPT | Performed by: EMERGENCY MEDICINE

## 2019-09-09 PROCEDURE — 86900 BLOOD TYPING SEROLOGIC ABO: CPT | Performed by: ORTHOPAEDIC SURGERY

## 2019-09-09 PROCEDURE — 85025 COMPLETE CBC W/AUTO DIFF WBC: CPT | Performed by: EMERGENCY MEDICINE

## 2019-09-09 PROCEDURE — 36415 COLL VENOUS BLD VENIPUNCTURE: CPT

## 2019-09-09 PROCEDURE — 63710000001 INSULIN REGULAR HUMAN PER 5 UNITS: Performed by: EMERGENCY MEDICINE

## 2019-09-09 PROCEDURE — 93005 ELECTROCARDIOGRAM TRACING: CPT | Performed by: EMERGENCY MEDICINE

## 2019-09-09 PROCEDURE — 36415 COLL VENOUS BLD VENIPUNCTURE: CPT | Performed by: EMERGENCY MEDICINE

## 2019-09-09 PROCEDURE — 83605 ASSAY OF LACTIC ACID: CPT | Performed by: EMERGENCY MEDICINE

## 2019-09-09 PROCEDURE — 93010 ELECTROCARDIOGRAM REPORT: CPT | Performed by: INTERNAL MEDICINE

## 2019-09-09 PROCEDURE — 99284 EMERGENCY DEPT VISIT MOD MDM: CPT

## 2019-09-09 RX ORDER — SODIUM CHLORIDE 0.9 % (FLUSH) 0.9 %
10 SYRINGE (ML) INJECTION AS NEEDED
Status: DISCONTINUED | OUTPATIENT
Start: 2019-09-09 | End: 2019-09-13 | Stop reason: HOSPADM

## 2019-09-09 RX ORDER — SENNA AND DOCUSATE SODIUM 50; 8.6 MG/1; MG/1
2 TABLET, FILM COATED ORAL NIGHTLY
Status: DISCONTINUED | OUTPATIENT
Start: 2019-09-09 | End: 2019-09-13 | Stop reason: HOSPADM

## 2019-09-09 RX ORDER — FLUTICASONE PROPIONATE 50 MCG
1 SPRAY, SUSPENSION (ML) NASAL DAILY
Status: DISCONTINUED | OUTPATIENT
Start: 2019-09-10 | End: 2019-09-13 | Stop reason: HOSPADM

## 2019-09-09 RX ORDER — SERTRALINE HYDROCHLORIDE 100 MG/1
100 TABLET, FILM COATED ORAL NIGHTLY
Status: DISCONTINUED | OUTPATIENT
Start: 2019-09-10 | End: 2019-09-13 | Stop reason: HOSPADM

## 2019-09-09 RX ORDER — ACETAMINOPHEN 325 MG/1
650 TABLET ORAL EVERY 6 HOURS PRN
Status: DISCONTINUED | OUTPATIENT
Start: 2019-09-09 | End: 2019-09-13 | Stop reason: HOSPADM

## 2019-09-09 RX ORDER — SODIUM POLYSTYRENE SULFONATE 15 G/60ML
30 SUSPENSION ORAL; RECTAL ONCE
Status: COMPLETED | OUTPATIENT
Start: 2019-09-09 | End: 2019-09-09

## 2019-09-09 RX ORDER — DEXTROSE MONOHYDRATE 25 G/50ML
25 INJECTION, SOLUTION INTRAVENOUS
Status: DISCONTINUED | OUTPATIENT
Start: 2019-09-09 | End: 2019-09-13 | Stop reason: HOSPADM

## 2019-09-09 RX ORDER — NITROGLYCERIN 0.4 MG/1
0.4 TABLET SUBLINGUAL
Status: DISCONTINUED | OUTPATIENT
Start: 2019-09-09 | End: 2019-09-13 | Stop reason: HOSPADM

## 2019-09-09 RX ORDER — ONDANSETRON 2 MG/ML
4 INJECTION INTRAMUSCULAR; INTRAVENOUS EVERY 6 HOURS PRN
Status: DISCONTINUED | OUTPATIENT
Start: 2019-09-09 | End: 2019-09-13 | Stop reason: HOSPADM

## 2019-09-09 RX ORDER — FAMOTIDINE 20 MG/1
20 TABLET, FILM COATED ORAL
Status: DISCONTINUED | OUTPATIENT
Start: 2019-09-10 | End: 2019-09-10

## 2019-09-09 RX ORDER — HYDROCODONE BITARTRATE AND ACETAMINOPHEN 5; 325 MG/1; MG/1
1 TABLET ORAL EVERY 6 HOURS PRN
Status: DISCONTINUED | OUTPATIENT
Start: 2019-09-09 | End: 2019-09-13 | Stop reason: HOSPADM

## 2019-09-09 RX ORDER — NICOTINE POLACRILEX 4 MG
15 LOZENGE BUCCAL
Status: DISCONTINUED | OUTPATIENT
Start: 2019-09-09 | End: 2019-09-13 | Stop reason: HOSPADM

## 2019-09-09 RX ORDER — SODIUM CHLORIDE 0.9 % (FLUSH) 0.9 %
10 SYRINGE (ML) INJECTION EVERY 12 HOURS SCHEDULED
Status: DISCONTINUED | OUTPATIENT
Start: 2019-09-09 | End: 2019-09-13 | Stop reason: HOSPADM

## 2019-09-09 RX ORDER — AMLODIPINE BESYLATE 10 MG/1
10 TABLET ORAL
Status: DISCONTINUED | OUTPATIENT
Start: 2019-09-10 | End: 2019-09-13 | Stop reason: HOSPADM

## 2019-09-09 RX ORDER — DEXTROSE MONOHYDRATE 25 G/50ML
50 INJECTION, SOLUTION INTRAVENOUS ONCE
Status: COMPLETED | OUTPATIENT
Start: 2019-09-09 | End: 2019-09-09

## 2019-09-09 RX ORDER — LACTULOSE 10 G/15ML
10 SOLUTION ORAL DAILY
Status: DISCONTINUED | OUTPATIENT
Start: 2019-09-10 | End: 2019-09-10

## 2019-09-09 RX ADMIN — INSULIN HUMAN 10 UNITS: 100 INJECTION, SOLUTION PARENTERAL at 17:24

## 2019-09-09 RX ADMIN — SODIUM CHLORIDE, PRESERVATIVE FREE 10 ML: 5 INJECTION INTRAVENOUS at 22:17

## 2019-09-09 RX ADMIN — SODIUM POLYSTYRENE SULFONATE 30 G: 15 SUSPENSION ORAL; RECTAL at 17:25

## 2019-09-09 RX ADMIN — DEXTROSE MONOHYDRATE 50 ML: 70 INJECTION, SOLUTION INTRAVENOUS at 17:22

## 2019-09-09 RX ADMIN — ALBUTEROL SULFATE 10 MG: 2.5 SOLUTION RESPIRATORY (INHALATION) at 17:19

## 2019-09-09 NOTE — CONSULTS
Orthopaedic Surgery  Consult Note  Dr. CHARITY Epstein II  [phone]    HPI:  Patient is a 89 y.o. Not  or  female who presents with A left femur draining wound.  She is a little over month status post ORIF of distal femur fracture.  I don't call from her care home that she was having some drainage and she came in to my clinic today.  I sent her to the ER to be admitted to medicine for surgery plan tomorrow.    MEDICAL HISTORY  Past Medical History:   Diagnosis Date   • Adjustment disorder with mixed anxiety and depressed mood    • Anemia    • Anxiety    • Atrial fibrillation (CMS/HCC)    • Atrophy of hand muscles     LEFT HAND   • Benign familial tremor    • Carpal tunnel syndrome    • Cataract    • Chronic rhinosinusitis    • Deep vein thrombosis (CMS/HCC)    • Depression    • Diabetes mellitus (CMS/HCC)    • Dyslipidemia    • Esophagitis, reflux    • Fracture of hip (CMS/HCC)    • Frequent falls    • GERD (gastroesophageal reflux disease)    • Hand pain    • Hearing loss    • Hip pain    • Hyperkalemia    • Hyperlipidemia    • Hypertension    • Impacted cerumen    • Insomnia    • Knee pain    • Limb pain    • Loss of hearing    • Low back pain    • Lower extremity weakness    • Lumbar canal stenosis    • Osteoporosis, senile    • Piriformis syndrome    • Renal insufficiency 2006   • Sensorineural hearing loss    • Stroke (CMS/HCC) 2004   • Thrombocytopenia (CMS/HCC)    • Tinea pedis    • Vitamin D deficiency    ·   Past Surgical History:   Procedure Laterality Date   • CHOLECYSTECTOMY  2013   • COLONOSCOPY     • ERCP WITH SPHINCTEROTOMY/PAPILLOTOMY  2012   • FEMUR OPEN REDUCTION INTERNAL FIXATION Left 7/30/2019    Procedure: OPEN REDUCTION INTERNAL FIXATION LEFT FEMUR;  Surgeon: Nazario Epstein II, MD;  Location[facility];  Service: Orthopedics   • FRACTURE SURGERY Left 2006    HIP   • VARICOSE VEIN SURGERY Right    ·   Prior to Admission medications    Medication Sig Start Date  End Date Taking? Authorizing Provider   acetaminophen (TYLENOL) 325 MG tablet Take 2 tablets by mouth Every 6 (Six) Hours As Needed for Mild Pain (1-3). 3/20/17   Zita Pace MD   amLODIPine (NORVASC) 10 MG tablet Take 1 tablet by mouth Daily. 8/8/19   Amadou Lawler MD   apixaban (ELIQUIS) 2.5 MG tablet tablet Take 1 tablet by mouth Every 12 (Twelve) Hours. 8/7/19   Amadou Lawler MD   fluticasone (FLONASE) 50 MCG/ACT nasal spray USE TWO SPRAYS IN EACH NOSTRIL ONCE DAILY 1/8/18   Fly Sanchez DO   HYDROcodone-acetaminophen (NORCO) 5-325 MG per tablet Take 1 tablet by mouth Every 6 (Six) Hours As Needed.    Provider, MD [facility]ino   lactulose (CHRONULAC) 10 GM/15ML solution Take 15 mL by mouth Daily. 8/8/19   Amadou Lawler MD   LANCETS MICRO THIN 33G misc USE AS DIRECTED TO TEST BLOOD GLUCOSE DAILY 1/16/19   Fly Sanchez DO   metoprolol tartrate (LOPRESSOR) 25 MG tablet Take 1 tablet by mouth 2 (Two) Times a Day. 8/23/19   Shellie Pace, DNP, APRN   O2 (OXYGEN) Inhale 2 L/min As Needed.    Provider, MD [facility]ino   Patiromer Sorbitex Calcium (VELTASSA) 8.4 g pack Take 1 packet by mouth Daily. 8/8/19   Amadou Lawler MD   polyethylene glycol (MIRALAX) pack packet Take 17 g by mouth Daily As Needed (constipation). 8/7/19   Amadou Lawler MD   raNITIdine (ZANTAC) 150 MG tablet TAKE 1 TABLET TWICE DAILY  FOR  REFLUX  ESOPHAGITIS 5/23/19   Fly Sanchez DO   sennosides-docusate sodium (SENOKOT-S) 8.6-50 MG tablet Take 2 tablets by mouth Every Night. 8/7/19   Amadou Lawler MD   sertraline (ZOLOFT) 100 MG tablet TAKE 1 TABLET EVERY DAY  FOR  MOOD  AND  WELL  BEING 5/23/19   Fly Sanchez DO   TRUE METRIX BLOOD GLUCOSE TEST test strip USE TO TEST BLOOD SUGAR ONCE DAILY AS DIRECTED 1/14/19   Fly Sanchez, DO   TRUE METRIX BLOOD GLUCOSE TEST test strip USE TO TEST BLOOD SUGAR ONCE DAILY AS DIRECTED 5/30/19   Fly Sanchez, DO   ·   Allergies   Allergen Reactions   • Tetracyclines &  Related Dermatitis and Rash   • Amoxicillin GI Intolerance   • Benzodiazepines Unknown (See Comments)     FAMILY UNSURE   • Codeine GI Intolerance   • Erythromycin Rash   • Macrolides And Ketolides Unknown (See Comments)     FAMILY UNSURE   • Melatonin Unknown (See Comments)     FAMILY UNSURE   • Neomycin Rash     MADE RASH WORSE   • Penicillins GI Intolerance   • Sulfa Antibiotics Rash   ·   Most Recent Immunizations   Administered Date(s) Administered   • Flu Vaccine High Dose PF 65YR+ 10/08/2018   • Pneumococcal Conjugate 13-Valent (PCV13) 10/01/2015   ·   Social History     Tobacco Use   • Smoking status: Never Smoker   • Smokeless tobacco: Never Used   Substance Use Topics   • Alcohol use: No   ·    Social History     Substance and Sexual Activity   Drug Use No   ·     VITALS: Pulse 52   Temp 97.1 °F (36.2 °C)   Resp 18   LMP  (LMP Unknown)   SpO2 97%  There is no height or weight on file to calculate BMI.    PHYSICAL EXAM:   · CONSTITUTIONAL: No acute distress  · LUNGS: Equal chest rise, no shortness of air  · CARDIOVASCULAR: palpable peripheral pulses  · SKIN: no skin lesions in the area examined  · LYMPH: no lymphadenopathy in the area examined  · EXTREMITY: Left Lower Extremity  · Tenderness to Palpation: Tenderness to palpation at the area of the draining incision  · Gross Deformity: there is an area of wound breakdown about 3 cm x 2 cm near the distal aspect of the lateral thigh incision.  There is surrounding erythema with purulent drainage  · Pulses:  Brisk Capillary Refill  · Sensation: Intact to Saphenous, Sural, Deep Peroneal, Superficial Peroneal, and Tibial Nerves and grossly throughout extremity  · Motor: 5/5 EHL/FHL/TA/GS motor complexes    RADIOLOGY REVIEW:   No radiology results for the last 7 days    LABS:   Results for the past 24 hours: No results found for this or any previous visit (from the past 24 hour(s)).    IMPRESSION:  Patient is a 89 y.o. Not  or  female with Left  surgical site infection status post ORIF left distal femur    PLAN:   · Admited to: No admitting provider for patient encounter.  · Diet: NPO after midnight, Diabetic for Now  · Weight Bearing:Left Lower Extremity Non Weight Bearing  · Surgery: left femur with placement of antibiotic beads  · Consent: The risks and benefits of operative versus nonoperative treatment were discussed.  The patient elected to undergo operative treatment of their injury.  The risks discussed included but were not limited to blood clots, MI, stroke, other medical complications, infection, damage to neurovascular structures,, malunion, nonunion,, hardware prominence,, loss of range of motion, stiffness,, infection, need for further procedures, and and risk of anesthesia..  No guarantees were made   · Disposition: Surgery tomorrow for left thigh I and D    Nazario Epstein II, MD  Orthopaedic Surgery  [facility]

## 2019-09-09 NOTE — ED NOTES
Pt to ER from Dr Epstein ([facility]) office.  Family states pt was told she is going to need surgery to clear up infection at surgical site.  Pt told to come through ER for admission.     Clarence Fiore RN  09/09/19 [id number]

## 2019-09-09 NOTE — ED PROVIDER NOTES
EMERGENCY DEPARTMENT ENCOUNTER    Room Number:  E464/1  Date seen:  9/10/2019  Time seen: 4:01 PM  PCP: Fly Sanchez DO  Historian: pt, family       HPI:  Chief Complaint: increasing drainage from incision site  Context: Magdalena Jerry is a 89 y.o. female who presents to the ED c/o increasing draining from incision located at her lateral left thigh. Per pt's family, they noticed the area was larger and there appeared to be more drainage than usual. Per family, pt has chronic chills. Pt was sent to the ER from Dr. Epstein (ortho) office for admission. There are no other complaints at this time.       PAST MEDICAL HISTORY  Active Ambulatory Problems     Diagnosis Date Noted   • Adjustment disorder with mixed anxiety and depressed mood 03/25/2016   • Anemia due to chronic kidney disease 03/25/2016   • Benign essential hypertension 03/25/2016   • Hereditary essential tremor 03/25/2016   • Type 2 diabetes mellitus with diabetic polyneuropathy, without long-term current use of insulin (CMS/formerly [facility]) 03/25/2016   • Dry skin dermatitis 03/25/2016   • Dyslipidemia 03/25/2016   • Gastroesophageal reflux disease with esophagitis 03/25/2016   • Pain of hand 03/25/2016   • Hearing loss 03/25/2016   • Arthralgia of hip 03/25/2016   • Impacted cerumen 03/25/2016   • Pruritus 03/25/2016   • Knee pain 03/25/2016   • Pain in extremity 03/25/2016   • Chronic low back pain 03/25/2016   • Weakness of lower extremity 03/25/2016   • Spinal stenosis of lumbar region 03/25/2016   • Senile osteoporosis 03/25/2016   • Piriformis syndrome 03/25/2016   • Primary insomnia 03/25/2016   • Tinea pedis 03/25/2016   • Vitamin D deficiency 03/25/2016   • Left leg cellulitis 03/15/2017   • CKD (chronic kidney disease) stage 3, GFR 30-59 ml/min (CMS/formerly [facility]) 03/15/2017   • Allergic rhinitis 03/16/2017   • Pneumonia due to influenza A virus 03/17/2017   • Hx of MRSA (methicillin resistant Staphylococcus aureus) infection 03/18/2017   • Bacteria in urine  03/19/2017   • Cellulitis of lower extremity 12/03/2018   • Chronic venous stasis 12/03/2018   • Anemia of chronic disease 12/06/2018   • Displaced oblique fracture of shaft of left femur, initial encounter for closed fracture (CMS/HCC)s/p orif 07/29/2019   • Acute kidney injury superimposed on chronic kidney disease (CMS/HCC) 07/29/2019   • Acute respiratory failure with hypoxia (CMS/HCC) 07/29/2019   • Hyperkalemia 07/31/2019   • Thrombocytopenia (CMS/HCC) 08/01/2019   • Atrial fibrillation (CMS/HCC) 08/05/2019   • UTI (urinary tract infection) 08/05/2019   • Deep vein thrombosis (DVT) of lower extremity (CMS/HCC) 08/05/2019   • Bradycardia 08/23/2019   • Low blood pressure reading 08/23/2019     Resolved Ambulatory Problems     Diagnosis Date Noted   • Acute otitis externa 03/25/2016   • Acute sinusitis 03/25/2016   • Chronic sinusitis 03/25/2016     Past Medical History:   Diagnosis Date   • Adjustment disorder with mixed anxiety and depressed mood    • Anemia    • Anxiety    • Atrial fibrillation (CMS/HCC)    • Atrophy of hand muscles    • Benign familial tremor    • Carpal tunnel syndrome    • Cataract    • Chronic rhinosinusitis    • Deep vein thrombosis (CMS/HCC)    • Depression    • Diabetes mellitus (CMS/HCC)    • Dyslipidemia    • Esophagitis, reflux    • Fracture of hip (CMS/HCC)    • Frequent falls    • GERD (gastroesophageal reflux disease)    • Hand pain    • Hearing loss    • Hip pain    • Hyperkalemia    • Hyperlipidemia    • Hypertension    • Impacted cerumen    • Insomnia    • Knee pain    • Limb pain    • Loss of hearing    • Low back pain    • Lower extremity weakness    • Lumbar canal stenosis    • Osteoporosis, senile    • Piriformis syndrome    • Renal insufficiency 2006   • Sensorineural hearing loss    • Stroke (CMS/HCC) 2004   • Thrombocytopenia (CMS/HCC)    • Tinea pedis    • Vitamin D deficiency          PAST SURGICAL HISTORY  Past Surgical History:   Procedure Laterality Date   •  CHOLECYSTECTOMY  2013   • COLONOSCOPY     • ERCP WITH SPHINCTEROTOMY/PAPILLOTOMY  2012   • FEMUR OPEN REDUCTION INTERNAL FIXATION Left 7/30/2019    Procedure: OPEN REDUCTION INTERNAL FIXATION LEFT FEMUR;  Surgeon: Nazario Epstein II, MD;  Location: [facility] OR;  Service: Orthopedics   • FRACTURE SURGERY Left 2006    HIP   • VARICOSE VEIN SURGERY Right          FAMILY HISTORY  No family history on file.      SOCIAL HISTORY  Social History     Socioeconomic History   • Marital status:      Spouse name: Not on file   • Number of children: Not on file   • Years of education: Not on file   • Highest education level: Not on file   Tobacco Use   • Smoking status: Never Smoker   • Smokeless tobacco: Never Used   Substance and Sexual Activity   • Alcohol use: No   • Drug use: No         ALLERGIES  Tetracyclines & related; Amoxicillin; Benzodiazepines; Codeine; Erythromycin; Macrolides and ketolides; Melatonin; Neomycin; Penicillins; and Sulfa antibiotics        REVIEW OF SYSTEMS  Review of Systems   Constitutional: Positive for chills (chronic). Negative for diaphoresis and fever.   HENT: Negative.    Eyes: Negative.    Respiratory: Negative.    Cardiovascular: Negative.    Gastrointestinal: Negative.    Genitourinary: Negative.    Musculoskeletal: Negative.    Skin: Negative for wound.        Increasing drainage from incision site   Neurological: Negative.    Psychiatric/Behavioral: Negative.             PHYSICAL EXAM  ED Triage Vitals [09/09/19 1510]   Temp Heart Rate Resp BP SpO2   97.1 °F (36.2 °C) 52 18 -- 97 %      Temp src Heart Rate Source Patient Position BP Location FiO2 (%)   -- -- -- -- --         GENERAL: no acute distress  HENT: nares patent  EYES: no scleral icterus  CV: regular rhythm, normal rate  RESPIRATORY: normal effort  ABDOMEN: soft  MUSCULOSKELETAL: no deformity  NEURO: alert, moves all extremities, follows commands  SKIN: warm, dry, surgical incision on lateral aspect of L thigh  with a central opening of the wound approximately 2 cm by 2 cm with trace yellow drainage present, minimal surrounding soft tissue swelling, mild tenderness    Vital signs and nursing notes reviewed.          LAB RESULTS  Recent Results (from the past 24 hour(s))   Type & Screen    Collection Time: 09/09/19  4:19 PM   Result Value Ref Range    ABO Type A     RH type Positive     Antibody Screen Negative     T&S Expiration Date 9/12/2019 11:59:59 PM    Basic Metabolic Panel    Collection Time: 09/09/19  4:19 PM   Result Value Ref Range    Glucose 137 (H) 65 - 99 mg/dL    BUN 38 (H) 8 - 23 mg/dL    Creatinine 1.21 (H) 0.57 - 1.00 mg/dL    Sodium 132 (L) 136 - 145 mmol/L    Potassium 6.6 (C) 3.5 - 5.2 mmol/L    Chloride 100 98 - 107 mmol/L    CO2 24.1 22.0 - 29.0 mmol/L    Calcium 8.5 8.2 - 9.6 mg/dL    eGFR Non African Amer 42 (L) >60 mL/min/1.73    BUN/Creatinine Ratio 31.4 (H) 7.0 - 25.0    Anion Gap 7.9 5.0 - 15.0 mmol/L   Lactic Acid, Plasma    Collection Time: 09/09/19  4:19 PM   Result Value Ref Range    Lactate 0.9 0.5 - 2.0 mmol/L   CBC Auto Differential    Collection Time: 09/09/19  4:19 PM   Result Value Ref Range    WBC 6.39 3.40 - 10.80 10*3/mm3    RBC 3.38 (L) 3.77 - 5.28 10*6/mm3    Hemoglobin 10.0 (L) 12.0 - 15.9 g/dL    Hematocrit 33.1 (L) 34.0 - 46.6 %    MCV 97.9 (H) 79.0 - 97.0 fL    MCH 29.6 26.6 - 33.0 pg    MCHC 30.2 (L) 31.5 - 35.7 g/dL    RDW 14.7 12.3 - 15.4 %    RDW-SD 53.1 37.0 - 54.0 fl    MPV 8.7 6.0 - 12.0 fL    Platelets 226 140 - 450 10*3/mm3    Neutrophil % 50.2 42.7 - 76.0 %    Lymphocyte % 35.8 19.6 - 45.3 %    Monocyte % 11.0 5.0 - 12.0 %    Eosinophil % 2.0 0.3 - 6.2 %    Basophil % 0.5 0.0 - 1.5 %    Immature Grans % 0.5 0.0 - 0.5 %    Neutrophils, Absolute 3.21 1.70 - 7.00 10*3/mm3    Lymphocytes, Absolute 2.29 0.70 - 3.10 10*3/mm3    Monocytes, Absolute 0.70 0.10 - 0.90 10*3/mm3    Eosinophils, Absolute 0.13 0.00 - 0.40 10*3/mm3    Basophils, Absolute 0.03 0.00 - 0.20 10*3/mm3     Immature Grans, Absolute 0.03 0.00 - 0.05 10*3/mm3    nRBC 0.0 0.0 - 0.2 /100 WBC   Basic Metabolic Panel    Collection Time: 09/09/19  8:07 PM   Result Value Ref Range    Glucose 101 (H) 65 - 99 mg/dL    BUN 36 (H) 8 - 23 mg/dL    Creatinine 1.07 (H) 0.57 - 1.00 mg/dL    Sodium 141 136 - 145 mmol/L    Potassium 3.5 3.5 - 5.2 mmol/L    Chloride 103 98 - 107 mmol/L    CO2 26.8 22.0 - 29.0 mmol/L    Calcium 8.7 8.2 - 9.6 mg/dL    eGFR Non African Amer 48 (L) >60 mL/min/1.73    BUN/Creatinine Ratio 33.6 (H) 7.0 - 25.0    Anion Gap 11.2 5.0 - 15.0 mmol/L   POC Glucose Once    Collection Time: 09/09/19  8:35 PM   Result Value Ref Range    Glucose 106 70 - 130 mg/dL       Ordered the above labs and reviewed the results.            PROCEDURES  Critical Care  Performed by: Luther Salmon MD  Authorized by: Tamiko Thompson MD     Critical care provider statement:     Critical care time (minutes):  30    Critical care time was exclusive of:  Separately billable procedures and treating other patients    Critical care was necessary to treat or prevent imminent or life-threatening deterioration of the following conditions:  Endocrine crisis (hyperkalemia)    Critical care was time spent personally by me on the following activities:  Discussions with consultants, examination of patient, obtaining history from patient or surrogate, ordering and review of laboratory studies and review of old charts            EKG:           EKG time: 1709  Rhythm/Rate: NSR, 60  P waves and AZ: normal   QRS, axis: LAFB   ST and T waves: no acute ischemic changes  No findings of hyperkalemia     Interpreted Contemporaneously by me, independently viewed  Pt is no longer in SVT compared to prior 8/5/2019.          MEDICATIONS GIVEN IN ER  Medications   sodium chloride 0.9 % flush 10 mL (not administered)   sertraline (ZOLOFT) tablet 100 mg (not administered)   sennosides-docusate sodium (SENOKOT-S) 8.6-50 MG tablet 2 tablet (not administered)    famotidine (PEPCID) tablet 20 mg (not administered)   polyethylene glycol 3350 powder (packet) (not administered)   O2 (OXYGEN) (not administered)   lactulose (CHRONULAC) 10 GM/15ML solution 10 g (not administered)   HYDROcodone-acetaminophen (NORCO) 5-325 MG per tablet 1 tablet (not administered)   fluticasone (FLONASE) 50 MCG/ACT nasal spray 1 spray (not administered)   amLODIPine (NORVASC) tablet 10 mg (not administered)   acetaminophen (TYLENOL) tablet 650 mg (not administered)   dextrose (GLUTOSE) oral gel 15 g (not administered)   dextrose (D50W) 25 g/ 50mL Intravenous Solution 25 g (not administered)   glucagon (human recombinant) (GLUCAGEN DIAGNOSTIC) injection 1 mg (not administered)   sodium chloride 0.9 % flush 10 mL (10 mL Intravenous Given 9/9/19 2217)   sodium chloride 0.9 % flush 10 mL (not administered)   nitroglycerin (NITROSTAT) SL tablet 0.4 mg (not administered)   insulin lispro (humaLOG) injection 0-7 Units (0 Units Subcutaneous Not Given 9/9/19 2217)   ondansetron (ZOFRAN) injection 4 mg (not administered)   dextrose (D50W) 25 g/ 50mL Intravenous Solution 50 mL (50 mL Intravenous Given 9/9/19 1722)   insulin regular (humuLIN R,novoLIN R) injection 10 Units (10 Units Intravenous Given 9/9/19 1724)   sodium polystyrene (KAYEXALATE) 15 GM/60ML suspension 30 g (30 g Oral Given 9/9/19 1725)   albuterol (PROVENTIL) nebulizer solution 0.5% 2.5 mg/0.5mL (10 mg Nebulization Given 9/9/19 1719)                   PROGRESS AND CONSULTS  ED Course as of Sep 10 0018   Mon Sep 09, 2019   1640 Discussed with Dr. Epstein who requested NPO at midnight.  Would like to hold antibiotics until operative cx obtained.   [JR]   1703 K 6.6.  EKG and insulin/ D50, kayexalate, albuterol neb ordered.   [JR]      ED Course User Index  [JR] Luther Salmon MD     7766  Ordered CBC, BMP, blood culture, and lactic acid for further evaluation.     1640  Rechecked pt. Pt is resting comfortably. Notified pt of lab results.  Discussed the plan to admit for further treatment. Pt understands and agrees with the plan, all questions answered.    1642  Placed a call to [facility].    1703  Ordered BMP for further evaluation and ordered hyperkalemia medications for low potassium levels.    1812  Discussed pt case with Dr. Thompson ([facility]) who agrees to admit pt.      MEDICAL DECISION MAKING    89-year-old female was sent here by her orthopedic surgeon for admission due to a wound infection.  Basic labs were obtained here and remarkable for potassium of 6.6.  I then ordered a stat EKG which did not show evidence of hyperkalemia however there was no reported hemolysis with this potassium value.  She was therefore treated with insulin and D50, Kayexalate, and albuterol neb treatment.  A repeat BMP was ordered which shows correction of serum potassium to 3.5.  Orthopedics requested that no antibiotics be ordered until they are able to obtain intraoperative cultures.  She is nontoxic-appearing and stable for telemetry.    MDM  Number of Diagnoses or Management Options  Hyperkalemia:   Wound infection after surgery:      Amount and/or Complexity of Data Reviewed  Clinical lab tests: ordered and reviewed (Glucose= 137  BUN= 38  Creatinine= 1.21  Potassium= 6.6  HGB= 10.0)  Tests in the medicine section of CPT®: ordered and reviewed (See EKG procedure note.)  Decide to obtain previous medical records or to obtain history from someone other than the patient: yes  Obtain history from someone other than the patient: yes (Family)  Review and summarize past medical records: yes  Discuss the patient with other providers: yes (Dr. Thompson ([facility]))               DIAGNOSIS  Final diagnoses:   Wound infection after surgery   Hyperkalemia         DISPOSITION  ADMISSION    Discussed treatment plan and reason for admission with pt/family and admitting physician.  Pt/family voiced understanding of the plan for admission for further testing/treatment as needed.         Latest  Documented Vital Signs:  As of 12:18 AM  BP- 135/59 HR- 61 Temp- 98.6 °F (37 °C) (Oral) O2 sat- 94%        --  Documentation assistance provided by edi Harris for Dr. ROMAIN Salmon MD.  Information recorded by the scribe was done at my direction and has been verified and validated by me.      Please note that portions of this were completed with a voice recognition program.        Nathalie Harris  09/09/19 2219       Luther Salmon MD  09/10/19 0018

## 2019-09-10 ENCOUNTER — ANESTHESIA (OUTPATIENT)
Dept: PERIOP | Facility: HOSPITAL | Age: 84
End: 2019-09-10

## 2019-09-10 ENCOUNTER — PATIENT OUTREACH (OUTPATIENT)
Dept: CASE MANAGEMENT | Facility: OTHER | Age: 84
End: 2019-09-10

## 2019-09-10 ENCOUNTER — ANESTHESIA EVENT (OUTPATIENT)
Dept: PERIOP | Facility: HOSPITAL | Age: 84
End: 2019-09-10

## 2019-09-10 ENCOUNTER — APPOINTMENT (OUTPATIENT)
Dept: GENERAL RADIOLOGY | Facility: HOSPITAL | Age: 84
End: 2019-09-10

## 2019-09-10 LAB
ALBUMIN SERPL-MCNC: 3.2 G/DL (ref 3.5–5.2)
ALBUMIN/GLOB SERPL: 1.3 G/DL
ALP SERPL-CCNC: 111 U/L (ref 39–117)
ALT SERPL W P-5'-P-CCNC: <5 U/L (ref 1–33)
ANION GAP SERPL CALCULATED.3IONS-SCNC: 9 MMOL/L (ref 5–15)
AST SERPL-CCNC: 12 U/L (ref 1–32)
BASOPHILS # BLD AUTO: 0.02 10*3/MM3 (ref 0–0.2)
BASOPHILS NFR BLD AUTO: 0.4 % (ref 0–1.5)
BILIRUB SERPL-MCNC: 0.3 MG/DL (ref 0.2–1.2)
BUN BLD-MCNC: 32 MG/DL (ref 8–23)
BUN/CREAT SERPL: 32.7 (ref 7–25)
CALCIUM SPEC-SCNC: 8.3 MG/DL (ref 8.2–9.6)
CHLORIDE SERPL-SCNC: 103 MMOL/L (ref 98–107)
CO2 SERPL-SCNC: 27 MMOL/L (ref 22–29)
CREAT BLD-MCNC: 0.98 MG/DL (ref 0.57–1)
CRP SERPL-MCNC: 2.92 MG/DL (ref 0–0.5)
DEPRECATED RDW RBC AUTO: 51.8 FL (ref 37–54)
EOSINOPHIL # BLD AUTO: 0.12 10*3/MM3 (ref 0–0.4)
EOSINOPHIL NFR BLD AUTO: 2.5 % (ref 0.3–6.2)
ERYTHROCYTE [DISTWIDTH] IN BLOOD BY AUTOMATED COUNT: 14.6 % (ref 12.3–15.4)
ERYTHROCYTE [SEDIMENTATION RATE] IN BLOOD: 48 MM/HR (ref 0–30)
GFR SERPL CREATININE-BSD FRML MDRD: 53 ML/MIN/1.73
GLOBULIN UR ELPH-MCNC: 2.4 GM/DL
GLUCOSE BLD-MCNC: 112 MG/DL (ref 65–99)
GLUCOSE BLDC GLUCOMTR-MCNC: 101 MG/DL (ref 70–130)
GLUCOSE BLDC GLUCOMTR-MCNC: 118 MG/DL (ref 70–130)
GLUCOSE BLDC GLUCOMTR-MCNC: 128 MG/DL (ref 70–130)
GLUCOSE BLDC GLUCOMTR-MCNC: 185 MG/DL (ref 70–130)
HCT VFR BLD AUTO: 29.9 % (ref 34–46.6)
HGB BLD-MCNC: 9.1 G/DL (ref 12–15.9)
IMM GRANULOCYTES # BLD AUTO: 0.02 10*3/MM3 (ref 0–0.05)
IMM GRANULOCYTES NFR BLD AUTO: 0.4 % (ref 0–0.5)
LYMPHOCYTES # BLD AUTO: 2.12 10*3/MM3 (ref 0.7–3.1)
LYMPHOCYTES NFR BLD AUTO: 43.5 % (ref 19.6–45.3)
MCH RBC QN AUTO: 29.2 PG (ref 26.6–33)
MCHC RBC AUTO-ENTMCNC: 30.4 G/DL (ref 31.5–35.7)
MCV RBC AUTO: 95.8 FL (ref 79–97)
MONOCYTES # BLD AUTO: 0.56 10*3/MM3 (ref 0.1–0.9)
MONOCYTES NFR BLD AUTO: 11.5 % (ref 5–12)
NEUTROPHILS # BLD AUTO: 2.03 10*3/MM3 (ref 1.7–7)
NEUTROPHILS NFR BLD AUTO: 41.7 % (ref 42.7–76)
NRBC BLD AUTO-RTO: 0 /100 WBC (ref 0–0.2)
PLATELET # BLD AUTO: 229 10*3/MM3 (ref 140–450)
PMV BLD AUTO: 8.7 FL (ref 6–12)
POTASSIUM BLD-SCNC: 3.3 MMOL/L (ref 3.5–5.2)
PROT SERPL-MCNC: 5.6 G/DL (ref 6–8.5)
RBC # BLD AUTO: 3.12 10*6/MM3 (ref 3.77–5.28)
SODIUM BLD-SCNC: 139 MMOL/L (ref 136–145)
WBC NRBC COR # BLD: 4.87 10*3/MM3 (ref 3.4–10.8)

## 2019-09-10 PROCEDURE — 82962 GLUCOSE BLOOD TEST: CPT

## 2019-09-10 PROCEDURE — 25010000002 PROPOFOL 10 MG/ML EMULSION: Performed by: NURSE ANESTHETIST, CERTIFIED REGISTERED

## 2019-09-10 PROCEDURE — 25010000002 ONDANSETRON PER 1 MG: Performed by: INTERNAL MEDICINE

## 2019-09-10 PROCEDURE — 85652 RBC SED RATE AUTOMATED: CPT | Performed by: INTERNAL MEDICINE

## 2019-09-10 PROCEDURE — 73552 X-RAY EXAM OF FEMUR 2/>: CPT

## 2019-09-10 PROCEDURE — 86140 C-REACTIVE PROTEIN: CPT | Performed by: INTERNAL MEDICINE

## 2019-09-10 PROCEDURE — 94799 UNLISTED PULMONARY SVC/PX: CPT

## 2019-09-10 PROCEDURE — 25010000002 VANCOMYCIN: Performed by: INTERNAL MEDICINE

## 2019-09-10 PROCEDURE — 87205 SMEAR GRAM STAIN: CPT | Performed by: ORTHOPAEDIC SURGERY

## 2019-09-10 PROCEDURE — 25010000002 FENTANYL CITRATE (PF) 100 MCG/2ML SOLUTION: Performed by: NURSE ANESTHETIST, CERTIFIED REGISTERED

## 2019-09-10 PROCEDURE — 80053 COMPREHEN METABOLIC PANEL: CPT | Performed by: INTERNAL MEDICINE

## 2019-09-10 PROCEDURE — 85025 COMPLETE CBC W/AUTO DIFF WBC: CPT | Performed by: INTERNAL MEDICINE

## 2019-09-10 PROCEDURE — C1713 ANCHOR/SCREW BN/BN,TIS/BN: HCPCS | Performed by: ORTHOPAEDIC SURGERY

## 2019-09-10 PROCEDURE — 87186 SC STD MICRODIL/AGAR DIL: CPT | Performed by: ORTHOPAEDIC SURGERY

## 2019-09-10 PROCEDURE — 87147 CULTURE TYPE IMMUNOLOGIC: CPT | Performed by: ORTHOPAEDIC SURGERY

## 2019-09-10 PROCEDURE — 87070 CULTURE OTHR SPECIMN AEROBIC: CPT | Performed by: ORTHOPAEDIC SURGERY

## 2019-09-10 PROCEDURE — 99223 1ST HOSP IP/OBS HIGH 75: CPT | Performed by: INTERNAL MEDICINE

## 2019-09-10 PROCEDURE — 0JDP0ZZ EXTRACTION OF LEFT LOWER LEG SUBCUTANEOUS TISSUE AND FASCIA, OPEN APPROACH: ICD-10-PCS | Performed by: ORTHOPAEDIC SURGERY

## 2019-09-10 PROCEDURE — 87075 CULTR BACTERIA EXCEPT BLOOD: CPT | Performed by: ORTHOPAEDIC SURGERY

## 2019-09-10 DEVICE — IMPLANTABLE DEVICE
Type: IMPLANTABLE DEVICE | Status: FUNCTIONAL
Brand: CERAMENT™BONE VOID FILLER

## 2019-09-10 RX ORDER — FENTANYL CITRATE 50 UG/ML
50 INJECTION, SOLUTION INTRAMUSCULAR; INTRAVENOUS
Status: DISCONTINUED | OUTPATIENT
Start: 2019-09-10 | End: 2019-09-10

## 2019-09-10 RX ORDER — FAMOTIDINE 20 MG/1
20 TABLET, FILM COATED ORAL DAILY
Status: DISCONTINUED | OUTPATIENT
Start: 2019-09-11 | End: 2019-09-13 | Stop reason: HOSPADM

## 2019-09-10 RX ORDER — HYDRALAZINE HYDROCHLORIDE 20 MG/ML
5 INJECTION INTRAMUSCULAR; INTRAVENOUS
Status: DISCONTINUED | OUTPATIENT
Start: 2019-09-10 | End: 2019-09-10 | Stop reason: HOSPADM

## 2019-09-10 RX ORDER — PROPOFOL 10 MG/ML
VIAL (ML) INTRAVENOUS AS NEEDED
Status: DISCONTINUED | OUTPATIENT
Start: 2019-09-10 | End: 2019-09-10 | Stop reason: SURG

## 2019-09-10 RX ORDER — HYDROCODONE BITARTRATE AND ACETAMINOPHEN 7.5; 325 MG/1; MG/1
1 TABLET ORAL ONCE AS NEEDED
Status: DISCONTINUED | OUTPATIENT
Start: 2019-09-10 | End: 2019-09-10 | Stop reason: HOSPADM

## 2019-09-10 RX ORDER — FENTANYL CITRATE 50 UG/ML
INJECTION, SOLUTION INTRAMUSCULAR; INTRAVENOUS AS NEEDED
Status: DISCONTINUED | OUTPATIENT
Start: 2019-09-10 | End: 2019-09-10 | Stop reason: SURG

## 2019-09-10 RX ORDER — SODIUM CHLORIDE, SODIUM LACTATE, POTASSIUM CHLORIDE, CALCIUM CHLORIDE 600; 310; 30; 20 MG/100ML; MG/100ML; MG/100ML; MG/100ML
9 INJECTION, SOLUTION INTRAVENOUS CONTINUOUS
Status: DISCONTINUED | OUTPATIENT
Start: 2019-09-10 | End: 2019-09-11

## 2019-09-10 RX ORDER — PROMETHAZINE HYDROCHLORIDE 25 MG/1
25 SUPPOSITORY RECTAL ONCE AS NEEDED
Status: DISCONTINUED | OUTPATIENT
Start: 2019-09-10 | End: 2019-09-10 | Stop reason: HOSPADM

## 2019-09-10 RX ORDER — LACTULOSE 10 G/15ML
10 SOLUTION ORAL DAILY
Status: DISCONTINUED | OUTPATIENT
Start: 2019-09-11 | End: 2019-09-13 | Stop reason: HOSPADM

## 2019-09-10 RX ORDER — HYDROMORPHONE HYDROCHLORIDE 1 MG/ML
0.25 INJECTION, SOLUTION INTRAMUSCULAR; INTRAVENOUS; SUBCUTANEOUS
Status: DISCONTINUED | OUTPATIENT
Start: 2019-09-10 | End: 2019-09-10 | Stop reason: HOSPADM

## 2019-09-10 RX ORDER — FENTANYL CITRATE 50 UG/ML
50 INJECTION, SOLUTION INTRAMUSCULAR; INTRAVENOUS
Status: DISCONTINUED | OUTPATIENT
Start: 2019-09-10 | End: 2019-09-10 | Stop reason: HOSPADM

## 2019-09-10 RX ORDER — LIDOCAINE HYDROCHLORIDE 20 MG/ML
INJECTION, SOLUTION INFILTRATION; PERINEURAL AS NEEDED
Status: DISCONTINUED | OUTPATIENT
Start: 2019-09-10 | End: 2019-09-10 | Stop reason: SURG

## 2019-09-10 RX ORDER — POTASSIUM CHLORIDE 750 MG/1
20 CAPSULE, EXTENDED RELEASE ORAL ONCE
Status: COMPLETED | OUTPATIENT
Start: 2019-09-10 | End: 2019-09-10

## 2019-09-10 RX ORDER — SODIUM CHLORIDE 0.9 % (FLUSH) 0.9 %
3-10 SYRINGE (ML) INJECTION AS NEEDED
Status: DISCONTINUED | OUTPATIENT
Start: 2019-09-10 | End: 2019-09-10

## 2019-09-10 RX ORDER — PROMETHAZINE HYDROCHLORIDE 25 MG/ML
6.25 INJECTION, SOLUTION INTRAMUSCULAR; INTRAVENOUS
Status: DISCONTINUED | OUTPATIENT
Start: 2019-09-10 | End: 2019-09-10 | Stop reason: HOSPADM

## 2019-09-10 RX ORDER — ACETAMINOPHEN 325 MG/1
650 TABLET ORAL ONCE AS NEEDED
Status: DISCONTINUED | OUTPATIENT
Start: 2019-09-10 | End: 2019-09-10 | Stop reason: HOSPADM

## 2019-09-10 RX ORDER — ONDANSETRON 2 MG/ML
4 INJECTION INTRAMUSCULAR; INTRAVENOUS ONCE AS NEEDED
Status: DISCONTINUED | OUTPATIENT
Start: 2019-09-10 | End: 2019-09-10 | Stop reason: HOSPADM

## 2019-09-10 RX ORDER — DIPHENHYDRAMINE HCL 25 MG
25 CAPSULE ORAL
Status: DISCONTINUED | OUTPATIENT
Start: 2019-09-10 | End: 2019-09-10 | Stop reason: HOSPADM

## 2019-09-10 RX ORDER — EPHEDRINE SULFATE 50 MG/ML
INJECTION, SOLUTION INTRAVENOUS AS NEEDED
Status: DISCONTINUED | OUTPATIENT
Start: 2019-09-10 | End: 2019-09-10 | Stop reason: SURG

## 2019-09-10 RX ORDER — MAGNESIUM HYDROXIDE 1200 MG/15ML
LIQUID ORAL AS NEEDED
Status: DISCONTINUED | OUTPATIENT
Start: 2019-09-10 | End: 2019-09-10 | Stop reason: HOSPADM

## 2019-09-10 RX ORDER — DIPHENHYDRAMINE HYDROCHLORIDE 50 MG/ML
12.5 INJECTION INTRAMUSCULAR; INTRAVENOUS
Status: DISCONTINUED | OUTPATIENT
Start: 2019-09-10 | End: 2019-09-10 | Stop reason: HOSPADM

## 2019-09-10 RX ORDER — EPHEDRINE SULFATE 50 MG/ML
5 INJECTION, SOLUTION INTRAVENOUS ONCE AS NEEDED
Status: DISCONTINUED | OUTPATIENT
Start: 2019-09-10 | End: 2019-09-10 | Stop reason: HOSPADM

## 2019-09-10 RX ORDER — ASPIRIN 325 MG
325 TABLET ORAL DAILY
Status: DISCONTINUED | OUTPATIENT
Start: 2019-09-11 | End: 2019-09-13 | Stop reason: HOSPADM

## 2019-09-10 RX ORDER — SODIUM CHLORIDE 0.9 % (FLUSH) 0.9 %
3 SYRINGE (ML) INJECTION EVERY 12 HOURS SCHEDULED
Status: DISCONTINUED | OUTPATIENT
Start: 2019-09-10 | End: 2019-09-10

## 2019-09-10 RX ORDER — LIDOCAINE HYDROCHLORIDE 10 MG/ML
0.5 INJECTION, SOLUTION EPIDURAL; INFILTRATION; INTRACAUDAL; PERINEURAL ONCE AS NEEDED
Status: DISCONTINUED | OUTPATIENT
Start: 2019-09-10 | End: 2019-09-10

## 2019-09-10 RX ORDER — MEPERIDINE HYDROCHLORIDE 25 MG/ML
12.5 INJECTION INTRAMUSCULAR; INTRAVENOUS; SUBCUTANEOUS
Status: DISCONTINUED | OUTPATIENT
Start: 2019-09-10 | End: 2019-09-10 | Stop reason: HOSPADM

## 2019-09-10 RX ORDER — HYDROCODONE BITARTRATE AND ACETAMINOPHEN 5; 325 MG/1; MG/1
1 TABLET ORAL EVERY 6 HOURS PRN
Status: DISCONTINUED | OUTPATIENT
Start: 2019-09-10 | End: 2019-09-13 | Stop reason: HOSPADM

## 2019-09-10 RX ORDER — PROMETHAZINE HYDROCHLORIDE 25 MG/ML
12.5 INJECTION, SOLUTION INTRAMUSCULAR; INTRAVENOUS ONCE AS NEEDED
Status: DISCONTINUED | OUTPATIENT
Start: 2019-09-10 | End: 2019-09-10 | Stop reason: HOSPADM

## 2019-09-10 RX ORDER — NALOXONE HCL 0.4 MG/ML
0.2 VIAL (ML) INJECTION AS NEEDED
Status: DISCONTINUED | OUTPATIENT
Start: 2019-09-10 | End: 2019-09-10 | Stop reason: HOSPADM

## 2019-09-10 RX ORDER — PROMETHAZINE HYDROCHLORIDE 25 MG/1
25 TABLET ORAL ONCE AS NEEDED
Status: DISCONTINUED | OUTPATIENT
Start: 2019-09-10 | End: 2019-09-10 | Stop reason: HOSPADM

## 2019-09-10 RX ORDER — OXYCODONE AND ACETAMINOPHEN 7.5; 325 MG/1; MG/1
1 TABLET ORAL ONCE AS NEEDED
Status: DISCONTINUED | OUTPATIENT
Start: 2019-09-10 | End: 2019-09-10 | Stop reason: HOSPADM

## 2019-09-10 RX ADMIN — HYDROCODONE BITARTRATE AND ACETAMINOPHEN 1 TABLET: 5; 325 TABLET ORAL at 22:38

## 2019-09-10 RX ADMIN — FENTANYL CITRATE 25 MCG: 50 INJECTION INTRAMUSCULAR; INTRAVENOUS at 16:57

## 2019-09-10 RX ADMIN — VANCOMYCIN HYDROCHLORIDE 1.5 G: 10 INJECTION, POWDER, LYOPHILIZED, FOR SOLUTION INTRAVENOUS at 17:02

## 2019-09-10 RX ADMIN — AMLODIPINE BESYLATE 10 MG: 10 TABLET ORAL at 09:13

## 2019-09-10 RX ADMIN — ONDANSETRON HYDROCHLORIDE 4 MG: 2 SOLUTION INTRAMUSCULAR; INTRAVENOUS at 16:59

## 2019-09-10 RX ADMIN — SENNOSIDES AND DOCUSATE SODIUM 2 TABLET: 8.6; 5 TABLET ORAL at 21:11

## 2019-09-10 RX ADMIN — FAMOTIDINE 20 MG: 20 TABLET, FILM COATED ORAL at 09:13

## 2019-09-10 RX ADMIN — LIDOCAINE HYDROCHLORIDE 60 MG: 20 INJECTION, SOLUTION INFILTRATION; PERINEURAL at 16:51

## 2019-09-10 RX ADMIN — FLUTICASONE PROPIONATE 1 SPRAY: 50 SPRAY, METERED NASAL at 18:31

## 2019-09-10 RX ADMIN — FENTANYL CITRATE 25 MCG: 50 INJECTION INTRAMUSCULAR; INTRAVENOUS at 17:01

## 2019-09-10 RX ADMIN — PROPOFOL 140 MG: 10 INJECTION, EMULSION INTRAVENOUS at 16:51

## 2019-09-10 RX ADMIN — ACETAMINOPHEN 650 MG: 325 TABLET, FILM COATED ORAL at 21:11

## 2019-09-10 RX ADMIN — SERTRALINE 100 MG: 100 TABLET, FILM COATED ORAL at 21:11

## 2019-09-10 RX ADMIN — POTASSIUM CHLORIDE 20 MEQ: 750 CAPSULE, EXTENDED RELEASE ORAL at 09:13

## 2019-09-10 RX ADMIN — SODIUM CHLORIDE, PRESERVATIVE FREE 10 ML: 5 INJECTION INTRAVENOUS at 10:54

## 2019-09-10 RX ADMIN — SODIUM CHLORIDE, POTASSIUM CHLORIDE, SODIUM LACTATE AND CALCIUM CHLORIDE 9 ML/HR: 600; 310; 30; 20 INJECTION, SOLUTION INTRAVENOUS at 15:43

## 2019-09-10 RX ADMIN — EPHEDRINE SULFATE 10 MG: 50 INJECTION INTRAMUSCULAR; INTRAVENOUS; SUBCUTANEOUS at 17:12

## 2019-09-10 RX ADMIN — SODIUM CHLORIDE, PRESERVATIVE FREE 10 ML: 5 INJECTION INTRAVENOUS at 22:38

## 2019-09-10 NOTE — PLAN OF CARE
Problem: Patient Care Overview  Goal: Plan of Care Review  Outcome: Ongoing (interventions implemented as appropriate)   09/10/19 0406   Coping/Psychosocial   Plan of Care Reviewed With patient   Plan of Care Review   Progress no change   OTHER   Outcome Summary Pt admitted to 4E for post-op infection to left thigh, small amt serous drainage noted to area, tender to touch, edematous, island dsg applied. Pt NPO since midnight for I/D this am, pt confused/forgetful at times, consent to be signed when son returns this am. K+ recheck 3.5. No distress noted, continue to monitor.     Goal: Individualization and Mutuality  Outcome: Ongoing (interventions implemented as appropriate)    Goal: Discharge Needs Assessment  Outcome: Ongoing (interventions implemented as appropriate)    Goal: Interprofessional Rounds/Family Conf  Outcome: Ongoing (interventions implemented as appropriate)      Problem: Skin Injury Risk (Adult)  Goal: Identify Related Risk Factors and Signs and Symptoms  Outcome: Outcome(s) achieved Date Met: 09/10/19    Goal: Skin Health and Integrity  Outcome: Ongoing (interventions implemented as appropriate)      Problem: Fall Risk (Adult)  Goal: Identify Related Risk Factors and Signs and Symptoms  Outcome: Outcome(s) achieved Date Met: 09/10/19    Goal: Absence of Fall  Outcome: Ongoing (interventions implemented as appropriate)      Problem: Fracture Orthopaedic (Adult)  Goal: Signs and Symptoms of Listed Potential Problems Will be Absent, Minimized or Managed (Fracture Orthopaedic)  Outcome: Ongoing (interventions implemented as appropriate)

## 2019-09-10 NOTE — PLAN OF CARE
Problem: Patient Care Overview  Goal: Plan of Care Review  Outcome: Ongoing (interventions implemented as appropriate)   09/10/19 1458 09/10/19 7858   Coping/Psychosocial   Plan of Care Reviewed With patient --    Plan of Care Review   Progress --  no change   OTHER   Outcome Summary --  VSS. Pt prepped for L. Femur surgical debridement and antibiotic bead placement after K+ improved this AM. Will continue to monitor when returned to unit.     Goal: Individualization and Mutuality  Outcome: Ongoing (interventions implemented as appropriate)    Goal: Discharge Needs Assessment  Outcome: Ongoing (interventions implemented as appropriate)    Goal: Interprofessional Rounds/Family Conf  Outcome: Ongoing (interventions implemented as appropriate)      Problem: Skin Injury Risk (Adult)  Goal: Skin Health and Integrity  Outcome: Ongoing (interventions implemented as appropriate)      Problem: Fall Risk (Adult)  Goal: Absence of Fall  Outcome: Ongoing (interventions implemented as appropriate)      Problem: Fracture Orthopaedic (Adult)  Goal: Signs and Symptoms of Listed Potential Problems Will be Absent, Minimized or Managed (Fracture Orthopaedic)  Outcome: Ongoing (interventions implemented as appropriate)

## 2019-09-10 NOTE — H&P
Internal medicine history and physical  INTERNAL MEDICINE   [facility]       Patient Identification:  Name: Magdalena Jerry  Age: 89 y.o.  Sex: female  :  1926  MRN: [id number]                   Primary Care Physician: Fly Sanchez DO                                   Chief Complaint: Sent from Dr. Tejada's orthopedic surgeon's office for evaluation and management of left thigh surgical site infection.    History of Present Illness:   Patient is a 89-year-old female who was hospitalized for 9 days from 2019 through 2019 when she presented after a fall and closed displaced oblique fracture of the left femoral shaft.  During hospitalization she was managed for ongoing chronic issues consisting of diabetes hypertension dyslipidemia gastroesophageal reflux disease benign essential tremors anxiety anemia and chronic kidney disease and hearing loss.  She underwent surgery consisting of open reduction internal fixation of the left femur on 2019.  She had issues with paroxysmal atrial fibrillation urinary tract infection which was adjusted and treated and was subsequently discharged to rehab facility.  Patient then subsequently went today for postop/post hospital discharge follow-up with her orthopedic surgery service and was noted to have increasing redness and drainage from the wound.  Patient did not have any systemic symptoms of fever and chills.  According to the ER physician it was conveyed to them that patient would need surgical debridement of the wound on 9/10/2019 with plans to start antibiotic therapy after the operative cultures were taken.  Patient herself is denies any complaint just smiles to most of the questions because of her  hearing deficit.  Patient was incidentally found to have potassium was 6.6 which was treated per ER protocol consisting of insulin bicarb D50 and Kayexalate.  Repeat potassium came back 3.5.    Past Medical History:  Past Medical  History:   Diagnosis Date   • Adjustment disorder with mixed anxiety and depressed mood    • Anemia    • Anxiety    • Atrial fibrillation (CMS/HCC)    • Atrophy of hand muscles     LEFT HAND   • Benign familial tremor    • Carpal tunnel syndrome    • Cataract    • Chronic rhinosinusitis    • Deep vein thrombosis (CMS/HCC)    • Depression    • Diabetes mellitus (CMS/HCC)    • Dyslipidemia    • Esophagitis, reflux    • Fracture of hip (CMS/HCC)    • Frequent falls    • GERD (gastroesophageal reflux disease)    • Hand pain    • Hearing loss    • Hip pain    • Hyperkalemia    • Hyperlipidemia    • Hypertension    • Impacted cerumen    • Insomnia    • Knee pain    • Limb pain    • Loss of hearing    • Low back pain    • Lower extremity weakness    • Lumbar canal stenosis    • Osteoporosis, senile    • Piriformis syndrome    • Renal insufficiency 2006   • Sensorineural hearing loss    • Stroke (CMS/HCC) 2004   • Thrombocytopenia (CMS/HCC)    • Tinea pedis    • Vitamin D deficiency      Past Surgical History:  Past Surgical History:   Procedure Laterality Date   • CHOLECYSTECTOMY  2013   • COLONOSCOPY     • ERCP WITH SPHINCTEROTOMY/PAPILLOTOMY  2012   • FEMUR OPEN REDUCTION INTERNAL FIXATION Left 7/30/2019    Procedure: OPEN REDUCTION INTERNAL FIXATION LEFT FEMUR;  Surgeon: Nazario Epstein II, MD;  Location: [facility];  Service: Orthopedics   • FRACTURE SURGERY Left 2006    HIP   • VARICOSE VEIN SURGERY Right       Home Meds:  Medications Prior to Admission   Medication Sig Dispense Refill Last Dose   • amLODIPine (NORVASC) 10 MG tablet Take 1 tablet by mouth Daily.   9/9/2019 at Unknown time   • apixaban (ELIQUIS) 2.5 MG tablet tablet Take 1 tablet by mouth Every 12 (Twelve) Hours. 60 tablet 0 9/9/2019 at Unknown time   • fluticasone (FLONASE) 50 MCG/ACT nasal spray USE TWO SPRAYS IN EACH NOSTRIL ONCE DAILY 16 mL 2 9/9/2019 at Unknown time   • lactulose (CHRONULAC) 10 GM/15ML solution Take 15 mL by mouth  Daily.   9/9/2019 at Unknown time   • metoprolol tartrate (LOPRESSOR) 25 MG tablet Take 1 tablet by mouth 2 (Two) Times a Day. 60 tablet 3 9/9/2019 at Unknown time   • raNITIdine (ZANTAC) 150 MG tablet TAKE 1 TABLET TWICE DAILY  FOR  REFLUX  ESOPHAGITIS 180 tablet 1 9/9/2019 at Unknown time   • sennosides-docusate sodium (SENOKOT-S) 8.6-50 MG tablet Take 2 tablets by mouth Every Night.   9/8/2019 at Unknown time   • sertraline (ZOLOFT) 100 MG tablet TAKE 1 TABLET EVERY DAY  FOR  MOOD  AND  WELL  BEING 90 tablet 1 9/9/2019 at Unknown time   • acetaminophen (TYLENOL) 325 MG tablet Take 2 tablets by mouth Every 6 (Six) Hours As Needed for Mild Pain (1-3).  0 Unknown at Unknown time   • HYDROcodone-acetaminophen (NORCO) 5-325 MG per tablet Take 1 tablet by mouth Every 6 (Six) Hours As Needed.   Unknown at Unknown time   • LANCETS MICRO THIN 33G misc USE AS DIRECTED TO TEST BLOOD GLUCOSE DAILY 100 each 5 Taking   • O2 (OXYGEN) Inhale 2 L/min As Needed.   Taking   • Patiromer Sorbitex Calcium (VELTASSA) 8.4 g pack Take 1 packet by mouth Daily. 30 each 0 Unknown at Unknown time   • polyethylene glycol (MIRALAX) pack packet Take 17 g by mouth Daily As Needed (constipation).   Unknown at Unknown time   • TRUE METRIX BLOOD GLUCOSE TEST test strip USE TO TEST BLOOD SUGAR ONCE DAILY AS DIRECTED 100 each 0 Taking   • TRUE METRIX BLOOD GLUCOSE TEST test strip USE TO TEST BLOOD SUGAR ONCE DAILY AS DIRECTED 100 each 1 Taking     Current Meds:     Current Facility-Administered Medications:   •  [COMPLETED] Insert peripheral IV, , , Once **AND** sodium chloride 0.9 % flush 10 mL, 10 mL, Intravenous, PRN, Luther Salmon MD  Allergies:  Allergies   Allergen Reactions   • Tetracyclines & Related Dermatitis and Rash   • Amoxicillin GI Intolerance   • Benzodiazepines Unknown (See Comments)     FAMILY UNSURE   • Codeine GI Intolerance   • Erythromycin Rash   • Macrolides And Ketolides Unknown (See Comments)     FAMILY UNSURE   •  "Melatonin Unknown (See Comments)     FAMILY UNSURE   • Neomycin Rash     MADE RASH WORSE   • Penicillins GI Intolerance   • Sulfa Antibiotics Rash     Social History:   Social History     Tobacco Use   • Smoking status: Never Smoker   • Smokeless tobacco: Never Used   Substance Use Topics   • Alcohol use: No      Family History:  No family history on file.       Review of Systems  See history of present illness and past medical history.  Limited by significant hearing deficits.  Constitutional: Positive for chills (chronic). Negative for diaphoresis and fever.   HENT: Negative.    Eyes: Negative.    Respiratory: Negative.    Cardiovascular: Negative.    Gastrointestinal: Negative.    Genitourinary: Negative.    Musculoskeletal: Negative.    Skin: Negative for wound.        Increasing drainage from incision site   Neurological: Negative.    Psychiatric/Behavioral: Negative.           Vitals:   /66 (BP Location: Right arm, Patient Position: Lying)   Pulse 50   Temp 98.6 °F (37 °C) (Oral)   Resp 18   Ht 170.2 cm (67\")   Wt 81.6 kg (180 lb)   LMP  (LMP Unknown)   SpO2 93%   BMI 28.19 kg/m²   I/O: No intake or output data in the 24 hours ending 09/09/19 2050  Exam:  General Appearance:    Alert, cooperative, no distress, appears stated age   Head:    Normocephalic, without obvious abnormality, atraumatic   Eyes:    PERRL, conjunctiva/corneas clear, EOM's intact, both eyes   Ears:    Normal external ear canals, both ears   Nose:   Nares normal, septum midline, mucosa normal, no drainage    or sinus tenderness   Throat:   Lips, tongue, gums normal; oral mucosa pink and moist   Neck:   Supple, symmetrical, trachea midline, no adenopathy;     thyroid:  no enlargement/tenderness/nodules; no carotid    bruit or JVD   Back:     Symmetric, no curvature, ROM normal, no CVA tenderness   Lungs:     Clear to auscultation bilaterally, respirations unlabored   Chest Wall:    No tenderness or deformity    Heart:    " Regular rate and rhythm, S1 and S2 normal, no murmur, rub   or gallop   Abdomen:     Soft, non-tender, bowel sounds active all four quadrants,     no masses, no hepatomegaly, no splenomegaly   Extremities:  Left midshaft lateral surgical site shows central opening/dehiscence of 2 x 2 centimeter with yellowish drainage and mild erythema around it.   Pulses:   Pulses palpable in all extremities; symmetric all extremities   Skin:   Skin color normal, Skin is warm and dry,  no rashes or palpable lesions   Neurologic:  Significant hearing deficits but grossly nonfocal       Data Review:      I reviewed the patient's new clinical results.  Results from last 7 days   Lab Units 09/09/19  1619 09/08/19  1000   WBC 10*3/mm3 6.39 4.76   HEMOGLOBIN g/dL 10.0* 9.0*   PLATELETS 10*3/mm3 226 216     Results from last 7 days   Lab Units 09/09/19  1619 09/08/19  1000   SODIUM mmol/L 132* 134*   POTASSIUM mmol/L 6.6* 4.2   CHLORIDE mmol/L 100 99   CO2 mmol/L 24.1 26.3   BUN mg/dL 38* 32*   CREATININE mg/dL 1.21* 1.07*   CALCIUM mg/dL 8.5 8.0*   GLUCOSE mg/dL 137* 161*     Microbiology Results (last 10 days)     ** No results found for the last 240 hours. **      No radiology results for the last 30 days.    ECG 12 Lead   Preliminary Result   HEART RATE= 60  bpm   RR Interval= 1028  ms   AL Interval= 192  ms   P Horizontal Axis= 12  deg   P Front Axis= 30  deg   QRSD Interval= 110  ms   QT Interval= 483  ms   QRS Axis= -40  deg   T Wave Axis= 70  deg   - ABNORMAL ECG -   Sinus rhythm   Atrial premature complex   Left anterior fascicular block   Left ventricular hypertrophy   Anterior Q waves, possibly due to LVH   Electronically Signed By:    Date and Time of Study: 2019-09-09 17:09:20            Assessment:  Active Hospital Problems    Diagnosis POA   • **Wound infection after surgery [T81.49XA] Yes   • Atrial fibrillation (CMS/HCC) [I48.91] Yes   • Displaced oblique fracture of shaft of left femur, initial encounter for closed  fracture (CMS/[facility])s/p orif [S72.332A] Yes   • Chronic venous stasis [I87.8] Yes   • CKD (chronic kidney disease) stage 3, GFR 30-59 ml/min (CMS/[facility]) [N18.3] Yes   • Type 2 diabetes mellitus with diabetic polyneuropathy, without long-term current use of insulin (CMS/[facility]) [E11.42] Yes   • Spinal stenosis of lumbar region [M48.061] Yes   • Senile osteoporosis [M81.0] Yes   • Benign essential hypertension [I10] Yes   • Anemia due to chronic kidney disease [N18.9, D63.1] Yes   hyperkalemia      Medical decision making:  Left thigh postop surgical site infection-admit the patient, consult orthopedic surgery service keep her n.p.o. after midnight.  Monitor her for any evolving septic parameters and low threshold to start IV vancomycin and cefepime otherwise hold antibiotic therapy as per reported orthopedic recommendation until specimen were taken during surgical debridement and afterwards antibiotic to be started.  Status post fall and left femoral shaft closed oblique fracture status recent hospitalization and post closed reduction on 7/30/2019.  Continue to manage her pain and physical therapy.  Hypokalemia of unclear etiology patient is status post treatment in the emergency room plan is to monitor.  History of paroxysmal atrial fibrillation currently in sinus rhythm with premature atrial complexes plan is to monitor.  Hypertension-continue antihypertensive regimen.  Chronic kidney disease-monitor avoid nephrotoxic agents and hypotensive episodes.  Anemia multifactorial plan is to monitor.  Osteoarthritis and chronic back pain-continue her current regimen.  Significant hearing deficits and possible underlying dementia making her at risk of evolving delirium and sundowning plan is to monitor.    Tamiko Thompson MD   9/9/2019  8:50 PM  Much of this encounter note is an electronic transcription/translation of spoken language to printed text. The electronic translation of spoken language may permit erroneous, or at times,  nonsensical words or phrases to be inadvertently transcribed; Although I have reviewed the note for such errors, some may still exist

## 2019-09-10 NOTE — OP NOTE
Irrigation and Debridement Operative Note  Dr. CHARITY Epstein II  [phone]    PATIENT NAME: Magdalena Jerry  MRN: [id number]  : 1926 AGE: 89 y.o. GENDER: female  DATE OF OPERATION: 9/10/2019  PREOPERATIVE DIAGNOSIS:  Infected left surgical wound  POSTOPERATIVE DIAGNOSIS: Same  OPERATION PERFORMED: Irrigation and Debridement of left femur  SURGEON: Nazario Epstein MD  Circulator: Abbie Bryant RN; Claudia Haque RN  Scrub Person: Jojo Sousa  Vendor Representative: Martin Yeh  ANESTHESIA: General  ASSISTANT: None   ESTIMATED BLOOD LOSS: 100cc  SPONGE AND NEEDLE COUNT: Correct  INDICATIONS: This patient was found to have a surgical site infection about a distal femur ORIF. The risks of surgery were discussed and included the risk of anesthesia, continued infection, damage to neurovascular structures, the need for further procedures, medical complications, and others. No guarantees were made. The patient wished to proceed with surgery and a surgical consent was signed.    PERTINENT FINDINGS: Gross contamination and purulence to the superficial tissues with an intact deep fascia    DETAILS OF PROCEDURE:    The patient was met in the preoperative area. The site was marked. The consent and H&P were reviewed. The patient was then wheeled back to the operative suite and underwent anesthesia. Excess hair about the operative area was removed using surgical clippers. Surgical alcohol was used to thoroughly clean the entire operative extremity.    The operative extremity was then prepped and draped in the normal sterile fashion which included multiple layers of sterile drapes. A surgical timeout was performed in which was d the surgical site was confirmed.    I made an incision laterally through the old scar and gross purulence was immediately encountered.  3 deep cultures were taken and then antibiotics were started.  Dissection was carried down to the fascia which was noted to be intact.  I  "thoroughly debrided and irrigated the superficial tissues with 3 L of normal saline.  I then proceeded deep to the fascia and again the tissue deep appeared very healthy and there was no purulence whatsoever.  However, I did irrigate the deep tissue with 3 L normal saline as well.    I used antibiotic beads, although they were not fully dry and ended up being more of a paste.  This was left both deep to the fascia and superficial as well.  This deep fascia was closed and then the superficial soft tissues were closed in layers.    The patient was then moved onto the bed and awoken from anesthesia.  The patient was taken to the recovery room in stable condition. There were no complications and the patient tolerated the procedure well.    R \"Adam\" Tete VILLA MD  Orthopaedic Surgery  [facility]  [phone]    "

## 2019-09-10 NOTE — PROGRESS NOTES
Pharmacokinetic Consult - Vancomycin Dosing   Day #1    Magdalena Jerry is a 89 y.o. female 79.5 kg (175 lb 4.8 oz) Body mass index is 27.46 kg/m²..  Pharmacy  to dose Vancomycin  for ssti per Dr ADDISON eFrguson request.  (ID consult Dr Ybarra) start Vancomycin after surgery tonight  Goal trough: 15-20 mg/L   Duration: 3 days   Other antibiotics: none    Past Medical History:   Diagnosis Date   • Adjustment disorder with mixed anxiety and depressed mood    • Anemia    • Anxiety    • Atrial fibrillation (CMS/HCC)    • Atrophy of hand muscles     LEFT HAND   • Benign familial tremor    • Carpal tunnel syndrome    • Cataract    • Chronic rhinosinusitis    • Deep vein thrombosis (CMS/HCC)    • Depression    • Diabetes mellitus (CMS/HCC)    • Dyslipidemia    • Esophagitis, reflux    • Fracture of hip (CMS/HCC)    • Frequent falls    • GERD (gastroesophageal reflux disease)    • Hand pain    • Hearing loss    • Hip pain    • Hyperkalemia    • Hyperlipidemia    • Hypertension    • Impacted cerumen    • Insomnia    • Knee pain    • Limb pain    • Loss of hearing    • Low back pain    • Lower extremity weakness    • Lumbar canal stenosis    • Osteoporosis, senile    • Piriformis syndrome    • Renal insufficiency 2006   • Sensorineural hearing loss    • Stroke (CMS/HCC) 2004   • Thrombocytopenia (CMS/[facility])    • Tinea pedis    • Vitamin D deficiency        Estimated Creatinine Clearance: 39 mL/min (by C-G formula based on SCr of 0.98 mg/dL).  Creatinine   Date Value Ref Range Status   09/10/2019 0.98 0.57 - 1.00 mg/dL Final   09/09/2019 1.07 (H) 0.57 - 1.00 mg/dL Final   09/09/2019 1.21 (H) 0.57 - 1.00 mg/dL Final   09/08/2019 1.07 (H) 0.57 - 1.00 mg/dL Final     WBC   Date Value Ref Range Status   09/10/2019 4.87 3.40 - 10.80 10*3/mm3 Final   09/09/2019 6.39 3.40 - 10.80 10*3/mm3 Final   09/08/2019 4.76 3.40 - 10.80 10*3/mm3 Final     Temp Readings from Last 2 Encounters:   09/10/19 98.3 °F (36.8 °C) (Oral)   08/07/19 99.1 °F  (37.3 °C) (Oral)     Anti-Infectives (From admission, onward)    Ordered     Dose/Rate Route Frequency Start Stop    09/10/19 1315  vancomycin 1500 mg/500 mL 0.9% NS IVPB ([facility])     Ordering Provider:  Zita Pace MD    20 mg/kg × 79.5 kg  over 150 Minutes Intravenous Once 09/10/19 2100      09/10/19 0921  Pharmacy to dose vancomycin     Ordering Provider:  Zita Pace MD     Does not apply Continuous PRN 09/10/19 0920 09/13/19 0919          Baseline cultures:  9/9  B/C x 2  In Process         Microbiology Results (last 10 days)     ** No results found for the last 240 hours. **          Past Hx of Vancomycin 1250mg IV q24h gave trough of 15.3 mcg/ml     VANCOMYCIN DOSING SCHEDULE ( INCLUDING LEVELS)  Vancomycin 1500mg IV  Load    Post surgery                           9/10 ~ 2100  Vancomycin random in am                           9/11    0600      PLAN/ ASSESSMENT  1. See above for dosing schedule   2. Monitor renal function…serum creatinine in am  3. Encourage hydration to prevent toxic accumulation   4. Monitor for s/sx of toxicity including incr in SCr and decr in UOP  5. Pharmacy will continue to follow and adjust accordingly  6. Note from ID  Based on depth of infection we will define treatment duration recommendations.  Hold antibiotics until we can obtain operative cultures and start vancomycin for goal level of 15-20  Ashley Leone, [facility]      09/10/19 1:19 PM

## 2019-09-10 NOTE — ANESTHESIA PREPROCEDURE EVALUATION
Anesthesia Evaluation     Patient summary reviewed and Nursing notes reviewed                Airway   Mallampati: II  No difficulty expected  Dental      Pulmonary    (+) pneumonia ,   Cardiovascular     ECG reviewed  Rhythm: irregular  Rate: normal    (+) hypertension, dysrhythmias, PVD, DVT, hyperlipidemia,       Neuro/Psych  (+) CVA, numbness, psychiatric history,     GI/Hepatic/Renal/Endo    (+)  GERD,  diabetes mellitus,     Musculoskeletal (-) negative ROS    Abdominal    Substance History - negative use     OB/GYN negative ob/gyn ROS         Other                        Anesthesia Plan    ASA 4     general   (Patient took her Eliquis yesterday)  intravenous induction   Anesthetic plan, all risks, benefits, and alternatives have been provided, discussed and informed consent has been obtained with: patient.

## 2019-09-10 NOTE — ANESTHESIA PROCEDURE NOTES
Airway  Urgency: elective    Date/Time: 9/10/2019 4:52 PM  Airway not difficult    General Information and Staff    Patient location during procedure: OR  CRNA: Sharon Bhakta CRNA    Indications and Patient Condition  Indications for airway management: airway protection    Preoxygenated: yes  Mask difficulty assessment: 1 - vent by mask    Final Airway Details  Final airway type: supraglottic airway      Successful airway: classic  Size 4    Number of attempts at approach: 1    Additional Comments  Smooth IV induction. LMA inserted with ease. Cuff up. LMA secured BEBS

## 2019-09-10 NOTE — CONSULTS
Referring Provider: Tamiko Thompson MD  Reason for Consultation:   Chief Complaint   Patient presents with   • Drainage from Incision         Subjective   History of present illness:   This very nice 89-year-old we are asked for evaluation opinion regarding wound infection.    Per review the past [facility] records, she was previously admitted to [facility] from 7/29/2019 through 8/7/2019.  She had fallen and sustained a distal femoral fracture and underwent left ORIF by Dr. Adam Epstein II on 7/30/2019.. Patient is difficult historian on account of severe hearing loss.  Her daughter-in-law reports that after staples were removed she developed some purulent drainage from the inferior aspect of the wound.  This was managed with local wound care but was progressive.  She had not been on any antibiotics.  Not associate with any constitutional symptoms of infection.  She was evaluated by Dr. Epstein in clinic yesterday.  He went ahead and admitted her yesterday for medical  optimization and is planning incision and debridement with washout today.    Past Medical History:   Diagnosis Date   • Adjustment disorder with mixed anxiety and depressed mood    • Anemia    • Anxiety    • Atrial fibrillation (CMS/HCC)    • Atrophy of hand muscles     LEFT HAND   • Benign familial tremor    • Carpal tunnel syndrome    • Cataract    • Chronic rhinosinusitis    • Deep vein thrombosis (CMS/HCC)    • Depression    • Diabetes mellitus (CMS/HCC)    • Dyslipidemia    • Esophagitis, reflux    • Fracture of hip (CMS/HCC)    • Frequent falls    • GERD (gastroesophageal reflux disease)    • Hand pain    • Hearing loss    • Hip pain    • Hyperkalemia    • Hyperlipidemia    • Hypertension    • Impacted cerumen    • Insomnia    • Knee pain    • Limb pain    • Loss of hearing    • Low back pain    • Lower extremity weakness    • Lumbar canal stenosis    • Osteoporosis, senile    • Piriformis syndrome    • Renal insufficiency 2006   •  Sensorineural hearing loss    • Stroke (CMS/HCC) 2004   • Thrombocytopenia (CMS/HCC)    • Tinea pedis    • Vitamin D deficiency        Past Surgical History:   Procedure Laterality Date   • CHOLECYSTECTOMY  2013   • COLONOSCOPY     • ERCP WITH SPHINCTEROTOMY/PAPILLOTOMY  2012   • FEMUR OPEN REDUCTION INTERNAL FIXATION Left 7/30/2019    Procedure: OPEN REDUCTION INTERNAL FIXATION LEFT FEMUR;  Surgeon: Nazario Epstein II, MD;  Location: [facility];  Service: Orthopedics   • FRACTURE SURGERY Left 2006    HIP   • VARICOSE VEIN SURGERY Right        No family history of infectious diseases  Social history: She previously lived alone here in [place], [place].  Had 6 children and 5 are still living.    Allergies   Allergen Reactions   • Tetracyclines & Related Dermatitis and Rash   • Amoxicillin GI Intolerance   • Benzodiazepines Unknown (See Comments)     FAMILY UNSURE   • Codeine GI Intolerance   • Erythromycin Rash   • Macrolides And Ketolides Unknown (See Comments)     FAMILY UNSURE   • Melatonin Unknown (See Comments)     FAMILY UNSURE   • Neomycin Rash     MADE RASH WORSE   • Penicillins GI Intolerance   • Sulfa Antibiotics Rash       Medication:  Antibiotics:  Anti-Infectives (From admission, onward)    Ordered     Dose/Rate Route Frequency Start Stop    09/10/19 0921  Pharmacy to dose vancomycin     Ordering Provider:  Zita Pace MD     Does not apply Continuous PRN 09/10/19 0920 09/13/19 0919          Review of Systems  Pertinent items are noted in HPI, all other systems reviewed and negative    Objective     Physical Exam:   Vital Signs   Temp:  [97.1 °F (36.2 °C)-98.6 °F (37 °C)] 98.3 °F (36.8 °C)  Heart Rate:  [50-74] 71  Resp:  [18] 18  BP: (127-172)/(59-74) 162/74    GENERAL: Awake and alert, in no acute distress.   HEENT: Oropharynx is clear. Hearing is grossly hard of hearing.   EYES: PERRL. No conjunctival injection. No lid lag.   LYMPHATICS: No lymphadenopathy of the neck or inguinal  regions.   HEART: Regular rate and rhythm. No peripheral edema.   LUNGS: Clear to auscultation anteriorly with normal respiratory effort.   GI: Soft, nontender, nondistended. No appreciable organomegaly.   SKIN: Warm and dry without cutaneous eruptions open incision with purulence on the inferior aspect of the left leg wound above the knee  PSYCHIATRIC: Appropriate mood, affect, insight, and judgment.     Results Review:     White count 4.87  Creatinine 0.98  Glucose 112 through 128    Microbiology:  9/9/2019 blood culture 2/2 no growth to date    Radiology: None      Assessment/Plan   1.  Wound cellulitis, rule out hardware associated infection and osteomyelitis  2.  Multiple antibiotic allergies: Most appear more consistent with drug intolerances than actual hypersensitivity.  3.  Diabetes mellitus type 2, continue glycemic control efforts to prevent/control infectious complications  Concerning for deep infection.  Discussed with Dr. Adam Epstein and he is going to take her to the operating room later today for incision and debridement.  Based on depth of infection we will define treatment duration recommendations.  Hold antibiotics until we can obtain operative cultures and start vancomycin for goal level of 15-20.  Check ESR and CRP.       Thank you for this consult.  We will continue to follow along and tailor antibiotics as the patient's clinical course evolves.      Doug Ybarra MD  09/10/19  11:50 AM

## 2019-09-10 NOTE — NURSING NOTE
09/10/19 0900   Wound 09/09/19 2011 Right heel Blisters   Date first assessed/Time first assessed: 09/09/19 2011   Present on Hospital Admission: Yes  Side: Right  Location: heel  Primary Wound Type: Blisters   Dressing Appearance intact;moist drainage   Base clean;moist;pink   Edges open   Wound Length (cm) 3 cm   Wound Width (cm) 2 cm   Drainage Characteristics/Odor serous   Drainage Amount scant   Care, Wound cleansed with;soap and water   Dressing Care, Wound dressing applied;border dressing;low-adherent   CWOCN consult for stage 2 pressure injury to right heel; presents as open blister.  Wound cleansed and covered with optifoam.  Patient with heel protector on and feet elevated on pillow.

## 2019-09-10 NOTE — PROGRESS NOTES
"     LOS: 1 day   Primary Care Physician: Fly Sanchez,      Subjective   Family at bedside.  Patient is hard of hearing but able to understand and communicate.  No leg pain.  No shortness of breath.  She is a loud snorer according to family.  Not on oxygen at home but did needed in rehab for a while.    Vital Signs  Body mass index is 27.46 kg/m².  Temp:  [97.1 °F (36.2 °C)-98.6 °F (37 °C)] 98.3 °F (36.8 °C)  Heart Rate:  [50-74] 71  Resp:  [18] 18  BP: (127-172)/(59-74) 162/74    On O2    Objective:  General Appearance:  In no acute distress (Looks younger than age).    Vital signs: (most recent): Blood pressure 162/74, pulse 71, temperature 98.3 °F (36.8 °C), temperature source Oral, resp. rate 18, height 170.2 cm (67\"), weight 79.5 kg (175 lb 4.8 oz), SpO2 97 %.    HEENT: (Hard of hearing.  Neck supple.  Trachea midline)    Lungs:  She is not in respiratory distress.  No stridor.  There are decreased breath sounds.  No rales, wheezes or rhonchi.    Heart: Normal rate.  Regular rhythm.  Positive for murmur.  (Grade 3/6 early systolic murmur heard right and left sternal borders)  Abdomen: Abdomen is soft and non-distended.  Bowel sounds are normal.   There is no abdominal tenderness.   There is no splenomegaly. There is no hepatomegaly.   Extremities: There is dependent edema.  (Trace edema at the ankles, left greater than right.  Heel protector on the right side.  Picture of the right heel noted-small area of erythema)  Neurological: Patient is alert.    Skin:  Warm.  There is ulceration.  (Right heel superficial ulcer.  Hyperpigmentation lower shins)        Results Review:    I reviewed the patient's new clinical results.    Results from last 7 days   Lab Units 09/10/19  0548 09/09/19  1619   WBC 10*3/mm3 4.87 6.39   HEMOGLOBIN g/dL 9.1* 10.0*   PLATELETS 10*3/mm3 229 226     Results from last 7 days   Lab Units 09/10/19  0548 09/09/19  2007   SODIUM mmol/L 139 141   POTASSIUM mmol/L 3.3* 3.5   CHLORIDE " mmol/L 103 103   CO2 mmol/L 27.0 26.8   BUN mg/dL 32* 36*   CREATININE mg/dL 0.98 1.07*   CALCIUM mg/dL 8.3 8.7   GLUCOSE mg/dL 112* 101*         Hemoglobin A1C:  Lab Results   Component Value Date    HGBA1C 6.03 (H) 12/06/2018       Glucose Range:  Glucose   Date/Time Value Ref Range Status   09/10/2019 0601 118 70 - 130 mg/dL Final   09/09/2019 2035 106 70 - 130 mg/dL Final       Lab Results   Component Value Date    [id number] 902 08/02/2019       No results found for: TSH    Assessment & Plan      Medication Review: Yes    Active Hospital Problems    Diagnosis  POA   • **Wound infection after surgery [T81.49XA]  Yes   • Atrial fibrillation (CMS/[facility]) [I48.91]  Yes   • Displaced oblique fracture of shaft of left femur, initial encounter for closed fracture (CMS/[facility])s/p orif [S72.332A]  Yes   • Chronic venous stasis [I87.8]  Yes   • CKD (chronic kidney disease) stage 3, GFR 30-59 ml/min (CMS/[facility]) [N18.3]  Yes   • Type 2 diabetes mellitus with diabetic polyneuropathy, without long-term current use of insulin (CMS/[facility]) [E11.42]  Yes   • Spinal stenosis of lumbar region [M48.061]  Yes   • Senile osteoporosis [M81.0]  Yes   • Benign essential hypertension [I10]  Yes   • Anemia due to chronic kidney disease [N18.9, D63.1]  Yes      Resolved Hospital Problems   No resolved problems to display.       Assessment/Plan  1.  Wound infection left hip after ORIF July 2019.  Going to the OR today for debridement.  Will start vancomycin after cultures collected.  Ask ID to see.  2.  Paroxysmal atrial fibrillation.  Seems to be in sinus currently.  Rate controlled  3.  Diabetes mellitus type 2.  Not on any medications.  Sliding scale insulin if sugars greater than 200.  4.  Hyperkalemia, now hypokalemia.  She got Kayexalate yesterday.  20 mEq p.o. to correct now.  Recheck in a.m.  5.  Hypoxia.  Probably underlying sleep apnea/hypoventilation.  Incentive spirometry and activity as tolerated.  Patient's family not interested in  work-up for this.  6.  Acute blood loss anemia.  Hemoglobin noted.  Will type and screen tomorrow.    Zita Pace MD  09/10/19  9:11 AM

## 2019-09-11 LAB
ABO GROUP BLD: NORMAL
ANION GAP SERPL CALCULATED.3IONS-SCNC: 7.5 MMOL/L (ref 5–15)
BLD GP AB SCN SERPL QL: NEGATIVE
BUN BLD-MCNC: 21 MG/DL (ref 8–23)
BUN/CREAT SERPL: 26.6 (ref 7–25)
CALCIUM SPEC-SCNC: 8.4 MG/DL (ref 8.2–9.6)
CHLORIDE SERPL-SCNC: 104 MMOL/L (ref 98–107)
CO2 SERPL-SCNC: 27.5 MMOL/L (ref 22–29)
CREAT BLD-MCNC: 0.79 MG/DL (ref 0.57–1)
DEPRECATED RDW RBC AUTO: 50.9 FL (ref 37–54)
ERYTHROCYTE [DISTWIDTH] IN BLOOD BY AUTOMATED COUNT: 14.5 % (ref 12.3–15.4)
GFR SERPL CREATININE-BSD FRML MDRD: 68 ML/MIN/1.73
GLUCOSE BLD-MCNC: 146 MG/DL (ref 65–99)
GLUCOSE BLDC GLUCOMTR-MCNC: 143 MG/DL (ref 70–130)
GLUCOSE BLDC GLUCOMTR-MCNC: 163 MG/DL (ref 70–130)
GLUCOSE BLDC GLUCOMTR-MCNC: 228 MG/DL (ref 70–130)
GLUCOSE BLDC GLUCOMTR-MCNC: 309 MG/DL (ref 70–130)
HCT VFR BLD AUTO: 27.8 % (ref 34–46.6)
HGB BLD-MCNC: 8.4 G/DL (ref 12–15.9)
MCH RBC QN AUTO: 28.7 PG (ref 26.6–33)
MCHC RBC AUTO-ENTMCNC: 30.2 G/DL (ref 31.5–35.7)
MCV RBC AUTO: 94.9 FL (ref 79–97)
PLATELET # BLD AUTO: 199 10*3/MM3 (ref 140–450)
PMV BLD AUTO: 8.8 FL (ref 6–12)
POTASSIUM BLD-SCNC: 3.6 MMOL/L (ref 3.5–5.2)
RBC # BLD AUTO: 2.93 10*6/MM3 (ref 3.77–5.28)
RH BLD: POSITIVE
SODIUM BLD-SCNC: 139 MMOL/L (ref 136–145)
T&S EXPIRATION DATE: NORMAL
VANCOMYCIN SERPL-MCNC: 14.9 MCG/ML (ref 5–40)
WBC NRBC COR # BLD: 5.77 10*3/MM3 (ref 3.4–10.8)

## 2019-09-11 PROCEDURE — 99232 SBSQ HOSP IP/OBS MODERATE 35: CPT | Performed by: INTERNAL MEDICINE

## 2019-09-11 PROCEDURE — 80048 BASIC METABOLIC PNL TOTAL CA: CPT | Performed by: INTERNAL MEDICINE

## 2019-09-11 PROCEDURE — 63710000001 INSULIN LISPRO (HUMAN) PER 5 UNITS: Performed by: INTERNAL MEDICINE

## 2019-09-11 PROCEDURE — 86900 BLOOD TYPING SEROLOGIC ABO: CPT | Performed by: INTERNAL MEDICINE

## 2019-09-11 PROCEDURE — 82962 GLUCOSE BLOOD TEST: CPT

## 2019-09-11 PROCEDURE — 85027 COMPLETE CBC AUTOMATED: CPT | Performed by: INTERNAL MEDICINE

## 2019-09-11 PROCEDURE — 86901 BLOOD TYPING SEROLOGIC RH(D): CPT | Performed by: INTERNAL MEDICINE

## 2019-09-11 PROCEDURE — 86850 RBC ANTIBODY SCREEN: CPT | Performed by: INTERNAL MEDICINE

## 2019-09-11 PROCEDURE — 25010000002 VANCOMYCIN 10 G RECONSTITUTED SOLUTION: Performed by: INTERNAL MEDICINE

## 2019-09-11 PROCEDURE — 80202 ASSAY OF VANCOMYCIN: CPT | Performed by: INTERNAL MEDICINE

## 2019-09-11 RX ADMIN — AMLODIPINE BESYLATE 10 MG: 10 TABLET ORAL at 08:54

## 2019-09-11 RX ADMIN — LACTULOSE 10 G: 10 SOLUTION ORAL at 08:54

## 2019-09-11 RX ADMIN — SODIUM CHLORIDE, PRESERVATIVE FREE 10 ML: 5 INJECTION INTRAVENOUS at 21:05

## 2019-09-11 RX ADMIN — ASPIRIN 325 MG: 325 TABLET ORAL at 10:37

## 2019-09-11 RX ADMIN — SERTRALINE 100 MG: 100 TABLET, FILM COATED ORAL at 21:05

## 2019-09-11 RX ADMIN — SENNOSIDES AND DOCUSATE SODIUM 2 TABLET: 8.6; 5 TABLET ORAL at 21:05

## 2019-09-11 RX ADMIN — VANCOMYCIN HYDROCHLORIDE 1250 MG: 10 INJECTION, POWDER, LYOPHILIZED, FOR SOLUTION INTRAVENOUS at 13:18

## 2019-09-11 RX ADMIN — FLUTICASONE PROPIONATE 1 SPRAY: 50 SPRAY, METERED NASAL at 08:55

## 2019-09-11 RX ADMIN — INSULIN LISPRO 5 UNITS: 100 INJECTION, SOLUTION INTRAVENOUS; SUBCUTANEOUS at 17:52

## 2019-09-11 RX ADMIN — SODIUM CHLORIDE, PRESERVATIVE FREE 10 ML: 5 INJECTION INTRAVENOUS at 08:54

## 2019-09-11 RX ADMIN — FAMOTIDINE 20 MG: 20 TABLET, FILM COATED ORAL at 08:54

## 2019-09-11 NOTE — PLAN OF CARE
Problem: Patient Care Overview  Goal: Plan of Care Review  Outcome: Ongoing (interventions implemented as appropriate)   09/11/19 0324   Coping/Psychosocial   Plan of Care Reviewed With patient   Plan of Care Review   Progress no change   OTHER   Outcome Summary PO Pain meds given x 1; Q2Turn; no c/o nausea; no s/s of distress        Problem: Skin Injury Risk (Adult)  Goal: Skin Health and Integrity  Outcome: Ongoing (interventions implemented as appropriate)      Problem: Fall Risk (Adult)  Goal: Absence of Fall  Outcome: Ongoing (interventions implemented as appropriate)      Problem: Fracture Orthopaedic (Adult)  Goal: Signs and Symptoms of Listed Potential Problems Will be Absent, Minimized or Managed (Fracture Orthopaedic)  Outcome: Ongoing (interventions implemented as appropriate)

## 2019-09-11 NOTE — PLAN OF CARE
Problem: Patient Care Overview  Goal: Plan of Care Review  Outcome: Ongoing (interventions implemented as appropriate)   09/11/19 5621   Coping/Psychosocial   Plan of Care Reviewed With patient   Plan of Care Review   Progress no change   OTHER   Outcome Summary IV abx continued, awaiting cx to come back for final abx plan, q2 turn, vss, will continue to monitor      Goal: Individualization and Mutuality  Outcome: Ongoing (interventions implemented as appropriate)    Goal: Discharge Needs Assessment  Outcome: Ongoing (interventions implemented as appropriate)    Goal: Interprofessional Rounds/Family Conf  Outcome: Ongoing (interventions implemented as appropriate)      Problem: Skin Injury Risk (Adult)  Goal: Skin Health and Integrity  Outcome: Ongoing (interventions implemented as appropriate)      Problem: Fall Risk (Adult)  Goal: Absence of Fall  Outcome: Ongoing (interventions implemented as appropriate)      Problem: Fracture Orthopaedic (Adult)  Goal: Signs and Symptoms of Listed Potential Problems Will be Absent, Minimized or Managed (Fracture Orthopaedic)  Outcome: Ongoing (interventions implemented as appropriate)

## 2019-09-11 NOTE — PROGRESS NOTES
"Pharmacokinetic Consult - Vancomycin Dosing (Follow-up Note)    Magdalena Jerry is on day #2 pharmacy to dose vancomycin for ssti.  Pharmacy dosing vancomycin per Dr ADDISON Pace's request. (ID following)  Goal trough: 15-20 mg/L   Duration: 3 days   Other antibiotics: none     Relevant clinical data and objective history reviewed:  89 y.o. female 170.2 cm (67\") 79.5 kg (175 lb 4.8 oz)    Past Medical History:   Diagnosis Date   • Adjustment disorder with mixed anxiety and depressed mood    • Anemia    • Anxiety    • Atrial fibrillation (CMS/HCC)    • Atrophy of hand muscles     LEFT HAND   • Benign familial tremor    • Carpal tunnel syndrome    • Cataract    • Chronic rhinosinusitis    • Deep vein thrombosis (CMS/HCC)    • Depression    • Diabetes mellitus (CMS/HCC)    • Dyslipidemia    • Esophagitis, reflux    • Fracture of hip (CMS/HCC)    • Frequent falls    • GERD (gastroesophageal reflux disease)    • Hand pain    • Hearing loss    • Hip pain    • Hyperkalemia    • Hyperlipidemia    • Hypertension    • Impacted cerumen    • Insomnia    • Knee pain    • Limb pain    • Loss of hearing    • Low back pain    • Lower extremity weakness    • Lumbar canal stenosis    • Osteoporosis, senile    • Piriformis syndrome    • Renal insufficiency 2006   • Sensorineural hearing loss    • Stroke (CMS/HCC) 2004   • Thrombocytopenia (CMS/[facility])    • Tinea pedis    • Vitamin D deficiency      Creatinine   Date Value Ref Range Status   09/11/2019 0.79 0.57 - 1.00 mg/dL Final   09/10/2019 0.98 0.57 - 1.00 mg/dL Final   09/09/2019 1.07 (H) 0.57 - 1.00 mg/dL Final     BUN   Date Value Ref Range Status   09/11/2019 21 8 - 23 mg/dL Final     Estimated Creatinine Clearance: 47.7 mL/min (by C-G formula based on SCr of 0.79 mg/dL).    Lab Results   Component Value Date    WBC 5.77 09/11/2019     Temp Readings from Last 3 Encounters:   09/11/19 98.2 °F (36.8 °C) (Oral)   08/07/19 99.1 °F (37.3 °C) (Oral)   04/22/19 98.1 °F (36.7 °C)      " Microbiology:  Surgical cultures x3 gram-positive cocci    Anti-Infectives (From admission, onward)    Ordered     Dose/Rate Route Frequency Start Stop    09/11/19 1011  vancomycin 1250 mg/250 mL 0.9% NS IVPB ([facility])     Ordering Provider:  Zita Pace MD    15 mg/kg × 79.5 kg  over 120 Minutes Intravenous Every 24 Hours 09/11/19 1130 09/14/19 1129    09/10/19 1543  vancomycin 1500 mg/500 mL 0.9% NS IVPB ([facility])     Ordering Provider:  Zita Pace MD    20 mg/kg × 79.5 kg  over 150 Minutes Intravenous Once 09/10/19 1900 09/10/19 1702    09/10/19 0921  Pharmacy to dose vancomycin     Ordering Provider:  Zita Pace MD     Does not apply Continuous PRN 09/10/19 0920 09/13/19 0919         Lab Results   Component Value Date    VANCOTROUGH 15.30 12/05/2018     Lab Results   Component Value Date    VANCORANDOM 14.90 09/11/2019     Past Hx of Vancomycin 1250mg IV q24h gave trough of 15.3 mcg/ml      VANCOMYCIN DOSING SCHEDULE ( INCLUDING LEVELS)  Vancomycin 1500mg IV  Load    Post surgery                           9/10 ~ 1702  Vancomycin random in am = 14.9 mcg/ml ( 10 hr)                           9/11    0318  Vancomycin 1250mg IV (15mg/kg) iv q 24 hours                           9/11    1130                           9/12    1130  Vancomycin trough prior to 4th overall dose                           9/13    1100     PLAN/ ASSESSMENT  1. See above for dosing schedule   2. Monitor renal function…serum creatinine in am    crcl improving  3. Encourage hydration to prevent toxic accumulation   4. Monitor for s/sx of toxicity including incr in SCr and decr in UOP  5. Pharmacy will continue to follow and adjust accordingly   Sahara [facility]

## 2019-09-11 NOTE — PROGRESS NOTES
Discharge Planning Assessment  [facility]     Patient Name: Magdalena Jerry  MRN: [id number]  Today's Date: 9/11/2019    Admit Date: 9/9/2019    Discharge Needs Assessment     Row Name 09/11/19 0859       Living Environment    Lives With  alone    Current Living Arrangements  home/apartment/condo    Potentially Unsafe Housing Conditions  other (see comments) no concerns     Primary Care Provided by  self    Provides Primary Care For  no one    Family Caregiver if Needed  child(ernestina), adult    Quality of Family Relationships  helpful;involved;supportive    Able to Return to Prior Arrangements  yes       Resource/Environmental Concerns    Resource/Environmental Concerns  none       Transition Planning    Patient/Family Anticipates Transition to  inpatient rehabilitation facility    Patient/Family Anticipated Services at Transition  skilled nursing       Discharge Needs Assessment    Readmission Within the Last 30 Days  current reason for admission unrelated to previous admission    Concerns to be Addressed  no discharge needs identified;denies needs/concerns at this time    Equipment Currently Used at Home  walker, rolling    Anticipated Changes Related to Illness  none    Outpatient/Agency/Support Group Needs  skilled nursing facility    Discharge Facility/Level of Care Needs  nursing facility, skilled        Discharge Plan     Row Name 09/11/19 0900       Plan    Plan  Return to skilled bed at [facility]     Patient/Family in Agreement with Plan  yes    Plan Comments  CCP met with patient at bedside. Patient requested CCP to talk with her son. CCP made outbound call to Anoop Jerry [phone]. CCP role explained and discharge planning discussed. Face sheet verified and IMM noted. Patient's PCP is Dr. Sanchez. Patient arrived to [facility] from [facility] and the plan is to return to [facility] when she is medically stable. Patient uses a walker for mobility. Patient's son unsure of transportation at this time. CCP discussed  ambulance transportation but no guarantee insurance coverage for ambulance transportation; patient's son verbalized understanding. CCP will follow up with patient's son when closer to discharge to discuss transportation. Ivy Gaona CSW         Destination      No service coordination in this encounter.      Durable Medical Equipment      No service coordination in this encounter.      Dialysis/Infusion      No service coordination in this encounter.      Home Medical Care      No service coordination in this encounter.      Therapy      No service coordination in this encounter.      Community Resources      No service coordination in this encounter.          Demographic Summary     Row Name 09/11/19 0859       General Information    Admission Type  inpatient    Arrived From  emergency department    Required Notices Provided  Important Message from Medicare    Referral Source  admission list    Reason for Consult  discharge planning    Preferred Language  English     Used During This Interaction  no        Functional Status     Row Name 09/11/19 0859       Functional Status    Usual Activity Tolerance  good    Current Activity Tolerance  fair       Functional Status, IADL    Medications  assistive equipment    Meal Preparation  assistive equipment    Housekeeping  assistive equipment    Laundry  assistive equipment    Shopping  assistive equipment       Mental Status    General Appearance WDL  WDL       Mental Status Summary    Recent Changes in Mental Status/Cognitive Functioning  no changes        Psychosocial    No documentation.       Abuse/Neglect    No documentation.       Legal    No documentation.       Substance Abuse    No documentation.       Patient Forms    No documentation.           KATHE Rose

## 2019-09-11 NOTE — ANESTHESIA POSTPROCEDURE EVALUATION
"Patient: Magdalena Jerry    Procedure Summary     Date:  09/10/19 Room / Location:  [facility] OR 09 / [facility] MAIN OR    Anesthesia Start:  1641 Anesthesia Stop:  1742    Procedure:  INCISION AND DRAINAGE LOWER EXTREMITY, left femur on a regular or table (Left ) Diagnosis:       Wound infection after surgery      (Wound infection after surgery [T81.49XA])    Surgeon:  Nazario Epstein II, MD Provider:  Michele Bernal MD    Anesthesia Type:  general ASA Status:  4          Anesthesia Type: general  Last vitals  BP   156/64 (09/10/19 2331)   Temp   37 °C (98.6 °F) (09/10/19 2331)   Pulse   73 (09/10/19 2331)   Resp   18 (09/10/19 2331)     SpO2   96 % (09/10/19 2331)     Post Anesthesia Care and Evaluation    Patient location during evaluation: bedside  Level of consciousness: awake  Pain management: adequate  Airway patency: patent  Anesthetic complications: No anesthetic complications    Cardiovascular status: acceptable  Respiratory status: acceptable  Hydration status: acceptable    Comments: /64 (BP Location: Right arm, Patient Position: Lying)   Pulse 73   Temp 37 °C (98.6 °F) (Oral)   Resp 18   Ht 170.2 cm (67\")   Wt 79.5 kg (175 lb 4.8 oz)   LMP  (LMP Unknown)   SpO2 96%   BMI 27.46 kg/m²         "

## 2019-09-11 NOTE — PROGRESS NOTES
INFECTIOUS DISEASES PROGRESS NOTE    CC: Follow-up wound infection    S:   Patient without significant leg pain.  No fevers or chills or night sweats  O:  Physical Exam:  Temp:  [97.5 °F (36.4 °C)-98.7 °F (37.1 °C)] 98.2 °F (36.8 °C)  Heart Rate:  [] 65  Resp:  [16-20] 18  BP: (141-177)/(64-88) 156/65  Physical Exam   Constitutional: She appears well-developed. No distress.   Pulmonary/Chest: Effort normal.   Abdominal: Soft. She exhibits no distension. There is no tenderness.   Neurological: She is alert.   Skin: Skin is warm and dry. No erythema ( lef leg wrapped).   Psychiatric: She has a normal mood and affect. Her behavior is normal.        Diagnostics:    Cr 0.79  White count 5.77  Hemoglobin 8.4.  Platelets 199  glc 101-185    Microbiology:  Surgical cultures x3 gram-positive cocci    Assessment/Plan   1.  Wound cellulitis, superficial, ruled out hardware associated infection and osteomyelitis  2.  Multiple antibiotic allergies: Most appear more consistent with drug intolerances than actual hypersensitivity.  3.  Diabetes mellitus type 2, continue glycemic control efforts to prevent/control infectious complications    Discussed with Dr. Epstein and she got really good news that the infection was superficial and did not involve the hardware.  Is going to make things a lot easier to treat.  We will keep on the vancomycin for goal level of 15-20 for the gram-positive cocci isolated and surgical cultures.  Once formal sensitivities returned I think we can finalize the antibiotic plan.    Doug Ybarra MD  9:43 AM  09/11/19

## 2019-09-11 NOTE — DISCHARGE PLACEMENT REQUEST
"Magdalena Morales (89 y.o. Female)     Date of Birth Social Security Number Address Home Phone MRN    02/20/1926  [place] [address] [phone] [id number]    [facility] Marital Status          None        Admission Date Admission Type Admitting Provider Attending Provider Department, Room/Bed    9/9/19 Emergency Zita Pace MD Hayden, Juliana, MD [address], [address]1    Discharge Date Discharge Disposition Discharge Destination                       Attending Provider:  Zita Pace MD    Allergies:  Tetracyclines & Related, Amoxicillin, Benzodiazepines, Codeine, Erythromycin, Macrolides And Ketolides, Melatonin, Neomycin, Penicillins, Sulfa Antibiotics    Isolation:  Contact   Infection:  MRSA (09/10/19)   Code Status:  CPR    Ht:  170.2 cm (67\")   Wt:  79.5 kg (175 lb 4.8 oz)    Admission Cmt:  None   Principal Problem:  Wound infection after surgery [T81.49XA]                 Active Insurance as of 9/9/2019     Primary Coverage     Payor Plan Insurance Group Employer/Plan Group    MEDICARE MEDICARE A & B      Payor Plan Address Payor Plan Phone Number Payor Plan Fax Number Effective Dates    [address] [phone]  2/1/1991 - None Entered    [facility] [address]       Subscriber Name Subscriber Birth Date Member ID       MAGDALENA MORALES 2/20/1926 [id number]           Secondary Coverage     Payor Plan Insurance Group Employer/Plan Group    [facility] SUPP [id number]     Payor Plan Address Payor Plan Phone Number Payor Plan Fax Number Effective Dates    [address]   12/1/2016 - None Entered    [facility] [id number]       Subscriber Name Subscriber Birth Date Member ID       MAGDALENA MORALES 2/20/1926 [id number]                 Emergency Contacts      (Rel.) Home Phone Work Phone Mobile Phone    Anoop Morales (Son) [phone] -- [phone]    MARKGUS (Daughter) [phone] " -- --    PERCY MORALES (Son) [phone] -- --

## 2019-09-11 NOTE — OUTREACH NOTE
Patient Outreach Note    Patient out of room during rounds.     Claudia Bustos RN  Community Care Coordinator    9/10/19, 2:30 PM

## 2019-09-11 NOTE — PROGRESS NOTES
Orthopaedic Surgery   Daily Progress Note  Dr. CHARITY Epstein II  [phone]  DEMOGRAPHICS:   · Magdalena Jerry   · Age:89 y.o.   · MRN:[id number]  · Admitted: 9/9/2019    PROCEDURE: 1 Day Post-Op s/p Procedure(s):  INCISION AND DRAINAGE LOWER EXTREMITY, left femur on a regular or table     HOSPITAL PROGRESS  · Patient Issues: No major issues overnight, comfortable since surgery  · Ambulation/Activity: Minimal activity since surgery     VITALS:  Vitals:    09/10/19 1828 09/10/19 1933 09/10/19 2331 09/11/19 0730   BP: 159/70 141/88 156/64 131/96   BP Location: Left arm Right arm Right arm Left arm   Patient Position: Lying Lying Lying Lying   Pulse: 80 105 73 85   Resp: 16 18 18 18   Temp:  97.5 °F (36.4 °C) 98.6 °F (37 °C) 97.3 °F (36.3 °C)   TempSrc:  Oral Oral Oral   SpO2: 91% 96% 96% 98%   Weight:       Height:           PHYSICAL EXAM:  · LUNGS: Equal chest rise, no shortness of air  · CARDIOVASCULAR: brisk capillary refill intact  · WOUND: Dressings clean, dry, and intact  · EXTREMITY: Operative Leg  · Pulses: brisk capillary refill intact  · Sensation: Sensation intact to light touch to the saphenous/sural/tibial/deep peroneal/superficial peroneal nerves, and grossly throughout the extremity.  · Motor: 5/5 EHL/FHL/TA/GS motor complexes    LABS:   Lab Results   Component Value Date    HGB 8.4 (L) 09/11/2019     Lab Results   Component Value Date    WBC 5.77 09/11/2019     Lab Results   Component Value Date    GLUCOSE 146 (H) 09/11/2019    CALCIUM 8.4 09/11/2019     09/11/2019    K 3.6 09/11/2019    CO2 27.5 09/11/2019     09/11/2019    BUN 21 09/11/2019    CREATININE 0.79 09/11/2019    EGFRIFAFRI 44 (L) 08/06/2018    EGFRIFNONA 68 09/11/2019    BCR 26.6 (H) 09/11/2019    ANIONGAP 7.5 09/11/2019       ASSESSMENT: Patient is a 89 y.o. female who is 1 Day Post-Op s/p Procedure(s):  INCISION AND DRAINAGE LOWER EXTREMITY, left femur on a regular or table     PLAN:   · Weight Bearing: Non Weight  "Bearing  · Labs: Above lab values review. Plan: No intervention necessary at this time.   · PT/OT: To Mobilize  · DVT PPX: ASA 325mg Daily  · Post-Op Xray: Postoperative x-rays show reasonable healing of her fracture   · Infection: Cultures from surgery growing gram-negative bacilli.  Fortunately, the infection did appear to be superficial.  The deep fascia was completely closed.  However, at the time of surgery I did dissect deep to the fascia to thoroughly wash out the plate and bone and leave antibiotic beads both deep and superficial to the fascia.  Additionally, I debrided all necrotic superficial tissue and the area of skin necrosis.  The wound was then closed.  · Dispo: Pending establishment of plan of antibiotic therapy    R \"Adam\"Tete VILLA MD  Orthopaedic Surgery  [facility]  [phone]    "

## 2019-09-12 LAB
ANION GAP SERPL CALCULATED.3IONS-SCNC: 8.5 MMOL/L (ref 5–15)
BUN BLD-MCNC: 22 MG/DL (ref 8–23)
BUN/CREAT SERPL: 25.9 (ref 7–25)
CALCIUM SPEC-SCNC: 8.3 MG/DL (ref 8.2–9.6)
CHLORIDE SERPL-SCNC: 101 MMOL/L (ref 98–107)
CO2 SERPL-SCNC: 27.5 MMOL/L (ref 22–29)
CREAT BLD-MCNC: 0.85 MG/DL (ref 0.57–1)
DEPRECATED RDW RBC AUTO: 51.8 FL (ref 37–54)
ERYTHROCYTE [DISTWIDTH] IN BLOOD BY AUTOMATED COUNT: 14.6 % (ref 12.3–15.4)
GFR SERPL CREATININE-BSD FRML MDRD: 62 ML/MIN/1.73
GLUCOSE BLD-MCNC: 131 MG/DL (ref 65–99)
GLUCOSE BLDC GLUCOMTR-MCNC: 149 MG/DL (ref 70–130)
GLUCOSE BLDC GLUCOMTR-MCNC: 170 MG/DL (ref 70–130)
GLUCOSE BLDC GLUCOMTR-MCNC: 200 MG/DL (ref 70–130)
GLUCOSE BLDC GLUCOMTR-MCNC: 223 MG/DL (ref 70–130)
HCT VFR BLD AUTO: 28 % (ref 34–46.6)
HGB BLD-MCNC: 8.4 G/DL (ref 12–15.9)
MCH RBC QN AUTO: 28.9 PG (ref 26.6–33)
MCHC RBC AUTO-ENTMCNC: 30 G/DL (ref 31.5–35.7)
MCV RBC AUTO: 96.2 FL (ref 79–97)
PLATELET # BLD AUTO: 188 10*3/MM3 (ref 140–450)
PMV BLD AUTO: 8.6 FL (ref 6–12)
POTASSIUM BLD-SCNC: 3.6 MMOL/L (ref 3.5–5.2)
RBC # BLD AUTO: 2.91 10*6/MM3 (ref 3.77–5.28)
SODIUM BLD-SCNC: 137 MMOL/L (ref 136–145)
WBC NRBC COR # BLD: 5.37 10*3/MM3 (ref 3.4–10.8)

## 2019-09-12 PROCEDURE — 82962 GLUCOSE BLOOD TEST: CPT

## 2019-09-12 PROCEDURE — 99232 SBSQ HOSP IP/OBS MODERATE 35: CPT | Performed by: INTERNAL MEDICINE

## 2019-09-12 PROCEDURE — 63710000001 INSULIN LISPRO (HUMAN) PER 5 UNITS: Performed by: INTERNAL MEDICINE

## 2019-09-12 PROCEDURE — 80048 BASIC METABOLIC PNL TOTAL CA: CPT | Performed by: INTERNAL MEDICINE

## 2019-09-12 PROCEDURE — 85027 COMPLETE CBC AUTOMATED: CPT | Performed by: INTERNAL MEDICINE

## 2019-09-12 PROCEDURE — 94799 UNLISTED PULMONARY SVC/PX: CPT

## 2019-09-12 PROCEDURE — 25010000002 VANCOMYCIN 10 G RECONSTITUTED SOLUTION: Performed by: INTERNAL MEDICINE

## 2019-09-12 RX ADMIN — VANCOMYCIN HYDROCHLORIDE 1250 MG: 10 INJECTION, POWDER, LYOPHILIZED, FOR SOLUTION INTRAVENOUS at 12:41

## 2019-09-12 RX ADMIN — FAMOTIDINE 20 MG: 20 TABLET, FILM COATED ORAL at 09:22

## 2019-09-12 RX ADMIN — FLUTICASONE PROPIONATE 1 SPRAY: 50 SPRAY, METERED NASAL at 09:22

## 2019-09-12 RX ADMIN — SODIUM CHLORIDE, PRESERVATIVE FREE 10 ML: 5 INJECTION INTRAVENOUS at 10:55

## 2019-09-12 RX ADMIN — SERTRALINE 100 MG: 100 TABLET, FILM COATED ORAL at 21:02

## 2019-09-12 RX ADMIN — ASPIRIN 325 MG: 325 TABLET ORAL at 09:22

## 2019-09-12 RX ADMIN — INSULIN LISPRO 2 UNITS: 100 INJECTION, SOLUTION INTRAVENOUS; SUBCUTANEOUS at 17:59

## 2019-09-12 RX ADMIN — SENNOSIDES AND DOCUSATE SODIUM 2 TABLET: 8.6; 5 TABLET ORAL at 21:02

## 2019-09-12 RX ADMIN — SODIUM CHLORIDE, PRESERVATIVE FREE 10 ML: 5 INJECTION INTRAVENOUS at 21:02

## 2019-09-12 RX ADMIN — LACTULOSE 10 G: 10 SOLUTION ORAL at 09:22

## 2019-09-12 RX ADMIN — AMLODIPINE BESYLATE 10 MG: 10 TABLET ORAL at 09:22

## 2019-09-12 NOTE — PROGRESS NOTES
INFECTIOUS DISEASES PROGRESS NOTE    CC: Follow-up wound cellulitis    S:   She is feeling well.  No significant postoperative pain.  No fevers or chills or night sweats    O:  Physical Exam:  Temp:  [98.2 °F (36.8 °C)-99.1 °F (37.3 °C)] 99.1 °F (37.3 °C)  Heart Rate:  [65-73] 72  Resp:  [18] 18  BP: (130-153)/(53-69) 153/69  Physical Exam   Constitutional: She appears well-developed. No distress.   Pulmonary/Chest: Effort normal.   Abdominal: Soft. She exhibits no distension. There is no tenderness.   Neurological: She is alert.   Skin: Skin is warm and dry.   No cellulitic features in exposed areas   Psychiatric: She has a normal mood and affect. Her behavior is normal.        Diagnostics:    Glucose 131 through 309  Creatinine 0.85  White count 5.4    Microbiology:  Surgical cultures x3 staph aureus     Estimated Creatinine Clearance: 44.9 mL/min (by C-G formula based on SCr of 0.85 mg/dL).    Assessment/Plan   1.  Wound cellulitis, superficial, ruled out hardware associated infection and osteomyelitis  2.  Multiple antibiotic allergies: Most appear more consistent with drug intolerances than actual hypersensitivity.  3.  Diabetes mellitus type 2, continue glycemic control efforts to prevent/control infectious complications    Surgical cultures with Staphylococcus aureus.  Continue vancomycin for goal level of 15-20.  Hope to have all information back tomorrow to finalize antibiotic plan.    Doug Ybarra MD  10:03 AM  09/12/19

## 2019-09-12 NOTE — PLAN OF CARE
Problem: Patient Care Overview  Goal: Plan of Care Review  Outcome: Ongoing (interventions implemented as appropriate)   09/12/19 0528   Plan of Care Review   Progress no change   OTHER   Outcome Summary VSS, , only c/o left leg pain when being turned/moved, rested well,monitor = SR     Goal: Individualization and Mutuality  Outcome: Ongoing (interventions implemented as appropriate)    Goal: Discharge Needs Assessment  Outcome: Ongoing (interventions implemented as appropriate)    Goal: Interprofessional Rounds/Family Conf  Outcome: Ongoing (interventions implemented as appropriate)      Problem: Skin Injury Risk (Adult)  Goal: Skin Health and Integrity  Outcome: Ongoing (interventions implemented as appropriate)      Problem: Fall Risk (Adult)  Goal: Absence of Fall  Outcome: Ongoing (interventions implemented as appropriate)      Problem: Fracture Orthopaedic (Adult)  Goal: Signs and Symptoms of Listed Potential Problems Will be Absent, Minimized or Managed (Fracture Orthopaedic)  Outcome: Ongoing (interventions implemented as appropriate)

## 2019-09-12 NOTE — PLAN OF CARE
Problem: Patient Care Overview  Goal: Plan of Care Review  Outcome: Ongoing (interventions implemented as appropriate)   09/12/19 1522   Coping/Psychosocial   Plan of Care Reviewed With patient   Plan of Care Review   Progress no change   OTHER   Outcome Summary Poor appetite, dry dressing changed this am, Q2 turning, family updated when at bedside, waiting for culture sensitivities then DC to [facility].      Goal: Individualization and Mutuality  Outcome: Ongoing (interventions implemented as appropriate)    Goal: Discharge Needs Assessment  Outcome: Ongoing (interventions implemented as appropriate)    Goal: Interprofessional Rounds/Family Conf  Outcome: Ongoing (interventions implemented as appropriate)      Problem: Skin Injury Risk (Adult)  Goal: Skin Health and Integrity  Outcome: Ongoing (interventions implemented as appropriate)      Problem: Fall Risk (Adult)  Goal: Absence of Fall  Outcome: Ongoing (interventions implemented as appropriate)      Problem: Fracture Orthopaedic (Adult)  Goal: Signs and Symptoms of Listed Potential Problems Will be Absent, Minimized or Managed (Fracture Orthopaedic)  Outcome: Ongoing (interventions implemented as appropriate)

## 2019-09-12 NOTE — PROGRESS NOTES
"     LOS: 3 days   Primary Care Physician: Fly Sanchez DO     Subjective   Family at bedside.  Patient awake and pleasant.  \"I don't know\" when I asked her how she is doing    Vital Signs  Body mass index is 27.46 kg/m².  Temp:  [98.2 °F (36.8 °C)-99.1 °F (37.3 °C)] 98.5 °F (36.9 °C)  Heart Rate:  [64-73] 64  Resp:  [18] 18  BP: (130-153)/(53-69) 140/60      Objective:  General Appearance:  In no acute distress (Looks younger than age).    Vital signs: (most recent): Blood pressure 140/60, pulse 64, temperature 98.5 °F (36.9 °C), temperature source Oral, resp. rate 18, height 170.2 cm (67\"), weight 79.5 kg (175 lb 4.8 oz), SpO2 98 %.    Lungs:  She is not in respiratory distress.  No stridor.  There are rales and decreased breath sounds.  No wheezes or rhonchi.  (Occasional crackle)  Heart: Normal rate.  Regular rhythm.  Positive for murmur.    Abdomen: Abdomen is soft and non-distended.  Bowel sounds are normal.   There is no abdominal tenderness.   There is no splenomegaly. There is no hepatomegaly.   Extremities: There is dependent edema.  (Trace at ankles)  Neurological: Patient is alert.          Results Review:    I reviewed the patient's new clinical results.    Results from last 7 days   Lab Units 09/12/19  0537 09/11/19  0318   WBC 10*3/mm3 5.37 5.77   HEMOGLOBIN g/dL 8.4* 8.4*   PLATELETS 10*3/mm3 188 199     Results from last 7 days   Lab Units 09/12/19  0537 09/11/19  0318   SODIUM mmol/L 137 139   POTASSIUM mmol/L 3.6 3.6   CHLORIDE mmol/L 101 104   CO2 mmol/L 27.5 27.5   BUN mg/dL 22 21   CREATININE mg/dL 0.85 0.79   CALCIUM mg/dL 8.3 8.4   GLUCOSE mg/dL 131* 146*         Hemoglobin A1C:  Lab Results   Component Value Date    HGBA1C 6.03 (H) 12/06/2018       Glucose Range:  Glucose   Date/Time Value Ref Range Status   09/12/2019 1116 170 (H) 70 - 130 mg/dL Final   09/12/2019 0619 149 (H) 70 - 130 mg/dL Final   09/11/2019 2051 163 (H) 70 - 130 mg/dL Final   09/11/2019 1603 309 (H) 70 - 130 mg/dL " Final   09/11/2019 1103 228 (H) 70 - 130 mg/dL Final   09/11/2019 0629 143 (H) 70 - 130 mg/dL Final       Lab Results   Component Value Date    VITAMINB12 902 08/02/2019       No results found for: TSH    Assessment & Plan      Medication Review: Yes    Active Hospital Problems    Diagnosis  POA   • **Wound infection after surgery [T81.49XA]  Yes   • Atrial fibrillation (CMS/[facility]) [I48.91]  Yes   • Displaced oblique fracture of shaft of left femur, initial encounter for closed fracture (CMS/[facility])s/p orif [S72.332A]  Yes   • Chronic venous stasis [I87.8]  Yes   • Type 2 diabetes mellitus with diabetic polyneuropathy, without long-term current use of insulin (CMS/[facility]) [E11.42]  Yes   • Spinal stenosis of lumbar region [M48.061]  Yes   • Senile osteoporosis [M81.0]  Yes   • Benign essential hypertension [I10]  Yes   • Anemia due to chronic kidney disease [N18.9, D63.1]  Yes      Resolved Hospital Problems   No resolved problems to display.       Assessment/Plan  1.  Wound cellulitis left hip.  Staph aureus on Gram stain.  Awaiting sensitivities from lab.  Continue vancomycin.  Family request physical therapy work with the patient.  She has been doing transfers with some assistance.  She had been sitting on the side of the bed prior to this admission  2.  Diabetes mellitus type 2.  Sugars are better today.  Continue to follow.  Consider low-dose oral agent if numbers consistently greater than 200.  3.  Probable underlying sleep apnea.  On O2.  Desats when she sleeps.  Family does not want work-up.  She will need O2 at discharge.  4.  Acute blood loss anemia.  Hemoglobin stable.  5.  Age-related decline in renal function, stable    Probable discharge to subacute rehab tomorrow    Zita Pace MD  09/12/19  3:26 PM

## 2019-09-12 NOTE — PROGRESS NOTES
Orthopaedic Surgery   Daily Progress Note  Dr. CHARITY Epstein II  [phone]  DEMOGRAPHICS:   · Magdalena Jerry   · Age:89 y.o.   · MRN:[id number]  · Admitted: 9/9/2019    PROCEDURE: 2 Days Post-Op s/p Procedure(s):  INCISION AND DRAINAGE LOWER EXTREMITY, left femur on a regular or table     HOSPITAL PROGRESS  · Patient Issues: Patient resting comfortably, no major issues  · Ambulation/Activity: Minimal activity since surgery     VITALS:  Vitals:    09/11/19 0730 09/11/19 1302 09/11/19 1937 09/12/19 0021   BP: 156/65 145/59 130/53 149/68   BP Location: Right arm Right arm Right arm Left arm   Patient Position: Lying Lying Lying Lying   Pulse: 65 73 65 73   Resp: 18 18 18 18   Temp: 98.2 °F (36.8 °C) 98.6 °F (37 °C) 98.6 °F (37 °C) 98.2 °F (36.8 °C)   TempSrc: Oral Oral Oral Oral   SpO2: 94% 96% 96% 93%   Weight:       Height:           PHYSICAL EXAM:  · LUNGS: Equal chest rise, no shortness of air  · CARDIOVASCULAR: brisk capillary refill intact  · WOUND: Incision clean dry and intact with minimal drainage  · EXTREMITY: Operative Leg  · Pulses: brisk capillary refill intact  · Sensation: Sensation intact to light touch to the saphenous/sural/tibial/deep peroneal/superficial peroneal nerves, and grossly throughout the extremity.  · Motor: 5/5 EHL/FHL/TA/GS motor complexes    LABS:   Lab Results   Component Value Date    HGB 8.4 (L) 09/12/2019     Lab Results   Component Value Date    WBC 5.37 09/12/2019     Lab Results   Component Value Date    GLUCOSE 131 (H) 09/12/2019    CALCIUM 8.3 09/12/2019     09/12/2019    K 3.6 09/12/2019    CO2 27.5 09/12/2019     09/12/2019    BUN 22 09/12/2019    CREATININE 0.85 09/12/2019    EGFRIFAFRI 44 (L) 08/06/2018    EGFRIFNONA 62 09/12/2019    BCR 25.9 (H) 09/12/2019    ANIONGAP 8.5 09/12/2019       ASSESSMENT: Patient is a 89 y.o. female who is 2 Days Post-Op s/p Procedure(s):  INCISION AND DRAINAGE LOWER EXTREMITY, left femur on a regular or table     PLAN:  "  · Weight Bearing: Non Weight Bearing  · Labs: Above lab values review. Plan: No intervention necessary at this time.   · PT/OT: To Mobilize  · DVT PPX: ASA 325mg Daily  · Post-Op Xray: Postoperative x-rays show reasonable healing of her fracture   · Infection: Gram stain showed gram-negative bacilli, wound culture growing gram-positive cocci  · Dispo: Pending establishment of plan of antibiotic therapy once sensitivities are final    R \"Adam\" Tete VILLA MD  Orthopaedic Surgery  [facility]  [phone]    "

## 2019-09-13 VITALS
SYSTOLIC BLOOD PRESSURE: 161 MMHG | HEIGHT: 67 IN | RESPIRATION RATE: 18 BRPM | WEIGHT: 175.3 LBS | DIASTOLIC BLOOD PRESSURE: 70 MMHG | TEMPERATURE: 98.5 F | BODY MASS INDEX: 27.51 KG/M2 | OXYGEN SATURATION: 97 % | HEART RATE: 63 BPM

## 2019-09-13 PROBLEM — D62 POSTOPERATIVE ANEMIA DUE TO ACUTE BLOOD LOSS: Status: ACTIVE | Noted: 2019-09-13

## 2019-09-13 LAB
BACTERIA SPEC AEROBE CULT: ABNORMAL
CREAT BLD-MCNC: 0.74 MG/DL (ref 0.57–1)
GFR SERPL CREATININE-BSD FRML MDRD: 73 ML/MIN/1.73
GLUCOSE BLDC GLUCOMTR-MCNC: 120 MG/DL (ref 70–130)
GLUCOSE BLDC GLUCOMTR-MCNC: 125 MG/DL (ref 70–130)
GRAM STN SPEC: ABNORMAL

## 2019-09-13 PROCEDURE — 82962 GLUCOSE BLOOD TEST: CPT

## 2019-09-13 PROCEDURE — 82565 ASSAY OF CREATININE: CPT | Performed by: INTERNAL MEDICINE

## 2019-09-13 PROCEDURE — 97161 PT EVAL LOW COMPLEX 20 MIN: CPT

## 2019-09-13 PROCEDURE — 99232 SBSQ HOSP IP/OBS MODERATE 35: CPT | Performed by: INTERNAL MEDICINE

## 2019-09-13 RX ORDER — ASPIRIN 325 MG
325 TABLET ORAL DAILY
Start: 2019-09-14 | End: 2019-09-13 | Stop reason: HOSPADM

## 2019-09-13 RX ORDER — HYDROCODONE BITARTRATE AND ACETAMINOPHEN 5; 325 MG/1; MG/1
1 TABLET ORAL EVERY 6 HOURS PRN
Qty: 12 TABLET | Refills: 0 | Status: ON HOLD | OUTPATIENT
Start: 2019-09-13 | End: 2023-02-09 | Stop reason: SDUPTHER

## 2019-09-13 RX ORDER — CEPHALEXIN 500 MG/1
500 CAPSULE ORAL EVERY 8 HOURS SCHEDULED
Status: DISCONTINUED | OUTPATIENT
Start: 2019-09-13 | End: 2019-09-13 | Stop reason: HOSPADM

## 2019-09-13 RX ORDER — CEPHALEXIN 500 MG/1
500 CAPSULE ORAL EVERY 8 HOURS SCHEDULED
Qty: 30 CAPSULE | Refills: 0
Start: 2019-09-13 | End: 2019-09-23

## 2019-09-13 RX ADMIN — FAMOTIDINE 20 MG: 20 TABLET, FILM COATED ORAL at 08:43

## 2019-09-13 RX ADMIN — ASPIRIN 325 MG: 325 TABLET ORAL at 08:43

## 2019-09-13 RX ADMIN — AMLODIPINE BESYLATE 10 MG: 10 TABLET ORAL at 08:43

## 2019-09-13 RX ADMIN — FLUTICASONE PROPIONATE 1 SPRAY: 50 SPRAY, METERED NASAL at 08:44

## 2019-09-13 RX ADMIN — SODIUM CHLORIDE, PRESERVATIVE FREE 10 ML: 5 INJECTION INTRAVENOUS at 08:44

## 2019-09-13 RX ADMIN — LACTULOSE 10 G: 10 SOLUTION ORAL at 08:43

## 2019-09-13 NOTE — PROGRESS NOTES
Case Management Discharge Note    Final Note: Returned to [facility].  Transported by [facility] ambulance    Destination - Selection Complete      Service Provider Request Status Selected Services Address Phone Number Fax Number    [facility] Selected Skilled Nursing [facility] [address] [phone]      Durable Medical Equipment      No service has been selected for the patient.      Dialysis/Infusion      No service has been selected for the patient.      Home Medical Care      No service has been selected for the patient.      Therapy      No service has been selected for the patient.      Community Resources      No service has been selected for the patient.        Transportation Services  Ambulance: [facility]    Final Discharge Disposition Code: 03 - skilled nursing facility (SNF)

## 2019-09-13 NOTE — PROGRESS NOTES
Continued Stay Note  [facility]     Patient Name: Magdalena Jerry  MRN: [id number]  Today's Date: 9/13/2019    Admit Date: 9/9/2019    Discharge Plan     Row Name 09/13/19 1012       Plan    Plan  Return to [facility] bed    Patient/Family in Agreement with Plan  yes    Plan Comments  ID has given final antibiotic plan.  Called Alicia/[facility] and they can accept back today.  [facility] ambulance setup for 1400 transport today.  Norberto Danielle RN-BC        Discharge Codes    No documentation.             Norberto Danielle, RN

## 2019-09-13 NOTE — PROGRESS NOTES
INFECTIOUS DISEASES PROGRESS NOTE    CC: f/u wound infection    S:   No sig LLE pain  No f/c/ns    O:  Physical Exam:  Temp:  [97.4 °F (36.3 °C)-99.1 °F (37.3 °C)] 98.5 °F (36.9 °C)  Heart Rate:  [62-71] 63  Resp:  [18] 18  BP: (140-161)/(60-72) 161/70  Physical Exam   Constitutional: She appears well-developed. No distress.   Pulmonary/Chest: Effort normal.   Neurological: She is alert.   Skin: Skin is warm and dry. No erythema.   Psychiatric: She has a normal mood and affect. Her behavior is normal.        Diagnostics:    Cr 0.7  glc 120-223     Microbiology:  Surgical cultures x3 MSSA     Estimated Creatinine Clearance: 47.7 mL/min (by C-G formula based on SCr of 0.74 mg/dL).    Assessment/Plan   1.  Wound cellulitis, superficial, ruled out hardware associated infection and osteomyelitis  2.  Multiple antibiotic allergies: Most appear more consistent with drug intolerances than actual hypersensitivity.  3.  Diabetes mellitus type 2, continue glycemic control efforts to prevent/control infectious complications    Cx with MSSA D/c vanc  Start keflex 500mg po q8 x 10 days (has PCN allergy but she says has tolerated PCN and keflex in past without issue)  We will see PRN      Doug Ybarra MD  9:42 AM  09/13/19

## 2019-09-13 NOTE — THERAPY EVALUATION
Patient Name: Magdalena Jerry  : 1926    MRN: [id number]                              Today's Date: 2019       Admit Date: 2019    Visit Dx:     ICD-10-CM ICD-9-CM   1. Wound infection after surgery T81.49XA 998.59   2. Hyperkalemia E87.5 276.7     Patient Active Problem List   Diagnosis   • Adjustment disorder with mixed anxiety and depressed mood   • Anemia due to chronic kidney disease   • Benign essential hypertension   • Hereditary essential tremor   • Type 2 diabetes mellitus with diabetic polyneuropathy, without long-term current use of insulin (CMS/[facility])   • Dry skin dermatitis   • Dyslipidemia   • Gastroesophageal reflux disease with esophagitis   • Pain of hand   • Hearing loss   • Arthralgia of hip   • Impacted cerumen   • Pruritus   • Knee pain   • Pain in extremity   • Chronic low back pain   • Weakness of lower extremity   • Spinal stenosis of lumbar region   • Senile osteoporosis   • Piriformis syndrome   • Primary insomnia   • Tinea pedis   • Vitamin D deficiency   • Left leg cellulitis   • Allergic rhinitis   • Pneumonia due to influenza A virus   • Hx of MRSA (methicillin resistant Staphylococcus aureus) infection   • Bacteria in urine   • Cellulitis of lower extremity   • Chronic venous stasis   • Anemia of chronic disease   • Displaced oblique fracture of shaft of left femur, initial encounter for closed fracture (CMS/[facility])s/p orif   • Acute kidney injury superimposed on chronic kidney disease (CMS/[facility])   • Acute respiratory failure with hypoxia (CMS/[facility])   • Hyperkalemia   • Thrombocytopenia (CMS/[facility])   • Atrial fibrillation (CMS/[facility])   • UTI (urinary tract infection)   • Deep vein thrombosis (DVT) of lower extremity (CMS/[facility])   • Bradycardia   • Low blood pressure reading   • Wound infection after surgery     Past Medical History:   Diagnosis Date   • Adjustment disorder with mixed anxiety and depressed mood    • Anemia    • Anxiety    • Atrial fibrillation (CMS/[facility])    •  Atrophy of hand muscles     LEFT HAND   • Benign familial tremor    • Carpal tunnel syndrome    • Cataract    • Chronic rhinosinusitis    • Deep vein thrombosis (CMS/HCC)    • Depression    • Diabetes mellitus (CMS/HCC)    • Dyslipidemia    • Esophagitis, reflux    • Fracture of hip (CMS/HCC)    • Frequent falls    • GERD (gastroesophageal reflux disease)    • Hand pain    • Hearing loss    • Hip pain    • Hyperkalemia    • Hyperlipidemia    • Hypertension    • Impacted cerumen    • Insomnia    • Knee pain    • Limb pain    • Loss of hearing    • Low back pain    • Lower extremity weakness    • Lumbar canal stenosis    • Osteoporosis, senile    • Piriformis syndrome    • Renal insufficiency 2006   • Sensorineural hearing loss    • Stroke (CMS/HCC) 2004   • Thrombocytopenia (CMS/[facility])    • Tinea pedis    • Vitamin D deficiency      Past Surgical History:   Procedure Laterality Date   • CHOLECYSTECTOMY  2013   • COLONOSCOPY     • ERCP WITH SPHINCTEROTOMY/PAPILLOTOMY  2012   • FEMUR OPEN REDUCTION INTERNAL FIXATION Left 7/30/2019    Procedure: OPEN REDUCTION INTERNAL FIXATION LEFT FEMUR;  Surgeon: Nazario Epstein II, MD;  Location: [facility];  Service: Orthopedics   • FRACTURE SURGERY Left 2006    HIP   • INCISION AND DRAINAGE LEG Left 9/10/2019    Procedure: INCISION AND DRAINAGE LOWER EXTREMITY, left femur on a regular or table;  Surgeon: Nazario Epstein II, MD;  Location: [facility];  Service: Orthopedics   • VARICOSE VEIN SURGERY Right      General Information     Row Name 09/13/19 1056          PT Evaluation Time/Intention    Document Type  evaluation  -LB     Mode of Treatment  physical therapy  -LB     Row Name 09/13/19 1054          General Information    Patient Profile Reviewed?  yes  -LB     Existing Precautions/Restrictions  non-weight bearing left LE  -LB     Barriers to Rehab  -- NWB on left LE  -LB     Row Name 09/13/19 1052          Resource/Environmental Concerns    Current  Living Arrangements  extended care facility  -LB     Row Name 09/13/19 1056          Stairs Within Home, Primary    Number of Stairs, Within Home, Primary  none  -LB     Row Name 09/13/19 1056          Safety Issues, Functional Mobility    Safety Issues Affecting Function (Mobility)  safety precautions follow-through/compliance  -LB       User Key  (r) = Recorded By, (t) = Taken By, (c) = Cosigned By    Initials Name Provider Type    Jasmine Estrada, PT Physical Therapist        Mobility     Row Name 09/13/19 1058          Bed Mobility Assessment/Treatment    Bed Mobility Assessment/Treatment  sidelying-sit-sidelying  -LB     Sidelying-Sit-Sidelying [place] (Bed Mobility)  moderate assist (50% patient effort);2 person assist  -LB     Assistive Device (Bed Mobility)  head of bed elevated;draw sheet  -LB     Row Name 09/13/19 1058          Transfer Assessment/Treatment    Comment (Transfers)  Did not attempt due to NWB  -LB     Row Name 09/13/19 1058          Mobility Assessment/Intervention    Extremity Weight-bearing Status  left lower extremity  -LB     Left Lower Extremity (Weight-bearing Status)  non weight-bearing (NWB)  -LB       User Key  (r) = Recorded By, (t) = Taken By, (c) = Cosigned By    Initials Name Provider Type    Jasmine Estrada, PT Physical Therapist        Obj/Interventions     Row Name 09/13/19 1059          General ROM    GENERAL ROM COMMENTS  right LE grossly WFL; left LE limited to pain  -LB     Row Name 09/13/19 1059          MMT (Manual Muscle Testing)    General MMT Comments  right le grossly 3-to 3/5 left le NT- ankle 3/5  -LB     Row Name 09/13/19 1059          Therapeutic Exercise    Lower Extremity (Therapeutic Exercise)  LAQ (long arc quad), left;LAQ (long arc quad), right  -LB     Lower Extremity Range of Motion (Therapeutic Exercise)  hip flexion/extension, right  -LB     Exercise Type (Therapeutic Exercise)  AAROM (active assistive range of motion)  -LB     Position  (Therapeutic Exercise)  seated  -LB     Row Name 09/13/19 1059          Static Sitting Balance    Level of [place] (Unsupported Sitting, Static Balance)  contact guard assist  -LB     Sitting Position (Unsupported Sitting, Static Balance)  sitting on edge of bed  -LB     Time Able to Maintain Position (Unsupported Sitting, Static Balance)  4 to 5 minutes  -LB       User Key  (r) = Recorded By, (t) = Taken By, (c) = Cosigned By    Initials Name Provider Type    Jasmine Estrada, PT Physical Therapist        Goals/Plan     Row Name 09/13/19 1102          Bed Mobility Goal 1 (PT)    Activity/Assistive Device (Bed Mobility Goal 1, PT)  sit to supine/supine to sit  -LB     [place] Level/Cues Needed (Bed Mobility Goal 1, PT)  moderate assist (50-74% patient effort)  -LB     Time Frame (Bed Mobility Goal 1, PT)  long term goal (LTG);5 days  -LB       User Key  (r) = Recorded By, (t) = Taken By, (c) = Cosigned By    Initials Name Provider Type    Jasmine Estrada, PT Physical Therapist        Clinical Impression     [facility] Name 09/13/19 1101          Pain Assessment    Additional Documentation  Pain Scale: FACES Pre/Post-Treatment (Group)  -LB     [facility] Name 09/13/19 1101          Pain Scale: FACES Pre/Post-Treatment    Pain: FACES Scale, Pretreatment  2-->hurts little bit  -LB     Pain: FACES Scale, Post-Treatment  4-->hurts little more  -LB     Pre/Post Treatment Pain Comment  pt states that the pain improved with lying down  -LB     Row Name 09/13/19 1101          Plan of Care Review    Plan of Care Reviewed With  patient  -LB     [facility] Name 09/13/19 1101          Physical Therapy Clinical Impression    Criteria for Skilled Interventions Met (PT Clinical Impression)  treatment indicated  -LB     Rehab Potential (PT Clinical Summary)  good, to achieve stated therapy goals  -LB     Row Name 09/13/19 1101          Positioning and Restraints    Pre-Treatment Position  in bed  -LB     Post Treatment Position  bed  -LB      In Bed  supine;call light within reach;exit alarm on  -LB       User Key  (r) = Recorded By, (t) = Taken By, (c) = Cosigned By    Initials Name Provider Type    Jasmine Estrada PT Physical Therapist        Outcome Measures     Row Name 09/13/19 1105          How much help from another person do you currently need...    Turning from your back to your side while in flat bed without using bedrails?  2  -LB     Moving from lying on back to sitting on the side of a flat bed without bedrails?  2  -LB     Moving to and from a bed to a chair (including a wheelchair)?  1  -LB     Standing up from a chair using your arms (e.g., wheelchair, bedside chair)?  1  -LB     Climbing 3-5 steps with a railing?  1  -LB     To walk in hospital room?  1  -LB     AM-PAC 6 Clicks Score (PT)  8  -LB     Row Name 09/13/19 1105          Functional Assessment    Outcome Measure Options  AM-PAC 6 Clicks Basic Mobility (PT)  -       User Key  (r) = Recorded By, (t) = Taken By, (c) = Cosigned By    Initials Name Provider Type    Jasmine Estrada PT Physical Therapist        Physical Therapy Education     Title: PT OT SLP Therapies (Done)     Topic: Physical Therapy (Done)     Point: Mobility training (Done)     Learning Progress Summary           Patient Acceptance, E,TB, VU,NR by  at 9/13/2019 11:03 AM    Comment:  TRU                   Point: Precautions (Done)     Learning Progress Summary           Patient Acceptance, E,TB, VU,NR by  at 9/13/2019 11:03 AM    Comment:  TRU                               User Key     Initials Effective Dates Name Provider Type [facility] 04/03/18 -  Jasmine Power PT Physical Therapist PT              PT Recommendation and Plan  Planned Therapy Interventions (PT Eval): bed mobility training, strengthening, transfer training  Outcome Summary/Treatment Plan (PT)  Anticipated Discharge Disposition (PT): extended care facility  Plan of Care Reviewed With: patient  Outcome Summary: Pt is 89 female who  is NWB following I and D on left LE.  Pt is alert and cooperative.  Pt needed assist of 2 to come to edge of the bed but sat with CGA while performing ther ex.  SNU planned at discharge     Time Calculation:   PT Charges     Row Name 09/13/19 1105             Time Calculation    Start Time  1041  -LB      Stop Time  1057  -LB      Time Calculation (min)  16 min  -LB      PT Received On  09/13/19  -LB      PT - Next Appointment  09/14/19  -LB      PT Goal Re-Cert Due Date  09/18/19  -LB        User Key  (r) = Recorded By, (t) = Taken By, (c) = Cosigned By    Initials Name Provider Type    LB Jasmine Power, PT Physical Therapist        Therapy Charges for Today     Code Description Service Date Service Provider Modifiers Qty    63886938348 HC PT EVAL LOW COMPLEXITY 2 9/13/2019 Jasmine Power, PT GP 1    19302006868 HC PT THER SUPP EA 15 MIN 9/13/2019 Jasmine Power, PT GP 1          PT G-Codes  Outcome Measure Options: AM-PAC 6 Clicks Basic Mobility (PT)  AM-PAC 6 Clicks Score (PT): 8    Jasmine Power PT  9/13/2019

## 2019-09-13 NOTE — PROGRESS NOTES
Continued Stay Note  [facility]     Patient Name: Magdalena Jerry  MRN: [id number]  Today's Date: 9/13/2019    Admit Date: 9/9/2019    Discharge Plan     Row Name 09/13/19 1211       Plan    Plan  Return to [facility]    Patient/Family in Agreement with Plan  yes    Plan Comments  Dr Cruz up to floor and discharge orders placed.  Notified [facility] of discharge.  Packet completed  and given to nurse    Row Name 09/13/19 1012       Plan    Plan  Return to [facility] skilled bed    Patient/Family in Agreement with Plan  yes    Plan Comments  ID has given final antibiotic plan.  Called [facility]ia/[facility] and they can accept back today.  [facility] ambulance setup for 1400 transport today.  Norberto Danielle RN-BC        Discharge Codes    No documentation.       Expected Discharge Date and Time     Expected Discharge Date Expected Discharge Time    Sep 13, 2019             Norberto Danielle, RN

## 2019-09-13 NOTE — PROGRESS NOTES
[facility]    Physicians Statement of Medical Necessity for Ambulance Transportation    It is medically necessary for:    Patient Name: Magdalena Jerry    Insurance Information:      To be transported by ambulance:    From (if nursing facility, specify level of care: skilled, senior care, etc): [facility]    To (specify level of care if nursing facility): [place] room 134    Date of Service: 9/13/19    Diagnosis: Infected left surgical thigh wound    Past Medical/Surgical History:  Past Medical History:   Diagnosis Date   • Adjustment disorder with mixed anxiety and depressed mood    • Anemia    • Anxiety    • Atrial fibrillation (CMS/HCC)    • Atrophy of hand muscles     LEFT HAND   • Benign familial tremor    • Carpal tunnel syndrome    • Cataract    • Chronic rhinosinusitis    • Deep vein thrombosis (CMS/HCC)    • Depression    • Diabetes mellitus (CMS/HCC)    • Dyslipidemia    • Esophagitis, reflux    • Fracture of hip (CMS/HCC)    • Frequent falls    • GERD (gastroesophageal reflux disease)    • Hand pain    • Hearing loss    • Hip pain    • Hyperkalemia    • Hyperlipidemia    • Hypertension    • Impacted cerumen    • Insomnia    • Knee pain    • Limb pain    • Loss of hearing    • Low back pain    • Lower extremity weakness    • Lumbar canal stenosis    • Osteoporosis, senile    • Piriformis syndrome    • Renal insufficiency 2006   • Sensorineural hearing loss    • Stroke (CMS/HCC) 2004   • Thrombocytopenia (CMS/HCC)    • Tinea pedis    • Vitamin D deficiency       Past Surgical History:   Procedure Laterality Date   • CHOLECYSTECTOMY  2013   • COLONOSCOPY     • ERCP WITH SPHINCTEROTOMY/PAPILLOTOMY  2012   • FEMUR OPEN REDUCTION INTERNAL FIXATION Left 7/30/2019    Procedure: OPEN REDUCTION INTERNAL FIXATION LEFT FEMUR;  Surgeon: Nazario Epstein II, MD;  Location: [facility];  Service: Orthopedics   • FRACTURE SURGERY Left 2006    HIP   • INCISION  AND DRAINAGE LEG Left 9/10/2019    Procedure: INCISION AND DRAINAGE LOWER EXTREMITY, left femur on a regular or table;  Surgeon: Nazario Epstein II, MD;  Location: [facility];  Service: Orthopedics   • VARICOSE VEIN SURGERY Right         Current Objective Medical Evidence(including physical exam finding to support reason for limitations):    Immobilization syndrome  Requires oxygen  Unable to sit at 90 degree angle    Other: Requires new oxygen,       Physician Signature:           (RN,NP,PA,CAN, Discharge Planner) Date/Time:      Printed Name:    __________________________________    [facility]   Phone: [phone] Phone: [phone]   Fax: [phone] Fax: [phone]

## 2019-09-13 NOTE — PLAN OF CARE
Problem: Patient Care Overview  Goal: Plan of Care Review  Outcome: Ongoing (interventions implemented as appropriate)   09/13/19 1103   Coping/Psychosocial   Plan of Care Reviewed With patient   OTHER   Outcome Summary Pt is 89 female who is NWB following I and D on left LE. Pt is alert and cooperative. Pt needed assist of 2 to come to edge of the bed but sat with CGA while performing ther ex. SNU planned at discharge

## 2019-09-13 NOTE — PLAN OF CARE
Problem: Patient Care Overview  Goal: Plan of Care Review  Outcome: Ongoing (interventions implemented as appropriate)   09/13/19 0217   Coping/Psychosocial   Plan of Care Reviewed With patient   Plan of Care Review   Progress no change   OTHER   Outcome Summary VSS; no c/o pain or nausea; no s/s of distress       Problem: Skin Injury Risk (Adult)  Goal: Skin Health and Integrity  Outcome: Ongoing (interventions implemented as appropriate)      Problem: Fall Risk (Adult)  Goal: Absence of Fall  Outcome: Ongoing (interventions implemented as appropriate)      Problem: Fracture Orthopaedic (Adult)  Goal: Signs and Symptoms of Listed Potential Problems Will be Absent, Minimized or Managed (Fracture Orthopaedic)  Outcome: Ongoing (interventions implemented as appropriate)

## 2019-09-13 NOTE — DISCHARGE SUMMARY
Date of Admission: 9/9/2019  Date of Discharge:  9/13/2019  Primary Care Physician: Fly Sanchez DO     Discharge Diagnosis:  Active Hospital Problems    Diagnosis  POA   • **Wound infection after surgery [T81.49XA]  Yes   • Postoperative anemia due to acute blood loss [D62]  Yes   • Atrial fibrillation (CMS/[facility]) [I48.91]  Yes   • Displaced oblique fracture of shaft of left femur, initial encounter for closed fracture (CMS/[facility])s/p orif [S72.332A]  Yes   • Chronic venous stasis [I87.8]  Yes   • Type 2 diabetes mellitus with diabetic polyneuropathy, without long-term current use of insulin (CMS/[facility]) [E11.42]  Yes   • Spinal stenosis of lumbar region [M48.061]  Yes   • Senile osteoporosis [M81.0]  Yes   • Benign essential hypertension [I10]  Yes   • Anemia due to chronic kidney disease [N18.9, D63.1]  Yes      Resolved Hospital Problems   No resolved problems to display.       Presenting Problem/History of Present Illness:  Wound infection after surgery [T81.49XA]     Hospital Course:  The patient is a 89 y.o. female with a history of atrial fibrillation, type 2 DM, HTN, and recent left femur fracture  who presented with ORIF 7/30/19 who was admitted from Dr. Epstein's office with evidence of a surgical site infection.  Please see admission H&P from 9/9/19 for further details.  On admission she was started on antibiotics.  Dr. Epstein with orthopedic surgery was consulted and she went to the OR for debridement    On 9/10/19 which revealed intact and healthy deep fascia.  Tissue cultures grew MSSA.  Infectious diseases evaluated the patient and recommended a 10d course of keflex.  She will be discharged to rehab to continue work on strength and mobility.  She should follow up with Dr. Epstein in 10-14d.  She did have obed nocturnal hypoxemia and the family desired no further workup.  She should have oxygen on an as needed basis.    She is on veltassa for hyperkalemia.  She was hyperkalemic on admission and received a  "dose of kayexalate. Her potassium remained in the low normal range and thus her veltassa will be stopped.  A BMP should be checked in a few days and veltassa may be restarted if needed. She should remain on a low potassium diet.    Exam Today:  Blood pressure 161/70, pulse 63, temperature 98.5 °F (36.9 °C), temperature source Oral, resp. rate 18, height 170.2 cm (67\"), weight 79.5 kg (175 lb 4.8 oz), SpO2 97 %.  General Appearance:  Awake, alert, in no distress.  Lungs:  She is not in respiratory distress.  No stridor.  No wheezes or rales.  Heart: Normal rate.  Regular rhythm.    Abdomen: Abdomen is soft and non-distended.  Bowel sounds are normal.   There is no abdominal tenderness.    Extremities: There is trace BLE edema at the ankles.  Left lower extremity surgical wound with staples c/d/i and no significant erythema.  Neurological: Patient is alert.      Procedures Performed:  Procedure(s):  INCISION AND DRAINAGE LOWER EXTREMITY, left femur on a regular or table        Consults:   Consults     Date and Time Order Name Status Description    9/10/2019 0921 Inpatient Infectious Diseases Consult Completed     9/9/2019 1642 [facility] (on-call MD unless specified) Details Completed            Discharge Disposition:  Skilled Nursing Facility (DC - External)    Discharge Medications:     Discharge Medications      New Medications      Instructions Start Date   cephalexin 500 MG capsule  Commonly known as:  KEFLEX   500 mg, Oral, Every 8 Hours Scheduled         Changes to Medications      Instructions Start Date   HYDROcodone-acetaminophen 5-325 MG per tablet  Commonly known as:  NORCO  What changed:  reasons to take this   1 tablet, Oral, Every 6 Hours PRN         Continue These Medications      Instructions Start Date   acetaminophen 325 MG tablet  Commonly known as:  TYLENOL   650 mg, Oral, Every 6 Hours PRN      amLODIPine 10 MG tablet  Commonly known as:  NORVASC   10 mg, Oral, Every 24 Hours Scheduled      apixaban 2.5 " MG tablet tablet  Commonly known as:  ELIQUIS   2.5 mg, Oral, Every 12 Hours Scheduled      fluticasone 50 MCG/ACT nasal spray  Commonly known as:  FLONASE   USE TWO SPRAYS IN EACH NOSTRIL ONCE DAILY      lactulose 10 GM/15ML solution  Commonly known as:  CHRONULAC   10 g, Oral, Daily      LANCETS MICRO THIN 33G misc   USE AS DIRECTED TO TEST BLOOD GLUCOSE DAILY      metoprolol tartrate 25 MG tablet  Commonly known as:  LOPRESSOR   25 mg, Oral, 2 Times Daily      O2  Commonly known as:  OXYGEN   2 L/min, Inhalation, As Needed      polyethylene glycol pack packet  Commonly known as:  MIRALAX   17 g, Oral, Daily PRN      raNITIdine 150 MG tablet  Commonly known as:  ZANTAC   TAKE 1 TABLET TWICE DAILY  FOR  REFLUX  ESOPHAGITIS      sennosides-docusate sodium 8.6-50 MG tablet  Commonly known as:  SENOKOT-S   2 tablets, Oral, Nightly      sertraline 100 MG tablet  Commonly known as:  ZOLOFT   TAKE 1 TABLET EVERY DAY  FOR  MOOD  AND  WELL  BEING      TRUE METRIX BLOOD GLUCOSE TEST test strip  Generic drug:  glucose blood   USE TO TEST BLOOD SUGAR ONCE DAILY AS DIRECTED      TRUE METRIX BLOOD GLUCOSE TEST test strip  Generic drug:  glucose blood   USE TO TEST BLOOD SUGAR ONCE DAILY AS DIRECTED         Stop These Medications    Patiromer Sorbitex Calcium 8.4 g pack  Commonly known as:  VELTASSA            Discharge Diet:   Diet Instructions     Diet: Consistent Carbohydrate, Specialty Diet; Thin Liquids, No Restrictions; Low Potassium      Discharge Diet:   Consistent Carbohydrate  Specialty Diet       Fluid Consistency:  Thin Liquids, No Restrictions    Specialty Diets:  Low Potassium          Activity at Discharge:   Activity Instructions     Activity as Tolerated            Follow-up Appointments:  Future Appointments   Date Time Provider Department Center   11/11/2019 12:20 PM Ayse Alves MD [facility] None     Additional Instructions for the Follow-ups that You Need to Schedule     Discharge Follow-up with  Specified Provider: Dr. Adam Epstein (Orthopedic Surgery)   As directed      To:  Dr. Adam Epstein (Orthopedic Surgery)    Follow Up Details:  10-14d         Discharge Follow-up with Specified Provider: General Practitioner or PCP at SNF   As directed      To:  General Practitioner or PCP at SNF    Follow Up Details:  1-3d               Test Results Pending at Discharge:   Order Current Status    Anaerobic Culture - Wound, Leg, Left In process    Anaerobic Culture - Wound, Leg, Left In process    Anaerobic Culture - Wound, Leg, Left In process    Blood Culture - Blood, Arm, Left Preliminary result    Blood Culture - Blood, Hand, Left Preliminary result           Raghav Cruz MD  09/13/19  12:07 PM    Time Spent on Discharge Activities: Greater than 30 minutes.

## 2019-09-14 LAB — BACTERIA SPEC AEROBE CULT: NORMAL

## 2019-09-15 LAB
BACTERIA SPEC AEROBE CULT: NORMAL
BACTERIA SPEC ANAEROBE CULT: NORMAL

## 2019-11-08 ENCOUNTER — TRANSCRIBE ORDERS (OUTPATIENT)
Dept: ADMINISTRATIVE | Facility: HOSPITAL | Age: 84
End: 2019-11-08

## 2019-11-08 ENCOUNTER — APPOINTMENT (OUTPATIENT)
Dept: LAB | Facility: HOSPITAL | Age: 84
End: 2019-11-08

## 2019-11-08 DIAGNOSIS — M21.859: Primary | ICD-10-CM

## 2019-11-08 PROCEDURE — 87205 SMEAR GRAM STAIN: CPT | Performed by: ORTHOPAEDIC SURGERY

## 2019-11-08 PROCEDURE — 87015 SPECIMEN INFECT AGNT CONCNTJ: CPT | Performed by: ORTHOPAEDIC SURGERY

## 2019-11-08 PROCEDURE — 87075 CULTR BACTERIA EXCEPT BLOOD: CPT | Performed by: ORTHOPAEDIC SURGERY

## 2019-11-08 PROCEDURE — 87070 CULTURE OTHR SPECIMN AEROBIC: CPT | Performed by: ORTHOPAEDIC SURGERY

## 2019-11-10 LAB
BACTERIA FLD CULT: NORMAL
GRAM STN SPEC: NORMAL
GRAM STN SPEC: NORMAL

## 2019-11-11 ENCOUNTER — OFFICE VISIT (OUTPATIENT)
Dept: CARDIOLOGY | Facility: CLINIC | Age: 84
End: 2019-11-11

## 2019-11-11 VITALS
WEIGHT: 175 LBS | HEART RATE: 47 BPM | DIASTOLIC BLOOD PRESSURE: 70 MMHG | BODY MASS INDEX: 27.47 KG/M2 | HEIGHT: 67 IN | RESPIRATION RATE: 16 BRPM | SYSTOLIC BLOOD PRESSURE: 148 MMHG

## 2019-11-11 DIAGNOSIS — R00.1 BRADYCARDIA: ICD-10-CM

## 2019-11-11 DIAGNOSIS — I10 BENIGN ESSENTIAL HYPERTENSION: ICD-10-CM

## 2019-11-11 DIAGNOSIS — I48.0 PAROXYSMAL ATRIAL FIBRILLATION (HCC): Primary | ICD-10-CM

## 2019-11-11 DIAGNOSIS — I87.8 CHRONIC VENOUS STASIS: ICD-10-CM

## 2019-11-11 PROCEDURE — 99214 OFFICE O/P EST MOD 30 MIN: CPT | Performed by: INTERNAL MEDICINE

## 2019-11-11 PROCEDURE — 93000 ELECTROCARDIOGRAM COMPLETE: CPT | Performed by: INTERNAL MEDICINE

## 2019-11-11 NOTE — PROGRESS NOTES
PATIENTINFORMATION    Date of Office Visit: 19  Encounter Provider: Ayse Alves MD  Place of Service: [facility] CARDIOLOGY  Patient Name: Magdalena Jerry  : 1926    Subjective:         Patient ID: Magdalena Jerry is a 89 y.o. female.      History of Present Illness    This is a nice lady who was seen in 2019 after a fall, which resulted in a left femur fracture.  She had surgery for this and we saw her for preoperative evaluation.  Cardiac workup in 2019 included an echocardiogram, which showed an ejection fraction of 64% with grade 1a diastolic dysfunction and no significant valvular heart disease.      She was diagnosed with new onset of atrial fibrillation with an elevated CHADS2-VASc score and was started on Eliquis.  She is currently in sinus rhythm with an incomplete left bundle branch block and a heart rate of 47 beats per minute.  The patient is a very poor historian and unfortunately, her family is also.      She is a poor historian with dementia.  She has a family present.  There has not been any episodes of palpitations, chest pain, shortness of breath or edema that is been noted.    Review of Systems   Constitution: Negative for fever, malaise/fatigue, weight gain and weight loss.   HENT: Negative for ear pain, hearing loss, nosebleeds and sore throat.    Eyes: Negative for double vision, pain, vision loss in left eye and vision loss in right eye.   Cardiovascular:        See history of present illness.   Respiratory: Negative for cough, shortness of breath, sleep disturbances due to breathing, snoring and wheezing.    Endocrine: Negative for cold intolerance, heat intolerance and polyuria.   Skin: Negative for itching, poor wound healing and rash.   Musculoskeletal: Negative for joint pain, joint swelling and myalgias.   Gastrointestinal: Negative for abdominal pain, diarrhea, hematochezia, nausea and vomiting.   Genitourinary: Negative for  "hematuria and hesitancy.   Neurological: Negative for numbness, paresthesias and seizures.   Psychiatric/Behavioral: Negative for depression. The patient is not nervous/anxious.            ECG 12 Lead  Date/Time: 11/11/2019 1:15 PM  Performed by: Ayse Alves MD  Authorized by: Ayse Alves MD   Comparison: compared with previous ECG from 8/23/2019  Similar to previous ECG  Rhythm: sinus bradycardia  BPM: 47  Conduction: incomplete left bundle branch block  ST Segments: ST segments normal  T Waves: T waves normal    Clinical impression: abnormal EKG               Objective:     /70 (BP Location: Right arm, Patient Position: Sitting, Cuff Size: Adult)   Pulse (!) 47   Resp 16   Ht 170.2 cm (67\")   Wt 79.4 kg (175 lb)   LMP  (LMP Unknown)   BMI 27.41 kg/m²  Body mass index is 27.41 kg/m².     Physical Exam   Constitutional: She appears well-developed.   HENT:   Head: Normocephalic and atraumatic.   Eyes: Conjunctivae and lids are normal. Pupils are equal, round, and reactive to light. Lids are everted and swept, no foreign bodies found.   Neck: Normal range of motion. No JVD present. Carotid bruit is not present. No tracheal deviation present. No thyroid mass present.   Cardiovascular: Normal rate, regular rhythm and normal heart sounds.   Pulses:       Dorsalis pedis pulses are 2+ on the right side, and 2+ on the left side.   Pulmonary/Chest: Effort normal and breath sounds normal.   Abdominal: Normal appearance and bowel sounds are normal.   Musculoskeletal: Normal range of motion.   Neurological: She is alert. She has normal strength.   Skin: Skin is warm, dry and intact.   Psychiatric: She has a normal mood and affect. Her behavior is normal.   Vitals reviewed.          Assessment/Plan:       1.  Atrial fibrillation with tachycardia. Chads-Vasc 7 so there is 12.5% risk of stroke.  She has been having bruising on her arms which is worse since she is been on the Eliquis.  I discussed risks and " benefits of anticoagulation with the patient and her family and they are agreeable to continue with Eliquis for now.  2.  History of fall with left femur fracture.  This is continued to be an issue when she follows with Dr. Epstein.  3.  Hypertension  4.  Acute on chronic kidney disease.  Nephrology following.  5.  History of stroke  6.  Diabetes  7.  DVTs    She will follow-up with [facility] in 1 year unless she has symptomatic changes before then.    Orders Placed This Encounter   Procedures   • ECG 12 Lead     This order was created via procedure documentation        Discharge Medications           Accurate as of 11/11/19  1:16 PM. If you have any questions, ask your nurse or doctor.               Continue These Medications      Instructions Start Date   acetaminophen 325 MG tablet  Commonly known as:  TYLENOL   650 mg, Oral, Every 6 Hours PRN      amLODIPine 10 MG tablet  Commonly known as:  NORVASC   10 mg, Oral, Every 24 Hours Scheduled      apixaban 2.5 MG tablet tablet  Commonly known as:  ELIQUIS   2.5 mg, Oral, Every 12 Hours Scheduled      fluticasone 50 MCG/ACT nasal spray  Commonly known as:  FLONASE   USE TWO SPRAYS IN EACH NOSTRIL ONCE DAILY      HYDROcodone-acetaminophen 5-325 MG per tablet  Commonly known as:  NORCO   1 tablet, Oral, Every 6 Hours PRN      lactulose 10 GM/15ML solution  Commonly known as:  CHRONULAC   10 g, Oral, Daily      LANCETS MICRO THIN 33G misc   USE AS DIRECTED TO TEST BLOOD GLUCOSE DAILY      metoprolol tartrate 25 MG tablet  Commonly known as:  LOPRESSOR   25 mg, Oral, 2 Times Daily      O2  Commonly known as:  OXYGEN   2 L/min, Inhalation, As Needed      polyethylene glycol pack packet  Commonly known as:  MIRALAX   17 g, Oral, Daily PRN      raNITIdine 150 MG tablet  Commonly known as:  ZANTAC   TAKE 1 TABLET TWICE DAILY  FOR  REFLUX  ESOPHAGITIS      senna-docusate sodium 8.6-50 MG tablet  Commonly known as:  SENOKOT-S   2 tablets, Oral, Nightly      sertraline 100 MG  tablet  Commonly known as:  ZOLOFT   TAKE 1 TABLET EVERY DAY  FOR  MOOD  AND  WELL  BEING      TRUE METRIX BLOOD GLUCOSE TEST test strip  Generic drug:  glucose blood   USE TO TEST BLOOD SUGAR ONCE DAILY AS DIRECTED      TRUE METRIX BLOOD GLUCOSE TEST test strip  Generic drug:  glucose blood   USE TO TEST BLOOD SUGAR ONCE DAILY AS DIRECTED                    Ayse Alves MD  11/11/19  1:16 PM

## 2019-11-13 LAB — BACTERIA SPEC ANAEROBE CULT: NORMAL

## 2019-12-04 ENCOUNTER — OFFICE VISIT (OUTPATIENT)
Dept: INFECTIOUS DISEASES | Facility: CLINIC | Age: 84
End: 2019-12-04

## 2019-12-04 VITALS
BODY MASS INDEX: 27.4 KG/M2 | TEMPERATURE: 98.6 F | HEIGHT: 67 IN | SYSTOLIC BLOOD PRESSURE: 123 MMHG | DIASTOLIC BLOOD PRESSURE: 63 MMHG | HEART RATE: 55 BPM

## 2019-12-04 DIAGNOSIS — T14.8XXA SURGICAL WOUND PRESENT: Primary | ICD-10-CM

## 2019-12-04 DIAGNOSIS — L03.116 CELLULITIS OF LEFT LOWER EXTREMITY: ICD-10-CM

## 2019-12-04 PROCEDURE — 99213 OFFICE O/P EST LOW 20 MIN: CPT | Performed by: INTERNAL MEDICINE

## 2019-12-04 RX ORDER — MULTIPLE VITAMINS W/ MINERALS TAB 9MG-400MCG
1 TAB ORAL DAILY
COMMUNITY

## 2019-12-04 RX ORDER — ASCORBIC ACID 500 MG
500 TABLET ORAL DAILY
COMMUNITY

## 2019-12-04 RX ORDER — NUT.TX,IMPAIRED RENAL FUNCTION 7.5 G-494
POWDER (GRAM) ORAL DAILY
COMMUNITY

## 2019-12-04 NOTE — PROGRESS NOTES
Very nice 89-year-old referred over by Dr. Epstein for possible suppression.      Known to me from prior hospitalization.  Briefly underwent left ORIF for distal femoral fracture after fall by Dr. Epstein on 7/30/2019.  This was complicated by purulent drainage from the inferior aspect of the wound.  Went to the operating room on 9/10 for incision debridement and infection was superficial.  She stayed on IV antibiotics but was discharged on oral Keflex.  Unfortunately she has had a residual wound in the left leg.  This was, per the son at bedside, at least an inch or so deep.  No known purulence.  She saw Dr. Epstein and had a wound culture from 11/8 which was negative.    With aggressive wound care and packing, the wound is improved.  No significant pain or erythema in that area.  Main complaint is chronic knee pain especially worse with her increased mobility at physical therapy.  She is not had any associated constitutional symptoms of infection such as fever, chills, night sweats.    On exam, she is afebrile with a left lateral mid femur superficial appearing less than a centimeter by centimeter wound that does not probe.  There is scant amount of blood but the tissues appear viable.  No superficial or deep cellulitic features.    Assessment and plan  1.  MSSA wound cellulitis following left ORIF of a distal femoral fracture status post incision and debridement on 9/10/2019  2.  Surgical wound with delayed healing    Today, the wound looks excellent.  It has started healing and is really superficial.  Given recent negative wound culture and lack of local or constitutional symptoms of infection, I think it be reasonable to hold on antibiotics in this 89-year-old that is at very high risk of toxicities.  I discussed with her and her son signs and symptoms of recrudescent infection when to seek medical attention.  I have not scheduled her follow-up appointment, but she knows she can return to see me anytime she needs

## 2019-12-20 ENCOUNTER — EPISODE CHANGES (OUTPATIENT)
Dept: CASE MANAGEMENT | Facility: OTHER | Age: 84
End: 2019-12-20

## 2020-03-10 ENCOUNTER — TELEPHONE (OUTPATIENT)
Dept: CARDIOLOGY | Facility: CLINIC | Age: 85
End: 2020-03-10

## 2020-03-10 NOTE — TELEPHONE ENCOUNTER
"The patients son is calling and states that the patient will be having a dental procedure and is requesting clearance as well as permission to hold the patients Eliquis for 3 days prior. The patients son says the letter needs to say \"for [employer] [facility] [employer] she needs to hold the Eliquis on a date to hold and the date to restart\". The patient is having the procedure on 3/16/20 by Dr Carson.  "

## 2021-06-13 NOTE — PROGRESS NOTES
"     LOS: 2 days   Primary Care Physician: Fly Sanchez, DO     Subjective   \"I do not know\" when I asked her how she is feeling.  She does say her left leg hurts when she tries to move it    Vital Signs  Body mass index is 27.46 kg/m².  Temp:  [98.2 °F (36.8 °C)-98.6 °F (37 °C)] 98.6 °F (37 °C)  Heart Rate:  [65-73] 65  Resp:  [18] 18  BP: (130-156)/(53-65) 130/53      Objective:  General Appearance:  In no acute distress (Looks younger than age.  Hard of hearing).    Vital signs: (most recent): Blood pressure 130/53, pulse 65, temperature 98.6 °F (37 °C), temperature source Oral, resp. rate 18, height 170.2 cm (67\"), weight 79.5 kg (175 lb 4.8 oz), SpO2 96 %.    Lungs:  She is not in respiratory distress.  No stridor.  There are decreased breath sounds.  No rales, wheezes or rhonchi.    Heart: Normal rate.  Regular rhythm.  Positive for murmur.  (Grade 3/6 systolic ejection murmur right left sternal borders)  Abdomen: Abdomen is soft and non-distended.  There is no abdominal tenderness.   There is no splenomegaly. There is no hepatomegaly.   Extremities: There is dependent edema.  (Trace left ankle)  Neurological: Patient is alert.    Skin:  Warm.          Results Review:    I reviewed the patient's new clinical results.    Results from last 7 days   Lab Units 09/11/19  0318 09/10/19  0548   WBC 10*3/mm3 5.77 4.87   HEMOGLOBIN g/dL 8.4* 9.1*   PLATELETS 10*3/mm3 199 229     Results from last 7 days   Lab Units 09/11/19  0318 09/10/19  0548   SODIUM mmol/L 139 139   POTASSIUM mmol/L 3.6 3.3*   CHLORIDE mmol/L 104 103   CO2 mmol/L 27.5 27.0   BUN mg/dL 21 32*   CREATININE mg/dL 0.79 0.98   CALCIUM mg/dL 8.4 8.3   GLUCOSE mg/dL 146* 112*         Hemoglobin A1C:  Lab Results   Component Value Date    HGBA1C 6.03 (H) 12/06/2018       Glucose Range:  Glucose   Date/Time Value Ref Range Status   09/11/2019 1603 309 (H) 70 - 130 mg/dL Final   09/11/2019 1103 228 (H) 70 - 130 mg/dL Final   09/11/2019 0629 143 (H) 70 - " 130 mg/dL Final   09/10/2019 2023 185 (H) 70 - 130 mg/dL Final   09/10/2019 1803 101 70 - 130 mg/dL Final   09/10/2019 1047 128 70 - 130 mg/dL Final       Lab Results   Component Value Date    XROCWOSD852 902 08/02/2019       No results found for: TSH    Assessment & Plan      Medication Review: Yes    Active Hospital Problems    Diagnosis  POA   • **Wound infection after surgery [T81.49XA]  Yes   • Atrial fibrillation (CMS/[facility]) [I48.91]  Yes   • Displaced oblique fracture of shaft of left femur, initial encounter for closed fracture (CMS/[facility])s/p orif [S72.332A]  Yes   • Chronic venous stasis [I87.8]  Yes   • CKD (chronic kidney disease) stage 3, GFR 30-59 ml/min (CMS/[facility]) [N18.3]  Yes   • Type 2 diabetes mellitus with diabetic polyneuropathy, without long-term current use of insulin (CMS/[facility]) [E11.42]  Yes   • Spinal stenosis of lumbar region [M48.061]  Yes   • Senile osteoporosis [M81.0]  Yes   • Benign essential hypertension [I10]  Yes   • Anemia due to chronic kidney disease [N18.9, D63.1]  Yes      Resolved Hospital Problems   No resolved problems to display.       Assessment/Plan  1.  Superficial wound infection left hip ORIF July 2019.  On vancomycin.  Await operative cultures.  Appreciate input from specialist  2.  Diabetes mellitus type 2.  Sugars have been fine until the last several.  Change diet to no concentrated sweets.  A1c was only 6.  Continue sliding scale.  If numbers remain elevated, consider starting a scheduled med  3.  Acute blood loss anemia secondary to surgery.  Recheck in a.m.  Asymptomatic  4.  Hypertension.  She is on Norvasc with parameters for which to hold  5.  History of atrial fibrillation though sinus with ectopy here.    Zita Pace MD  09/11/19  7:41 PM         symmetrical

## 2021-09-21 ENCOUNTER — LAB REQUISITION (OUTPATIENT)
Dept: LAB | Facility: HOSPITAL | Age: 86
End: 2021-09-21

## 2021-09-21 ENCOUNTER — OFFICE VISIT (OUTPATIENT)
Dept: WOUND CARE | Facility: HOSPITAL | Age: 86
End: 2021-09-21

## 2021-09-21 DIAGNOSIS — E11.621 TYPE 2 DIABETES MELLITUS WITH FOOT ULCER (CODE) (HCC): ICD-10-CM

## 2021-09-21 PROCEDURE — 87075 CULTR BACTERIA EXCEPT BLOOD: CPT | Performed by: SURGERY

## 2021-09-21 PROCEDURE — G0463 HOSPITAL OUTPT CLINIC VISIT: HCPCS

## 2021-09-21 PROCEDURE — 87070 CULTURE OTHR SPECIMN AEROBIC: CPT | Performed by: SURGERY

## 2021-09-21 PROCEDURE — 87205 SMEAR GRAM STAIN: CPT | Performed by: SURGERY

## 2021-09-24 LAB
BACTERIA SPEC AEROBE CULT: NORMAL
GRAM STN SPEC: NORMAL
GRAM STN SPEC: NORMAL

## 2021-09-26 LAB — BACTERIA SPEC ANAEROBE CULT: NORMAL

## 2021-10-05 ENCOUNTER — OFFICE VISIT (OUTPATIENT)
Dept: WOUND CARE | Facility: HOSPITAL | Age: 86
End: 2021-10-05

## 2021-10-05 PROCEDURE — G0463 HOSPITAL OUTPT CLINIC VISIT: HCPCS

## 2021-10-12 ENCOUNTER — HOSPITAL ENCOUNTER (EMERGENCY)
Facility: HOSPITAL | Age: 86
Discharge: HOME | End: 2021-10-13
Attending: EMERGENCY MEDICINE | Admitting: EMERGENCY MEDICINE

## 2021-10-12 ENCOUNTER — APPOINTMENT (OUTPATIENT)
Dept: GENERAL RADIOLOGY | Facility: HOSPITAL | Age: 86
End: 2021-10-12

## 2021-10-12 DIAGNOSIS — Z86.39 HISTORY OF DIABETES MELLITUS: ICD-10-CM

## 2021-10-12 DIAGNOSIS — L03.032 CELLULITIS OF SECOND TOE OF LEFT FOOT: Primary | ICD-10-CM

## 2021-10-12 DIAGNOSIS — L03.116 CELLULITIS OF LEFT FOOT: ICD-10-CM

## 2021-10-12 DIAGNOSIS — Z86.79 HISTORY OF ATRIAL FIBRILLATION: ICD-10-CM

## 2021-10-12 DIAGNOSIS — Z86.73 HISTORY OF CVA (CEREBROVASCULAR ACCIDENT): ICD-10-CM

## 2021-10-12 LAB
ANION GAP SERPL CALCULATED.3IONS-SCNC: 8.4 MMOL/L (ref 5–15)
BASOPHILS # BLD AUTO: 0.03 10*3/MM3 (ref 0–0.2)
BASOPHILS NFR BLD AUTO: 0.8 % (ref 0–1.5)
BUN SERPL-MCNC: 28 MG/DL (ref 8–23)
BUN/CREAT SERPL: 21.1 (ref 7–25)
CALCIUM SPEC-SCNC: 8.8 MG/DL (ref 8.2–9.6)
CHLORIDE SERPL-SCNC: 106 MMOL/L (ref 98–107)
CO2 SERPL-SCNC: 24.6 MMOL/L (ref 22–29)
CREAT SERPL-MCNC: 1.33 MG/DL (ref 0.57–1)
DEPRECATED RDW RBC AUTO: 43.9 FL (ref 37–54)
EOSINOPHIL # BLD AUTO: 0.11 10*3/MM3 (ref 0–0.4)
EOSINOPHIL NFR BLD AUTO: 3 % (ref 0.3–6.2)
ERYTHROCYTE [DISTWIDTH] IN BLOOD BY AUTOMATED COUNT: 12.7 % (ref 12.3–15.4)
GFR SERPL CREATININE-BSD FRML MDRD: 37 ML/MIN/1.73
GLUCOSE SERPL-MCNC: 127 MG/DL (ref 65–99)
HCT VFR BLD AUTO: 42.4 % (ref 34–46.6)
HGB BLD-MCNC: 12.9 G/DL (ref 12–15.9)
IMM GRANULOCYTES # BLD AUTO: 0.01 10*3/MM3 (ref 0–0.05)
IMM GRANULOCYTES NFR BLD AUTO: 0.3 % (ref 0–0.5)
LYMPHOCYTES # BLD AUTO: 1.09 10*3/MM3 (ref 0.7–3.1)
LYMPHOCYTES NFR BLD AUTO: 29.5 % (ref 19.6–45.3)
MCH RBC QN AUTO: 29 PG (ref 26.6–33)
MCHC RBC AUTO-ENTMCNC: 30.4 G/DL (ref 31.5–35.7)
MCV RBC AUTO: 95.3 FL (ref 79–97)
MONOCYTES # BLD AUTO: 0.46 10*3/MM3 (ref 0.1–0.9)
MONOCYTES NFR BLD AUTO: 12.5 % (ref 5–12)
NEUTROPHILS NFR BLD AUTO: 1.99 10*3/MM3 (ref 1.7–7)
NEUTROPHILS NFR BLD AUTO: 53.9 % (ref 42.7–76)
NRBC BLD AUTO-RTO: 0 /100 WBC (ref 0–0.2)
PLATELET # BLD AUTO: 194 10*3/MM3 (ref 140–450)
PMV BLD AUTO: 9.2 FL (ref 6–12)
POTASSIUM SERPL-SCNC: 5.4 MMOL/L (ref 3.5–5.2)
RBC # BLD AUTO: 4.45 10*6/MM3 (ref 3.77–5.28)
SODIUM SERPL-SCNC: 139 MMOL/L (ref 136–145)
WBC # BLD AUTO: 3.69 10*3/MM3 (ref 3.4–10.8)

## 2021-10-12 PROCEDURE — 96365 THER/PROPH/DIAG IV INF INIT: CPT

## 2021-10-12 PROCEDURE — 25010000002 CEFTRIAXONE PER 250 MG: Performed by: EMERGENCY MEDICINE

## 2021-10-12 PROCEDURE — 85025 COMPLETE CBC W/AUTO DIFF WBC: CPT | Performed by: EMERGENCY MEDICINE

## 2021-10-12 PROCEDURE — 80048 BASIC METABOLIC PNL TOTAL CA: CPT | Performed by: EMERGENCY MEDICINE

## 2021-10-12 PROCEDURE — 73630 X-RAY EXAM OF FOOT: CPT

## 2021-10-12 PROCEDURE — 99284 EMERGENCY DEPT VISIT MOD MDM: CPT

## 2021-10-12 RX ORDER — CEPHALEXIN 500 MG/1
500 CAPSULE ORAL 3 TIMES DAILY
Qty: 30 CAPSULE | Refills: 0 | Status: SHIPPED | OUTPATIENT
Start: 2021-10-12 | End: 2021-10-22

## 2021-10-12 RX ADMIN — SODIUM CHLORIDE 500 ML: 9 INJECTION, SOLUTION INTRAVENOUS at 22:39

## 2021-10-12 RX ADMIN — CEFTRIAXONE 1 G: 1 INJECTION, POWDER, FOR SOLUTION INTRAMUSCULAR; INTRAVENOUS at 22:42

## 2021-10-13 VITALS
DIASTOLIC BLOOD PRESSURE: 79 MMHG | SYSTOLIC BLOOD PRESSURE: 177 MMHG | TEMPERATURE: 97.5 F | HEART RATE: 67 BPM | RESPIRATION RATE: 18 BRPM | OXYGEN SATURATION: 90 %

## 2021-10-13 NOTE — ED PROVIDER NOTES
EMERGENCY DEPARTMENT ENCOUNTER    Room Number:  09/09  Date of encounter:  10/12/2021  PCP: Emerson Mir MD (Inactive)  Historian: Patient, son      HPI:  Chief Complaint: Left toe infection  A complete HPI/ROS/PMH/PSH/SH/FH are unobtainable due to: None    Context: Magdalena Jerry is a 89 y.o. female who presents to the ED via [facility] from [place] for wound check of her left second toe.'s been treated by wound care and the [facility] for several months with waxing and waning course.  Has been recently more severe over the last couple days.  Patient reports increased pain with the area.  Son states that the area of redness has spread from the toe into the dorsal aspect of the foot.  Reports that the wound on the top of the second toe is bigger as well.  No fevers, chills, vomiting, diarrhea.  Eating and drinking normally.  No recent antibiotics.  Has had several rounds of antibiotics and x-rays with no evidence of osteomyelitis previously, though not recently.      MEDICAL RECORD REVIEW    Antibiotic allergies reviewed to tetracyclines, amoxicillin, erythromycin, macrolides Lacie lites, neomycin, penicillins, and sulfa antibiotics.  None of which are severe mostly GI intolerance or rash    PAST MEDICAL HISTORY  Active Ambulatory Problems     Diagnosis Date Noted   • Adjustment disorder with mixed anxiety and depressed mood 03/25/2016   • Anemia due to chronic kidney disease 03/25/2016   • Benign essential hypertension 03/25/2016   • Hereditary essential tremor 03/25/2016   • Type 2 diabetes mellitus with diabetic polyneuropathy, without long-term current use of insulin (HCC) 03/25/2016   • Dry skin dermatitis 03/25/2016   • Dyslipidemia 03/25/2016   • Gastroesophageal reflux disease with esophagitis 03/25/2016   • Pain of hand 03/25/2016   • Hearing loss 03/25/2016   • Arthralgia of hip 03/25/2016   • Impacted cerumen 03/25/2016   • Pruritus 03/25/2016   • Knee pain 03/25/2016   • Pain in  extremity 03/25/2016   • Chronic low back pain 03/25/2016   • Weakness of lower extremity 03/25/2016   • Spinal stenosis of lumbar region 03/25/2016   • Senile osteoporosis 03/25/2016   • Piriformis syndrome 03/25/2016   • Primary insomnia 03/25/2016   • Tinea pedis 03/25/2016   • Vitamin D deficiency 03/25/2016   • Left leg cellulitis 03/15/2017   • Allergic rhinitis 03/16/2017   • Pneumonia due to influenza A virus 03/17/2017   • Hx of MRSA (methicillin resistant Staphylococcus aureus) infection 03/18/2017   • Bacteria in urine 03/19/2017   • Cellulitis of lower extremity 12/03/2018   • Chronic venous stasis 12/03/2018   • Anemia of chronic disease 12/06/2018   • Displaced oblique fracture of shaft of left femur, initial encounter for closed fracture (CMS/[facility])s/p orif 07/29/2019   • Acute kidney injury superimposed on chronic kidney disease ([facility]) 07/29/2019   • Acute respiratory failure with hypoxia ([facility]) 07/29/2019   • Hyperkalemia 07/31/2019   • Thrombocytopenia ([facility]) 08/01/2019   • Atrial fibrillation ([facility]) 08/05/2019   • UTI (urinary tract infection) 08/05/2019   • Deep vein thrombosis (DVT) of lower extremity ([facility]) 08/05/2019   • Bradycardia 08/23/2019   • Low blood pressure reading 08/23/2019   • Wound infection after surgery 09/09/2019   • Postoperative anemia due to acute blood loss 09/13/2019     Resolved Ambulatory Problems     Diagnosis Date Noted   • Acute otitis externa 03/25/2016   • Acute sinusitis 03/25/2016   • Chronic sinusitis 03/25/2016     Past Medical History:   Diagnosis Date   • Anemia    • Anxiety    • Atrophy of hand muscles    • Benign familial tremor    • Carpal tunnel syndrome    • Cataract    • Chronic rhinosinusitis    • Deep vein thrombosis (CMS/[facility])    • Depression    • Diabetes mellitus (CMS/[facility])    • Esophagitis, reflux    • Fracture of hip (CMS/[facility])    • Frequent falls    • GERD (gastroesophageal reflux disease)    • Hand pain    • Hip pain    • Hyperlipidemia    • Hypertension     • Insomnia    • Limb pain    • Loss of hearing    • Low back pain    • Lower extremity weakness    • Lumbar canal stenosis    • Osteoporosis, senile    • Renal insufficiency 2006   • Sensorineural hearing loss    • Stroke (CMS/HCC) 2004         PAST SURGICAL HISTORY  Past Surgical History:   Procedure Laterality Date   • CHOLECYSTECTOMY  2013   • COLONOSCOPY     • ERCP WITH SPHINCTEROTOMY/PAPILLOTOMY  2012   • FEMUR OPEN REDUCTION INTERNAL FIXATION Left 7/30/2019    Procedure: OPEN REDUCTION INTERNAL FIXATION LEFT FEMUR;  Surgeon: Nazario Epstein II, MD;  Location: [facility] OR;  Service: Orthopedics   • FRACTURE SURGERY Left 2006    HIP   • INCISION AND DRAINAGE LEG Left 9/10/2019    Procedure: INCISION AND DRAINAGE LOWER EXTREMITY, left femur on a regular or table;  Surgeon: Nazario Epstein II, MD;  Location: [facility] OR;  Service: Orthopedics   • VARICOSE VEIN SURGERY Right          FAMILY HISTORY  No family history on file.      SOCIAL HISTORY  Social History     Socioeconomic History   • Marital status:    Tobacco Use   • Smoking status: Never Smoker   • Smokeless tobacco: Never Used   Substance and Sexual Activity   • Alcohol use: No   • Drug use: No         ALLERGIES  Tetracyclines & related, Amoxicillin, Benzodiazepines, Codeine, Erythromycin, Macrolides and ketolides, Melatonin, Neomycin, Penicillins, and Sulfa antibiotics        REVIEW OF SYSTEMS  Review of Systems     All systems reviewed and negative except for those discussed in HPI.       PHYSICAL EXAM    I have reviewed the triage vital signs and nursing notes.    ED Triage Vitals [10/12/21 1733]   Temp Heart Rate Resp BP SpO2   97.5 °F (36.4 °C) 55 18 105/85 97 %      Temp src Heart Rate Source Patient Position BP Location FiO2 (%)   -- Monitor -- -- --       Physical Exam  General: Awake, alert, no acute distress  HEENT: Mucous membranes moist, atraumatic, EOMI  Neck: Full ROM  Pulm: Symmetric chest rise, nonlabored,  lungs CTAB  Cardiovascular: Regular rate and rhythm, intact distal pulses  GI: Soft, nontender, nondistended, no rebound, no guarding, bowel sounds present  MSK: Full ROM, no deformity.  Left second digit with a granulomatous lesion on the dorsal aspect of the second toe with surrounding erythema in that toe is tracking into the dorsal aspect of the mid and lateral left mid foot.  The toe is tender to touch  Skin: Warm, dry  Neuro: Alert and oriented x 3, GCS 15, moving all extremities, no focal deficits  Psych: Calm, cooperative      N95, protective eye goggles, and gloves used during this encounter. Patient in surgical mask.      LAB RESULTS  Recent Results (from the past 24 hour(s))   Basic Metabolic Panel    Collection Time: 10/12/21  8:48 PM    Specimen: Blood   Result Value Ref Range    Glucose 127 (H) 65 - 99 mg/dL    BUN 28 (H) 8 - 23 mg/dL    Creatinine 1.33 (H) 0.57 - 1.00 mg/dL    Sodium 139 136 - 145 mmol/L    Potassium 5.4 (H) 3.5 - 5.2 mmol/L    Chloride 106 98 - 107 mmol/L    CO2 24.6 22.0 - 29.0 mmol/L    Calcium 8.8 8.2 - 9.6 mg/dL    eGFR Non African Amer 37 (L) >60 mL/min/1.73    BUN/Creatinine Ratio 21.1 7.0 - 25.0    Anion Gap 8.4 5.0 - 15.0 mmol/L   CBC Auto Differential    Collection Time: 10/12/21  8:48 PM    Specimen: Blood   Result Value Ref Range    WBC 3.69 3.40 - 10.80 10*3/mm3    RBC 4.45 3.77 - 5.28 10*6/mm3    Hemoglobin 12.9 12.0 - 15.9 g/dL    Hematocrit 42.4 34.0 - 46.6 %    MCV 95.3 79.0 - 97.0 fL    MCH 29.0 26.6 - 33.0 pg    MCHC 30.4 (L) 31.5 - 35.7 g/dL    RDW 12.7 12.3 - 15.4 %    RDW-SD 43.9 37.0 - 54.0 fl    MPV 9.2 6.0 - 12.0 fL    Platelets 194 140 - 450 10*3/mm3    Neutrophil % 53.9 42.7 - 76.0 %    Lymphocyte % 29.5 19.6 - 45.3 %    Monocyte % 12.5 (H) 5.0 - 12.0 %    Eosinophil % 3.0 0.3 - 6.2 %    Basophil % 0.8 0.0 - 1.5 %    Immature Grans % 0.3 0.0 - 0.5 %    Neutrophils, Absolute 1.99 1.70 - 7.00 10*3/mm3    Lymphocytes, Absolute 1.09 0.70 - 3.10 10*3/mm3     Monocytes, Absolute 0.46 0.10 - 0.90 10*3/mm3    Eosinophils, Absolute 0.11 0.00 - 0.40 10*3/mm3    Basophils, Absolute 0.03 0.00 - 0.20 10*3/mm3    Immature Grans, Absolute 0.01 0.00 - 0.05 10*3/mm3    nRBC 0.0 0.0 - 0.2 /100 WBC       Ordered the above labs and independently reviewed the results.        RADIOLOGY  XR Foot 3+ View Left    Result Date: 10/12/2021  Patient: AMBAR MORALES  Time Out: 21:25 Exam(s): FILM LEFT FOOT EXAM:   XR Left Foot Complete, 3 or More Views CLINICAL HISTORY:    Reason for exam: toe infection, eval osteo. TECHNIQUE:   Frontal, lateral and oblique views of the left foot. COMPARISON:   No previous study. FINDINGS:   Bones joints:  There is osteopenia.  Moderate to severe osteoarthritic changes at the DIP and PIP joints.  Subluxation is noted at the left second toe PIP joint.  Calcaneus is unremarkable.  No acute fracture.   Soft tissues:  There is extensive soft tissue swelling about the left mid foot.  No radiopaque foreign body.   Vasculature:  Vascular calcifications are noted compatible with diabetes. IMPRESSION:     1.  Osteopenia. 2.  Vascular calcifications are noted compatible with diabetes. 3.  Subluxation at the PIP joint of the left second toe. 4.  Diffuse soft tissue swelling worrisome for cellulitis. 5.  If there is concern for cellulitis or osteomyelitis, MRI imaging of the left foot should be performed.     Electronically signed by Rom Tompkins MD on 10-12-21 at 2125      I ordered the above noted radiological studies. Reviewed by me.  See dictation for official radiology interpretation.      PROCEDURES    Procedures      MEDICATIONS GIVEN IN ER    Medications   sodium chloride 0.9 % bolus 500 mL (has no administration in time range)   cefTRIAXone (ROCEPHIN) 1 g in sodium chloride 0.9 % 100 mL IVPB-VTB (has no administration in time range)         PROGRESS, DATA ANALYSIS, CONSULTS, AND MEDICAL DECISION MAKING    All labs have been independently reviewed by me.   All radiology studies have been reviewed by me and discussed with radiologist dictating the report.   EKG's independently viewed and interpreted by me.  Discussion below represents my analysis of pertinent findings related to patient's condition, differential diagnosis, treatment plan and final disposition.    Initial concern for cellulitis, osteomyelitis, renal failure, electrolyte abnormalities, sepsis, among others.    ED Course as of 10/12/21 2238   Tue Oct 12, 2021   2100 WBC: 3.69 [DC]   2100 Hemoglobin: 12.9 [DC]   2215 Glucose(!): 127 [DC]   2215 BUN(!): 28 [DC]   2215 Creatinine(!): 1.33 [DC]   2215 Potassium(!): 5.4 [DC]   2226 I updated patient and her son on the findings today, with no definitive osteomyelitis and overall reassuring blood work.  I did discuss with him that I cannot 100% rule out osteomyelitis based on x-ray alone, and stated that we certainly could consider hospitalization versus discharge on antibiotics.  Did discuss potential bad outcomes with the worsening condition if antibiotics do not work well in the outpatient setting including potential worsening of infection, sepsis, or potential amputation.  They understand these risks, but at this time there prefer to go home, I think this is a reasonable option, will give a dose of antibiotics here in the emergency department and a prescription.  I do have an appointment with wound care next Tuesday which I encouraged him to maintain.  PCP follow-up as well, ED return for worsening symptoms as needed. [DC]      ED Course User Index  [DC] Maxwell Strickland MD       AS OF 22:38 EDT VITALS:    BP - 105/85  HR - 55  TEMP - 97.5 °F (36.4 °C)  02 SATS - 97%        DIAGNOSIS  Final diagnoses:   Cellulitis of second toe of left foot   Cellulitis of left foot   History of diabetes mellitus   History of atrial fibrillation   History of CVA (cerebrovascular accident)         DISPOSITION  DISCHARGE    Patient discharged in stable condition.    Reviewed  implications of results, diagnosis, meds, responsibility to follow up, warning signs and symptoms of possible worsening, potential complications and reasons to return to ER.    Patient/Family voiced understanding of above instructions.    Discussed plan for discharge, as there is no emergent indication for admission. Patient referred to primary care provider for BP management due to today's BP. Pt/family is agreeable and understands need for follow up and repeat testing.  Pt is aware that discharge does not mean that nothing is wrong but it indicates no emergency is present that requires admission and they must continue care with follow-up as given below or physician of their choice.     FOLLOW-UP  [facility] Emergency Department  [address] [facility] [address] [phone]    As needed, If symptoms worsen    Emerson Mir MD  [address] [facility] [address] [phone]    Schedule an appointment as soon as possible for a visit       Phoenix Almeida MD  [address] [facility] [address] [phone]    Go on 10/19/2021  As scheduled         Medication List      New Prescriptions    cephalexin 500 MG capsule  Commonly known as: KEFLEX  Take 1 capsule by mouth 3 (Three) Times a Day for 10 days.           Where to Get Your Medications      You can get these medications from any pharmacy    Bring a paper prescription for each of these medications  · cephalexin 500 MG capsule                    Maxwell Strickland MD  10/12/21 2807

## 2021-10-13 NOTE — CASE MANAGEMENT/SOCIAL WORK
[facility] informed of need to transport pt to facility. Unable to transport pt until 0700 10-. Emily Lott RN

## 2021-10-13 NOTE — DISCHARGE INSTRUCTIONS
Complete course of antibiotics as prescribed, keep wound nice and clean with continued good wound care at facility.  Follow-up with your wound care physician as scheduled, PCP follow-up for recheck this week, ED return for worsening symptoms as needed.

## 2021-10-13 NOTE — ED NOTES
Pt wearing mask while staff is in room, RN wearing N95 and goggles while providing care     Cyril Crowe RN  10/12/21 2025

## 2021-10-13 NOTE — ED NOTES
Report to FNIA Amato at [facility].      Sheryl Sanchez RN  10/13/21 6711

## 2021-10-19 ENCOUNTER — OFFICE VISIT (OUTPATIENT)
Dept: WOUND CARE | Facility: HOSPITAL | Age: 86
End: 2021-10-19

## 2021-10-19 PROCEDURE — G0463 HOSPITAL OUTPT CLINIC VISIT: HCPCS

## 2021-11-09 ENCOUNTER — OFFICE VISIT (OUTPATIENT)
Dept: WOUND CARE | Facility: HOSPITAL | Age: 86
End: 2021-11-09

## 2021-11-09 PROCEDURE — 17250 CHEM CAUT OF GRANLTJ TISSUE: CPT

## 2021-11-30 ENCOUNTER — OFFICE VISIT (OUTPATIENT)
Dept: WOUND CARE | Facility: HOSPITAL | Age: 86
End: 2021-11-30

## 2021-11-30 PROCEDURE — G0463 HOSPITAL OUTPT CLINIC VISIT: HCPCS

## 2021-12-21 ENCOUNTER — OFFICE VISIT (OUTPATIENT)
Dept: WOUND CARE | Facility: HOSPITAL | Age: 86
End: 2021-12-21

## 2021-12-21 PROCEDURE — 17250 CHEM CAUT OF GRANLTJ TISSUE: CPT

## 2022-01-11 ENCOUNTER — OFFICE VISIT (OUTPATIENT)
Dept: WOUND CARE | Facility: HOSPITAL | Age: 87
End: 2022-01-11

## 2022-01-11 PROCEDURE — 17250 CHEM CAUT OF GRANLTJ TISSUE: CPT

## 2022-02-01 ENCOUNTER — APPOINTMENT (OUTPATIENT)
Dept: WOUND CARE | Facility: HOSPITAL | Age: 87
End: 2022-02-01

## 2022-02-08 ENCOUNTER — OFFICE VISIT (OUTPATIENT)
Dept: WOUND CARE | Facility: HOSPITAL | Age: 87
End: 2022-02-08

## 2022-02-08 PROCEDURE — G0463 HOSPITAL OUTPT CLINIC VISIT: HCPCS

## 2022-03-01 ENCOUNTER — HOSPITAL ENCOUNTER (OUTPATIENT)
Dept: GENERAL RADIOLOGY | Facility: HOSPITAL | Age: 87
Discharge: HOME | End: 2022-03-01

## 2022-03-01 ENCOUNTER — OFFICE VISIT (OUTPATIENT)
Dept: WOUND CARE | Facility: HOSPITAL | Age: 87
End: 2022-03-01

## 2022-03-01 ENCOUNTER — LAB REQUISITION (OUTPATIENT)
Dept: LAB | Facility: HOSPITAL | Age: 87
End: 2022-03-01

## 2022-03-01 DIAGNOSIS — L97.522 NON-PRS CHRONIC ULCER OTH PRT LEFT FOOT W FAT LAYER EXPOSED: ICD-10-CM

## 2022-03-01 DIAGNOSIS — Z00.00 ENCOUNTER FOR GENERAL ADULT MEDICAL EXAMINATION WITHOUT ABNORMAL FINDINGS: ICD-10-CM

## 2022-03-01 PROCEDURE — 87077 CULTURE AEROBIC IDENTIFY: CPT | Performed by: SURGERY

## 2022-03-01 PROCEDURE — 87075 CULTR BACTERIA EXCEPT BLOOD: CPT | Performed by: SURGERY

## 2022-03-01 PROCEDURE — 87186 SC STD MICRODIL/AGAR DIL: CPT | Performed by: SURGERY

## 2022-03-01 PROCEDURE — 87070 CULTURE OTHR SPECIMN AEROBIC: CPT | Performed by: SURGERY

## 2022-03-01 PROCEDURE — 87205 SMEAR GRAM STAIN: CPT | Performed by: SURGERY

## 2022-03-01 PROCEDURE — 73620 X-RAY EXAM OF FOOT: CPT

## 2022-03-01 PROCEDURE — 87076 CULTURE ANAEROBE IDENT EACH: CPT | Performed by: SURGERY

## 2022-03-01 PROCEDURE — G0463 HOSPITAL OUTPT CLINIC VISIT: HCPCS

## 2022-03-05 LAB
BACTERIA SPEC AEROBE CULT: ABNORMAL
GRAM STN SPEC: ABNORMAL

## 2022-03-06 LAB — BACTERIA SPEC ANAEROBE CULT: ABNORMAL

## 2022-03-15 ENCOUNTER — OFFICE VISIT (OUTPATIENT)
Dept: WOUND CARE | Facility: HOSPITAL | Age: 87
End: 2022-03-15

## 2022-03-15 PROCEDURE — G0463 HOSPITAL OUTPT CLINIC VISIT: HCPCS

## 2022-03-29 ENCOUNTER — OFFICE VISIT (OUTPATIENT)
Dept: WOUND CARE | Facility: HOSPITAL | Age: 87
End: 2022-03-29

## 2022-03-29 PROCEDURE — G0463 HOSPITAL OUTPT CLINIC VISIT: HCPCS

## 2022-04-19 ENCOUNTER — APPOINTMENT (OUTPATIENT)
Dept: WOUND CARE | Facility: HOSPITAL | Age: 87
End: 2022-04-19

## 2022-04-26 ENCOUNTER — OFFICE VISIT (OUTPATIENT)
Dept: WOUND CARE | Facility: HOSPITAL | Age: 87
End: 2022-04-26

## 2022-04-26 PROCEDURE — G0463 HOSPITAL OUTPT CLINIC VISIT: HCPCS

## 2022-05-10 ENCOUNTER — LAB REQUISITION (OUTPATIENT)
Dept: LAB | Facility: HOSPITAL | Age: 87
End: 2022-05-10

## 2022-05-10 ENCOUNTER — OFFICE VISIT (OUTPATIENT)
Dept: WOUND CARE | Facility: HOSPITAL | Age: 87
End: 2022-05-10

## 2022-05-10 DIAGNOSIS — L97.522 NON-PRESSURE CHRONIC ULCER OF OTHER PART OF LEFT FOOT WITH FAT LAYER EXPOSED: ICD-10-CM

## 2022-05-10 DIAGNOSIS — E11.621 TYPE 2 DIABETES MELLITUS WITH FOOT ULCER (CODE): ICD-10-CM

## 2022-05-10 PROCEDURE — 87205 SMEAR GRAM STAIN: CPT | Performed by: SURGERY

## 2022-05-10 PROCEDURE — 87186 SC STD MICRODIL/AGAR DIL: CPT | Performed by: SURGERY

## 2022-05-10 PROCEDURE — 87075 CULTR BACTERIA EXCEPT BLOOD: CPT | Performed by: SURGERY

## 2022-05-10 PROCEDURE — 87076 CULTURE ANAEROBE IDENT EACH: CPT | Performed by: SURGERY

## 2022-05-10 PROCEDURE — 87070 CULTURE OTHR SPECIMN AEROBIC: CPT | Performed by: SURGERY

## 2022-05-10 PROCEDURE — 87015 SPECIMEN INFECT AGNT CONCNTJ: CPT | Performed by: SURGERY

## 2022-05-10 PROCEDURE — 87077 CULTURE AEROBIC IDENTIFY: CPT | Performed by: SURGERY

## 2022-05-10 PROCEDURE — G0463 HOSPITAL OUTPT CLINIC VISIT: HCPCS

## 2022-05-13 LAB
BACTERIA SPEC AEROBE CULT: ABNORMAL
BACTERIA SPEC AEROBE CULT: ABNORMAL
GRAM STN SPEC: ABNORMAL

## 2022-05-15 LAB
BACTERIA SPEC ANAEROBE CULT: ABNORMAL
BACTERIA SPEC ANAEROBE CULT: ABNORMAL

## 2022-05-24 ENCOUNTER — OFFICE VISIT (OUTPATIENT)
Dept: WOUND CARE | Facility: HOSPITAL | Age: 87
End: 2022-05-24

## 2022-05-24 PROCEDURE — 17250 CHEM CAUT OF GRANLTJ TISSUE: CPT

## 2022-06-21 ENCOUNTER — OFFICE VISIT (OUTPATIENT)
Dept: WOUND CARE | Facility: HOSPITAL | Age: 87
End: 2022-06-21

## 2022-06-21 PROCEDURE — 17250 CHEM CAUT OF GRANLTJ TISSUE: CPT

## 2022-07-19 ENCOUNTER — OFFICE VISIT (OUTPATIENT)
Dept: WOUND CARE | Facility: HOSPITAL | Age: 87
End: 2022-07-19

## 2022-07-19 PROCEDURE — G0463 HOSPITAL OUTPT CLINIC VISIT: HCPCS

## 2022-08-09 ENCOUNTER — OFFICE VISIT (OUTPATIENT)
Dept: WOUND CARE | Facility: HOSPITAL | Age: 87
End: 2022-08-09

## 2022-08-09 PROCEDURE — G0463 HOSPITAL OUTPT CLINIC VISIT: HCPCS

## 2022-09-06 ENCOUNTER — OFFICE VISIT (OUTPATIENT)
Dept: WOUND CARE | Facility: HOSPITAL | Age: 87
End: 2022-09-06

## 2022-09-06 PROCEDURE — G0463 HOSPITAL OUTPT CLINIC VISIT: HCPCS

## 2022-10-04 ENCOUNTER — OFFICE VISIT (OUTPATIENT)
Dept: WOUND CARE | Facility: HOSPITAL | Age: 87
End: 2022-10-04

## 2022-10-04 PROCEDURE — G0463 HOSPITAL OUTPT CLINIC VISIT: HCPCS

## 2023-02-07 ENCOUNTER — HOSPITAL ENCOUNTER (OUTPATIENT)
Facility: HOSPITAL | Age: 88
Setting detail: OBSERVATION
Discharge: NURSING, REHAB OR LONG-TERM CARE | End: 2023-02-09
Attending: EMERGENCY MEDICINE | Admitting: HOSPITALIST
Payer: MEDICARE

## 2023-02-07 DIAGNOSIS — T81.49XA WOUND INFECTION AFTER SURGERY: ICD-10-CM

## 2023-02-07 DIAGNOSIS — S81.811A LEG LACERATION, RIGHT, INITIAL ENCOUNTER: Primary | ICD-10-CM

## 2023-02-07 PROCEDURE — 85730 THROMBOPLASTIN TIME PARTIAL: CPT | Performed by: PHYSICIAN ASSISTANT

## 2023-02-07 PROCEDURE — 80053 COMPREHEN METABOLIC PANEL: CPT | Performed by: PHYSICIAN ASSISTANT

## 2023-02-07 PROCEDURE — 85025 COMPLETE CBC W/AUTO DIFF WBC: CPT | Performed by: PHYSICIAN ASSISTANT

## 2023-02-07 PROCEDURE — 99284 EMERGENCY DEPT VISIT MOD MDM: CPT

## 2023-02-07 PROCEDURE — 85610 PROTHROMBIN TIME: CPT | Performed by: PHYSICIAN ASSISTANT

## 2023-02-07 PROCEDURE — G0378 HOSPITAL OBSERVATION PER HR: HCPCS

## 2023-02-07 RX ORDER — SODIUM CHLORIDE 0.9 % (FLUSH) 0.9 %
10 SYRINGE (ML) INJECTION AS NEEDED
Status: DISCONTINUED | OUTPATIENT
Start: 2023-02-07 | End: 2023-02-09 | Stop reason: HOSPADM

## 2023-02-07 RX ORDER — LIDOCAINE HYDROCHLORIDE AND EPINEPHRINE 10; 10 MG/ML; UG/ML
20 INJECTION, SOLUTION INFILTRATION; PERINEURAL ONCE
Status: COMPLETED | OUTPATIENT
Start: 2023-02-07 | End: 2023-02-07

## 2023-02-07 RX ADMIN — LIDOCAINE HYDROCHLORIDE AND EPINEPHRINE 20 ML: 10; 10 INJECTION, SOLUTION INFILTRATION; PERINEURAL at 23:54

## 2023-02-08 PROBLEM — Z86.718 HISTORY OF DVT (DEEP VEIN THROMBOSIS): Status: ACTIVE | Noted: 2023-02-08

## 2023-02-08 LAB
ALBUMIN SERPL-MCNC: 3.6 G/DL (ref 3.5–5.2)
ALBUMIN/GLOB SERPL: 1.1 G/DL
ALP SERPL-CCNC: 67 U/L (ref 39–117)
ALT SERPL W P-5'-P-CCNC: 16 U/L (ref 1–33)
ANION GAP SERPL CALCULATED.3IONS-SCNC: 12.5 MMOL/L (ref 5–15)
ANION GAP SERPL CALCULATED.3IONS-SCNC: 7.8 MMOL/L (ref 5–15)
APTT PPP: 33.7 SECONDS (ref 22.7–35.4)
AST SERPL-CCNC: 27 U/L (ref 1–32)
BASOPHILS # BLD AUTO: 0.04 10*3/MM3 (ref 0–0.2)
BASOPHILS NFR BLD AUTO: 0.5 % (ref 0–1.5)
BILIRUB SERPL-MCNC: 0.4 MG/DL (ref 0–1.2)
BUN SERPL-MCNC: 26 MG/DL (ref 8–23)
BUN SERPL-MCNC: 28 MG/DL (ref 8–23)
BUN/CREAT SERPL: 19.3 (ref 7–25)
BUN/CREAT SERPL: 22.4 (ref 7–25)
CALCIUM SPEC-SCNC: 8.9 MG/DL (ref 8.2–9.6)
CALCIUM SPEC-SCNC: 8.9 MG/DL (ref 8.2–9.6)
CHLORIDE SERPL-SCNC: 105 MMOL/L (ref 98–107)
CHLORIDE SERPL-SCNC: 106 MMOL/L (ref 98–107)
CO2 SERPL-SCNC: 22.5 MMOL/L (ref 22–29)
CO2 SERPL-SCNC: 26.2 MMOL/L (ref 22–29)
CREAT SERPL-MCNC: 1.16 MG/DL (ref 0.57–1)
CREAT SERPL-MCNC: 1.45 MG/DL (ref 0.57–1)
DEPRECATED RDW RBC AUTO: 41.4 FL (ref 37–54)
DEPRECATED RDW RBC AUTO: 42.4 FL (ref 37–54)
EGFRCR SERPLBLD CKD-EPI 2021: 33.1 ML/MIN/1.73
EGFRCR SERPLBLD CKD-EPI 2021: 43.2 ML/MIN/1.73
EOSINOPHIL # BLD AUTO: 0.11 10*3/MM3 (ref 0–0.4)
EOSINOPHIL NFR BLD AUTO: 1.4 % (ref 0.3–6.2)
ERYTHROCYTE [DISTWIDTH] IN BLOOD BY AUTOMATED COUNT: 12.6 % (ref 12.3–15.4)
ERYTHROCYTE [DISTWIDTH] IN BLOOD BY AUTOMATED COUNT: 12.7 % (ref 12.3–15.4)
GLOBULIN UR ELPH-MCNC: 3.3 GM/DL
GLUCOSE BLDC GLUCOMTR-MCNC: 145 MG/DL (ref 70–130)
GLUCOSE BLDC GLUCOMTR-MCNC: 159 MG/DL (ref 70–130)
GLUCOSE BLDC GLUCOMTR-MCNC: 172 MG/DL (ref 70–130)
GLUCOSE SERPL-MCNC: 146 MG/DL (ref 65–99)
GLUCOSE SERPL-MCNC: 164 MG/DL (ref 65–99)
HBA1C MFR BLD: 6.2 % (ref 4.8–5.6)
HCT VFR BLD AUTO: 35.2 % (ref 34–46.6)
HCT VFR BLD AUTO: 39.1 % (ref 34–46.6)
HGB BLD-MCNC: 11.1 G/DL (ref 12–15.9)
HGB BLD-MCNC: 12.4 G/DL (ref 12–15.9)
IMM GRANULOCYTES # BLD AUTO: 0.03 10*3/MM3 (ref 0–0.05)
IMM GRANULOCYTES NFR BLD AUTO: 0.4 % (ref 0–0.5)
INR PPP: 1.32 (ref 0.9–1.1)
LYMPHOCYTES # BLD AUTO: 3.64 10*3/MM3 (ref 0.7–3.1)
LYMPHOCYTES NFR BLD AUTO: 47.8 % (ref 19.6–45.3)
MCH RBC QN AUTO: 28.9 PG (ref 26.6–33)
MCH RBC QN AUTO: 29.4 PG (ref 26.6–33)
MCHC RBC AUTO-ENTMCNC: 31.5 G/DL (ref 31.5–35.7)
MCHC RBC AUTO-ENTMCNC: 31.7 G/DL (ref 31.5–35.7)
MCV RBC AUTO: 91.1 FL (ref 79–97)
MCV RBC AUTO: 93.4 FL (ref 79–97)
MONOCYTES # BLD AUTO: 0.7 10*3/MM3 (ref 0.1–0.9)
MONOCYTES NFR BLD AUTO: 9.2 % (ref 5–12)
NEUTROPHILS NFR BLD AUTO: 3.09 10*3/MM3 (ref 1.7–7)
NEUTROPHILS NFR BLD AUTO: 40.7 % (ref 42.7–76)
NRBC BLD AUTO-RTO: 0 /100 WBC (ref 0–0.2)
PLATELET # BLD AUTO: 167 10*3/MM3 (ref 140–450)
PLATELET # BLD AUTO: 179 10*3/MM3 (ref 140–450)
PMV BLD AUTO: 9.4 FL (ref 6–12)
PMV BLD AUTO: 9.9 FL (ref 6–12)
POTASSIUM SERPL-SCNC: 4.3 MMOL/L (ref 3.5–5.2)
POTASSIUM SERPL-SCNC: 4.4 MMOL/L (ref 3.5–5.2)
PROT SERPL-MCNC: 6.9 G/DL (ref 6–8.5)
PROTHROMBIN TIME: 16.5 SECONDS (ref 11.7–14.2)
RBC # BLD AUTO: 3.77 10*6/MM3 (ref 3.77–5.28)
RBC # BLD AUTO: 4.29 10*6/MM3 (ref 3.77–5.28)
SODIUM SERPL-SCNC: 140 MMOL/L (ref 136–145)
SODIUM SERPL-SCNC: 140 MMOL/L (ref 136–145)
WBC NRBC COR # BLD: 4.89 10*3/MM3 (ref 3.4–10.8)
WBC NRBC COR # BLD: 7.61 10*3/MM3 (ref 3.4–10.8)

## 2023-02-08 PROCEDURE — 82962 GLUCOSE BLOOD TEST: CPT

## 2023-02-08 PROCEDURE — 80048 BASIC METABOLIC PNL TOTAL CA: CPT | Performed by: NURSE PRACTITIONER

## 2023-02-08 PROCEDURE — 96365 THER/PROPH/DIAG IV INF INIT: CPT

## 2023-02-08 PROCEDURE — G0378 HOSPITAL OBSERVATION PER HR: HCPCS

## 2023-02-08 PROCEDURE — 36415 COLL VENOUS BLD VENIPUNCTURE: CPT | Performed by: NURSE PRACTITIONER

## 2023-02-08 PROCEDURE — 96361 HYDRATE IV INFUSION ADD-ON: CPT

## 2023-02-08 PROCEDURE — 85027 COMPLETE CBC AUTOMATED: CPT | Performed by: NURSE PRACTITIONER

## 2023-02-08 PROCEDURE — 83036 HEMOGLOBIN GLYCOSYLATED A1C: CPT | Performed by: NURSE PRACTITIONER

## 2023-02-08 PROCEDURE — 25010000002 VANCOMYCIN 10 G RECONSTITUTED SOLUTION: Performed by: NURSE PRACTITIONER

## 2023-02-08 PROCEDURE — 63710000001 INSULIN LISPRO (HUMAN) PER 5 UNITS: Performed by: NURSE PRACTITIONER

## 2023-02-08 RX ORDER — ACETAMINOPHEN 160 MG/5ML
650 SOLUTION ORAL EVERY 4 HOURS PRN
Status: DISCONTINUED | OUTPATIENT
Start: 2023-02-08 | End: 2023-02-09 | Stop reason: HOSPADM

## 2023-02-08 RX ORDER — SODIUM CHLORIDE 9 MG/ML
100 INJECTION, SOLUTION INTRAVENOUS CONTINUOUS
Status: DISCONTINUED | OUTPATIENT
Start: 2023-02-08 | End: 2023-02-08

## 2023-02-08 RX ORDER — LOPERAMIDE HYDROCHLORIDE 2 MG/1
2 CAPSULE ORAL AS NEEDED
COMMUNITY

## 2023-02-08 RX ORDER — NICOTINE POLACRILEX 4 MG
15 LOZENGE BUCCAL
Status: DISCONTINUED | OUTPATIENT
Start: 2023-02-08 | End: 2023-02-09 | Stop reason: HOSPADM

## 2023-02-08 RX ORDER — SODIUM CHLORIDE 0.9 % (FLUSH) 0.9 %
10 SYRINGE (ML) INJECTION EVERY 12 HOURS SCHEDULED
Status: DISCONTINUED | OUTPATIENT
Start: 2023-02-08 | End: 2023-02-09 | Stop reason: HOSPADM

## 2023-02-08 RX ORDER — CALCIUM CARBONATE 200(500)MG
2 TABLET,CHEWABLE ORAL 2 TIMES DAILY PRN
Status: DISCONTINUED | OUTPATIENT
Start: 2023-02-08 | End: 2023-02-09 | Stop reason: HOSPADM

## 2023-02-08 RX ORDER — ONDANSETRON 4 MG/1
4 TABLET, FILM COATED ORAL EVERY 6 HOURS PRN
Status: DISCONTINUED | OUTPATIENT
Start: 2023-02-08 | End: 2023-02-09 | Stop reason: HOSPADM

## 2023-02-08 RX ORDER — MIRTAZAPINE 15 MG/1
7.5 TABLET, FILM COATED ORAL NIGHTLY
COMMUNITY

## 2023-02-08 RX ORDER — DEXTROSE MONOHYDRATE 25 G/50ML
25 INJECTION, SOLUTION INTRAVENOUS
Status: DISCONTINUED | OUTPATIENT
Start: 2023-02-08 | End: 2023-02-09 | Stop reason: HOSPADM

## 2023-02-08 RX ORDER — METOPROLOL SUCCINATE 25 MG/1
25 TABLET, EXTENDED RELEASE ORAL
Status: DISCONTINUED | OUTPATIENT
Start: 2023-02-08 | End: 2023-02-09 | Stop reason: HOSPADM

## 2023-02-08 RX ORDER — SODIUM CHLORIDE 0.9 % (FLUSH) 0.9 %
10 SYRINGE (ML) INJECTION AS NEEDED
Status: DISCONTINUED | OUTPATIENT
Start: 2023-02-08 | End: 2023-02-09 | Stop reason: HOSPADM

## 2023-02-08 RX ORDER — IBUPROFEN 600 MG/1
1 TABLET ORAL AS NEEDED
Status: DISCONTINUED | OUTPATIENT
Start: 2023-02-08 | End: 2023-02-09 | Stop reason: HOSPADM

## 2023-02-08 RX ORDER — FLUTICASONE PROPIONATE 50 MCG
2 SPRAY, SUSPENSION (ML) NASAL 2 TIMES DAILY
Status: DISCONTINUED | OUTPATIENT
Start: 2023-02-08 | End: 2023-02-09 | Stop reason: HOSPADM

## 2023-02-08 RX ORDER — ACETAMINOPHEN 325 MG/1
650 TABLET ORAL EVERY 4 HOURS PRN
Status: DISCONTINUED | OUTPATIENT
Start: 2023-02-08 | End: 2023-02-09 | Stop reason: HOSPADM

## 2023-02-08 RX ORDER — HYDROCODONE BITARTRATE AND ACETAMINOPHEN 5; 325 MG/1; MG/1
1 TABLET ORAL EVERY 6 HOURS PRN
Status: DISCONTINUED | OUTPATIENT
Start: 2023-02-08 | End: 2023-02-09 | Stop reason: HOSPADM

## 2023-02-08 RX ORDER — SERTRALINE HYDROCHLORIDE 25 MG/1
25 TABLET, FILM COATED ORAL DAILY
Status: DISCONTINUED | OUTPATIENT
Start: 2023-02-08 | End: 2023-02-09 | Stop reason: HOSPADM

## 2023-02-08 RX ORDER — INSULIN LISPRO 100 [IU]/ML
0-9 INJECTION, SOLUTION INTRAVENOUS; SUBCUTANEOUS
Status: DISCONTINUED | OUTPATIENT
Start: 2023-02-08 | End: 2023-02-09 | Stop reason: HOSPADM

## 2023-02-08 RX ORDER — LIDOCAINE 4 G/G
1 PATCH TOPICAL EVERY 24 HOURS
COMMUNITY

## 2023-02-08 RX ORDER — AMOXICILLIN 250 MG
2 CAPSULE ORAL NIGHTLY
Status: DISCONTINUED | OUTPATIENT
Start: 2023-02-08 | End: 2023-02-09 | Stop reason: HOSPADM

## 2023-02-08 RX ORDER — SODIUM CHLORIDE 9 MG/ML
40 INJECTION, SOLUTION INTRAVENOUS AS NEEDED
Status: DISCONTINUED | OUTPATIENT
Start: 2023-02-08 | End: 2023-02-09 | Stop reason: HOSPADM

## 2023-02-08 RX ORDER — ONDANSETRON 2 MG/ML
4 INJECTION INTRAMUSCULAR; INTRAVENOUS EVERY 6 HOURS PRN
Status: DISCONTINUED | OUTPATIENT
Start: 2023-02-08 | End: 2023-02-09 | Stop reason: HOSPADM

## 2023-02-08 RX ORDER — LACTULOSE 10 G/15ML
10 SOLUTION ORAL DAILY
Status: DISCONTINUED | OUTPATIENT
Start: 2023-02-08 | End: 2023-02-09 | Stop reason: HOSPADM

## 2023-02-08 RX ORDER — FAMOTIDINE 20 MG/1
20 TABLET, FILM COATED ORAL
Status: DISCONTINUED | OUTPATIENT
Start: 2023-02-08 | End: 2023-02-09 | Stop reason: HOSPADM

## 2023-02-08 RX ORDER — MIRTAZAPINE 15 MG/1
7.5 TABLET, FILM COATED ORAL NIGHTLY
Status: DISCONTINUED | OUTPATIENT
Start: 2023-02-08 | End: 2023-02-09 | Stop reason: HOSPADM

## 2023-02-08 RX ORDER — ACETAMINOPHEN 650 MG/1
650 SUPPOSITORY RECTAL EVERY 4 HOURS PRN
Status: DISCONTINUED | OUTPATIENT
Start: 2023-02-08 | End: 2023-02-09 | Stop reason: HOSPADM

## 2023-02-08 RX ADMIN — SERTRALINE 25 MG: 25 TABLET, FILM COATED ORAL at 12:20

## 2023-02-08 RX ADMIN — INSULIN LISPRO 2 UNITS: 100 INJECTION, SOLUTION INTRAVENOUS; SUBCUTANEOUS at 08:29

## 2023-02-08 RX ADMIN — SODIUM CHLORIDE 100 ML/HR: 9 INJECTION, SOLUTION INTRAVENOUS at 02:18

## 2023-02-08 RX ADMIN — FLUTICASONE PROPIONATE 2 SPRAY: 50 SPRAY, METERED NASAL at 22:11

## 2023-02-08 RX ADMIN — VANCOMYCIN HYDROCHLORIDE 1500 MG: 10 INJECTION, POWDER, LYOPHILIZED, FOR SOLUTION INTRAVENOUS at 02:17

## 2023-02-08 RX ADMIN — LACTULOSE 10 G: 10 SOLUTION ORAL at 12:20

## 2023-02-08 RX ADMIN — FAMOTIDINE 20 MG: 20 TABLET, FILM COATED ORAL at 17:36

## 2023-02-08 RX ADMIN — METOPROLOL SUCCINATE 25 MG: 25 TABLET, EXTENDED RELEASE ORAL at 12:20

## 2023-02-08 RX ADMIN — INSULIN LISPRO 2 UNITS: 100 INJECTION, SOLUTION INTRAVENOUS; SUBCUTANEOUS at 12:20

## 2023-02-08 RX ADMIN — DOCUSATE SODIUM 50MG AND SENNOSIDES 8.6MG 2 TABLET: 8.6; 5 TABLET, FILM COATED ORAL at 22:11

## 2023-02-08 RX ADMIN — FLUTICASONE PROPIONATE 2 SPRAY: 50 SPRAY, METERED NASAL at 12:21

## 2023-02-08 RX ADMIN — MIRTAZAPINE 7.5 MG: 15 TABLET, FILM COATED ORAL at 22:11

## 2023-02-08 RX ADMIN — Medication 10 ML: at 08:28

## 2023-02-08 RX ADMIN — Medication 10 ML: at 22:12

## 2023-02-09 VITALS
TEMPERATURE: 97.6 F | WEIGHT: 159.17 LBS | HEART RATE: 108 BPM | RESPIRATION RATE: 16 BRPM | SYSTOLIC BLOOD PRESSURE: 136 MMHG | OXYGEN SATURATION: 98 % | DIASTOLIC BLOOD PRESSURE: 82 MMHG | HEIGHT: 67 IN | BODY MASS INDEX: 24.98 KG/M2

## 2023-02-09 LAB
ANION GAP SERPL CALCULATED.3IONS-SCNC: 6.9 MMOL/L (ref 5–15)
BASOPHILS # BLD AUTO: 0.03 10*3/MM3 (ref 0–0.2)
BASOPHILS NFR BLD AUTO: 0.6 % (ref 0–1.5)
BUN SERPL-MCNC: 18 MG/DL (ref 8–23)
BUN/CREAT SERPL: 18.4 (ref 7–25)
CALCIUM SPEC-SCNC: 9.1 MG/DL (ref 8.2–9.6)
CHLORIDE SERPL-SCNC: 106 MMOL/L (ref 98–107)
CO2 SERPL-SCNC: 28.1 MMOL/L (ref 22–29)
CREAT SERPL-MCNC: 0.98 MG/DL (ref 0.57–1)
DEPRECATED RDW RBC AUTO: 43.5 FL (ref 37–54)
EGFRCR SERPLBLD CKD-EPI 2021: 52.9 ML/MIN/1.73
EOSINOPHIL # BLD AUTO: 0.11 10*3/MM3 (ref 0–0.4)
EOSINOPHIL NFR BLD AUTO: 2.2 % (ref 0.3–6.2)
ERYTHROCYTE [DISTWIDTH] IN BLOOD BY AUTOMATED COUNT: 12.8 % (ref 12.3–15.4)
GLUCOSE BLDC GLUCOMTR-MCNC: 117 MG/DL (ref 70–130)
GLUCOSE BLDC GLUCOMTR-MCNC: 151 MG/DL (ref 70–130)
GLUCOSE SERPL-MCNC: 126 MG/DL (ref 65–99)
HCT VFR BLD AUTO: 36.1 % (ref 34–46.6)
HGB BLD-MCNC: 11.4 G/DL (ref 12–15.9)
IMM GRANULOCYTES # BLD AUTO: 0.02 10*3/MM3 (ref 0–0.05)
IMM GRANULOCYTES NFR BLD AUTO: 0.4 % (ref 0–0.5)
LYMPHOCYTES # BLD AUTO: 1.16 10*3/MM3 (ref 0.7–3.1)
LYMPHOCYTES NFR BLD AUTO: 23.5 % (ref 19.6–45.3)
MCH RBC QN AUTO: 29.5 PG (ref 26.6–33)
MCHC RBC AUTO-ENTMCNC: 31.6 G/DL (ref 31.5–35.7)
MCV RBC AUTO: 93.3 FL (ref 79–97)
MONOCYTES # BLD AUTO: 0.57 10*3/MM3 (ref 0.1–0.9)
MONOCYTES NFR BLD AUTO: 11.6 % (ref 5–12)
NEUTROPHILS NFR BLD AUTO: 3.04 10*3/MM3 (ref 1.7–7)
NEUTROPHILS NFR BLD AUTO: 61.7 % (ref 42.7–76)
NRBC BLD AUTO-RTO: 0 /100 WBC (ref 0–0.2)
PLATELET # BLD AUTO: 149 10*3/MM3 (ref 140–450)
PMV BLD AUTO: 9.7 FL (ref 6–12)
POTASSIUM SERPL-SCNC: 4 MMOL/L (ref 3.5–5.2)
RBC # BLD AUTO: 3.87 10*6/MM3 (ref 3.77–5.28)
SARS-COV-2 RNA RESP QL NAA+PROBE: NOT DETECTED
SODIUM SERPL-SCNC: 141 MMOL/L (ref 136–145)
WBC NRBC COR # BLD: 4.93 10*3/MM3 (ref 3.4–10.8)

## 2023-02-09 PROCEDURE — 85025 COMPLETE CBC W/AUTO DIFF WBC: CPT | Performed by: HOSPITALIST

## 2023-02-09 PROCEDURE — G0378 HOSPITAL OBSERVATION PER HR: HCPCS

## 2023-02-09 PROCEDURE — U0005 INFEC AGEN DETEC AMPLI PROBE: HCPCS | Performed by: HOSPITALIST

## 2023-02-09 PROCEDURE — 82962 GLUCOSE BLOOD TEST: CPT

## 2023-02-09 PROCEDURE — 80048 BASIC METABOLIC PNL TOTAL CA: CPT | Performed by: HOSPITALIST

## 2023-02-09 PROCEDURE — U0003 INFECTIOUS AGENT DETECTION BY NUCLEIC ACID (DNA OR RNA); SEVERE ACUTE RESPIRATORY SYNDROME CORONAVIRUS 2 (SARS-COV-2) (CORONAVIRUS DISEASE [COVID-19]), AMPLIFIED PROBE TECHNIQUE, MAKING USE OF HIGH THROUGHPUT TECHNOLOGIES AS DESCRIBED BY CMS-2020-01-R: HCPCS | Performed by: HOSPITALIST

## 2023-02-09 RX ORDER — HYDROCODONE BITARTRATE AND ACETAMINOPHEN 5; 325 MG/1; MG/1
1-2 TABLET ORAL EVERY 6 HOURS PRN
Qty: 12 TABLET | Refills: 0 | Status: SHIPPED | OUTPATIENT
Start: 2023-02-09

## 2023-02-09 RX ORDER — FAMOTIDINE 20 MG/1
20 TABLET, FILM COATED ORAL 2 TIMES DAILY
Qty: 60 TABLET | Refills: 0 | Status: SHIPPED | OUTPATIENT
Start: 2023-02-09

## 2023-02-09 RX ORDER — METOPROLOL SUCCINATE 25 MG/1
25 TABLET, EXTENDED RELEASE ORAL
Start: 2023-02-10

## 2023-02-09 RX ADMIN — METOPROLOL SUCCINATE 25 MG: 25 TABLET, EXTENDED RELEASE ORAL at 08:59

## 2023-02-09 RX ADMIN — FAMOTIDINE 20 MG: 20 TABLET, FILM COATED ORAL at 06:37

## 2023-02-09 RX ADMIN — LACTULOSE 10 G: 10 SOLUTION ORAL at 09:00

## 2023-02-09 RX ADMIN — SERTRALINE 25 MG: 25 TABLET, FILM COATED ORAL at 08:59

## 2023-02-09 RX ADMIN — Medication 10 ML: at 09:02

## 2023-02-09 RX ADMIN — FLUTICASONE PROPIONATE 2 SPRAY: 50 SPRAY, METERED NASAL at 09:02

## 2023-03-01 ENCOUNTER — OFFICE VISIT (OUTPATIENT)
Dept: WOUND CARE | Facility: HOSPITAL | Age: 88
End: 2023-03-01
Payer: MEDICARE

## 2023-03-01 PROCEDURE — G0463 HOSPITAL OUTPT CLINIC VISIT: HCPCS

## 2023-03-15 ENCOUNTER — OFFICE VISIT (OUTPATIENT)
Dept: WOUND CARE | Facility: HOSPITAL | Age: 88
End: 2023-03-15
Payer: MEDICARE

## 2023-03-15 PROCEDURE — G0463 HOSPITAL OUTPT CLINIC VISIT: HCPCS

## 2023-03-30 ENCOUNTER — OFFICE VISIT (OUTPATIENT)
Dept: WOUND CARE | Facility: HOSPITAL | Age: 88
End: 2023-03-30
Payer: MEDICARE

## 2023-03-30 PROCEDURE — G0463 HOSPITAL OUTPT CLINIC VISIT: HCPCS

## 2023-04-13 ENCOUNTER — OFFICE VISIT (OUTPATIENT)
Dept: WOUND CARE | Facility: HOSPITAL | Age: 88
End: 2023-04-13
Payer: MEDICARE

## 2023-04-13 PROCEDURE — G0463 HOSPITAL OUTPT CLINIC VISIT: HCPCS

## 2023-05-04 ENCOUNTER — OFFICE VISIT (OUTPATIENT)
Dept: WOUND CARE | Facility: HOSPITAL | Age: 88
End: 2023-05-04
Payer: MEDICARE

## 2023-05-04 PROCEDURE — G0463 HOSPITAL OUTPT CLINIC VISIT: HCPCS

## (undated) DEVICE — T-DRAPE,EXTREMITY,STERILE: Brand: MEDLINE

## (undated) DEVICE — SUT VIC 2/0 CT2 27IN J269H

## (undated) DEVICE — PK ORTHO MAJ 40

## (undated) DEVICE — SOL ISO/ALC RUB 70PCT 4OZ

## (undated) DEVICE — ST IRR CYSTO W/SPK 77IN LF

## (undated) DEVICE — BEAD MOLD: Brand: OSTEOSET

## (undated) DEVICE — PERI-LOC TARGETER 3.5MM                                    PROVISIONAL FIXATION PIN 40MM: Brand: PERI-LOC

## (undated) DEVICE — GOWN,PREVENTION PLUS,XLONG/XLARGE,STRL: Brand: MEDLINE

## (undated) DEVICE — GLV SURG PREMIERPRO ORTHO LTX PF SZ8.5 BRN

## (undated) DEVICE — APPL CHLORAPREP W/TINT 26ML ORNG

## (undated) DEVICE — UNDERCAST PADDING: Brand: DEROYAL

## (undated) DEVICE — SUT ETHIB 2 CV V37 MS/4 30IN MX69G

## (undated) DEVICE — BNDG ELAS ELITE V/CLOSE 4IN 5YD LF STRL

## (undated) DEVICE — TRY SKINPREP DRYPREP

## (undated) DEVICE — DRAPE,U/ SHT,SPLIT,PLAS,STERIL: Brand: MEDLINE

## (undated) DEVICE — STPLR SKIN VISISTAT WD 35CT

## (undated) DEVICE — 3M™ STERI-DRAPE™ INSTRUMENT POUCH 1018: Brand: STERI-DRAPE™

## (undated) DEVICE — DRSNG SURESITE WNDW 4X4.5

## (undated) DEVICE — GAUZE,SPONGE,FLUFF,6"X6.75",STRL,10/TRAY: Brand: MEDLINE

## (undated) DEVICE — PERI-LOC TARGETER LG FRAG 3.5MM                                    DRILL BIT W/QC: Brand: PERI-LOC

## (undated) DEVICE — TOWEL,OR,DSP,ST,BLUE,STD,4/PK,20PK/CS: Brand: MEDLINE

## (undated) DEVICE — DRP C/ARMOR

## (undated) DEVICE — PAD,ABDOMINAL,8"X10",ST,LF: Brand: MEDLINE

## (undated) DEVICE — GLV SURG SENSICARE GREEN W/ALOE PF LF 8.5 STRL

## (undated) DEVICE — BNDG ELAS ELITE V/CLOSE 6IN 5YD LF STRL

## (undated) DEVICE — ADHS SKIN DERMABOND TOP ADVANCED

## (undated) DEVICE — SUT VIC 0 CT1 36IN J946H

## (undated) DEVICE — GLV SURG SENSICARE PI PF LF 8.5 GRN STRL

## (undated) DEVICE — DRSNG TELFA PAD NONADH STR 1S 3X8IN

## (undated) DEVICE — PICO 7 10CM X 30CM: Brand: PICO™ 7

## (undated) DEVICE — DRSNG GZ PETROLTM XEROFORM CURAD 1X8IN STRL